# Patient Record
Sex: MALE | Race: WHITE | NOT HISPANIC OR LATINO | URBAN - METROPOLITAN AREA
[De-identification: names, ages, dates, MRNs, and addresses within clinical notes are randomized per-mention and may not be internally consistent; named-entity substitution may affect disease eponyms.]

---

## 2021-12-20 PROBLEM — I63.9 CVA (CEREBRAL VASCULAR ACCIDENT): Status: ACTIVE | Noted: 2021-12-20

## 2021-12-21 ENCOUNTER — HOSPITAL ENCOUNTER (INPATIENT)
Facility: HOSPITAL | Age: 56
LOS: 16 days | Discharge: NURSING, REHAB OR LONG-TERM CARE | DRG: 064 | End: 2022-01-06
Attending: INTERNAL MEDICINE | Admitting: INTERNAL MEDICINE
Payer: MEDICAID

## 2021-12-21 DIAGNOSIS — I73.9 PAD (PERIPHERAL ARTERY DISEASE): ICD-10-CM

## 2021-12-21 DIAGNOSIS — I63.412 STROKE DUE TO EMBOLISM OF LEFT MIDDLE CEREBRAL ARTERY: ICD-10-CM

## 2021-12-21 DIAGNOSIS — I51.3 MURAL THROMBUS OF CARDIAC APEX: ICD-10-CM

## 2021-12-21 DIAGNOSIS — I63.9 ISCHEMIC STROKE: ICD-10-CM

## 2021-12-21 DIAGNOSIS — R09.89: ICD-10-CM

## 2021-12-21 DIAGNOSIS — I63.9 STROKE: ICD-10-CM

## 2021-12-21 DIAGNOSIS — I50.40 COMBINED SYSTOLIC AND DIASTOLIC CONGESTIVE HEART FAILURE, UNSPECIFIED HF CHRONICITY: Primary | ICD-10-CM

## 2021-12-21 DIAGNOSIS — R94.31 QT PROLONGATION: ICD-10-CM

## 2021-12-21 DIAGNOSIS — I42.9 CARDIOMYOPATHY: ICD-10-CM

## 2021-12-21 PROBLEM — I51.89 GRADE III DIASTOLIC DYSFUNCTION: Status: ACTIVE | Noted: 2021-12-21

## 2021-12-21 PROBLEM — R53.81 DEBILITY: Status: ACTIVE | Noted: 2021-12-21

## 2021-12-21 PROBLEM — I10 ESSENTIAL HYPERTENSION: Status: ACTIVE | Noted: 2021-12-21

## 2021-12-21 PROBLEM — G93.6 CYTOTOXIC CEREBRAL EDEMA: Status: ACTIVE | Noted: 2021-12-21

## 2021-12-21 LAB
ABO + RH BLD: NORMAL
BLD GP AB SCN CELLS X3 SERPL QL: NORMAL
CHOLEST SERPL-MCNC: 162 MG/DL (ref 120–199)
CHOLEST/HDLC SERPL: 2.8 {RATIO} (ref 2–5)
HDLC SERPL-MCNC: 58 MG/DL (ref 40–75)
HDLC SERPL: 35.8 % (ref 20–50)
LDLC SERPL CALC-MCNC: 89.2 MG/DL (ref 63–159)
NONHDLC SERPL-MCNC: 104 MG/DL
POCT GLUCOSE: 83 MG/DL (ref 70–110)
TRIGL SERPL-MCNC: 74 MG/DL (ref 30–150)

## 2021-12-21 PROCEDURE — 25000003 PHARM REV CODE 250

## 2021-12-21 PROCEDURE — 81001 URINALYSIS AUTO W/SCOPE: CPT

## 2021-12-21 PROCEDURE — 99223 PR INITIAL HOSPITAL CARE,LEVL III: ICD-10-PCS | Mod: ,,, | Performed by: PSYCHIATRY & NEUROLOGY

## 2021-12-21 PROCEDURE — 99223 1ST HOSP IP/OBS HIGH 75: CPT | Mod: ,,, | Performed by: NEUROLOGICAL SURGERY

## 2021-12-21 PROCEDURE — 80061 LIPID PANEL: CPT

## 2021-12-21 PROCEDURE — 36415 COLL VENOUS BLD VENIPUNCTURE: CPT | Performed by: INTERNAL MEDICINE

## 2021-12-21 PROCEDURE — 99223 PR INITIAL HOSPITAL CARE,LEVL III: ICD-10-PCS | Mod: ,,, | Performed by: NEUROLOGICAL SURGERY

## 2021-12-21 PROCEDURE — 20000000 HC ICU ROOM

## 2021-12-21 PROCEDURE — 99291 CRITICAL CARE FIRST HOUR: CPT | Mod: ,,,

## 2021-12-21 PROCEDURE — 99223 1ST HOSP IP/OBS HIGH 75: CPT | Mod: ,,, | Performed by: PSYCHIATRY & NEUROLOGY

## 2021-12-21 PROCEDURE — 99291 PR CRITICAL CARE, E/M 30-74 MINUTES: ICD-10-PCS | Mod: ,,,

## 2021-12-21 PROCEDURE — 86850 RBC ANTIBODY SCREEN: CPT

## 2021-12-21 RX ORDER — ONDANSETRON 2 MG/ML
4 INJECTION INTRAMUSCULAR; INTRAVENOUS EVERY 8 HOURS PRN
Status: DISCONTINUED | OUTPATIENT
Start: 2021-12-21 | End: 2022-01-06 | Stop reason: HOSPADM

## 2021-12-21 RX ORDER — ACETAMINOPHEN 325 MG/1
650 TABLET ORAL EVERY 6 HOURS PRN
Status: DISCONTINUED | OUTPATIENT
Start: 2021-12-21 | End: 2021-12-28

## 2021-12-21 RX ORDER — SODIUM,POTASSIUM PHOSPHATES 280-250MG
2 POWDER IN PACKET (EA) ORAL
Status: DISCONTINUED | OUTPATIENT
Start: 2021-12-21 | End: 2021-12-28

## 2021-12-21 RX ORDER — LANOLIN ALCOHOL/MO/W.PET/CERES
800 CREAM (GRAM) TOPICAL
Status: DISCONTINUED | OUTPATIENT
Start: 2021-12-21 | End: 2021-12-28

## 2021-12-21 RX ORDER — ASPIRIN 81 MG/1
81 TABLET ORAL DAILY
Status: DISCONTINUED | OUTPATIENT
Start: 2021-12-22 | End: 2021-12-22

## 2021-12-21 RX ORDER — HYDRALAZINE HYDROCHLORIDE 20 MG/ML
10 INJECTION INTRAMUSCULAR; INTRAVENOUS EVERY 6 HOURS PRN
Status: DISCONTINUED | OUTPATIENT
Start: 2021-12-21 | End: 2021-12-22

## 2021-12-21 RX ORDER — SODIUM CHLORIDE 0.9 % (FLUSH) 0.9 %
10 SYRINGE (ML) INJECTION
Status: DISCONTINUED | OUTPATIENT
Start: 2021-12-21 | End: 2022-01-06 | Stop reason: HOSPADM

## 2021-12-21 RX ORDER — ATORVASTATIN CALCIUM 20 MG/1
40 TABLET, FILM COATED ORAL DAILY
Status: DISCONTINUED | OUTPATIENT
Start: 2021-12-22 | End: 2022-01-06 | Stop reason: HOSPADM

## 2021-12-21 RX ORDER — LABETALOL HCL 20 MG/4 ML
10 SYRINGE (ML) INTRAVENOUS EVERY 6 HOURS PRN
Status: DISCONTINUED | OUTPATIENT
Start: 2021-12-21 | End: 2021-12-22

## 2021-12-21 RX ORDER — AMOXICILLIN 250 MG
1 CAPSULE ORAL 2 TIMES DAILY
Status: DISCONTINUED | OUTPATIENT
Start: 2021-12-21 | End: 2021-12-28

## 2021-12-21 RX ADMIN — SENNOSIDES AND DOCUSATE SODIUM 1 TABLET: 50; 8.6 TABLET ORAL at 08:12

## 2021-12-21 NOTE — SUBJECTIVE & OBJECTIVE
Past Medical History:   Diagnosis Date    Coronary artery disease     Hypertension      No past surgical history on file.  No family history on file.  Social History     Tobacco Use    Smoking status: Light Tobacco Smoker   Substance Use Topics    Alcohol use: Not Currently     Review of patient's allergies indicates:  No Known Allergies    Medications: I have reviewed the current medication administration record.    No medications prior to admission.       Review of Systems   Unable to perform ROS: Patient nonverbal   Constitutional: Negative for fever.   HENT: Positive for trouble swallowing.    Eyes: Positive for discharge (left eye).   Gastrointestinal: Negative for vomiting.   Skin: Negative for rash.   Neurological: Positive for speech difficulty and weakness.     Objective:     Vital Signs (Most Recent):  Temp: 98.7 °F (37.1 °C) (12/21/21 1717)  Pulse: 88 (12/21/21 1717)  Resp: 18 (12/21/21 1717)  BP: (!) 149/96 (12/21/21 1717)  SpO2: 100 % (12/21/21 1717)    Vital Signs Range (Last 24H):  Temp:  [97.2 °F (36.2 °C)-98.7 °F (37.1 °C)]   Pulse:  []   Resp:  [18-29]   BP: (110-149)/(66-96)   SpO2:  [93 %-100 %]     Physical Exam  Vitals and nursing note reviewed.   Constitutional:       Appearance: He is normal weight. He is not diaphoretic.   HENT:      Head: Normocephalic and atraumatic.      Right Ear: External ear normal.      Left Ear: External ear normal.      Nose:      Comments: NGT in place  Eyes:      General:         Left eye: Discharge present.     Pupils: Pupils are equal, round, and reactive to light.      Comments: L gaze preference   Cardiovascular:      Rate and Rhythm: Normal rate.      Pulses: Normal pulses.   Pulmonary:      Effort: Pulmonary effort is normal. No respiratory distress.   Abdominal:      General: Abdomen is flat. There is no distension.   Musculoskeletal:         General: No deformity or signs of injury.      Cervical back: Normal range of motion. No rigidity.       Right lower leg: No edema.      Left lower leg: No edema.   Skin:     General: Skin is warm and dry.      Findings: No rash.   Neurological:      Mental Status: He is alert.      Cranial Nerves: Cranial nerve deficit, dysarthria and facial asymmetry present.      Motor: Weakness present.   Psychiatric:      Comments: Unable to assess 2/2 severe aphasia         Neurological Exam:   LOC: alert  Attention Span: Good   Language: Expressive aphasia  Articulation: Dysarthria  Orientation: Untestable due to severe aphasia   Visual Fields: Full  EOM (CN III, IV, VI): Gaze preference  left  Pupils (CN II, III): PERRL  Facial Movement (CN VII): Lower facial weakness on the Right  Motor: Arm left  Normal 5/5  Leg left  Normal 5/5  Arm right  Plegia 0/5  Leg right Paresis: 1/5  Sensation: withdraws to pain in bilateral LE      Laboratory:  CMP:   Recent Labs   Lab 12/21/21 0453   CALCIUM 8.6*   ALBUMIN 3.4*   PROT 6.5      K 3.9   CO2 19*      BUN 10   CREATININE 0.8   ALKPHOS 44*   ALT 22   AST 30   BILITOT 1.3*     CBC:   Recent Labs   Lab 12/21/21 0453   WBC 8.93   RBC 4.08*   HGB 13.6*   HCT 40.1   *   MCV 98   MCH 33.3*   MCHC 33.9     Lipid Panel: No results for input(s): CHOL, LDLCALC, HDL, TRIG in the last 168 hours.  Coagulation: No results for input(s): PT, INR, APTT in the last 168 hours.  Hgb A1C:   Recent Labs   Lab 12/21/21 0453   HGBA1C 4.9     TSH:   Recent Labs   Lab 12/21/21  0453   TSH 0.768       Diagnostic Results:      Brain imaging:  MRI Brain WO pending    CTH WO 12/21/21  Again demonstrated in better delineated as large left MCA distribution infarct with no associated acute hemorrhage and with localized mass effect resulting in effacement of overlying cortical sulci and partial effacement left sylvian fissure.  No associated acute hemorrhage.     Several small old areas of infarction again noted including cerebellum bilaterally, anterior left perisylvian region, lateral left  frontal cortex and posterior right parietal cortex.    CTH WO 12/20/21    Large area loss of gray-white differentiation consistent with acute infarct left MCA distribution involving the left temporal lobe, temporal occipital region and portions of the basal ganglia with localized mass effect including effacement of overlying cortical sulci although with no associated acute hemorrhage.     Several small old areas of infarction are evident including anterior left perisylvian and cerebellum bilaterally.  Mild underlying atrophy.    Vessel Imaging:  CTA H/N 12/20/21  Segmental occlusion measuring approximately 10 mm mid and distal M1 segment left MCA with diminished flow distal to the stenosis.     Right cervical carotid circulation--atherosclerotic calcification bifurcation without measurable stenosis.     Left cervical carotid circulation--atherosclerotic calcification distal left common carotid artery and bifurcation with stenosis proximal left ICA measuring 16% utilizing NASCET criteria.  Mild eccentric narrowing mid to distal portion left common carotid artery also noted.     Vertebral arteries--no focal arterial abnormality or measurable stenosis.    Cardiac Evaluation:   TTE with bubble 12/21/21  1. The left ventricle (LV) is dilated with severely depressed systolic function & global hypokinesis.  2. LV ejection fraction is 15-20%. Grade III LV diastolic dysfunction.  3. Small to moderate sized (1.6x1.1 cm) layered, protruding apical mass c/w thrombus.  4. The right ventricle (RV) is severely enlarged with normal systolic function.   5. Estimated RVSP is moderately elevated (50-70 mmHg). Pulmonary hypertension is present.  6. Severe atrial enlargement.  7. Moderate eccentric mitral regrugitation.  8. Mild tricuspid regurgitation.

## 2021-12-21 NOTE — HPI
Mr. Leger is a 57 yo male with unknown past medical history who presents to [facility] with a L MCA CVA without intervention. He originally presented to [facility] by EMS on 12/20 after being found down by a coworker. LKW uncertain. 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 The coworker checked on the patient the following day, and he was found down with only a shirt, which lead to EMS being called. On presentation to [facility], the patient was nonverbal to command, with right sided hemiparesis. Stroke protocol initiated with Tele stoke evaluation, and the patient was not deemed a candidate for any acute intervention. CT at [facility] with large left MCA distribution infarct with no associated acute hemorrhage  CTA at [facility] significant for segmental occlusion measuring approximately 10 mm mid and distal M1 segment left MCA with diminished flow distal to the stenosis. He has not yet had an MRI. Of note, ECHO at [facility] is significant for an EF of 15-20% with an apical mass consistent with a thrombus. He was started on a statin and DAPT at [facility] but never appeared to have enteral access so was only given ASA suppositories, per chart review. He was transferred to [facility] on 12/21.    He will be admitted to [facility] for hourly neuromonitoring and a higher level of care.

## 2021-12-21 NOTE — ASSESSMENT & PLAN NOTE
Seen on TTE at outside facility  -TTE 12/21/21: with EF of 15-20%, small to moderate sized (1.6x1.1 cm) layered, protruding apical mass c/w thrombus, global hypokinesis, severe atrial enlargement.  -Case discussed with stroke fellow. Recommend AC with heparin gtt MINIMAL intensity, NO bolus.

## 2021-12-21 NOTE — SUBJECTIVE & OBJECTIVE
Past Medical History:   Diagnosis Date    Coronary artery disease     Hypertension      No past surgical history on file.   Current Facility-Administered Medications on File Prior to Encounter   Medication Dose Route Frequency Provider Last Rate Last Admin    [] 0.9%  NaCl infusion   Intravenous Continuous Maryjane Verduzco  mL/hr at 21 0610 New Bag at 21 0610    [COMPLETED] aspirin suppository 300 mg  300 mg Rectal Once Fito Rodríguez MD   300 mg at 21 1103    [COMPLETED] iohexoL (OMNIPAQUE 350) injection 70 mL  70 mL Intravenous ONCE PRN Maryjane Verduzco MD   70 mL at 21 191    [COMPLETED] perflutren protein-A microsphr 0.22 mg/mL IV susp  0.5 mL Intravenous Once Artur Shah MD   0.11 mg at 21 1051    [DISCONTINUED] 0.9%  NaCl infusion   Intravenous Continuous Gilda Romero  mL/hr at 21 1454 New Bag at 21 1454    [DISCONTINUED] acetaminophen tablet 650 mg  650 mg Oral Q8H PRN Maryjane Verduzco MD        [DISCONTINUED] aspirin chewable tablet 81 mg  81 mg Oral Daily Maryjane Verduzco MD        [DISCONTINUED] aspirin tablet 325 mg  325 mg Oral Once Maryjane Verduzco MD        [DISCONTINUED] atorvastatin tablet 80 mg  80 mg Oral QHS Maryjane Verduzco MD        [DISCONTINUED] clopidogreL tablet 75 mg  75 mg Oral Daily Maryjane Verduzco MD        [DISCONTINUED] clopidogreL tablet 75 mg  75 mg Oral Daily Fito Rodríguez MD        [DISCONTINUED] dextrose 50% injection 12.5 g  12.5 g Intravenous PRN Maryjane Verduzco MD        [DISCONTINUED] dextrose 50% injection 25 g  25 g Intravenous PRN Maryajne Verduzco MD        [DISCONTINUED] enoxaparin injection 40 mg  40 mg Subcutaneous Daily Maryjane Verduzco MD   40 mg at 21    [DISCONTINUED] glucagon (human recombinant) injection 1 mg  1 mg Intramuscular PRN Maryjane Verduzco MD         [DISCONTINUED] glucose chewable tablet 16 g  16 g Oral PRN Maryjane Verduzco MD        [DISCONTINUED] glucose chewable tablet 24 g  24 g Oral PRN Maryjane Verduzco MD        [DISCONTINUED] naloxone 0.4 mg/mL injection 0.02 mg  0.02 mg Intravenous PRN Maryjane Verduzco MD        [DISCONTINUED] sodium chloride 0.9% flush 10 mL  10 mL Intravenous Q12H PRN Maryjane Verduzco MD         No current outpatient medications on file prior to encounter.      Allergies: Patient has no known allergies.    No family history on file.  Social History     Tobacco Use    Smoking status: Light Tobacco Smoker   Substance Use Topics    Alcohol use: Not Currently     Review of Systems   Unable to perform ROS: Acuity of condition     Objective:     Vitals:    Temp: 98.7 °F (37.1 °C)  Pulse: 88  BP: (!) 149/96  MAP (mmHg): 117  Resp: 18  SpO2: 100 %  O2 Device (Oxygen Therapy): room air    Temp  Min: 97.2 °F (36.2 °C)  Max: 98.7 °F (37.1 °C)  Pulse  Min: 64  Max: 103  BP  Min: 110/66  Max: 149/96  MAP (mmHg)  Min: 90  Max: 117  Resp  Min: 18  Max: 29  SpO2  Min: 93 %  Max: 100 %    No intake/output data recorded.           Physical Exam   Constitutional: Well-nourished, well-developed. No obvious distress.  Eyes: Clear conjunctiva. Anicteric. No discharge. Lids without lesions.  HEENT: MMM. Nose, external ears atraumatic.  Cardio: RRR. Pulses intact. Capillary refill time < 2 seconds.  Respiratory: Clear to auscultation. Regular effort.  GI: Bowel sounds present. Soft, non-distended, non-tender.    Neurologic:  E4V2M6  Alert, grunting  Severely dysarthric  Follows commands intermittently, difficult to identify if this is effort dependent or aphasia dependent  PERRL  Pupils brisk  RUE extends  RLE WD  Moves left side spontaneously and antigravity    NIH Scale  1a. 0  1b. 1  1c. 1  2.   1  3.   0  4.   2  5a. 0  5b. 3  6a. 0  6b. 3  7.   0  8.   1 (difficult to assess due to aphasia)  9.   2  10.  2  11. 0    [facility]: 16      Today I personally reviewed pertinent medications, lines/drains/airways, imaging, cardiology results, laboratory results, microbiology results, notably:

## 2021-12-21 NOTE — ASSESSMENT & PLAN NOTE
Small to moderate sized (1.6x1.1 cm) layered, protruding apical mass c/w thrombus.  -Noted on ECHO from 12/21  -Hold AC until 72 hours post CVA and after MRI to determine stroke burden

## 2021-12-21 NOTE — ASSESSMENT & PLAN NOTE
1. The left ventricle (LV) is dilated with severely depressed systolic function & global hypokinesis.  2. LV ejection fraction is 15-20%. Grade III LV diastolic dysfunction.  3. Small to moderate sized (1.6x1.1 cm) layered, protruding apical mass c/w thrombus.  4. The right ventricle (RV) is severely enlarged with normal systolic function.   5. Estimated RVSP is moderately elevated (50-70 mmHg). Pulmonary hypertension is present.  6. Severe atrial enlargement.  7. Moderate eccentric mitral regrugitation.  8. Mild tricuspid regurgitation.     ECHO 12/21

## 2021-12-21 NOTE — ASSESSMENT & PLAN NOTE
Area of cytotoxic cerebral edema identified when reviewing brain imaging in the territory of the L middle cerebral artery. There is mass effect associated with it. Continue to monitor the patients clinical exam for any worsening of symptoms which may indicate expansion of the stroke or the area of the edema resulting in the clinical change. The pattern is suggestive of embolic etiology

## 2021-12-21 NOTE — NURSING
Patient arrived to [facility] from [facility] via [id number]    Type of stroke/diagnosis:  L MCA stroke    TPA start and end time n/a    Thrombectomy start and end time n/a    Current symptoms: aphasic, squeeze L hand, withdraw R side, appears to have inattention of R side, making moans/grunts nose     Skin assessment done: Yes  Wounds noted: scabbing to right and left wrist     *If wounds noted, was Wound Care consulted?no    White Completed? Pending, NGT in place    Patient Belongings on Admit: none    NCC notified: IVON Georges

## 2021-12-22 LAB
ALBUMIN SERPL BCP-MCNC: 3.2 G/DL (ref 3.5–5.2)
ALP SERPL-CCNC: 42 U/L (ref 55–135)
ALT SERPL W/O P-5'-P-CCNC: 18 U/L (ref 10–44)
ANION GAP SERPL CALC-SCNC: 9 MMOL/L (ref 8–16)
AST SERPL-CCNC: 32 U/L (ref 10–40)
BASOPHILS # BLD AUTO: 0.03 K/UL (ref 0–0.2)
BASOPHILS NFR BLD: 0.3 % (ref 0–1.9)
BILIRUB SERPL-MCNC: 1.3 MG/DL (ref 0.1–1)
BILIRUB UR QL STRIP: NEGATIVE
BUN SERPL-MCNC: 11 MG/DL (ref 6–20)
CALCIUM SERPL-MCNC: 7.9 MG/DL (ref 8.7–10.5)
CHLORIDE SERPL-SCNC: 111 MMOL/L (ref 95–110)
CK SERPL-CCNC: 341 U/L (ref 20–200)
CLARITY UR REFRACT.AUTO: CLEAR
CO2 SERPL-SCNC: 19 MMOL/L (ref 23–29)
COLOR UR AUTO: YELLOW
CREAT SERPL-MCNC: 0.7 MG/DL (ref 0.5–1.4)
DIFFERENTIAL METHOD: ABNORMAL
EOSINOPHIL # BLD AUTO: 0 K/UL (ref 0–0.5)
EOSINOPHIL NFR BLD: 0.3 % (ref 0–8)
ERYTHROCYTE [DISTWIDTH] IN BLOOD BY AUTOMATED COUNT: 12.4 % (ref 11.5–14.5)
EST. GFR  (AFRICAN AMERICAN): >60 ML/MIN/1.73 M^2
EST. GFR  (NON AFRICAN AMERICAN): >60 ML/MIN/1.73 M^2
GLUCOSE SERPL-MCNC: 85 MG/DL (ref 70–110)
GLUCOSE UR QL STRIP: NEGATIVE
HCT VFR BLD AUTO: 36.5 % (ref 40–54)
HGB BLD-MCNC: 12.6 G/DL (ref 14–18)
HGB UR QL STRIP: ABNORMAL
HYALINE CASTS UR QL AUTO: 2 /LPF
IMM GRANULOCYTES # BLD AUTO: 0.05 K/UL (ref 0–0.04)
IMM GRANULOCYTES NFR BLD AUTO: 0.5 % (ref 0–0.5)
KETONES UR QL STRIP: ABNORMAL
LEUKOCYTE ESTERASE UR QL STRIP: ABNORMAL
LYMPHOCYTES # BLD AUTO: 1.5 K/UL (ref 1–4.8)
LYMPHOCYTES NFR BLD: 15.9 % (ref 18–48)
MAGNESIUM SERPL-MCNC: 1.5 MG/DL (ref 1.6–2.6)
MAGNESIUM SERPL-MCNC: 1.8 MG/DL (ref 1.6–2.6)
MCH RBC QN AUTO: 34.4 PG (ref 27–31)
MCHC RBC AUTO-ENTMCNC: 34.5 G/DL (ref 32–36)
MCV RBC AUTO: 100 FL (ref 82–98)
MICROSCOPIC COMMENT: ABNORMAL
MONOCYTES # BLD AUTO: 0.7 K/UL (ref 0.3–1)
MONOCYTES NFR BLD: 7.3 % (ref 4–15)
NEUTROPHILS # BLD AUTO: 6.9 K/UL (ref 1.8–7.7)
NEUTROPHILS NFR BLD: 75.7 % (ref 38–73)
NITRITE UR QL STRIP: NEGATIVE
NRBC BLD-RTO: 0 /100 WBC
PH UR STRIP: 5 [PH] (ref 5–8)
PHOSPHATE SERPL-MCNC: 2.2 MG/DL (ref 2.7–4.5)
PHOSPHATE SERPL-MCNC: 2.4 MG/DL (ref 2.7–4.5)
PLATELET # BLD AUTO: 83 K/UL (ref 150–450)
PLATELET BLD QL SMEAR: ABNORMAL
PMV BLD AUTO: 11 FL (ref 9.2–12.9)
POCT GLUCOSE: 105 MG/DL (ref 70–110)
POTASSIUM SERPL-SCNC: 4 MMOL/L (ref 3.5–5.1)
PROT SERPL-MCNC: 6.2 G/DL (ref 6–8.4)
PROT UR QL STRIP: NEGATIVE
RBC # BLD AUTO: 3.66 M/UL (ref 4.6–6.2)
RBC #/AREA URNS AUTO: 16 /HPF (ref 0–4)
SODIUM SERPL-SCNC: 139 MMOL/L (ref 136–145)
SP GR UR STRIP: 1.02 (ref 1–1.03)
URN SPEC COLLECT METH UR: ABNORMAL
WBC # BLD AUTO: 9.17 K/UL (ref 3.9–12.7)
WBC #/AREA URNS AUTO: 8 /HPF (ref 0–5)

## 2021-12-22 PROCEDURE — 63600175 PHARM REV CODE 636 W HCPCS: Performed by: PHYSICIAN ASSISTANT

## 2021-12-22 PROCEDURE — 99233 PR SUBSEQUENT HOSPITAL CARE,LEVL III: ICD-10-PCS | Mod: ,,, | Performed by: PSYCHIATRY & NEUROLOGY

## 2021-12-22 PROCEDURE — 99291 CRITICAL CARE FIRST HOUR: CPT | Mod: ,,, | Performed by: PHYSICIAN ASSISTANT

## 2021-12-22 PROCEDURE — 80053 COMPREHEN METABOLIC PANEL: CPT

## 2021-12-22 PROCEDURE — 94761 N-INVAS EAR/PLS OXIMETRY MLT: CPT

## 2021-12-22 PROCEDURE — 84100 ASSAY OF PHOSPHORUS: CPT | Mod: 91 | Performed by: INTERNAL MEDICINE

## 2021-12-22 PROCEDURE — 97530 THERAPEUTIC ACTIVITIES: CPT

## 2021-12-22 PROCEDURE — 83735 ASSAY OF MAGNESIUM: CPT | Mod: 91 | Performed by: INTERNAL MEDICINE

## 2021-12-22 PROCEDURE — 85025 COMPLETE CBC W/AUTO DIFF WBC: CPT

## 2021-12-22 PROCEDURE — 25000003 PHARM REV CODE 250

## 2021-12-22 PROCEDURE — 84100 ASSAY OF PHOSPHORUS: CPT

## 2021-12-22 PROCEDURE — 99233 SBSQ HOSP IP/OBS HIGH 50: CPT | Mod: ,,, | Performed by: PSYCHIATRY & NEUROLOGY

## 2021-12-22 PROCEDURE — 25000003 PHARM REV CODE 250: Performed by: PHYSICIAN ASSISTANT

## 2021-12-22 PROCEDURE — 20000000 HC ICU ROOM

## 2021-12-22 PROCEDURE — 97165 OT EVAL LOW COMPLEX 30 MIN: CPT

## 2021-12-22 PROCEDURE — 99233 SBSQ HOSP IP/OBS HIGH 50: CPT | Mod: ,,, | Performed by: NEUROLOGICAL SURGERY

## 2021-12-22 PROCEDURE — 82550 ASSAY OF CK (CPK): CPT

## 2021-12-22 PROCEDURE — 97112 NEUROMUSCULAR REEDUCATION: CPT

## 2021-12-22 PROCEDURE — 99233 PR SUBSEQUENT HOSPITAL CARE,LEVL III: ICD-10-PCS | Mod: ,,, | Performed by: NEUROLOGICAL SURGERY

## 2021-12-22 PROCEDURE — 83735 ASSAY OF MAGNESIUM: CPT

## 2021-12-22 PROCEDURE — 97162 PT EVAL MOD COMPLEX 30 MIN: CPT

## 2021-12-22 PROCEDURE — 99291 PR CRITICAL CARE, E/M 30-74 MINUTES: ICD-10-PCS | Mod: ,,, | Performed by: PHYSICIAN ASSISTANT

## 2021-12-22 RX ORDER — NAPROXEN SODIUM 220 MG/1
81 TABLET, FILM COATED ORAL DAILY
Status: DISCONTINUED | OUTPATIENT
Start: 2021-12-22 | End: 2021-12-24

## 2021-12-22 RX ORDER — METOPROLOL TARTRATE 25 MG/1
12.5 TABLET ORAL 2 TIMES DAILY
Status: DISCONTINUED | OUTPATIENT
Start: 2021-12-22 | End: 2021-12-23

## 2021-12-22 RX ORDER — HYDRALAZINE HYDROCHLORIDE 20 MG/ML
10 INJECTION INTRAMUSCULAR; INTRAVENOUS EVERY 6 HOURS PRN
Status: DISCONTINUED | OUTPATIENT
Start: 2021-12-22 | End: 2021-12-24

## 2021-12-22 RX ORDER — LABETALOL HCL 20 MG/4 ML
10 SYRINGE (ML) INTRAVENOUS EVERY 6 HOURS PRN
Status: DISCONTINUED | OUTPATIENT
Start: 2021-12-22 | End: 2021-12-24

## 2021-12-22 RX ORDER — HEPARIN SODIUM 5000 [USP'U]/ML
5000 INJECTION, SOLUTION INTRAVENOUS; SUBCUTANEOUS EVERY 8 HOURS
Status: DISCONTINUED | OUTPATIENT
Start: 2021-12-22 | End: 2021-12-23

## 2021-12-22 RX ADMIN — SODIUM CHLORIDE TAB 1 GM 2 G: 1 TAB at 09:12

## 2021-12-22 RX ADMIN — SENNOSIDES AND DOCUSATE SODIUM 1 TABLET: 50; 8.6 TABLET ORAL at 10:12

## 2021-12-22 RX ADMIN — SENNOSIDES AND DOCUSATE SODIUM 1 TABLET: 50; 8.6 TABLET ORAL at 09:12

## 2021-12-22 RX ADMIN — ASPIRIN 81 MG CHEWABLE TABLET 81 MG: 81 TABLET CHEWABLE at 09:12

## 2021-12-22 RX ADMIN — POTASSIUM & SODIUM PHOSPHATES POWDER PACK 280-160-250 MG 2 PACKET: 280-160-250 PACK at 06:12

## 2021-12-22 RX ADMIN — Medication 800 MG: at 09:12

## 2021-12-22 RX ADMIN — METOPROLOL TARTRATE 12.5 MG: 25 TABLET, FILM COATED ORAL at 09:12

## 2021-12-22 RX ADMIN — ATORVASTATIN CALCIUM 40 MG: 20 TABLET, FILM COATED ORAL at 09:12

## 2021-12-22 RX ADMIN — SODIUM CHLORIDE TAB 1 GM 2 G: 1 TAB at 02:12

## 2021-12-22 RX ADMIN — Medication 800 MG: at 06:12

## 2021-12-22 RX ADMIN — SODIUM CHLORIDE TAB 1 GM 2 G: 1 TAB at 10:12

## 2021-12-22 RX ADMIN — POTASSIUM & SODIUM PHOSPHATES POWDER PACK 280-160-250 MG 2 PACKET: 280-160-250 PACK at 09:12

## 2021-12-22 RX ADMIN — HEPARIN SODIUM 5000 UNITS: 5000 INJECTION INTRAVENOUS; SUBCUTANEOUS at 10:12

## 2021-12-22 RX ADMIN — METOPROLOL TARTRATE 12.5 MG: 25 TABLET, FILM COATED ORAL at 10:12

## 2021-12-22 RX ADMIN — HEPARIN SODIUM 5000 UNITS: 5000 INJECTION INTRAVENOUS; SUBCUTANEOUS at 09:12

## 2021-12-22 NOTE — SUBJECTIVE & OBJECTIVE
Interval History:  NAEO, stroke day 3. NSGY following, stroke team following. Continue to monitor neuro exam closely. Hemicrani watch, intubation watch. Strict BP and HR control since patient with LV thrombus, cannot safely AC yet 2/2 to acute large territory stroke. Start scheduled lopressor 12.5 BID     Review of Systems   Unable to perform ROS: Patient nonverbal     Objective:     Vitals:  Temp: 98.7 °F (37.1 °C)  Pulse: 80  Rhythm: normal sinus rhythm  BP: (!) 140/90  MAP (mmHg): 110  Resp: (!) 21  SpO2: 97 %  O2 Device (Oxygen Therapy): room air    Temp  Min: 97.9 °F (36.6 °C)  Max: 98.7 °F (37.1 °C)  Pulse  Min: 74  Max: 98  BP  Min: 126/87  Max: 155/87  MAP (mmHg)  Min: 1  Max: 117  Resp  Min: 11  Max: 23  SpO2  Min: 93 %  Max: 100 %    12/21 0701 - 12/22 0700  In: -   Out: 250 [Urine:250]   Unmeasured Output  Urine Occurrence: 1  Pad Count: 1       Physical Exam  Physical Exam   Constitutional: Well-nourished, well-developed. No obvious distress.  Eyes: Clear conjunctiva. Anicteric. No discharge. Lids without lesions.  HEENT: MMM. Nose, external ears atraumatic.  Cardio: RRR. Pulses intact. Capillary refill time < 2 seconds.  Respiratory: Clear to auscultation. Regular effort.  GI: Bowel sounds present. Soft, non-distended, non-tender.     Neurologic:  E4V2M6  Alert  Severely dysarthric  Mixed aphasia   PERRL  RUE extends  RLE WD  Moves left side spontaneously and antigravity     NIH Scale  1a. 0  1b. 1  1c. 1  2.   1  3.   0  4.   2  5a. 0  5b. 3  6a. 0  6b. 3  7.   0  8.   1 (difficult to assess due to aphasia)  9.   2  10. 2  11. 0     NIH: 16    Medications:  Continuous Scheduledaspirin, 81 mg, Daily  atorvastatin, 40 mg, Daily  heparin (porcine), 5,000 Units, Q8H  metoprolol tartrate, 12.5 mg, BID  senna-docusate 8.6-50 mg, 1 tablet, BID  sodium chloride, 2 g, TID    PRNacetaminophen, 650 mg, Q6H PRN  hydrALAZINE, 10 mg, Q6H PRN  labetalol, 10 mg, Q6H PRN  magnesium oxide, 800 mg, PRN  magnesium oxide,  800 mg, PRN  ondansetron, 4 mg, Q8H PRN  potassium bicarbonate, 35 mEq, PRN  potassium bicarbonate, 50 mEq, PRN  potassium bicarbonate, 60 mEq, PRN  potassium, sodium phosphates, 2 packet, PRN  potassium, sodium phosphates, 2 packet, PRN  potassium, sodium phosphates, 2 packet, PRN  sodium chloride 0.9%, 10 mL, PRN      Today I personally reviewed pertinent medications, lines/drains/airways, imaging, cardiology results, laboratory results, microbiology results,   Diet  Diet NPO  Diet NPO

## 2021-12-22 NOTE — HOSPITAL COURSE
Patient was admitted to [facility] for L MCA on som-craniotomy watch. He was started on heparin gtt for cardiac thrombus; however, this was temporarily discontinued due to petechial hemorrhagic conversion. Patient's stay also complicated by runs of V Tach (12/24-12/25) and a CTA finding of subsegmental PE. Heparin drip was restarted, and patient was later stepped down to NPU. Patient also was seen by cardiology as inpatient for HF. Entresto, metoprolol, and spironolactone were initiated, lisinopril d/c. He will follow up with cardiology as an outpatient. Patient was bridged from heparin gtt to coumadin. INR became therapeutic on 1/6/22 (2.2). He will be discharged to [facility] with OP follow up with KENNEDI.

## 2021-12-22 NOTE — PT/OT/SLP EVAL
Physical Therapy Evaluation and Treatment    Patient Name:  Jimmy Leger   MRN:  [id number]    Recommendations:     Discharge Recommendations:  rehabilitation facility   Discharge Equipment Recommendations:  (TBD)   Barriers to discharge: Increased level of assist    Assessment:     Jimmy Leger is a 56 y.o. male admitted with a medical diagnosis of Stroke due to embolism of left middle cerebral artery.  He presents with the following impairments/functional limitations:  weakness,impaired endurance,impaired self care skills,impaired functional mobilty,gait instability,impaired balance,impaired cognition,decreased upper extremity function,decreased lower extremity function,decreased safety awareness,abnormal tone,decreased ROM,impaired coordination,impaired fine motor. These deficits affect their roles and responsibilities in which they were able to complete prior to admit. Jimmy Leger would benefit from acute PT intervention to improve quality of life and focus on recovery of impairments. Once medically stable, recommending pt discharge to Inpatient Rehabilitation Facility. Patient attempting to verbalize during session but largely unintelligible. Requires maximal-total assistance for all mobility.    Rehab Prognosis: Good; patient would benefit from acute skilled PT services 4 x/week to address these deficits and reach maximum level of function. Patient is most appropriate to go to rehabilitation facility.  Recent Surgery: * No surgery found *      Plan:     During this hospitalization, patient to be seen 4 x/week to address the identified rehab impairments via gait training,therapeutic activities,therapeutic exercises,neuromuscular re-education and progress toward the following goals:    · Plan of Care Expires:  01/22/22    Subjective     Chief Complaint: Unable to assess, patient non-verbal   Patient/Family Comments/Goals: Unable to assess, patient non-verbal   Pain/Comfort:  · Pain Rating 1: 0/10  · Pain Rating  Post-Intervention 1: 0/10    Patients cultural, spiritual, Faith conflicts given the current situation: no    Living Environment:  Unable to obtain patient history due to aphasia. 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    Objective:     Communicated with nursing prior to session.  Patient found HOB elevated with telemetry,pulse ox (continuous),Condom Catheter,blood pressure cuff,NG tube,bed alarm upon PT entry to room.    General Precautions: Standard, aspiration,fall,NPO   Orthopedic Precautions:N/A   Braces: N/A    Exams:  · Cognitive Exam:  unable to assess, patient non-verbal, follows commands 50% of the time  · RLE ROM: WFL  · RLE Strength: 0/5  · LLE ROM: WFL  · LLE Strength: 4-/5 grossly  · Sensation: nods to being able to sense LLE, unable to determine on RLE    Functional Mobility:  · Bed Mobility:     · Rolling Right: maximal assistance  · Scooting: maximal assistance  · Supine to Sit: total assistance of 2 persons  · Sit to Supine: maximal assistance of 2 persons  · Transfers:  Deferred due to poor sitting balance   · Balance:   · Static Sitting: Poor, able to maintain for 10 minute(s) with maximal - total assistance, patient demonstrates significant R lean slightly improved after LUE WB, requires maximal verbal and tactile cues to orient to midline  · Dynamic Sitting: Poor: Patient unable to accept challenge or move without loss of balance, total assistance    Therapeutic Activities and Exercises:  Patient educated on role of acute care PT and PT POC and call light usage  Whiteboard updated    AM-PAC 6 CLICK MOBILITY  Total Score:7     Patient left HOB elevated with all lines intact, call button in reach, RN notified and bed alarm on.    GOALS:   Multidisciplinary Problems     Physical Therapy Goals        Problem: Physical Therapy Goal    Goal Priority Disciplines Outcome Goal Variances Interventions   Physical Therapy Goal     PT, PT/OT Ongoing, Progressing      Description: Goals to be met by: 2022     Patient will increase functional independence with mobility by performin. Supine to sit with moderate assistance  2. Sit to supine with minimum assistance  3. Rolling to Left with maximal assistance  4. Rolling to Right with minimum assistance  5. Sit to stand transfer with moderate assistance  6. Bed to chair transfer with moderate assistance using LRAD as needed  7. Gait  x 10 feet with maximal assistance using LRAD as needed  8. Lower extremity exercise program x10 reps per handout, with independence                    History:     Past Medical History:   Diagnosis Date    Coronary artery disease     Hypertension        No past surgical history on file.    Time Tracking:     PT Received On: 21  PT Start Time: 1002     PT Stop Time: 1020  PT Total Time (min): 18 min     Billable Minutes: Evaluation 8 min Neuromuscular Re-education 10 min    2021    Therapy tech utilized for session to maximize physical performance and safety

## 2021-12-22 NOTE — PROGRESS NOTES
[facility]  Neurosurgery  Progress Note    Subjective:     History of Present Illness: 56 M with unknown past medical history who presents as transfer with L MCA CVA without intervention. Patient with out family bedside for collateral history. Following history taken from chart review:  He [facility] presented [facility] by [facility] on 12/20 after being found down by a coworker. LKW uncertain. 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 The coworker checked on the patient the following day, and he was found down with only a shirt, which lead to EMS being called. On presentation to OSH, the patient was nonverbal to command, with right sided hemiparesis. Stroke protocol initiated with Tele stoke evaluation, and the patient was not deemed a candidate for any acute intervention. CTA at OSH significant for segmental occlusion in L MCA with diminished flow distal to the stenosis. If note, ECHO at [facility] is significant for an EF of 15-20% with an apical mass consistent with a thrombus. He was started on a statin and DAPT at OSH but never appeared to have enteral access so was only given ASA suppositories, per chart review. AC per NCC pending MRI.     NIHSS 17    CTH with multifocal L MCA infarct w/o MLS. Na 138.      Post-Op Info:  * No surgery found *         Interval History: PSD3. NAEON. MRI reviewed. Na 139    Medications:  Continuous Infusions:  Scheduled Meds:   aspirin  81 mg Oral Daily    atorvastatin  40 mg Oral Daily    senna-docusate 8.6-50 mg  1 tablet Per NG tube BID     PRN Meds:acetaminophen, hydrALAZINE, labetalol, magnesium oxide, magnesium oxide, ondansetron, potassium bicarbonate, potassium bicarbonate, potassium bicarbonate, potassium, sodium phosphates, potassium, sodium phosphates, potassium, sodium phosphates, sodium chloride 0.9%     Review of Systems  Objective:         There is no height or weight on file to calculate BMI.  Vital Signs (Most Recent):  Temp: 98.7 °F (37.1 °C) (12/22/21 0301)  Pulse: 76 (12/22/21 0836)  Resp: 16 (12/22/21 0836)  BP: (!) 134/94 (12/22/21 0836)  SpO2: 98 % (12/22/21 0836) Vital Signs (24h Range):  Temp:  [97.9 °F (36.6 °C)-98.7 °F (37.1 °C)] 98.7 °F (37.1 °C)  Pulse:  [74-98] 76  Resp:  [11-23] 16  SpO2:  [93 %-100 %] 98 %  BP: (126-155)/(81-97) 134/94                          NG/OG Tube 12/21/21 1410 Left nostril (Active)   Placement Check placement verified by x-ray;placement verified by distal tube length measurement 12/22/21 0301   Tolerance no signs/symptoms of discomfort 12/22/21 0301   Securement secured to nostril center w/ adhesive device 12/22/21 0301   Clamp Status/Tolerance clamped 12/22/21 0301   Suction Setting/Drainage Method suction at the bedside 12/22/21 0301   Insertion Site Appearance no redness, warmth, tenderness, skin breakdown, drainage 12/22/21 0301   Drainage None 12/22/21 0301   Flush/Irrigation flushed w/;water;no resistance met 12/22/21 0301       Male External Urinary Catheter 12/21/21 1830 (Active)       Neurosurgery Physical Exam  General: no distress  Head: Non-traumatic, normocephalic  Eyes: Pupils equal, L gaze preference  Neck: Supple, normal ROM, no tenderness to palpation  CVS: Normal rate and rhythm, distal pulses present  Pulm: Symmetric expansion, no respiratory distress  GI: Abdomen nondistended, nontender  MSK: Moves L side w/o restriction  Skin: Dry, intact  Psych: expressive/receptive aphasia  Neuro: GCS [id number]  CNII-XII: L gaze preference/R facial drooping, difficult to appreciate given patient participation but otherwise grossly intact   Extremities:  Motor:  Withdraws RUE/RLE to stim  Spontaneously moves LUE/LLE antigravity, will not follow commands     Sensory:      Sensorium intact throughout, responds to stim     Coordination:      Coordination intact throughout on L side    Significant  Labs:  Recent Labs   Lab 12/20/21  1357 12/21/21  0453 12/22/21  0301   * 91 85    138 139   K 4.3 3.9 4.0    108 111*   CO2 23 19* 19*   BUN 11 10 11   CREATININE 0.9 0.8 0.7   CALCIUM 9.3 8.6* 7.9*   MG  --   --  1.8     Recent Labs   Lab 12/20/21  1357 12/21/21  0453 12/22/21  0301   WBC 8.66 8.93 9.17   HGB 15.0 13.6* 12.6*   HCT 44.0 40.1 36.5*   * 118* 83*     No results for input(s): LABPT, INR, APTT in the last 48 hours.  Microbiology Results (last 7 days)     ** No results found for the last 168 hours. **        Recent Lab Results       12/22/21  0600   12/22/21  0301   12/21/21  2349   12/21/21  2309   12/21/21  1911        Albumin   3.2             Alkaline Phosphatase   42             ALT   18             Anion Gap   9             Ascending aorta               Ao peak daniel               Ao VTI               Appearance, UA     Clear           AST   32  Comment: *Result may be interfered by visible hemolysis             AV valve area               AV mean gradient               AV index (prosthetic)               AV peak gradient               AV Velocity Ratio               Baso #   0.03             Basophil %   0.3             Bilirubin (UA)     Negative           BILIRUBIN TOTAL   1.3  Comment: For infants and newborns, interpretation of results should be based  on gestational age, weight and in agreement with clinical  observations.    Premature Infant recommended reference ranges:  Up to 24 hours.............<8.0 mg/dL  Up to 48 hours............<12.0 mg/dL  3-5 days..................<15.0 mg/dL  6-29 days.................<15.0 mg/dL               BSA               BUN   11             QEF               Calcium   7.9             Chloride   111             Cholesterol         162  Comment: The National Cholesterol Education Program (NCEP) has set the  following guidelines (reference ranges) for Cholesterol:  Optimal.....................<200 mg/dL  Borderline  High.............200-239 mg/dL  High........................> or = 240 mg/dL         CO2   19             Color, UA     Yellow           CPK   341             Creatinine   0.7             Left Ventricle Relative Wall Thickness               Differential Method   Automated             E/A ratio               Echo EF Estimated               E/E' ratio               eGFR if    >60.0             eGFR if non    >60.0  Comment: Calculation used to obtain the estimated glomerular filtration  rate (eGFR) is the CKD-EPI equation.                EF               Eos #   0.0             Eosinophil %   0.3             E wave deceleration time               FS               Glucose   85             Glucose, UA     Negative           Gran # (ANC)   6.9             Gran %   75.7             Group & Rh               HDL         58  Comment: The National Cholesterol Education Program (NCEP) has set the  following guidelines (reference values) for HDL Cholesterol:  Low...............<40 mg/dL  Optimal...........>60 mg/dL         HDL/Cholesterol Ratio         35.8       Hematocrit   36.5             Hemoglobin   12.6             Hyaline Casts, UA     2           Immature Grans (Abs)   0.05  Comment: Mild elevation in immature granulocytes is non specific and   can be seen in a variety of conditions including stress response,   acute inflammation, trauma and pregnancy. Correlation with other   laboratory and clinical findings is essential.               Immature Granulocytes   0.5             INDIRECT MARISELA               IVC diameter               IVS               Ketones, UA     1+           LA WIDTH               Left Atrium Major Axis               Left Atrium Minor Axis               LA size               LA volume               LA Volume Index               LA Volume Index (Mod)               LVOT area               LDL Cholesterol External         89.2  Comment: The National Cholesterol Education  Program (NCEP) has set the  following guidelines (reference values) for LDL Cholesterol:  Optimal.......................<130 mg/dL  Borderline High...............130-159 mg/dL  High..........................160-189 mg/dL  Very High.....................>190 mg/dL         Leukocytes, UA     1+           LV LATERAL E/E' RATIO               LV SEPTAL E/E' RATIO               LV Diastolic Volume               LV Diastolic Volume Index               LVIDd               LVIDs               LV mass               LV Mass Index               Left Ventricular Outflow Tract Mean Gradient               Left Ventricular Outflow Tract Mean Velocity               LVOT diameter               LVOT peak navdeep               LVOT stroke volume               LVOT peak VTI               LV Systolic Volume               LV Systolic Volume Index               Lymph #   1.5             Lymph %   15.9             Magnesium   1.8             MCH   34.4             MCHC   34.5             MCV   100             Mean e'               Microscopic Comment     SEE COMMENT  Comment: Other formed elements not mentioned in the report are not   present in the microscopic examination.              Mono #   0.7             Mono %   7.3             MPV   11.0             MV valve area p 1/2 method               MV Peak A Navdeep               MV Peak E Navdeep               MV stenosis pressure 1/2 time               NITRITE UA     Negative           Non-HDL Cholesterol         104  Comment: Risk category and Non-HDL cholesterol goals:  Coronary heart disease (CHD)or equivalent (10-year risk of CHD >20%):  Non-HDL cholesterol goal     <130 mg/dL  Two or more CHD risk factors and 10-year risk of CHD <= 20%:  Non-HDL cholesterol goal     <160 mg/dL  0 to 1 CHD risk factor:  Non-HDL cholesterol goal     <190 mg/dL         nRBC   0             Occult Blood UA     2+           pH, UA     5.0           Phosphorus   2.4             Platelet Estimate   Decreased              Platelets   83             POCT Glucose 105       83         Potassium   4.0             PROTEIN TOTAL   6.2             Protein, UA     Negative  Comment: Recommend a 24 hour urine protein or a urine   protein/creatinine ratio if globulin induced proteinuria is  clinically suspected.             Pulmonary Valve Mean Velocity               PV Peak D Navdeep               PV Peak S Navdeep               PV PEAK VELOCITY               Pulm vein S/D ratio               Posterior Wall               Right Atrial Pressure (from IVC)               Right Atrium Volume Systolic               RA area               RA Major Axis               RA Width               RBC   3.66             RBC, UA     16           RDW   12.4             RIGHT VENTRICLE FREE WALL THICKNESS               RV mid diameter               RV S'               RVDD               Right ventricular length in diastole (apical 4-chamber view)               RVOT area               RVOT prox diameter               Sinus               Sodium   139             Specific [place], UA     1.025           Specimen UA     Urine, Catheterized           STJ               TAPSE               TDI SEPTAL               TDI LATERAL               Total Cholesterol/HDL Ratio         2.8       Triglycerides         74  Comment: The National Cholesterol Education Program (NCEP) has set the  following guidelines (reference values) for triglycerides:  Normal......................<150 mg/dL  Borderline High.............150-199 mg/dL  High........................200-499 mg/dL         Triscuspid Valve Regurgitation Peak Gradient               TR Max Navdeep               TV rest pulmonary artery pressure               WBC, UA     8           WBC   9.17                              12/21/21  1811   12/21/21  1039        Albumin         Alkaline Phosphatase         ALT         Anion Gap         Ascending aorta   3.59       Ao peak navdeep   1.03       Ao VTI   16.5       Appearance, UA         AST          AV valve area   2.41       AV mean gradient   2       AV index (prosthetic)   0.41       AV peak gradient   4       AV Velocity Ratio   0.43       Baso #         Basophil %         Bilirubin (UA)         BILIRUBIN TOTAL         BSA   1.98       BUN         QEF   27       Calcium         Chloride         Cholesterol         CO2         Color, UA         CPK         Creatinine         Left Ventricle Relative Wall Thickness   0.25       Differential Method         E/A ratio   2.52       Echo EF Estimated   18       E/E' ratio   12.60       eGFR if          eGFR if non          EF   15       Eos #         Eosinophil %         E wave deceleration time   [id number]       FS   8       Glucose         Glucose, UA         Gran # (ANC)         Gran %         Group & Rh A POS         HDL         HDL/Cholesterol Ratio         Hematocrit         Hemoglobin         Hyaline Casts, UA         Immature Grans (Abs)         Immature Granulocytes         INDIRECT MARISELA NEG         IVC diameter   2.22       IVS   0.88       Ketones, UA         LA WIDTH   5.00       Left Atrium Major Axis   6.26       Left Atrium Minor Axis   6.21       LA size   4.33       LA volume   114.74          96.87       LA Volume Index   57.9       LA Volume Index (Mod)   48.9       LVOT area   5.9       LDL Cholesterol External         Leukocytes, UA         LV LATERAL E/E' RATIO   9.00       LV SEPTAL E/E' RATIO   21.00       LV Diastolic Volume   282.54       LV Diastolic Volume Index   142.70       LVIDd   7.32       LVIDs   6.70       LV mass   301.24       LV Mass Index   152       Left Ventricular Outflow Tract Mean Gradient   0.48       Left Ventricular Outflow Tract Mean Velocity   0.3061400609782       LVOT diameter   2.75       LVOT peak daniel   0.44       LVOT stroke volume   39.77       LVOT peak VTI   6.70       LV Systolic Volume   230.98       LV Systolic Volume Index   116.7       Lymph #          Lymph %         Magnesium         MCH         MCHC         MCV         Mean e'   0.05       Microscopic Comment         Mono #         Mono %         MPV         MV valve area p 1/2 method   7.32       MV Peak A Navdeep   0.25       MV Peak E Navdeep   0.63       MV stenosis pressure 1/2 time   [id number]       NITRITE UA         Non-HDL Cholesterol         nRBC         Occult Blood UA         pH, UA         Phosphorus         Platelet Estimate         Platelets         POCT Glucose         Potassium         PROTEIN TOTAL         Protein, UA         Pulmonary Valve Mean Velocity   0.26       PV Peak D Navdeep   0.79       PV Peak S Navdeep   [id number]       PV PEAK VELOCITY   0.57       Pulm vein S/D ratio   0.33       Posterior Wall   0.90       Right Atrial Pressure (from IVC)   8       Right Atrium Volume Systolic   70.98       RA area   20.2       RA Major Axis   5.24       RA Width   4.94       RBC         RBC, UA         RDW         RIGHT VENTRICLE FREE WALL THICKNESS   0.60       RV mid diameter   3.34       RV S'   13       RVDD   3.27       Right ventricular length in diastole (apical 4-chamber view)   9.47       RVOT area   6.51       RVOT prox diameter   2.88       Sinus   3.25       Sodium         Specific Gravity, UA         Specimen UA         STJ   2.84       TAPSE   2.46       TDI SEPTAL   0.03       TDI LATERAL   0.07       Total Cholesterol/HDL Ratio         Triglycerides         Triscuspid Valve Regurgitation Peak Gradient   43       TR Max Navdeep   3.26       TV rest pulmonary artery pressure   51       WBC, UA         WBC               Significant Diagnostics:  I have reviewed and interpreted all pertinent imaging results/findings within the past 24 hours along with rad report     Assessment/Plan:     * Stroke due to embolism of left middle cerebral artery  56M w/ unknown PMHX who presents after being found down with R sided hemiparesis and aphasia found to have L MCA infarction 2/2 L MCA occlusion.      NIHSS17. PSD3    --Patient admitted to ICU on telemetry      -q1h neurochecks in ICU  --All labs and diagnostics reviewed. MRI reviewed with evolving stroke and some effacement on lateral vent without significant MLS      -Further imaging per primary team, NSGY only reccomends future scans if exam decompensates  --SBP recs and management per primary team  --Na 145-155 strict, reccomend hypertonic saline PRN per primary team to reach goal. Na 139 today       -Rec mannitol per primary team for serum osm goal <320 if Na goal reached and persistent swelling present  --Low threshold for Keppra; no concern for seizure at this time  --HOB >30  --Apical thrombus work up per NCC  --VN following, stroke work up per VN  --Continue to monitor clinically, notify NSGY immediately with any changes in neuro status    Dispo: DHC watch. Medical management for now            Michael Ramos MD  Neurosurgery  [facility]

## 2021-12-22 NOTE — HPI
56 M with unknown past medical history who presents as transfer with L MCA CVA without intervention. Patient with out family bedside for collateral history. Following history taken from chart review:  [facility] on [facility]/20 after being found down by a coworker. LKW uncertain. 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 The coworker checked on the patient the following day, and he was found down with only a shirt, which lead to EMS being called. On presentation to OSH, the patient was nonverbal to command, with right sided hemiparesis. Stroke protocol initiated with Tele stoke evaluation, and the patient was not deemed a candidate for any acute intervention. CTA at OSH significant for segmental occlusion in L MCA with diminished flow distal to the stenosis. If note, ECHO at [facility] is significant for an EF of 15-20% with an apical mass consistent with a thrombus. He was started on a statin and DAPT at OSH but never appeared to have enteral access so was only given ASA suppositories, per chart review. AC per NCC pending MRI.     NIHSS 17    CTH with multifocal L MCA infarct w/o MLS. Na 138.

## 2021-12-22 NOTE — ASSESSMENT & PLAN NOTE
56M w/ unknown PMHX who presents after being found down with R sided hemiparesis and aphasia found to have L MCA infarction 2/2 L MCA occlusion.     NIHSS17. PSD3    --Patient admitted to ICU on telemetry      -q1h neurochecks in ICU  --All labs and diagnostics reviewed. MRI reviewed with evolving stroke and some effacement on lateral vent without significant MLS      -Further imaging per primary team, NSGY only reccomends future scans if exam decompensates  --SBP recs and management per primary team  --Na 145-155 strict, reccomend hypertonic saline PRN per primary team to reach goal. Na 139 today       -Rec mannitol per primary team for serum osm goal <320 if Na goal reached and persistent swelling present  --Low threshold for Keppra; no concern for seizure at this time  --HOB >30  --Apical thrombus work up per NCC  --VN following, stroke work up per VN  --Continue to monitor clinically, notify NSGY immediately with any changes in neuro status    Dispo: DHC watch. Medical management for now

## 2021-12-22 NOTE — PLAN OF CARE
PT eval complete, plan of care established    2021    Problem: Physical Therapy Goal  Goal: Physical Therapy Goal  Description: Goals to be met by: 2022     Patient will increase functional independence with mobility by performin. Supine to sit with moderate assistance  2. Sit to supine with minimum assistance  3. Rolling to Left with maximal assistance  4. Rolling to Right with minimum assistance  5. Sit to stand transfer with moderate assistance  6. Bed to chair transfer with moderate assistance using LRAD as needed  7. Gait  x 10 feet with maximal assistance using LRAD as needed  8. Lower extremity exercise program x10 reps per handout, with independence   Outcome: Ongoing, Progressing

## 2021-12-22 NOTE — PLAN OF CARE
[facility]  Initial Discharge Assessment       Primary Care Provider: Primary Doctor No    Admission Diagnosis: Stroke [I63.9]    Admission Date: 12/21/2021  Expected Discharge Date: 12/28/2021    Discharge Barriers Identified: [employer]    Payor: MEDICAID / Plan: PENDING MEDICAID / Product Type: Government /     Extended Emergency Contact Information  Primary Emergency Contact: Brooke Leger  Mobile Phone: [phone]  Relation: Sister    Discharge Plan A: Rehab  Discharge Plan B: Home    Transferred from: [facility]    Past Medical History:   Diagnosis Date    Coronary artery disease     Hypertension          CM met with patient in room for Discharge Planning Assessment.  Patient is unable to answer questions. No family in room.  Phone call to Brooke Leger (sister) [phone].   Per sister, the pateint lives alone in a singles story house.   Per sister, the patient was independent with ADLS and used no dme for ambulation.  Patient will have assistance from his sister upon discharge.   Discharge Planning Booklet left in room for patient/family and discussed.  All questions addressed.  CM will follow for needs.      Initial Assessment (most recent)     Adult Discharge Assessment - 12/22/21 1606        Discharge Assessment    Assessment Type Discharge Planning Assessment     Confirmed/corrected address, phone number and insurance Yes     Confirmed Demographics Correct on Facesheet     Source of Information family     If unable to respond/provide information was family/caregiver contacted? Yes     Contact Name/Number Brooke Leger (sister) [phone]     Communicated ANGIE with patient/caregiver Date not available/Unable to determine     Reason For Admission Stroke     Lives With alone     Facility Arrived From: [facility]     Do you expect to return to your current living situation? Other (see comments)   any    Do you have help at home or someone to help you manage your care at home? Yes     Who are  your caregiver(s) and their phone number(s)? Brooke Leger (sister) [phone]     Prior to hospitilization cognitive status: Alert/Oriented     Current cognitive status: Unable to Assess     Walking or Climbing Stairs Difficulty none     Dressing/Bathing Difficulty none     Home Layout Able to live on 1st floor     Equipment Currently Used at Home none     Readmission within 30 days? No     Do you currently have service(s) that help you manage your care at home? No     Do you take prescription medications? --   any    Do you have prescription coverage? No     Do you have any problems affording any of your prescribed medications? TBD     Is the patient taking medications as prescribed? --   any    How do you get to doctors appointments? family or friend will provide     Are you on dialysis? No     Do you take coumadin? No     Discharge Plan A Rehab     Discharge Plan B Home     DME Needed Upon Discharge  none     Discharge Plan discussed with: Sibling     Name(s) and Number(s) Brooke Leger (sister) [phone]     Discharge Barriers Identified Unisured               Alma Denton RN, CCRN-K, [facility]  Neuro-Critical Care   [phone] [id number]

## 2021-12-22 NOTE — CONSULTS
[facility]  Adult Nutrition  Consult Note    SUMMARY     Recommendations    1. Modify TF to better meet needs: Isosource 1.5 @ goal rate 50 mL/hr    - This provides 1800 kcal, 82 g protein, and 917 mL free water    - Additional water per MD, hold for n/v or residuals >500 mL     2. If able to advance diet, rec'd Cardiac, fluids per MD and texture per SLP     3. RD to monitor    Goals: Pt will meet and tolerate >75% EEN/EPN by RD f/u  Nutrition Goal Status: new    Communication of RD Recs:  (POC)    Assessment and Plan    Nutrition Problem  Inadequate energy intake    Related to (etiology):   Inability to consume sufficient energy    Signs and Symptoms (as evidenced by):   NPO with no alternate means of nutrition  Confusion     Interventions (treatment strategy):  Collaboration with other providers  Enteral nutrition    Nutrition Diagnosis Status:   New     Malnutrition Assessment       Orbital Region (Subcutaneous Fat Loss): well nourished  Upper Arm Region (Subcutaneous Fat Loss): well nourished   [place] (Muscle Loss): well nourished         Reason for Assessment    Reason For Assessment: consult (TF)  Diagnosis:  (Stroke)  Relevant Medical History: HTN, CAD  Interdisciplinary Rounds: did not attend    General Information Comments: Pt presents s/p stroke. TF recs requested. NGT present. Noted that pt confused and nonverbal, no family at bedside. Nutren 1.5 ordered @ 55 mL/hr; no TF running during visit. Unsure of appetite PTA for chewing/swallowing difficulties. UBW ~165# per graph review. 13# wt gain noted x 4 months. No wt loss noted. Visual NFPE completed 12/22; pt apperas nourished with no physical s/s of malnutrition.    Nutrition Discharge Planning: Pending clinical course    Nutrition Risk Screen    Nutrition Risk Screen: tube feeding or parenteral nutrition    Nutrition/Diet History    Spiritual, Cultural Beliefs, Episcopal Practices, Values that Affect Care:  (SERA)  Food  "Allergies: NKFA  Factors Affecting Nutritional Intake: NPO,impaired cognitive status/motor control    Anthropometrics    Temp: 98 °F (36.7 °C)  Height: 5' 11" (180.3 cm)  Height (inches): 71 in  Weight: 78.5 kg (173 lb)  Weight (lb): 173 lb  Ideal Body Weight (IBW), Male: 172 lb  % Ideal Body Weight, Male (lb): 100.58 %  BMI (Calculated): 24.1  BMI Grade: 18.5-24.9 - normal  Usual Body Weight (UBW), k kg (per chart)  % Usual Body Weight: 104.85  % Weight Change From Usual Weight: 4.63 %       Lab/Procedures/Meds    Pertinent Labs Reviewed: reviewed  Pertinent Labs Comments: Phos 2.4, Alk phos 42, Alb 3.2, Bili total 1.3  Pertinent Medications Reviewed: reviewed  Pertinent Medications Comments: aspirin, lipitor, senna    Estimated/Assessed Needs    Weight Used For Calorie Calculations: 78.5 kg (173 lb)  Energy Calorie Requirements (kcal): 4023-1580 kcal/day  Energy Need Method: [place]-St Jeor (x 1.2)  Protein Requirements: 79-94 g/day (1.0-1.2 g/kg)  Weight Used For Protein Calculations: 78.5 kg (173 lb)  Fluid Requirements (mL): 1 mL/kcal or per MD  Estimated Fluid Requirement Method: RDA Method  RDA Method (mL): 1626       Nutrition Prescription Ordered    Current Diet Order: NPO   Comments: TF of Nutren 1.5 @ 55 mL/hr ordered; no TF running    Evaluation of Received Nutrient/Fluid Intake    I/O: -250 mL since admit  Energy Calories Required: not meeting needs  Protein Required: not meeting needs  Comments: LBM   Tolerance: tolerating  % Intake of Estimated Energy Needs: 0 - 25 %  % Meal Intake: NPO    Nutrition Risk    Level of Risk/Frequency of Follow-up: low     Monitor and Evaluation    Food and Nutrient Intake: energy intake,enteral nutrition intake  Food and Nutrient Adminstration: enteral and parenteral nutrition administration  Knowledge/Beliefs/Attitudes: food and nutrition knowledge/skill  Physical Activity and Function: nutrition-related ADLs and IADLs  Anthropometric Measurements: " weight,weight change  Biochemical Data, Medical Tests and Procedures: electrolyte and renal panel,gastrointestinal profile,glucose/endocrine profile,inflammatory profile,lipid profile  Nutrition-Focused Physical Findings: overall appearance     Nutrition Follow-Up    RD Follow-up?: Yes

## 2021-12-22 NOTE — CONSULTS
[facility]  Neurosurgery  Consult Note    Consults  Subjective:     Chief Complaint/Reason for Admission: MCA stroke    History of Present Illness: 56 M with unknown past medical history who presents as transfer with L MCA CVA without intervention. Patient with out family bedside for collateral history. Following history taken from chart review:  He originally presented to [facility] by [facility] on 12/20 after being found down by a coworker. LKW uncertain. 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 The coworker checked on the patient the following day, and he was found down with only a shirt, which lead to EMS being called. On presentation to [facility], the patient was nonverbal to command, with right sided hemiparesis. Stroke protocol initiated with Tele stoke evaluation, and the patient was not deemed a candidate for any acute intervention. CTA at OSH significant for segmental occlusion in L MCA with diminished flow distal to the stenosis. If note, ECHO at [facility] is significant for an EF of 15-20% with an apical mass consistent with a thrombus. He was started on a statin and DAPT at OSH but never appeared to have enteral access so was only given ASA suppositories, per chart review. AC per NCC pending MRI.     NIHSS 17    CTH with multifocal L MCA infarct w/o MLS. Na 138.      No medications prior to admission.       Review of patient's allergies indicates:  No Known Allergies    Past Medical History:   Diagnosis Date    Coronary artery disease     Hypertension      No past surgical history on file.  Family History    None       Tobacco Use    Smoking status: Light Tobacco Smoker    Smokeless tobacco: Not on file   Substance and Sexual Activity    Alcohol use: Not Currently    Drug use: Not on file    Sexual activity: Not on file     Review of Systems   Unable to perform ROS: Patient nonverbal  "    Objective:        There is no height or weight on file to calculate BMI.  Vital Signs (Most Recent):  Temp: 98.7 °F (37.1 °C) (12/21/21 1717)  Pulse: 84 (12/21/21 1815)  Resp: 19 (12/21/21 1815)  BP: (!) 149/96 (12/21/21 1717)  SpO2: 99 % (12/21/21 1815) Vital Signs (24h Range):  Temp:  [97.2 °F (36.2 °C)-98.7 °F (37.1 °C)] 98.7 °F (37.1 °C)  Pulse:  [] 84  Resp:  [18-19] 19  SpO2:  [93 %-100 %] 99 %  BP: (110-149)/(66-96) 149/96     Date 12/21/21 0700 - 12/22/21 0659   Shift 5558-2577 6521-9866 3541-8885 24 Hour Total   INTAKE   Shift Total       OUTPUT   Urine  0  0   Shift Total  0  0   Weight (kg)                            NG/OG Tube 12/21/21 1410 Left nostril (Active)   Placement Check placement verified by x-ray 12/21/21 1440   Tolerance no signs/symptoms of discomfort 12/21/21 1440   Securement secured to nostril center 12/21/21 1440   Clamp Status/Tolerance clamped 12/21/21 1440            Urethral Catheter 12/20/21 1420 Non-latex (Active)   Site Assessment Clean;Intact 12/21/21 0741   Collection Container Urimeter 12/21/21 0741   Securement Method secured to top of thigh w/ adhesive device 12/21/21 0741   Catheter Care Performed yes 12/21/21 0741   Reason for Continuing Urinary Catheterization Required immobilization 12/21/21 0741   CAUTI Prevention Bundle Intact seal between catheter & drainage tubing;Drainage bag/urimeter off the floor;Sheeting clip in use;StatLock in place w 1" slack;Drainage bag/urimeter below bladder;No dependent loops or kinks;Drainage bag/urimeter not overfilled (<2/3 full) 12/21/21 0741   Output (mL) 0 mL 12/21/21 1800       Neurosurgery Physical Exam   General: no distress  Head: Non-traumatic, normocephalic  Eyes: Pupils equal, EOMi; L gaze preference  Neck: Supple, normal ROM, no tenderness to palpation  CVS: Normal rate and rhythm, distal pulses present  Pulm: Symmetric expansion, no respiratory distress  GI: Abdomen nondistended, nontender  MSK: Moves L side w/o " restriction  Skin: Dry, intact  Psych: expressive/receptive aphasia  Neuro: GCS E4V2M5  CNII-XII: Intact on fine exam, PERRL, EOMi, possible L gaze preference/R facial drooping, difficult to appreciate given patient particpation  Extremities:  Motor:  Withdraws RUE/RLE to stim  Spontaneously moves LUE/LLE antigravity, will not follow commands    Sensory:      Sensorium intact throughout, responds to stim    Coordination:      Coordination intact throughout on L side            Significant Labs:  Recent Labs   Lab 12/20/21  1357 12/21/21  0453   * 91    138   K 4.3 3.9    108   CO2 23 19*   BUN 11 10   CREATININE 0.9 0.8   CALCIUM 9.3 8.6*     Recent Labs   Lab 12/20/21  1357 12/21/21  0453   WBC 8.66 8.93   HGB 15.0 13.6*   HCT 44.0 40.1   * 118*         Significant Diagnostics:  I have reviewed and interpreted all pertinent imaging results/findings within the past 24 hours.    Assessment/Plan:     * Stroke due to embolism of left middle cerebral artery  56M w/ unknown PMHX who presents after being found down with R sided hemiparesis and aphasia found to have multifocal L MCA infarction 2/2 L MCA occlusion.     NIHSS17     --Patient admitted to ICU on telemetry      -q1h neurochecks in ICU  --All labs and diagnostics reviewed. MRI pending.       -Further imaging per primary team, NSGY only reccomends future scans if exam decompensates  --Patient may continue anti-plt/coag medications at this time; will need full reversal prior to the OR (INR <1.4, Plt >100k)  --SBP recs and management per primary team  --Na 145-155 strict, reccomend hypertonic saline PRN per primary team to reach goal      -Rec mannitol per primary team for serum osm goal <320 if Na goal reached and persistent swelling present  --Low threshold for Keppra; no concern for seizure at this time  --HOB >30  --Follow-up full pre-op labs (CBC/CMP/PT-INR/PTT/T&S)  --NPO  --Apical thrombus work up per NCC  --VN following, stroke  work up per VN  --Continue to monitor clinically, notify NSGY immediately with any changes in neuro status    Dispo: ongoing    Please contact the on call neurosurgery provider with questions or concerns. Provider may be reached via  or during weekday call only via [employer] [phone].     D/w Dr Nowak          Thank you for your consult. I will follow-up with patient. Please contact us if you have any additional questions.    Margie Elliott MD  Neurosurgery  [facility]

## 2021-12-22 NOTE — PROGRESS NOTES
[facility]  Vascular Neurology  Comprehensive Stroke Center  Progress Note    Assessment/Plan:     * Stroke due to embolism of left middle cerebral artery  57 yo male with PMHx of HTN and CAD who presents as a transfer from [facility]. No intervention was performed(no tPA as OOW, no IR per telestroke provider). CTH at OSH with large L MCA infarct. CTA with distal L M1 occlusion. He was admitted to hospital medicine at OSH and now transferred to [facility] for higher level of care. Repeat NCCTH(12/21) with large L MCA infarct. Echo at OSH with LV global hypokinesis, EF 15-20%, severe atrial enlargement and small to moderate size apical thrombus.   Etiology: likely cardioembolic 2/2 low EF(15-20%) and cardiac thrombus    Antithrombotics for secondary stroke prevention: Anticoagulants: Heparin gtt MINIMAL intensity, NO bolus    Statins for secondary stroke prevention and hyperlipidemia, if present: Statins: Atorvastatin- 40 mg daily     Aggressive risk factor modification: HTN, CAD     Rehab efforts: The patient has been evaluated by a stroke team provider and the therapy needs have been fully considered based off the presenting complaints and exam findings. The following therapy evaluations are needed: PT evaluate and treat, OT evaluate and treat, SLP evaluate and treat, PM&R evaluate for appropriate placement    Diagnostics ordered/pending: None     VTE prophylaxis: Mechanical prophylaxis: Place SCDs  None: Reason for No Pharmacological VTE Prophylaxis: recommending heparin gtt minimal intensity    BP parameters: Infarct: No intervention, SBP <220       Cytotoxic cerebral edema  Area of cytotoxic cerebral edema identified when reviewing brain imaging in the territory of the L middle cerebral artery. There is mass effect associated with it. Continue to monitor the patients clinical exam for any worsening of symptoms which may indicate expansion of the stroke or the area of the edema resulting in the  clinical change. The pattern is suggestive of embolic etiology        Essential hypertension  Stroke RF  SBP<220    Mural thrombus of cardiac apex  Seen on TTE at outside facility  -TTE 12/21/21: with EF of 15-20%, small to moderate sized (1.6x1.1 cm) layered, protruding apical mass c/w thrombus, global hypokinesis, severe atrial enlargement.  -Case discussed with stroke fellow. Recommend AC with heparin gtt MINIMAL intensity, NO bolus.               12/22 patient in ICU for hemicrani watch, NPO per SLP      STROKE DOCUMENTATION        NIH Scale:  1a. Level of Consciousness: 0-->Alert, keenly responsive  1b. LOC Questions: 2-->Answers neither question correctly  1c. LOC Commands: 0-->Performs both tasks correctly  2. Best Gaze: 2-->Forced deviation, or total gaze paresis not overcome by the oculocephalic maneuver  3. Visual: 2-->Complete hemianopia  4. Facial Palsy: 2-->Partial paralysis (total or near-total paralysis of lower face)  5a. Motor Arm, Left: 0-->No drift, limb holds 90 (or 45) degrees for full 10 secs  5b. Motor Arm, Right: 4-->No movement  6a. Motor Leg, Left: 0-->No drift, leg holds 30 degree position for full 5 secs  6b. Motor Leg, Right: 4-->No movement  7. Limb Ataxia: 0-->Absent  8. Sensory: 0-->Normal, no sensory loss  9. Best Language: 2-->Severe aphasia, all communication is through fragmentary expression, great need for inference, questioning, and guessing by the listener. Range of information that can be exchanged is limited, listener carries burden of. . . (see row details)  10. Dysarthria: 2-->Severe dysarthria, patients speech is so slurred as to be unintelligible in the absence of or out of proportion to any dysphasia, or is mute/anarthric  11. Extinction and Inattention (formerly Neglect): 0-->No abnormality  Total (NIH Stroke Scale): 20       Modified Pickaway Score: 0  Brenden Coma Scale:    ABCD2 Score:    [id number] Score:   HAS -BLED Score:   ICH Score:   Hunt & Gillis Classification:       Hemorrhagic change of an Ischemic Stroke: Does this patient have an ischemic stroke with hemorrhagic changes? No     Neurologic Chief Complaint: found down, nonverbal, RSW    Subjective:     Interval History: Patient is seen for follow-up neurological assessment and treatment recommendations: patient in ICU for hemicrani watch, NPO per SLP    HPI, Past Medical, Family, and Social History remains the same as documented in the initial encounter.     Review of Systems   Constitutional: Negative for fever.   HENT: Positive for trouble swallowing.    Eyes: Positive for visual disturbance.   Respiratory: Negative for cough.    Cardiovascular: Negative for chest pain.   Gastrointestinal: Negative for nausea and vomiting.   Neurological: Positive for facial asymmetry, speech difficulty and weakness. Negative for numbness.     Scheduled Meds:   aspirin  81 mg Per NG tube Daily    atorvastatin  40 mg Oral Daily    heparin (porcine)  5,000 Units Subcutaneous Q8H    metoprolol tartrate  12.5 mg Per NG tube BID    senna-docusate 8.6-50 mg  1 tablet Per NG tube BID    sodium chloride  2 g Oral TID     Continuous Infusions:  PRN Meds:acetaminophen, hydrALAZINE, labetalol, magnesium oxide, magnesium oxide, ondansetron, potassium bicarbonate, potassium bicarbonate, potassium bicarbonate, potassium, sodium phosphates, potassium, sodium phosphates, potassium, sodium phosphates, sodium chloride 0.9%    Objective:     Vital Signs (Most Recent):  Temp: 98 °F (36.7 °C) (12/22/21 1200)  Pulse: 81 (12/22/21 1200)  Resp: 19 (12/22/21 1200)  BP: (!) 134/91 (12/22/21 1200)  SpO2: 98 % (12/22/21 1200)  BP Location: Right arm    Vital Signs Range (Last 24H):  Temp:  [97.9 °F (36.6 °C)-98.7 °F (37.1 °C)]   Pulse:  [74-98]   Resp:  [11-24]   BP: (131-155)/(81-97)   SpO2:  [95 %-100 %]   BP Location: Right arm    Physical Exam  Vitals reviewed.   Constitutional:       General: He is not in acute distress.     Appearance: He is  well-developed and well-nourished.   HENT:      Head: Normocephalic and atraumatic.   Cardiovascular:      Rate and Rhythm: Normal rate.   Pulmonary:      Effort: Pulmonary effort is normal. No respiratory distress.   Skin:     General: Skin is warm and dry.   Neurological:      Mental Status: He is alert.         Neurological Exam:   LOC: alert  Attention Span: Good   Language: Global aphasia  Articulation: Untestable due to severe aphasia   Orientation: Untestable due to severe aphasia   Visual Fields: Hemianopsia right  EOM (CN III, IV, VI): Gaze preference  left  Facial Movement (CN VII): Lower facial weakness on the Right  Motor: Arm left  Normal 5/5  Leg left  Normal 5/5  Arm right  Plegia 0/5  Leg right Plegia 0/5  Sensation: Intact to light touch, temperature and vibration  Tone: Flaccid  RUE    Laboratory:  CMP:   Recent Labs   Lab 12/22/21  0301   CALCIUM 7.9*   ALBUMIN 3.2*   PROT 6.2      K 4.0   CO2 19*   *   BUN 11   CREATININE 0.7   ALKPHOS 42*   ALT 18   AST 32   BILITOT 1.3*     CBC:   Recent Labs   Lab 12/22/21  0301   WBC 9.17   RBC 3.66*   HGB 12.6*   HCT 36.5*   PLT 83*   *   MCH 34.4*   MCHC 34.5     Lipid Panel:   Recent Labs   Lab 12/21/21  1911   CHOL 162   LDLCALC 89.2   HDL 58   TRIG 74     Coagulation: No results for input(s): PT, INR, APTT in the last 168 hours.  Platelet Aggregation Study: No results for input(s): PLTAGG, PLTAGINTERP, PLTAGREGLACO, ADPPLTAGGREG in the last 168 hours.  Hgb A1C:   Recent Labs   Lab 12/21/21  0453   HGBA1C 4.9     TSH:   Recent Labs   Lab 12/21/21  0453   TSH 0.768       Diagnostic Results     Brain imaging:  MRI Brain WO. Date: 12/22/21  Large acute  left MCA distribution infarct with modest mass effect.     Multiple small remote scattered infarcts elsewhere as above.  Embolic disease to be considered.     CTH WO 12/21/21  Again demonstrated in better delineated as large left MCA distribution infarct with no associated acute hemorrhage  and with localized mass effect resulting in effacement of overlying cortical sulci and partial effacement left sylvian fissure.  No associated acute hemorrhage.     Several small old areas of infarction again noted including cerebellum bilaterally, anterior left perisylvian region, lateral left frontal cortex and posterior right parietal cortex.     CTH WO 12/20/21     Large area loss of gray-white differentiation consistent with acute infarct left MCA distribution involving the left temporal lobe, temporal occipital region and portions of the basal ganglia with localized mass effect including effacement of overlying cortical sulci although with no associated acute hemorrhage.     Several small old areas of infarction are evident including anterior left perisylvian and cerebellum bilaterally.  Mild underlying atrophy.     Vessel Imaging:  CTA H/N 12/20/21  Segmental occlusion measuring approximately 10 mm mid and distal M1 segment left MCA with diminished flow distal to the stenosis.     Right cervical carotid circulation--atherosclerotic calcification bifurcation without measurable stenosis.     Left cervical carotid circulation--atherosclerotic calcification distal left common carotid artery and bifurcation with stenosis proximal left ICA measuring 16% utilizing NASCET criteria.  Mild eccentric narrowing mid to distal portion left common carotid artery also noted.     Vertebral arteries--no focal arterial abnormality or measurable stenosis.     Cardiac Evaluation:   TTE with bubble 12/21/21  1. The left ventricle (LV) is dilated with severely depressed systolic function & global hypokinesis.  2. LV ejection fraction is 15-20%. Grade III LV diastolic dysfunction.  3. Small to moderate sized (1.6x1.1 cm) layered, protruding apical mass c/w thrombus.  4. The right ventricle (RV) is severely enlarged with normal systolic function.   5. Estimated RVSP is moderately elevated (50-70 mmHg). Pulmonary hypertension is  present.  6. Severe atrial enlargement.  7. Moderate eccentric mitral regrugitation.  8. Mild tricuspid regurgitation.        Laura Toth PA-C  [facility] [[email or web address]] Stroke Center  Department of Vascular Neurology   [facility]

## 2021-12-22 NOTE — PT/OT/SLP PROGRESS
Speech Language Pathology      Jimmy Leger  MRN: [id number]    Patient not seen today secondary to Nursing hold (Comment) (pt intubation and hemicrani watch on this date). Will re-attempt 12/23.

## 2021-12-22 NOTE — ASSESSMENT & PLAN NOTE
55 yo male with PMHx of HTN and CAD who presents as a transfer from [facility]. No intervention was performed(no tPA as OOW, no IR per telestroke provider). CTH at OSH with large L MCA infarct. CTA with distal L M1 occlusion. He was admitted to hospital medicine at OSH and now transferred to [facility] for higher level of care. Repeat NCCTH(12/21) with large L MCA infarct. Echo at OSH with LV global hypokinesis, EF 15-20%, severe atrial enlargement and small to moderate size apical thrombus.   Etiology: likely cardioembolic 2/2 low EF(15-20%) and cardiac thrombus    Antithrombotics for secondary stroke prevention: Anticoagulants: Heparin gtt MINIMAL intensity, NO bolus    Statins for secondary stroke prevention and hyperlipidemia, if present: Statins: Atorvastatin- 40 mg daily     Aggressive risk factor modification: HTN, CAD     Rehab efforts: The patient has been evaluated by a stroke team provider and the therapy needs have been fully considered based off the presenting complaints and exam findings. The following therapy evaluations are needed: PT evaluate and treat, OT evaluate and treat, SLP evaluate and treat, PM&R evaluate for appropriate placement    Diagnostics ordered/pending: None     VTE prophylaxis: Mechanical prophylaxis: Place SCDs  None: Reason for No Pharmacological VTE Prophylaxis: recommending heparin gtt minimal intensity    BP parameters: Infarct: No intervention, SBP <220

## 2021-12-22 NOTE — PROGRESS NOTES
[facility] Neuro [facility]  Neurocritical Care  Progress Note    Admit Date: 12/21/2021  Service Date: 12/22/2021  Length of Stay: 1    Subjective:     Chief Complaint: Stroke due to embolism of left middle cerebral artery    History of Present Illness: Mr. Leger is a 57 yo male with unknown past medical history who presents to [facility] with a L MCA CVA without intervention. He originally presented to [facility] by EMS on 12/20 after being found down by a coworker. LKW uncertain. 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 The coworker checked on the patient the following day, and he was found down with only a shirt, which lead to EMS being called. On presentation to [facility], the patient was nonverbal to command, with right sided hemiparesis. Stroke protocol initiated with Tele stoke evaluation, and the patient was not deemed a candidate for any acute intervention. CT at [facility] with large left MCA distribution infarct with no associated acute hemorrhage  CTA at [facility] significant for segmental occlusion measuring approximately 10 mm mid and distal M1 segment left MCA with diminished flow distal to the stenosis. He has not yet had an MRI. Of note, ECHO at [facility] is significant for an EF of 15-20% with an apical mass consistent with a thrombus. He was started on a statin and DAPT at [facility] but never appeared to have enteral access so was only given ASA suppositories, per chart review. He was transferred to [facility] on 12/21.    He will be admitted to [facility] for hourly neuromonitoring and a higher level of care.          Hospital Course: 12/22/2021NAEO, stroke day 3. NSGY following, stroke team following. Continue to monitor neuro exam closely. Strict BP and HR control since patient with LV thrombus, cannot safely AC yet 2/2 to acute large territory stroke       Interval History:  NAEO, stroke day 3. NSGY following, stroke team  Date of Service: 08/23/2019    HISTORY OF PRESENT ILLNESS:  The patient is a 63-year-old female who is here today for her new Medicare wellness visit.  The patient is taking her medications on a daily basis, which helps out with her blood pressure.  She is on Hydrochlorothiazide and Lasix as needed for her edema.  She is on Pulmicort for her asthma as well as ProAir.  The patient states her breathing seems to be fine when she is taking the medication.  She does have a history of fatty liver disease and does have some elevated liver enzymes that has been consistent.  She is watching her diet.  She has been having some elevated blood sugars as well as elevated cholesterol numbers as well.  She does not want to take any statins because she tried it once before and it really did tolerate it that well.  The patient denies any shortness of breath or chest pains, any nausea or vomiting at all.  Denies any leg edema.  She denies any anxiety, depression or any memory loss.  She is able do the activities of daily living.    ALLERGIES AND MEDICATIONS:  Reviewed in Epic.      OBJECTIVE:    Please see my Medicare wellness SmartSet.    ASSESSMENT AND PLAN:  1.  Medicare wellness visit.  At this time, we did go over the patient's lab tests.  We did discuss about the cholesterol.  She wants to watch it with her diet.  We will get a mammogram as well as a DEXA scan on the patient.  We did discuss about the shingles vaccine.  2.  Postmenopausal status.  Again, will get a DEXA scan.  3.  Breast cancer screening.  Will get a mammogram.  4.  Hypertension.  Blood pressure stable today.  5.  Fatty liver disease, stable.  6.  Asthma that is stable as well.  7.  Hyperglycemia.  We did discuss about diet.  She does not want to see a diabetic educator.  8.  Hypercholesterolemia.  At this time, patient just wants to watch her diet.  I told her that she does have increased risk for cardiac event over the next 10 years.  9.  Abnormal liver  following. Continue to monitor neuro exam closely. Hemicrani watch, intubation watch. Strict BP and HR control since patient with LV thrombus, cannot safely AC yet 2/2 to acute large territory stroke. Start scheduled lopressor 12.5 BID     Review of Systems   Unable to perform ROS: Patient nonverbal     Objective:     Vitals:  Temp: 98.7 °F (37.1 °C)  Pulse: 80  Rhythm: normal sinus rhythm  BP: (!) 140/90  MAP (mmHg): 110  Resp: (!) 21  SpO2: 97 %  O2 Device (Oxygen Therapy): room air    Temp  Min: 97.9 °F (36.6 °C)  Max: 98.7 °F (37.1 °C)  Pulse  Min: 74  Max: 98  BP  Min: 126/87  Max: 155/87  MAP (mmHg)  Min: 1  Max: 117  Resp  Min: 11  Max: 23  SpO2  Min: 93 %  Max: 100 %    12/21 0701 - 12/22 0700  In: -   Out: 250 [Urine:250]   Unmeasured Output  Urine Occurrence: 1  Pad Count: 1       Physical Exam  Physical Exam   Constitutional: Well-nourished, well-developed. No obvious distress.  Eyes: Clear conjunctiva. Anicteric. No discharge. Lids without lesions.  HEENT: MMM. Nose, external ears atraumatic.  Cardio: RRR. Pulses intact. Capillary refill time < 2 seconds.  Respiratory: Clear to auscultation. Regular effort.  GI: Bowel sounds present. Soft, non-distended, non-tender.     Neurologic:  E4V2M6  Alert  Severely dysarthric  Mixed aphasia   PERRL  RUE extends  RLE WD  Moves left side spontaneously and antigravity     NIH Scale  1a. 0  1b. 1  1c. 1  2.   1  3.   0  4.   2  5a. 0  5b. 3  6a. 0  6b. 3  7.   0  8.   1 (difficult to assess due to aphasia)  9.   2  10. 2  11. 0     NIH: 16    Medications:  Continuous Scheduledaspirin, 81 mg, Daily  atorvastatin, 40 mg, Daily  heparin (porcine), 5,000 Units, Q8H  metoprolol tartrate, 12.5 mg, BID  senna-docusate 8.6-50 mg, 1 tablet, BID  sodium chloride, 2 g, TID    PRNacetaminophen, 650 mg, Q6H PRN  hydrALAZINE, 10 mg, Q6H PRN  labetalol, 10 mg, Q6H PRN  magnesium oxide, 800 mg, PRN  magnesium oxide, 800 mg, PRN  ondansetron, 4 mg, Q8H PRN  potassium bicarbonate, 35  function tests.  At this time, it is stable.  10.  Colon cancer screening.  We did do IFOB today.      Dictated By: Rafael Elaine DO  Signing Provider: Rafael Elaine DO    EQ/ray ([id number])  DD: 08/23/2019 11:13:11 TD: 08/26/2019 08:01:35    Copy Sent To:    mEq, PRN  potassium bicarbonate, 50 mEq, PRN  potassium bicarbonate, 60 mEq, PRN  potassium, sodium phosphates, 2 packet, PRN  potassium, sodium phosphates, 2 packet, PRN  potassium, sodium phosphates, 2 packet, PRN  sodium chloride 0.9%, 10 mL, PRN      Today I personally reviewed pertinent medications, lines/drains/airways, imaging, cardiology results, laboratory results, microbiology results,   Diet  Diet NPO  Diet NPO        Assessment/Plan:     Neuro  * Stroke due to embolism of left middle cerebral artery  Large Left MCA stroke without intervention  -Admit to NCC, VN following  -NSGY on board for hemicrani watch  -q1h neuro checks, vital checks  -EKG, ECHO, CXR  -A1c, TSH, lipid panel, coag panel  -Daily CBC, CMP, mag, phos  -SBP < 160, prn labetalol and hydralazine  -Daily Statin, ASA  -SCDs, SQH  -CT large left MCA distribution infarct with no associated acute hemorrhage  -CTA segmental occlusion measuring approximately 10 mm mid and distal M1 segment left MCA with diminished flow distal to the stenosis.  -PT/OT/SLP  -MRI brain -Large acute  left MCA distribution infarct with modest mass effect.   Multiple small remote scattered infarcts elsewhere as above.  Embolic disease to be considered      Cytotoxic cerebral edema  -see primary problem    Cardiac/Vascular  Essential hypertension  SBP < 160  Prn labetalol and hydralazine      Mural thrombus of cardiac apex  Small to moderate sized (1.6x1.1 cm) layered, protruding apical mass c/w thrombus.  -Noted on ECHO from 12/21  -Cannot safely start full AC at this time secondary to acute ischemic stroke with small hemorrhagic component  -Strict HR and BP control  -SBP goal <160  -lopressor started       Other  Debility  PT/OT    Grade III diastolic dysfunction  1. The left ventricle (LV) is dilated with severely depressed systolic function & global hypokinesis.  2. LV ejection fraction is 15-20%. Grade III LV diastolic dysfunction.  3. Small to moderate sized  (1.6x1.1 cm) layered, protruding apical mass c/w thrombus.  4. The right ventricle (RV) is severely enlarged with normal systolic function.   5. Estimated RVSP is moderately elevated (50-70 mmHg). Pulmonary hypertension is present.  6. Severe atrial enlargement.  7. Moderate eccentric mitral regrugitation.  8. Mild tricuspid regurgitation.     ECHO 12/21  -monitor I/Os   -careful sodium control             The patient is being Prophylaxed for:  Venous Thromboembolism with: Chemical  Stress Ulcer with: Not Applicable   Ventilator Pneumonia with: not applicable    Activity Orders          Turn patient starting at 12/21 1800    Elevate HOB starting at 12/21 1717    Diet NPO: NPO starting at 12/21 1717        Full Code     Critical condition in that Patient has a condition that poses threat to life and bodily function: hemispheric LMCA stroke at risk for neurologic decompensation including possible hemicrani     36 minutes of Critical care time was spent personally by me on the following activities: development of treatment plan with patient or surrogate and bedside caregivers, discussions with consultants, evaluation of patient's response to treatment, examination of patient, ordering and performing treatments and interventions, ordering and review of laboratory studies, ordering and review of radiographic studies, pulse oximetry, antibiotic titration if applicable, vasopressor titration if applicable, re-evaluation of patient's condition. This critical care time did not overlap with that of any other provider or involve time for any procedures. There is high probability for acute neurological change leading to clinical and possibly life-threatening deterioration requiring highest level of physician preparedness for urgent intervention.         Destini Weems PA-C  Neurocritical Care  [facility]

## 2021-12-22 NOTE — H&P
[facility] Neuro Critical Care  Neurocritical Care  History & Physical    Admit Date: 2021  Service Date: 2021  Length of Stay: 0    Subjective:     Chief Complaint: Stroke due to embolism of left middle cerebral artery    History of Present Illness: Mr. Leger is a 57 yo male with unknown past medical history who presents to [facility] with a L MCA CVA without intervention. He originally presented to [facility] by EMS on  after being found down by a coworker. LKW uncertain. Per ED staff/EMS information, he did not appear for work in the morning (21) or feed the horses, which, per his coworker, the patient does every morning. The coworker checked on the patient the following day, and he was found down with only a shirt, which lead to EMS being called. On presentation to [facility], the patient was nonverbal to command, with right sided hemiparesis. Stroke protocol initiated with Tele stoke evaluation, and the patient was not deemed a candidate for any acute intervention. CT at [facility] with large left MCA distribution infarct with no associated acute hemorrhage  CTA at [facility] significant for segmental occlusion measuring approximately 10 mm mid and distal M1 segment left MCA with diminished flow distal to the stenosis. He has not yet had an MRI. Of note, ECHO at [facility] is significant for an EF of 15-20% with an apical mass consistent with a thrombus. He was started on a statin and DAPT at [facility] but never appeared to have enteral access so was only given ASA suppositories, per chart review. He was transferred to [facility] on .    He will be admitted to [facility] for hourly neuromonitoring and a higher level of care.          Past Medical History:   Diagnosis Date    Coronary artery disease     Hypertension      No past surgical history on file.   Current Facility-Administered Medications on File Prior to Encounter   Medication Dose Route Frequency Provider Last Rate Last Admin    [] 0.9%  NaCl infusion   Intravenous  Continuous Maryjane Verduzco  mL/hr at 12/21/21 0610 New Bag at 12/21/21 0610    [COMPLETED] aspirin suppository 300 mg  300 mg Rectal Once Fito Rodríguez MD   300 mg at 12/21/21 1103    [COMPLETED] iohexoL (OMNIPAQUE 350) injection 70 mL  70 mL Intravenous ONCE PRN Maryjane Verduzco MD   70 mL at 12/20/21 1911    [COMPLETED] perflutren protein-A microsphr 0.22 mg/mL IV susp  0.5 mL Intravenous Once Artur Shah MD   0.11 mg at 12/21/21 1051    [DISCONTINUED] 0.9%  NaCl infusion   Intravenous Continuous Gilda Romero  mL/hr at 12/21/21 1454 New Bag at 12/21/21 1454    [DISCONTINUED] acetaminophen tablet 650 mg  650 mg Oral Q8H PRN Maryjane Verduzco MD        [DISCONTINUED] aspirin chewable tablet 81 mg  81 mg Oral Daily Maryjane Verduzco MD        [DISCONTINUED] aspirin tablet 325 mg  325 mg Oral Once Maryjane Verduzco MD        [DISCONTINUED] atorvastatin tablet 80 mg  80 mg Oral QHS Maryjane Verduzco MD        [DISCONTINUED] clopidogreL tablet 75 mg  75 mg Oral Daily Maryjane Verduzco MD        [DISCONTINUED] clopidogreL tablet 75 mg  75 mg Oral Daily Fito Rodríguez MD        [DISCONTINUED] dextrose 50% injection 12.5 g  12.5 g Intravenous PRN Maryjane Verduzco MD        [DISCONTINUED] dextrose 50% injection 25 g  25 g Intravenous PRN Maryjane Verduzco MD        [DISCONTINUED] enoxaparin injection 40 mg  40 mg Subcutaneous Daily Maryjane Verduzco MD   40 mg at 12/20/21 2151    [DISCONTINUED] glucagon (human recombinant) injection 1 mg  1 mg Intramuscular PRN Maryjane Verduzco MD        [DISCONTINUED] glucose chewable tablet 16 g  16 g Oral PRN Maryjane Verduzco MD        [DISCONTINUED] glucose chewable tablet 24 g  24 g Oral PRN Maryjane Verduzco MD        [DISCONTINUED] naloxone 0.4 mg/mL injection 0.02 mg  0.02 mg Intravenous PRN Maryjane Verduzco MD         [DISCONTINUED] sodium chloride 0.9% flush 10 mL  10 mL Intravenous Q12H PRN Maryjane Verduzco MD         No current outpatient medications on file prior to encounter.      Allergies: Patient has no known allergies.    No family history on file.  Social History     Tobacco Use    Smoking status: Light Tobacco Smoker   Substance Use Topics    Alcohol use: Not Currently     Review of Systems   Unable to perform ROS: Acuity of condition     Objective:     Vitals:    Temp: 98.7 °F (37.1 °C)  Pulse: 88  BP: (!) 149/96  MAP (mmHg): 117  Resp: 18  SpO2: 100 %  O2 Device (Oxygen Therapy): room air    Temp  Min: 97.2 °F (36.2 °C)  Max: 98.7 °F (37.1 °C)  Pulse  Min: 64  Max: 103  BP  Min: 110/66  Max: 149/96  MAP (mmHg)  Min: 90  Max: 117  Resp  Min: 18  Max: 29  SpO2  Min: 93 %  Max: 100 %    No intake/output data recorded.           Physical Exam   Constitutional: Well-nourished, well-developed. No obvious distress.  Eyes: Clear conjunctiva. Anicteric. No discharge. Lids without lesions.  HEENT: MMM. Nose, external ears atraumatic.  Cardio: RRR. Pulses intact. Capillary refill time < 2 seconds.  Respiratory: Clear to auscultation. Regular effort.  GI: Bowel sounds present. Soft, non-distended, non-tender.    Neurologic:  E4V2M6  Alert, grunting  Severely dysarthric  Follows commands intermittently, difficult to identify if this is effort dependent or aphasia dependent  PERRL  Pupils brisk  RUE extends  RLE WD  Moves left side spontaneously and antigravity    NIH Scale  1a. 0  1b. 1  1c. 1  2.   1  3.   0  4.   2  5a. 0  5b. 3  6a. 0  6b. 3  7.   0  8.   1 (difficult to assess due to aphasia)  9.   2  10. 2  11. 0    NIH: 16      Today I personally reviewed pertinent medications, lines/drains/airways, imaging, cardiology results, laboratory results, microbiology results, notably:        Assessment/Plan:     Neuro  * Stroke due to embolism of left middle cerebral artery  -Left MCA stroke without  intervention  -Admit to NCC, VN following  -NSGY on board for hemicrani watch  -q1h neuro checks, vital checks  -EKG, ECHO, CXR  -A1c, TSH, lipid panel, coag panel  -Daily CBC, CMP, mag, phos  -SBP < 180, prn labetalol and hydralazine  -Daily Statin, ASA  -SCDs, hold AC until after MRI to determine stroke burden  -CT large left MCA distribution infarct with no associated acute hemorrhage  -CTA segmental occlusion measuring approximately 10 mm mid and distal M1 segment left MCA with diminished flow distal to the stenosis.  -PT/OT/SLP  -MRI pending      Cytotoxic cerebral edema  -see primary problem    Cardiac/Vascular  Essential hypertension  SBP < 220  Prn labetalol and hydralazine  No home anti-hypertensives listed    Mural thrombus of cardiac apex  Small to moderate sized (1.6x1.1 cm) layered, protruding apical mass c/w thrombus.  -Noted on ECHO from 12/21  -Hold AC until 72 hours post CVA and after MRI to determine stroke burden      Other  Debility  PT/OT    Grade III diastolic dysfunction  1. The left ventricle (LV) is dilated with severely depressed systolic function & global hypokinesis.  2. LV ejection fraction is 15-20%. Grade III LV diastolic dysfunction.  3. Small to moderate sized (1.6x1.1 cm) layered, protruding apical mass c/w thrombus.  4. The right ventricle (RV) is severely enlarged with normal systolic function.   5. Estimated RVSP is moderately elevated (50-70 mmHg). Pulmonary hypertension is present.  6. Severe atrial enlargement.  7. Moderate eccentric mitral regrugitation.  8. Mild tricuspid regurgitation.     ECHO 12/21            The patient is being Prophylaxed for:  Venous Thromboembolism with: Mechanical  Stress Ulcer with: None  Ventilator Pneumonia with: none    Activity Orders          Turn patient starting at 12/21 1800    Elevate HOB starting at 12/21 1717    Diet NPO: NPO starting at 12/21 1717        Full Code     Critical condition in that Patient has a condition that poses threat  to life and bodily function: L MCA CVA with no intervention, Apical mural thrombus and determination of AC intervention in setting of new stroke, blood pressure management, airway monitoring, hypertension, hyperlipidemia.      35 minutes of Critical care time was spent personally by me on the following activities: development of treatment plan with patient or surrogate and bedside caregivers, discussions with consultants, evaluation of patient's response to treatment, examination of patient, ordering and performing treatments and interventions, ordering and review of laboratory studies, ordering and review of radiographic studies, pulse oximetry, antibiotic titration if applicable, vasopressor titration if applicable, re-evaluation of patient's condition. This critical care time did not overlap with that of any other provider or involve time for any procedures. There is high probability for acute neurological change leading to clinical and possibly life-threatening deterioration requiring highest level of physician preparedness for urgent intervention.    Felisa Quinones PA-C  Neurocritical Care  [facility]

## 2021-12-22 NOTE — HOSPITAL COURSE
12/22/2021NAEO, stroke day 3. NSGY following, stroke team following. Continue to monitor neuro exam closely. Strict BP and HR control since patient with LV thrombus, cannot safely AC yet 2/2 to acute large territory stroke   12/23/2021 NAEON. Heparin gtt started for LV thrombus, cont som-crani watch.   12/24/2021 exam improving; 24hrs CTH demonstrates petechial hmg will repeat and hold heparin drip if hmg increases otherwise will continue; DHC watch; Na goal 140-150; sbp 100-140  12/25/2021 Vtach runs o/n; tachypnea; CTA chest +subseq PE: continue heparin drip; CTH remains stable   12/26/2021 heparin drip: LV apical thrombus (known) +PE (new subsegmental); sbp 100-160; start SSRI; tachypnea    12/27/2021: Left dorsal pedal pulse not located w/ dopplar uncertain of onset. US of LE.   12/28/2021: Naeo. LE US w/ severe PAD and occluded stents. Vascular Surgery consulted, possible step down to Vascular Neurology.

## 2021-12-22 NOTE — PLAN OF CARE
[facility] Care Plan    POC reviewed with Jimmy Leger and family at 0300. Pt unable to verbalize understanding d/t aphasia. Family Questions and concerns addressed. No acute events overnight. Pt progressing toward goals. Will continue to monitor. See below and flowsheets for full assessment and VS info.     - mri obtained   - pt maintained NPO   - straight cathx1     Neuro:  Brenden Coma Scale  Best Eye Response: 3-->(E3) to speech  Best Motor Response: 5-->(M5) localizes pain  Best Verbal Response: 1-->(V1) none  Brenden Coma Scale Score: 9  Assessment Qualifiers: patient not sedated/intubated,no eye obstruction present        24hr Temp:  [97.2 °F (36.2 °C)-98.7 °F (37.1 °C)]     CV:   Rhythm: normal sinus rhythm  BP goals:   SBP < 220  MAP > 65    Resp:   O2 Device (Oxygen Therapy): room air       Plan: N/A    GI/:     Diet/Nutrition Received: NPO  Last Bowel Movement: 12/18/21  Voiding Characteristics: due to void,external catheter    Intake/Output Summary (Last 24 hours) at 12/22/2021 0651  Last data filed at 12/21/2021 2301  Gross per 24 hour   Intake --   Output 250 ml   Net -250 ml     Unmeasured Output  Urine Occurrence: 1  Pad Count: 1    Labs/Accuchecks:  Recent Labs   Lab 12/21/21  0453 12/21/21  0453 12/22/21  0301   WBC 8.93   < > 9.17   RBC 4.08*   < > 3.66*   HGB 13.6*   < > 12.6*   HCT 40.1   < > 36.5*   *  --   --     < > = values in this interval not displayed.      Recent Labs   Lab 12/22/21  0301      K 4.0   CO2 19*   *   BUN 11   CREATININE 0.7   ALKPHOS 42*   ALT 18   AST 32   BILITOT 1.3*    No results for input(s): PROTIME, INR, APTT, HEPANTIXA in the last 168 hours.   Recent Labs   Lab 12/22/21  0301   *       Electrolytes: Electrolytes replaced  Accuchecks: Q6H    Gtts:       LDA/Wounds:  Lines/Drains/Airways       Drain                   NG/OG Tube 12/21/21 1410 Left nostril <1 day    Male External Urinary Catheter 12/21/21 1830 <1 day              Peripheral  Intravenous Line                   Peripheral IV - Single Lumen 12/20/21 1330 16 G Left Antecubital 1 day         Peripheral IV - Single Lumen 12/20/21 1330 18 G Right Antecubital 1 day                  Wounds: No  Wound care consulted: No

## 2021-12-22 NOTE — PT/OT/SLP EVAL
Occupational Therapy   Evaluation & Treatment     Name: Jimmy Leger  MRN: [id number]  Admitting Diagnosis:  Stroke due to embolism of left middle cerebral artery  Recent Surgery: * No surgery found *      Recommendations:     Discharge Recommendations: rehabilitation facility  Discharge Equipment Recommendations:   (tbd)  Barriers to discharge:  None    Assessment:     Jimmy Leger is a 56 y.o. male with a medical diagnosis of Stroke due to embolism of left middle cerebral artery.  He presents with impairments listed below. Pt displayed limited overall tolerance and active participation in the session. Pt is functioning at a low level and requiring high overall levels of assist. Pt is noted w/ R hemiplegia causing deficits for balance; makign the pt an overall safety concern and fall risk. Pt noted w/ global aphasia and limited command following. Pt did well to tolerate and participate in the session Pt displayed global deconditioning requiring increased assist for ADLs and mobility at this time. Pt would benefit from skilled OT services to improve independence and overall occupational functioning.     Performance deficits affecting function: weakness,impaired endurance,impaired self care skills,impaired functional mobilty,gait instability,impaired balance,visual deficits,impaired cognition,decreased upper extremity function,decreased lower extremity function,decreased coordination,decreased safety awareness,abnormal tone,decreased ROM,impaired coordination,impaired fine motor.      Rehab Prognosis: Good; patient would benefit from acute skilled OT services to address these deficits and reach maximum level of function.       Plan:     Patient to be seen 5 x/week to address the above listed problems via self-care/home management,therapeutic activities,therapeutic exercises,neuromuscular re-education  · Plan of Care Expires:    · Plan of Care Reviewed with: patient    Subjective     Chief Complaint: No complaints    Patient/Family Comments/goals: Increase functioning    Occupational Profile: No family or friends around to provide info  Living Environment:   Previous level of function:   Roles and Routines:   Equipment Used at Home:  none  Assistance upon Discharge:     Pain/Comfort:  · Pain Rating 1: 0/10 (no s/s of pain noted)  · Pain Rating Post-Intervention 1: 0/10 (no noted s/s of pain)    Patients cultural, spiritual, Restorationism conflicts given the current situation:      Objective:     Communicated with: RN prior to session.  Patient found HOB elevated with telemetry,pulse ox (continuous),Condom Catheter upon OT entry to room.    General Precautions: Standard, aspiration,fall,NPO   Orthopedic Precautions:N/A   Braces: N/A  Respiratory Status: Room air    Occupational Performance:    Bed Mobility:    · Patient completed Scooting/Bridging with total assistance  · Patient completed Supine to Sit with total assistance  · Patient completed Sit to Supine with total assistance    Functional Mobility/Transfers:  · Pt w/ poor sitting balance and limited commands following.     Activities of Daily Living:  · Lower Body Dressing: total assistance donned socks    Cognitive/Visual Perceptual:  Cognitive/Psychosocial Skills:     -       Oriented to: global aphasia    -       Follows Commands/attention:Inattentive and Easily distracted  -       Communication: global aphasia  -       Memory: No Deficits noted  -       Safety awareness/insight to disability: impaired   -       Mood/Affect/Coping skills/emotional control: Flat affect  Visual/Perceptual:      -unbale to full assess       Physical Exam:  Balance:    -       total assist for sitting balance w/ R lateral lean and LUE   Postural examination/scapula alignment:    -       Rounded shoulders  Skin integrity: Visible skin intact  Upper Extremity Range of Motion:     -       Right Upper Extremity: Deficits: flaccid  -       Left Upper Extremity: WFL  Upper Extremity Strength:    -        Left Upper Extremity: WFL   Strength:    -       Right Upper Extremity: Deficits: flaccid  -       Left Upper Extremity: WFL  Fine Motor Coordination:    -       Impaired  Right hand thumb/finger opposition skills   and Right hand, manipulation of objects    Gross motor coordination:   Rside hemiplegia/paresis    AMPAC 6 Click ADL:  AMPAC Total Score: 6    Treatment & Education:  Pt sat EOB ~10 min at total assist w/ R lateral Lean and LUE pushing.  Pt completed postural activities at total assist seated EOB.  PROM to LUE to all joints in all planes.  Pt educated on POC and overall coordination of care.  Education:    Patient left HOB elevated with all lines intact, call button in reach and RN present    GOALS:   Multidisciplinary Problems     Occupational Therapy Goals     Not on file                History:     Past Medical History:   Diagnosis Date    Coronary artery disease     Hypertension        No past surgical history on file.    Time Tracking:     OT Date of Treatment: 12/22/21  OT Start Time: 0914  OT Stop Time: 0931  OT Total Time (min): 17 min    Billable Minutes:Evaluation 9 minutes  Therapeutic Activity 8 minutes    12/22/2021

## 2021-12-22 NOTE — ASSESSMENT & PLAN NOTE
1. The left ventricle (LV) is dilated with severely depressed systolic function & global hypokinesis.  2. LV ejection fraction is 15-20%. Grade III LV diastolic dysfunction.  3. Small to moderate sized (1.6x1.1 cm) layered, protruding apical mass c/w thrombus.  4. The right ventricle (RV) is severely enlarged with normal systolic function.   5. Estimated RVSP is moderately elevated (50-70 mmHg). Pulmonary hypertension is present.  6. Severe atrial enlargement.  7. Moderate eccentric mitral regrugitation.  8. Mild tricuspid regurgitation.     ECHO 12/21  -monitor I/Os   -careful sodium control

## 2021-12-22 NOTE — SUBJECTIVE & OBJECTIVE
Interval History: PSD3. NAEON. MRI reviewed. Na 139    Medications:  Continuous Infusions:  Scheduled Meds:   aspirin  81 mg Oral Daily    atorvastatin  40 mg Oral Daily    senna-docusate 8.6-50 mg  1 tablet Per NG tube BID     PRN Meds:acetaminophen, hydrALAZINE, labetalol, magnesium oxide, magnesium oxide, ondansetron, potassium bicarbonate, potassium bicarbonate, potassium bicarbonate, potassium, sodium phosphates, potassium, sodium phosphates, potassium, sodium phosphates, sodium chloride 0.9%     Review of Systems  Objective:        There is no height or weight on file to calculate BMI.  Vital Signs (Most Recent):  Temp: 98.7 °F (37.1 °C) (12/22/21 0301)  Pulse: 76 (12/22/21 0836)  Resp: 16 (12/22/21 0836)  BP: (!) 134/94 (12/22/21 0836)  SpO2: 98 % (12/22/21 0836) Vital Signs (24h Range):  Temp:  [97.9 °F (36.6 °C)-98.7 °F (37.1 °C)] 98.7 °F (37.1 °C)  Pulse:  [74-98] 76  Resp:  [11-23] 16  SpO2:  [93 %-100 %] 98 %  BP: (126-155)/(81-97) 134/94                          NG/OG Tube 12/21/21 1410 Left nostril (Active)   Placement Check placement verified by x-ray;placement verified by distal tube length measurement 12/22/21 0301   Tolerance no signs/symptoms of discomfort 12/22/21 0301   Securement secured to nostril center w/ adhesive device 12/22/21 0301   Clamp Status/Tolerance clamped 12/22/21 0301   Suction Setting/Drainage Method suction at the bedside 12/22/21 0301   Insertion Site Appearance no redness, warmth, tenderness, skin breakdown, drainage 12/22/21 0301   Drainage None 12/22/21 0301   Flush/Irrigation flushed w/;water;no resistance met 12/22/21 0301       Male External Urinary Catheter 12/21/21 1830 (Active)       Neurosurgery Physical Exam  General: no distress  Head: Non-traumatic, normocephalic  Eyes: Pupils equal, L gaze preference  Neck: Supple, normal ROM, no tenderness to palpation  CVS: Normal rate and rhythm, distal pulses present  Pulm: Symmetric expansion, no respiratory  distress  GI: Abdomen nondistended, nontender  MSK: Moves L side w/o restriction  Skin: Dry, intact  Psych: expressive/receptive aphasia  Neuro: GCS [id number]  CNII-XII: L gaze preference/R facial drooping, difficult to appreciate given patient participation but otherwise grossly intact   Extremities:  Motor:  Withdraws RUE/RLE to stim  Spontaneously moves LUE/LLE antigravity, will not follow commands     Sensory:      Sensorium intact throughout, responds to stim     Coordination:      Coordination intact throughout on L side    Significant Labs:  Recent Labs   Lab 12/20/21  1357 12/21/21  0453 12/22/21  0301   * 91 85    138 139   K 4.3 3.9 4.0    108 111*   CO2 23 19* 19*   BUN 11 10 11   CREATININE 0.9 0.8 0.7   CALCIUM 9.3 8.6* 7.9*   MG  --   --  1.8     Recent Labs   Lab 12/20/21  1357 12/21/21  0453 12/22/21  0301   WBC 8.66 8.93 9.17   HGB 15.0 13.6* 12.6*   HCT 44.0 40.1 36.5*   * 118* 83*     No results for input(s): LABPT, INR, APTT in the last 48 hours.  Microbiology Results (last 7 days)     ** No results found for the last 168 hours. **        Recent Lab Results       12/22/21  0600   12/22/21  0301   12/21/21  2349   12/21/21  2309   12/21/21  1911        Albumin   3.2             Alkaline Phosphatase   42             ALT   18             Anion Gap   9             Ascending aorta               Ao peak daniel               Ao VTI               Appearance, UA     Clear           AST   32  Comment: *Result may be interfered by visible hemolysis             AV valve area               AV mean gradient               AV index (prosthetic)               AV peak gradient               AV Velocity Ratio               Baso #   0.03             Basophil %   0.3             Bilirubin (UA)     Negative           BILIRUBIN TOTAL   1.3  Comment: For infants and newborns, interpretation of results should be based  on gestational age, weight and in agreement with  clinical  observations.    Premature Infant recommended reference ranges:  Up to 24 hours.............<8.0 mg/dL  Up to 48 hours............<12.0 mg/dL  3-5 days..................<15.0 mg/dL  6-29 days.................<15.0 mg/dL               BSA               BUN   11             QEF               Calcium   7.9             Chloride   111             Cholesterol         162  Comment: The National Cholesterol Education Program (NCEP) has set the  following guidelines (reference ranges) for Cholesterol:  Optimal.....................<200 mg/dL  Borderline High.............200-239 mg/dL  High........................> or = 240 mg/dL         CO2   19             Color, UA     Yellow           CPK   341             Creatinine   0.7             Left Ventricle Relative Wall Thickness               Differential Method   Automated             E/A ratio               Echo EF Estimated               E/E' ratio               eGFR if    >60.0             eGFR if non    >60.0  Comment: Calculation used to obtain the estimated glomerular filtration  rate (eGFR) is the CKD-EPI equation.                EF               Eos #   0.0             Eosinophil %   0.3             E wave deceleration time               FS               Glucose   85             Glucose, UA     Negative           Gran # (ANC)   6.9             Gran %   75.7             Group & Rh               HDL         58  Comment: The National Cholesterol Education Program (NCEP) has set the  following guidelines (reference values) for HDL Cholesterol:  Low...............<40 mg/dL  Optimal...........>60 mg/dL         HDL/Cholesterol Ratio         35.8       Hematocrit   36.5             Hemoglobin   12.6             Hyaline Casts, UA     2           Immature Grans (Abs)   0.05  Comment: Mild elevation in immature granulocytes is non specific and   can be seen in a variety of conditions including stress response,   acute inflammation, trauma  and pregnancy. Correlation with other   laboratory and clinical findings is essential.               Immature Granulocytes   0.5             INDIRECT MARISELA               IVC diameter               IVS               Ketones, UA     1+           LA WIDTH               Left Atrium Major Axis               Left Atrium Minor Axis               LA size               LA volume               LA Volume Index               LA Volume Index (Mod)               LVOT area               LDL Cholesterol External         89.2  Comment: The National Cholesterol Education Program (NCEP) has set the  following guidelines (reference values) for LDL Cholesterol:  Optimal.......................<130 mg/dL  Borderline High...............130-159 mg/dL  High..........................160-189 mg/dL  Very High.....................>190 mg/dL         Leukocytes, UA     1+           LV LATERAL E/E' RATIO               LV SEPTAL E/E' RATIO               LV Diastolic Volume               LV Diastolic Volume Index               LVIDd               LVIDs               LV mass               LV Mass Index               Left Ventricular Outflow Tract Mean Gradient               Left Ventricular Outflow Tract Mean Velocity               LVOT diameter               LVOT peak navdeep               LVOT stroke volume               LVOT peak VTI               LV Systolic Volume               LV Systolic Volume Index               Lymph #   1.5             Lymph %   15.9             Magnesium   1.8             MCH   34.4             MCHC   34.5             MCV   100             Mean e'               Microscopic Comment     SEE COMMENT  Comment: Other formed elements not mentioned in the report are not   present in the microscopic examination.              Mono #   0.7             Mono %   7.3             MPV   11.0             MV valve area p 1/2 method               MV Peak A Navdeep               MV Peak E Navdeep               MV stenosis pressure 1/2 time                NITRITE UA     Negative           Non-HDL Cholesterol         104  Comment: Risk category and Non-HDL cholesterol goals:  Coronary heart disease (CHD)or equivalent (10-year risk of CHD >20%):  Non-HDL cholesterol goal     <130 mg/dL  Two or more CHD risk factors and 10-year risk of CHD <= 20%:  Non-HDL cholesterol goal     <160 mg/dL  0 to 1 CHD risk factor:  Non-HDL cholesterol goal     <190 mg/dL         nRBC   0             Occult Blood UA     2+           pH, UA     5.0           Phosphorus   2.4             Platelet Estimate   Decreased             Platelets   83             POCT Glucose 105       83         Potassium   4.0             PROTEIN TOTAL   6.2             Protein, UA     Negative  Comment: Recommend a 24 hour urine protein or a urine   protein/creatinine ratio if globulin induced proteinuria is  clinically suspected.             Pulmonary Valve Mean Velocity               PV Peak D Navdeep               PV Peak S Navdeep               PV PEAK VELOCITY               Pulm vein S/D ratio               Posterior Wall               Right Atrial Pressure (from IVC)               Right Atrium Volume Systolic               RA area               RA Major Axis               RA Width               RBC   3.66             RBC, UA     16           RDW   12.4             RIGHT VENTRICLE FREE WALL THICKNESS               RV mid diameter               RV S'               RVDD               Right ventricular length in diastole (apical 4-chamber view)               RVOT area               RVOT prox diameter               Sinus               Sodium   139             Specific [place], UA     1.025           Specimen UA     Urine, Catheterized           STJ               TAPSE               TDI SEPTAL               TDI LATERAL               Total Cholesterol/HDL Ratio         2.8       Triglycerides         74  Comment: The National Cholesterol Education Program (NCEP) has set the  following guidelines (reference values) for  triglycerides:  Normal......................<150 mg/dL  Borderline High.............150-199 mg/dL  High........................200-499 mg/dL         Triscuspid Valve Regurgitation Peak Gradient               TR Max Navdeep               TV rest pulmonary artery pressure               WBC, UA     8           WBC   9.17                              12/21/21  1811   12/21/21  1039        Albumin         Alkaline Phosphatase         ALT         Anion Gap         Ascending aorta   3.59       Ao peak navdeep   1.03       Ao VTI   16.5       Appearance, UA         AST         AV valve area   2.41       AV mean gradient   2       AV index (prosthetic)   0.41       AV peak gradient   4       AV Velocity Ratio   0.43       Baso #         Basophil %         Bilirubin (UA)         BILIRUBIN TOTAL         BSA   1.98       BUN         QEF   27       Calcium         Chloride         Cholesterol         CO2         Color, UA         CPK         Creatinine         Left Ventricle Relative Wall Thickness   0.25       Differential Method         E/A ratio   2.52       Echo EF Estimated   18       E/E' ratio   12.60       eGFR if          eGFR if non          EF   15       Eos #         Eosinophil %         E wave deceleration time   [id number]       FS   8       Glucose         Glucose, UA         Gran # (ANC)         Gran %         Group & Rh A POS         HDL         HDL/Cholesterol Ratio         Hematocrit         Hemoglobin         Hyaline Casts, UA         Immature Grans (Abs)         Immature Granulocytes         INDIRECT MARISELA NEG         IVC diameter   2.22       IVS   0.88       Ketones, UA         LA WIDTH   5.00       Left Atrium Major Axis   6.26       Left Atrium Minor Axis   6.21       LA size   4.33       LA volume   114.74          96.87       LA Volume Index   57.9       LA Volume Index (Mod)   48.9       LVOT area   5.9       LDL Cholesterol External         Leukocytes, UA         LV  LATERAL E/E' RATIO   9.00       LV SEPTAL E/E' RATIO   21.00       LV Diastolic Volume   282.54       LV Diastolic Volume Index   142.70       LVIDd   7.32       LVIDs   6.70       LV mass   301.24       LV Mass Index   152       Left Ventricular Outflow Tract Mean Gradient   0.48       Left Ventricular Outflow Tract Mean Velocity   [id number]       LVOT diameter   2.75       LVOT peak navdeep   0.44       LVOT stroke volume   39.77       LVOT peak VTI   6.70       LV Systolic Volume   230.98       LV Systolic Volume Index   116.7       Lymph #         Lymph %         Magnesium         MCH         MCHC         MCV         Mean e'   0.05       Microscopic Comment         Mono #         Mono %         MPV         MV valve area p 1/2 method   7.32       MV Peak A Navdeep   0.25       MV Peak E Navdeep   0.63       MV stenosis pressure 1/2 time   [id number]       NITRITE UA         Non-HDL Cholesterol         nRBC         Occult Blood UA         pH, UA         Phosphorus         Platelet Estimate         Platelets         POCT Glucose         Potassium         PROTEIN TOTAL         Protein, UA         Pulmonary Valve Mean Velocity   0.26       PV Peak D Navdeep   0.79       PV Peak S Navdeep   [id number]       PV PEAK VELOCITY   0.57       Pulm vein S/D ratio   0.33       Posterior Wall   0.90       Right Atrial Pressure (from IVC)   8       Right Atrium Volume Systolic   70.98       RA area   20.2       RA Major Axis   5.24       RA Width   4.94       RBC         RBC, UA         RDW         RIGHT VENTRICLE FREE WALL THICKNESS   0.60       RV mid diameter   3.34       RV S'   13       RVDD   3.27       Right ventricular length in diastole (apical 4-chamber view)   9.47       RVOT area   6.51       RVOT prox diameter   2.88       Sinus   3.25       Sodium         Specific Gravity, UA         Specimen UA         STJ   2.84       TAPSE   2.46       TDI SEPTAL   0.03       TDI LATERAL   0.07       Total Cholesterol/HDL Ratio          Triglycerides         Triscuspid Valve Regurgitation Peak Gradient   43       TR Max Navdeep   3.26       TV rest pulmonary artery pressure   51       WBC, UA         WBC               Significant Diagnostics:  I have reviewed and interpreted all pertinent imaging results/findings within the past 24 hours along with rad report

## 2021-12-22 NOTE — ASSESSMENT & PLAN NOTE
Large Left MCA stroke without intervention  -Admit to NCC, VN following  -NSGY on board for hemicrani watch  -q1h neuro checks, vital checks  -EKG, ECHO, CXR  -A1c, TSH, lipid panel, coag panel  -Daily CBC, CMP, mag, phos  -SBP < 160, prn labetalol and hydralazine  -Daily Statin, ASA  -SCDs, SQH  -CT large left MCA distribution infarct with no associated acute hemorrhage  -CTA segmental occlusion measuring approximately 10 mm mid and distal M1 segment left MCA with diminished flow distal to the stenosis.  -PT/OT/SLP  -MRI brain -Large acute  left MCA distribution infarct with modest mass effect.   Multiple small remote scattered infarcts elsewhere as above.  Embolic disease to be considered

## 2021-12-22 NOTE — CONSULTS
Inpatient consult to Physical Medicine Rehab  Consult performed by: Laura Polk NP  Consult ordered by: Felisa Quinones PA-C  Reason for consult: Assess rehab needs      Reviewed patient history and current admission.  Rehab team following.  Full consult to follow.    JUHI Ramesh, FNP-C  Physical Medicine & Rehabilitation   12/22/2021

## 2021-12-22 NOTE — PLAN OF CARE
Recommendations    1. Modify TF to better meet needs: Isosource 1.5 @ goal rate 50 mL/hr    - This provides 1800 kcal, 82 g protein, and 917 mL free water    - Additional water per MD, hold for n/v or residuals >500 mL     2. If able to advance diet, rec'd Cardiac, fluids per MD and texture per SLP     3. RD to monitor    Goals: Pt will meet and tolerate >75% EEN/EPN by RD f/u  Nutrition Goal Status: new    Communication of RD Recs:  (POC)    Manasa Loja, MS, RD, LDN  Ext: [phone]

## 2021-12-22 NOTE — ASSESSMENT & PLAN NOTE
Small to moderate sized (1.6x1.1 cm) layered, protruding apical mass c/w thrombus.  -Noted on ECHO from 12/21  -Cannot safely start full AC at this time secondary to acute ischemic stroke with small hemorrhagic component  -Strict HR and BP control  -SBP goal <160  -lopressor started

## 2021-12-22 NOTE — CONSULTS
[facility] - Neuro Critical Care  Vascular Neurology  [facility]  Consult Note    Inpatient consult to Vascular (Stroke) Neurology  Consult performed by: Alis Kohli PA-C  Consult ordered by: Felisa Quinones PA-C        Assessment/Plan:     Patient is a 56 y.o. year old male with:    * Stroke due to embolism of left middle cerebral artery  55 yo male with PMHx of HTN and CAD who presents as a transfer from [facility]. No intervention was performed(no tPA as OOW, no IR per telestroke provider). CTH at OSH with large L MCA infarct. CTA with distal L M1 occlusion. He was admitted to hospital medicine at OSH and now transferred to [facility] for higher level of care. Repeat NCCTH(12/21) with large L MCA infarct. Echo at OSH with LV global hypokinesis, EF 15-20%, severe atrial enlargement and small to moderate size apical thrombus. He is NPO and was given ASA suppository 300 prior to arrival. Patient to be admitted to [facility] for close monitoring.   Etiology: likely cardioembolic 2/2 low EF(15-20%) and cardiac thrombus    Antithrombotics for secondary stroke prevention: Anticoagulants: Heparin gtt MINIMAL intensity, NO bolus    Statins for secondary stroke prevention and hyperlipidemia, if present:   Statins: Atorvastatin- 40 mg daily recommend checking lipid panel    Aggressive risk factor modification: HTN, CAD     Rehab efforts: The patient has been evaluated by a stroke team provider and the therapy needs have been fully considered based off the presenting complaints and exam findings. The following therapy evaluations are needed: PT evaluate and treat, OT evaluate and treat, SLP evaluate and treat, PM&R evaluate for appropriate placement    Diagnostics ordered/pending: Lipid Profile to assess cholesterol levels, MRI head without contrast to assess brain parenchyma    VTE prophylaxis: Mechanical prophylaxis: Place SCDs  None: Reason for No Pharmacological VTE Prophylaxis: recommending heparin gtt minimal  intensity    BP parameters: Infarct: No intervention, SBP <220       Cytotoxic cerebral edema  Area of cytotoxic cerebral edema identified when reviewing brain imaging in the territory of the L middle cerebral artery. There is mass effect associated with it. Continue to monitor the patients clinical exam for any worsening of symptoms which may indicate expansion of the stroke or the area of the edema resulting in the clinical change. The pattern is suggestive of embolic etiology        Essential hypertension  Stroke RF  SBP<220    Mural thrombus of cardiac apex  Seen on TTE at outside facility  -TTE 12/21/21: with EF of 15-20%, small to moderate sized (1.6x1.1 cm) layered, protruding apical mass c/w thrombus, global hypokinesis, severe atrial enlargement.  -Case discussed with stroke fellow. Recommend AC with heparin gtt MINIMAL intensity, NO bolus.            STROKE DOCUMENTATION          NIH Scale:  1a. Level of Consciousness: 0-->Alert, keenly responsive  1b. LOC Questions: 2-->Answers neither question correctly  1c. LOC Commands: 0-->Performs both tasks correctly  2. Best Gaze: 1-->Partial gaze palsy, gaze is abnormal in one or both eyes, but forced deviation or total gaze paresis is not present  3. Visual: 0-->No visual loss  4. Facial Palsy: 1-->Minor paralysis (flattened nasolabial fold, asymmetry on smiling)  5a. Motor Arm, Left: 0-->No drift, limb holds 90 (or 45) degrees for full 10 secs  5b. Motor Arm, Right: 3-->No effort against gravity, limb falls  6a. Motor Leg, Left: 0-->No drift, leg holds 30 degree position for full 5 secs  6b. Motor Leg, Right: 3-->No effort against gravity, leg falls to bed immediately  7. Limb Ataxia: 0-->Absent  8. Sensory: 0-->Normal, no sensory loss  9. Best Language: 2-->Severe aphasia, all communication is through fragmentary expression, great need for inference, questioning, and guessing by the listener. Range of information that can be exchanged is limited, listener  carries burden of. . . (see row details)  10. Dysarthria: 2-->Severe dysarthria, patients speech is so slurred as to be unintelligible in the absence of or out of proportion to any dysphasia, or is mute/anarthric  11. Extinction and Inattention (formerly Neglect): 0-->No abnormality  Total (NIH Stroke Scale): 14    Modified [place] Score: 0  [place] Coma Scale:    ABCD2 Score:    [id number] Score:   HAS -BLED Score:   ICH Score:   Hunt & Gillis Classification:       Thrombolysis Candidate? No, Out of window     Delays to Thrombolysis?  No    Interventional Revascularization Candidate?   Is the patient eligible for mechanical endovascular reperfusion (DOMINIC)?  No; Large core infarct on imaging     Delays to Thrombectomy? No    Hemorrhagic change of an Ischemic Stroke: Does this patient have an ischemic stroke with hemorrhagic changes? No     Subjective:     History of Present Illness:  57 yo male with PMHx of HTN and CAD who presents as a transfer from [facility]. Patient had been admitted to OSH after telestroke was cancelled due to no intervention recommended per telestroke provider. Patient initially brought to OSH via EMS after being found down by coworker yesterday afternoon. Last known normal unclear. Patient did not show up to feed the horses on the morning of 12/19/21 which he usually does. Coworker went to check on him yesterday and found patient down with only a shirt and called EMS. On initial evaluation at OSH ED, patient nonverbal to command with right sided hemiparesis. Stroke code activated. CTH at OSH with large L MCA infarct. CTA with distal L M1 occlusion. He was admitted to hospital medicine at OSH and now transferred to [facility] for higher level of care. Repeat NCCTH(12/21) with large L MCA infarct. Echo at OSH with LV global hypokinesis, EF 15-20%, severe atrial enlargement and small to moderate size apical thrombus. He is NPO, received  via suppository pta. Patient to be admitted to [facility] for close  monitoring.     *History obtained per chart review as patient is aphasic.           Past Medical History:   Diagnosis Date    Coronary artery disease     Hypertension      No past surgical history on file.  No family history on file.  Social History     Tobacco Use    Smoking status: Light Tobacco Smoker   Substance Use Topics    Alcohol use: Not Currently     Review of patient's allergies indicates:  No Known Allergies    Medications: I have reviewed the current medication administration record.    No medications prior to admission.       Review of Systems   Unable to perform ROS: Patient nonverbal   Constitutional: Negative for fever.   HENT: Positive for trouble swallowing.    Eyes: Positive for discharge (left eye).   Gastrointestinal: Negative for vomiting.   Skin: Negative for rash.   Neurological: Positive for speech difficulty and weakness.     Objective:     Vital Signs (Most Recent):  Temp: 98.7 °F (37.1 °C) (12/21/21 1717)  Pulse: 88 (12/21/21 1717)  Resp: 18 (12/21/21 1717)  BP: (!) 149/96 (12/21/21 1717)  SpO2: 100 % (12/21/21 1717)    Vital Signs Range (Last 24H):  Temp:  [97.2 °F (36.2 °C)-98.7 °F (37.1 °C)]   Pulse:  []   Resp:  [18-29]   BP: (110-149)/(66-96)   SpO2:  [93 %-100 %]     Physical Exam  Vitals and nursing note reviewed.   Constitutional:       Appearance: He is normal weight. He is not diaphoretic.   HENT:      Head: Normocephalic and atraumatic.      Right Ear: External ear normal.      Left Ear: External ear normal.      Nose:      Comments: NGT in place  Eyes:      General:         Left eye: Discharge present.     Pupils: Pupils are equal, round, and reactive to light.      Comments: L gaze preference   Cardiovascular:      Rate and Rhythm: Normal rate.      Pulses: Normal pulses.   Pulmonary:      Effort: Pulmonary effort is normal. No respiratory distress.   Abdominal:      General: Abdomen is flat. There is no distension.   Musculoskeletal:         General: No deformity  or signs of injury.      Cervical back: Normal range of motion. No rigidity.      Right lower leg: No edema.      Left lower leg: No edema.   Skin:     General: Skin is warm and dry.      Findings: No rash.   Neurological:      Mental Status: He is alert.      Cranial Nerves: Cranial nerve deficit, dysarthria and facial asymmetry present.      Motor: Weakness present.   Psychiatric:      Comments: Unable to assess 2/2 severe aphasia         Neurological Exam:   LOC: alert  Attention Span: Good   Language: Expressive aphasia  Articulation: Dysarthria  Orientation: Untestable due to severe aphasia   Visual Fields: Full  EOM (CN III, IV, VI): Gaze preference  left  Pupils (CN II, III): PERRL  Facial Movement (CN VII): Lower facial weakness on the Right  Motor: Arm left  Normal 5/5  Leg left  Normal 5/5  Arm right  Plegia 0/5  Leg right Paresis: 1/5  Sensation: withdraws to pain in bilateral LE      Laboratory:  CMP:   Recent Labs   Lab 12/21/21  0453   CALCIUM 8.6*   ALBUMIN 3.4*   PROT 6.5      K 3.9   CO2 19*      BUN 10   CREATININE 0.8   ALKPHOS 44*   ALT 22   AST 30   BILITOT 1.3*     CBC:   Recent Labs   Lab 12/21/21  0453   WBC 8.93   RBC 4.08*   HGB 13.6*   HCT 40.1   *   MCV 98   MCH 33.3*   MCHC 33.9     Lipid Panel: No results for input(s): CHOL, LDLCALC, HDL, TRIG in the last 168 hours.  Coagulation: No results for input(s): PT, INR, APTT in the last 168 hours.  Hgb A1C:   Recent Labs   Lab 12/21/21 0453   HGBA1C 4.9     TSH:   Recent Labs   Lab 12/21/21  0453   TSH 0.768       Diagnostic Results:      Brain imaging:  MRI Brain WO pending    CTH WO 12/21/21  Again demonstrated in better delineated as large left MCA distribution infarct with no associated acute hemorrhage and with localized mass effect resulting in effacement of overlying cortical sulci and partial effacement left sylvian fissure.  No associated acute hemorrhage.     Several small old areas of infarction again noted  including cerebellum bilaterally, anterior left perisylvian region, lateral left frontal cortex and posterior right parietal cortex.    CTH WO 12/20/21    Large area loss of gray-white differentiation consistent with acute infarct left MCA distribution involving the left temporal lobe, temporal occipital region and portions of the basal ganglia with localized mass effect including effacement of overlying cortical sulci although with no associated acute hemorrhage.     Several small old areas of infarction are evident including anterior left perisylvian and cerebellum bilaterally.  Mild underlying atrophy.    Vessel Imaging:  CTA H/N 12/20/21  Segmental occlusion measuring approximately 10 mm mid and distal M1 segment left MCA with diminished flow distal to the stenosis.     Right cervical carotid circulation--atherosclerotic calcification bifurcation without measurable stenosis.     Left cervical carotid circulation--atherosclerotic calcification distal left common carotid artery and bifurcation with stenosis proximal left ICA measuring 16% utilizing NASCET criteria.  Mild eccentric narrowing mid to distal portion left common carotid artery also noted.     Vertebral arteries--no focal arterial abnormality or measurable stenosis.    Cardiac Evaluation:   TTE with bubble 12/21/21  1. The left ventricle (LV) is dilated with severely depressed systolic function & global hypokinesis.  2. LV ejection fraction is 15-20%. Grade III LV diastolic dysfunction.  3. Small to moderate sized (1.6x1.1 cm) layered, protruding apical mass c/w thrombus.  4. The right ventricle (RV) is severely enlarged with normal systolic function.   5. Estimated RVSP is moderately elevated (50-70 mmHg). Pulmonary hypertension is present.  6. Severe atrial enlargement.  7. Moderate eccentric mitral regrugitation.  8. Mild tricuspid regurgitation.          Alis Kohli PA-C  Comprehensive Stroke Center  Department of Vascular Neurology   [facility] - Neuro Critical Care

## 2021-12-22 NOTE — SUBJECTIVE & OBJECTIVE
No medications prior to admission.       Review of patient's allergies indicates:  No Known Allergies    Past Medical History:   Diagnosis Date    Coronary artery disease     Hypertension      No past surgical history on file.  Family History    None       Tobacco Use    Smoking status: Light Tobacco Smoker    Smokeless tobacco: Not on file   Substance and Sexual Activity    Alcohol use: Not Currently    Drug use: Not on file    Sexual activity: Not on file     Review of Systems   Unable to perform ROS: Patient nonverbal     Objective:        There is no height or weight on file to calculate BMI.  Vital Signs (Most Recent):  Temp: 98.7 °F (37.1 °C) (12/21/21 1717)  Pulse: 84 (12/21/21 1815)  Resp: 19 (12/21/21 1815)  BP: (!) 149/96 (12/21/21 1717)  SpO2: 99 % (12/21/21 1815) Vital Signs (24h Range):  Temp:  [97.2 °F (36.2 °C)-98.7 °F (37.1 °C)] 98.7 °F (37.1 °C)  Pulse:  [] 84  Resp:  [18-19] 19  SpO2:  [93 %-100 %] 99 %  BP: (110-149)/(66-96) 149/96     Date 12/21/21 0700 - 12/22/21 0659   Shift [id number] 8132-5833 24 Hour Total   INTAKE   Shift Total       OUTPUT   Urine  0  0   Shift Total  0  0   Weight (kg)                            NG/OG Tube 12/21/21 1410 Left nostril (Active)   Placement Check placement verified by x-ray 12/21/21 1440   Tolerance no signs/symptoms of discomfort 12/21/21 1440   Securement secured to nostril center 12/21/21 1440   Clamp Status/Tolerance clamped 12/21/21 1440            Urethral Catheter 12/20/21 1420 Non-latex (Active)   Site Assessment Clean;Intact 12/21/21 0741   Collection Container Urimeter 12/21/21 0741   Securement Method secured to top of thigh w/ adhesive device 12/21/21 0741   Catheter Care Performed yes 12/21/21 0741   Reason for Continuing Urinary Catheterization Required immobilization 12/21/21 0741   CAUTI Prevention Bundle Intact seal between catheter & drainage tubing;Drainage bag/urimeter off the floor;Sheeting clip in use;StatLock in  "place w 1" slack;Drainage bag/urimeter below bladder;No dependent loops or kinks;Drainage bag/urimeter not overfilled (<2/3 full) 12/21/21 0741   Output (mL) 0 mL 12/21/21 1800       Neurosurgery Physical Exam   General: no distress  Head: Non-traumatic, normocephalic  Eyes: Pupils equal, EOMi; L gaze preference  Neck: Supple, normal ROM, no tenderness to palpation  CVS: Normal rate and rhythm, distal pulses present  Pulm: Symmetric expansion, no respiratory distress  GI: Abdomen nondistended, nontender  MSK: Moves L side w/o restriction  Skin: Dry, intact  Psych: expressive/receptive aphasia  Neuro: GCS [id number]  CNII-XII: Intact on fine exam, PERRL, EOMi, possible L gaze preference/R facial drooping, difficult to appreciate given patient particpation  Extremities:  Motor:  Withdraws RUE/RLE to stim  Spontaneously moves LUE/LLE antigravity, will not follow commands    Sensory:      Sensorium intact throughout, responds to stim    Coordination:      Coordination intact throughout on L side            Significant Labs:  Recent Labs   Lab 12/20/21  1357 12/21/21  0453   * 91    138   K 4.3 3.9    108   CO2 23 19*   BUN 11 10   CREATININE 0.9 0.8   CALCIUM 9.3 8.6*     Recent Labs   Lab 12/20/21  1357 12/21/21  0453   WBC 8.66 8.93   HGB 15.0 13.6*   HCT 44.0 40.1   * 118*         Significant Diagnostics:  I have reviewed and interpreted all pertinent imaging results/findings within the past 24 hours.  "

## 2021-12-22 NOTE — ASSESSMENT & PLAN NOTE
57 yo male with PMHx of HTN and CAD who presents as a transfer from [facility]. No intervention was performed(no tPA as OOW, no IR per telestroke provider). CTH at OSH with large L MCA infarct. CTA with distal L M1 occlusion. He was admitted to hospital medicine at OSH and now transferred to [facility] for higher level of care. Repeat NCCTH(12/21) with large L MCA infarct. Echo at OSH with LV global hypokinesis, EF 15-20%, severe atrial enlargement and small to moderate size apical thrombus. He is NPO and was given ASA suppository 300 prior to arrival. Patient to be admitted to [facility] for close monitoring.   Etiology: likely cardioembolic 2/2 low EF(15-20%) and cardiac thrombus    Antithrombotics for secondary stroke prevention: Anticoagulants: Heparin gtt MINIMAL intensity, NO bolus    Statins for secondary stroke prevention and hyperlipidemia, if present:   Statins: Atorvastatin- 40 mg daily recommend checking lipid panel    Aggressive risk factor modification: HTN, CAD     Rehab efforts: The patient has been evaluated by a stroke team provider and the therapy needs have been fully considered based off the presenting complaints and exam findings. The following therapy evaluations are needed: PT evaluate and treat, OT evaluate and treat, SLP evaluate and treat, PM&R evaluate for appropriate placement    Diagnostics ordered/pending: Lipid Profile to assess cholesterol levels, MRI head without contrast to assess brain parenchyma    VTE prophylaxis: Mechanical prophylaxis: Place SCDs  None: Reason for No Pharmacological VTE Prophylaxis: recommending heparin gtt minimal intensity    BP parameters: Infarct: No intervention, SBP <220

## 2021-12-22 NOTE — ASSESSMENT & PLAN NOTE
56M w/ unknown PMHX who presents after being found down with R sided hemiparesis and aphasia found to have multifocal L MCA infarction 2/2 L MCA occlusion.     [id number]     --Patient admitted to ICU on telemetry      -q1h neurochecks in ICU  --All labs and diagnostics reviewed. MRI pending.       -Further imaging per primary team, NSGY only reccomends future scans if exam decompensates  --Patient may continue anti-plt/coag medications at this time; will need full reversal prior to the OR (INR <1.4, Plt >100k)  --SBP recs and management per primary team  --Na 145-155 strict, reccomend hypertonic saline PRN per primary team to reach goal      -Rec mannitol per primary team for serum osm goal <320 if Na goal reached and persistent swelling present  --Low threshold for Keppra; no concern for seizure at this time  --HOB >30  --Follow-up full pre-op labs (CBC/CMP/PT-INR/PTT/T&S)  --NPO  --Apical thrombus work up per NCC  --VN following, stroke work up per VN  --Continue to monitor clinically, notify NSGY immediately with any changes in neuro status    Dispo: ongoing    Please contact the on call neurosurgery provider with questions or concerns. Provider may be reached via  or during weekday call only via [employer] [phone].     D/w Dr Nowak

## 2021-12-22 NOTE — SUBJECTIVE & OBJECTIVE
Neurologic Chief Complaint: found down, nonverbal, RSW    Subjective:     Interval History: Patient is seen for follow-up neurological assessment and treatment recommendations: patient in ICU for hemicrani watch, NPO per SLP    HPI, Past Medical, Family, and Social History remains the same as documented in the initial encounter.     Review of Systems   Constitutional: Negative for fever.   HENT: Positive for trouble swallowing.    Eyes: Positive for visual disturbance.   Respiratory: Negative for cough.    Cardiovascular: Negative for chest pain.   Gastrointestinal: Negative for nausea and vomiting.   Neurological: Positive for facial asymmetry, speech difficulty and weakness. Negative for numbness.     Scheduled Meds:   aspirin  81 mg Per NG tube Daily    atorvastatin  40 mg Oral Daily    heparin (porcine)  5,000 Units Subcutaneous Q8H    metoprolol tartrate  12.5 mg Per NG tube BID    senna-docusate 8.6-50 mg  1 tablet Per NG tube BID    sodium chloride  2 g Oral TID     Continuous Infusions:  PRN Meds:acetaminophen, hydrALAZINE, labetalol, magnesium oxide, magnesium oxide, ondansetron, potassium bicarbonate, potassium bicarbonate, potassium bicarbonate, potassium, sodium phosphates, potassium, sodium phosphates, potassium, sodium phosphates, sodium chloride 0.9%    Objective:     Vital Signs (Most Recent):  Temp: 98 °F (36.7 °C) (12/22/21 1200)  Pulse: 81 (12/22/21 1200)  Resp: 19 (12/22/21 1200)  BP: (!) 134/91 (12/22/21 1200)  SpO2: 98 % (12/22/21 1200)  BP Location: Right arm    Vital Signs Range (Last 24H):  Temp:  [97.9 °F (36.6 °C)-98.7 °F (37.1 °C)]   Pulse:  [74-98]   Resp:  [11-24]   BP: (131-155)/(81-97)   SpO2:  [95 %-100 %]   BP Location: Right arm    Physical Exam  Vitals reviewed.   Constitutional:       General: He is not in acute distress.     Appearance: He is well-developed and well-nourished.   HENT:      Head: Normocephalic and atraumatic.   Cardiovascular:      Rate and Rhythm: Normal  rate.   Pulmonary:      Effort: Pulmonary effort is normal. No respiratory distress.   Skin:     General: Skin is warm and dry.   Neurological:      Mental Status: He is alert.         Neurological Exam:   LOC: alert  Attention Span: Good   Language: Global aphasia  Articulation: Untestable due to severe aphasia   Orientation: Untestable due to severe aphasia   Visual Fields: Hemianopsia right  EOM (CN III, IV, VI): Gaze preference  left  Facial Movement (CN VII): Lower facial weakness on the Right  Motor: Arm left  Normal 5/5  Leg left  Normal 5/5  Arm right  Plegia 0/5  Leg right Plegia 0/5  Sensation: Intact to light touch, temperature and vibration  Tone: Flaccid  RUE    Laboratory:  CMP:   Recent Labs   Lab 12/22/21  0301   CALCIUM 7.9*   ALBUMIN 3.2*   PROT 6.2      K 4.0   CO2 19*   *   BUN 11   CREATININE 0.7   ALKPHOS 42*   ALT 18   AST 32   BILITOT 1.3*     CBC:   Recent Labs   Lab 12/22/21  0301   WBC 9.17   RBC 3.66*   HGB 12.6*   HCT 36.5*   PLT 83*   *   MCH 34.4*   MCHC 34.5     Lipid Panel:   Recent Labs   Lab 12/21/21  1911   CHOL 162   LDLCALC 89.2   HDL 58   TRIG 74     Coagulation: No results for input(s): PT, INR, APTT in the last 168 hours.  Platelet Aggregation Study: No results for input(s): PLTAGG, PLTAGINTERP, PLTAGREGLACO, ADPPLTAGGREG in the last 168 hours.  Hgb A1C:   Recent Labs   Lab 12/21/21  0453   HGBA1C 4.9     TSH:   Recent Labs   Lab 12/21/21  0453   TSH 0.768       Diagnostic Results     Brain imaging:  MRI Brain WO. Date: 12/22/21  Large acute  left MCA distribution infarct with modest mass effect.     Multiple small remote scattered infarcts elsewhere as above.  Embolic disease to be considered.     CTH WO 12/21/21  Again demonstrated in better delineated as large left MCA distribution infarct with no associated acute hemorrhage and with localized mass effect resulting in effacement of overlying cortical sulci and partial effacement left sylvian fissure.  No  associated acute hemorrhage.     Several small old areas of infarction again noted including cerebellum bilaterally, anterior left perisylvian region, lateral left frontal cortex and posterior right parietal cortex.     CTH WO 12/20/21     Large area loss of gray-white differentiation consistent with acute infarct left MCA distribution involving the left temporal lobe, temporal occipital region and portions of the basal ganglia with localized mass effect including effacement of overlying cortical sulci although with no associated acute hemorrhage.     Several small old areas of infarction are evident including anterior left perisylvian and cerebellum bilaterally.  Mild underlying atrophy.     Vessel Imaging:  CTA H/N 12/20/21  Segmental occlusion measuring approximately 10 mm mid and distal M1 segment left MCA with diminished flow distal to the stenosis.     Right cervical carotid circulation--atherosclerotic calcification bifurcation without measurable stenosis.     Left cervical carotid circulation--atherosclerotic calcification distal left common carotid artery and bifurcation with stenosis proximal left ICA measuring 16% utilizing NASCET criteria.  Mild eccentric narrowing mid to distal portion left common carotid artery also noted.     Vertebral arteries--no focal arterial abnormality or measurable stenosis.     Cardiac Evaluation:   TTE with bubble 12/21/21  1. The left ventricle (LV) is dilated with severely depressed systolic function & global hypokinesis.  2. LV ejection fraction is 15-20%. Grade III LV diastolic dysfunction.  3. Small to moderate sized (1.6x1.1 cm) layered, protruding apical mass c/w thrombus.  4. The right ventricle (RV) is severely enlarged with normal systolic function.   5. Estimated RVSP is moderately elevated (50-70 mmHg). Pulmonary hypertension is present.  6. Severe atrial enlargement.  7. Moderate eccentric mitral regrugitation.  8. Mild tricuspid regurgitation.

## 2021-12-23 LAB
ALBUMIN SERPL BCP-MCNC: 3.1 G/DL (ref 3.5–5.2)
ALP SERPL-CCNC: 47 U/L (ref 55–135)
ALT SERPL W/O P-5'-P-CCNC: 20 U/L (ref 10–44)
ANION GAP SERPL CALC-SCNC: 8 MMOL/L (ref 8–16)
APTT BLDCRRT: 24.9 SEC (ref 21–32)
APTT BLDCRRT: 29.6 SEC (ref 21–32)
AST SERPL-CCNC: 31 U/L (ref 10–40)
BASOPHILS # BLD AUTO: 0.04 K/UL (ref 0–0.2)
BASOPHILS NFR BLD: 0.4 % (ref 0–1.9)
BILIRUB SERPL-MCNC: 1.2 MG/DL (ref 0.1–1)
BUN SERPL-MCNC: 15 MG/DL (ref 6–20)
CALCIUM SERPL-MCNC: 8.2 MG/DL (ref 8.7–10.5)
CHLORIDE SERPL-SCNC: 109 MMOL/L (ref 95–110)
CO2 SERPL-SCNC: 22 MMOL/L (ref 23–29)
CREAT SERPL-MCNC: 0.7 MG/DL (ref 0.5–1.4)
DIFFERENTIAL METHOD: ABNORMAL
EOSINOPHIL # BLD AUTO: 0.1 K/UL (ref 0–0.5)
EOSINOPHIL NFR BLD: 1 % (ref 0–8)
ERYTHROCYTE [DISTWIDTH] IN BLOOD BY AUTOMATED COUNT: 12.2 % (ref 11.5–14.5)
EST. GFR  (AFRICAN AMERICAN): >60 ML/MIN/1.73 M^2
EST. GFR  (NON AFRICAN AMERICAN): >60 ML/MIN/1.73 M^2
GLUCOSE SERPL-MCNC: 143 MG/DL (ref 70–110)
HCT VFR BLD AUTO: 38.3 % (ref 40–54)
HGB BLD-MCNC: 13.2 G/DL (ref 14–18)
IMM GRANULOCYTES # BLD AUTO: 0.03 K/UL (ref 0–0.04)
IMM GRANULOCYTES NFR BLD AUTO: 0.3 % (ref 0–0.5)
INR PPP: 1 (ref 0.8–1.2)
LYMPHOCYTES # BLD AUTO: 1.9 K/UL (ref 1–4.8)
LYMPHOCYTES NFR BLD: 19.7 % (ref 18–48)
MAGNESIUM SERPL-MCNC: 1.8 MG/DL (ref 1.6–2.6)
MCH RBC QN AUTO: 34.2 PG (ref 27–31)
MCHC RBC AUTO-ENTMCNC: 34.5 G/DL (ref 32–36)
MCV RBC AUTO: 99 FL (ref 82–98)
MONOCYTES # BLD AUTO: 1 K/UL (ref 0.3–1)
MONOCYTES NFR BLD: 9.9 % (ref 4–15)
NEUTROPHILS # BLD AUTO: 6.6 K/UL (ref 1.8–7.7)
NEUTROPHILS NFR BLD: 68.7 % (ref 38–73)
NRBC BLD-RTO: 0 /100 WBC
PHOSPHATE SERPL-MCNC: 2 MG/DL (ref 2.7–4.5)
PLATELET # BLD AUTO: 141 K/UL (ref 150–450)
PMV BLD AUTO: 10.9 FL (ref 9.2–12.9)
POCT GLUCOSE: 149 MG/DL (ref 70–110)
POCT GLUCOSE: 150 MG/DL (ref 70–110)
POCT GLUCOSE: 157 MG/DL (ref 70–110)
POCT GLUCOSE: 159 MG/DL (ref 70–110)
POTASSIUM SERPL-SCNC: 3.8 MMOL/L (ref 3.5–5.1)
PROT SERPL-MCNC: 6.1 G/DL (ref 6–8.4)
PROTHROMBIN TIME: 10.9 SEC (ref 9–12.5)
RBC # BLD AUTO: 3.86 M/UL (ref 4.6–6.2)
SODIUM SERPL-SCNC: 139 MMOL/L (ref 136–145)
WBC # BLD AUTO: 9.57 K/UL (ref 3.9–12.7)

## 2021-12-23 PROCEDURE — 25000003 PHARM REV CODE 250: Performed by: INTERNAL MEDICINE

## 2021-12-23 PROCEDURE — 97112 NEUROMUSCULAR REEDUCATION: CPT

## 2021-12-23 PROCEDURE — 99291 PR CRITICAL CARE, E/M 30-74 MINUTES: ICD-10-PCS | Mod: ,,, | Performed by: NURSE PRACTITIONER

## 2021-12-23 PROCEDURE — 80053 COMPREHEN METABOLIC PANEL: CPT

## 2021-12-23 PROCEDURE — 85025 COMPLETE CBC W/AUTO DIFF WBC: CPT

## 2021-12-23 PROCEDURE — 94761 N-INVAS EAR/PLS OXIMETRY MLT: CPT

## 2021-12-23 PROCEDURE — 92523 SPEECH SOUND LANG COMPREHEN: CPT

## 2021-12-23 PROCEDURE — 99233 PR SUBSEQUENT HOSPITAL CARE,LEVL III: ICD-10-PCS | Mod: ,,, | Performed by: PSYCHIATRY & NEUROLOGY

## 2021-12-23 PROCEDURE — 25000003 PHARM REV CODE 250

## 2021-12-23 PROCEDURE — 63600175 PHARM REV CODE 636 W HCPCS: Performed by: PHYSICIAN ASSISTANT

## 2021-12-23 PROCEDURE — 84100 ASSAY OF PHOSPHORUS: CPT

## 2021-12-23 PROCEDURE — 99291 CRITICAL CARE FIRST HOUR: CPT | Mod: ,,, | Performed by: NURSE PRACTITIONER

## 2021-12-23 PROCEDURE — 92610 EVALUATE SWALLOWING FUNCTION: CPT

## 2021-12-23 PROCEDURE — 63600175 PHARM REV CODE 636 W HCPCS: Performed by: INTERNAL MEDICINE

## 2021-12-23 PROCEDURE — 25000003 PHARM REV CODE 250: Performed by: PHYSICIAN ASSISTANT

## 2021-12-23 PROCEDURE — 20000000 HC ICU ROOM

## 2021-12-23 PROCEDURE — 85730 THROMBOPLASTIN TIME PARTIAL: CPT | Performed by: INTERNAL MEDICINE

## 2021-12-23 PROCEDURE — 99233 SBSQ HOSP IP/OBS HIGH 50: CPT | Mod: ,,, | Performed by: PSYCHIATRY & NEUROLOGY

## 2021-12-23 PROCEDURE — 85610 PROTHROMBIN TIME: CPT | Performed by: INTERNAL MEDICINE

## 2021-12-23 PROCEDURE — 83735 ASSAY OF MAGNESIUM: CPT

## 2021-12-23 PROCEDURE — 99900035 HC TECH TIME PER 15 MIN (STAT)

## 2021-12-23 RX ORDER — METOPROLOL TARTRATE 25 MG/1
12.5 TABLET ORAL ONCE
Status: COMPLETED | OUTPATIENT
Start: 2021-12-23 | End: 2021-12-23

## 2021-12-23 RX ORDER — HEPARIN SODIUM,PORCINE/D5W 25000/250
0-40 INTRAVENOUS SOLUTION INTRAVENOUS CONTINUOUS
Status: DISCONTINUED | OUTPATIENT
Start: 2021-12-23 | End: 2022-01-06

## 2021-12-23 RX ORDER — METOPROLOL TARTRATE 25 MG/1
25 TABLET, FILM COATED ORAL 2 TIMES DAILY
Status: DISCONTINUED | OUTPATIENT
Start: 2021-12-23 | End: 2021-12-28

## 2021-12-23 RX ADMIN — POTASSIUM & SODIUM PHOSPHATES POWDER PACK 280-160-250 MG 2 PACKET: 280-160-250 PACK at 03:12

## 2021-12-23 RX ADMIN — HEPARIN SODIUM 12 UNITS/KG/HR: 1000 INJECTION INTRAVENOUS; SUBCUTANEOUS at 01:12

## 2021-12-23 RX ADMIN — POTASSIUM & SODIUM PHOSPHATES POWDER PACK 280-160-250 MG 2 PACKET: 280-160-250 PACK at 06:12

## 2021-12-23 RX ADMIN — METOPROLOL TARTRATE 12.5 MG: 25 TABLET, FILM COATED ORAL at 04:12

## 2021-12-23 RX ADMIN — POTASSIUM & SODIUM PHOSPHATES POWDER PACK 280-160-250 MG 2 PACKET: 280-160-250 PACK at 10:12

## 2021-12-23 RX ADMIN — METOPROLOL TARTRATE 25 MG: 25 TABLET, FILM COATED ORAL at 08:12

## 2021-12-23 RX ADMIN — Medication 800 MG: at 06:12

## 2021-12-23 RX ADMIN — SODIUM CHLORIDE TAB 1 GM 2 G: 1 TAB at 03:12

## 2021-12-23 RX ADMIN — ATORVASTATIN CALCIUM 40 MG: 20 TABLET, FILM COATED ORAL at 09:12

## 2021-12-23 RX ADMIN — SODIUM CHLORIDE TAB 1 GM 2 G: 1 TAB at 09:12

## 2021-12-23 RX ADMIN — SENNOSIDES AND DOCUSATE SODIUM 1 TABLET: 50; 8.6 TABLET ORAL at 08:12

## 2021-12-23 RX ADMIN — Medication 800 MG: at 10:12

## 2021-12-23 RX ADMIN — METOPROLOL TARTRATE 12.5 MG: 25 TABLET, FILM COATED ORAL at 09:12

## 2021-12-23 RX ADMIN — HEPARIN SODIUM 5000 UNITS: 5000 INJECTION INTRAVENOUS; SUBCUTANEOUS at 06:12

## 2021-12-23 RX ADMIN — SENNOSIDES AND DOCUSATE SODIUM 1 TABLET: 50; 8.6 TABLET ORAL at 09:12

## 2021-12-23 RX ADMIN — SODIUM CHLORIDE TAB 1 GM 2 G: 1 TAB at 08:12

## 2021-12-23 RX ADMIN — ASPIRIN 81 MG CHEWABLE TABLET 81 MG: 81 TABLET CHEWABLE at 09:12

## 2021-12-23 NOTE — SUBJECTIVE & OBJECTIVE
Interval History:  NAEON. Heparin gtt started for LV thrombus, cont som-crani watch.     Review of Systems   Unable to perform ROS: Other     Objective:     Vitals:  Temp: 97.8 °F (36.6 °C)  Pulse: 102  Rhythm: normal sinus rhythm  BP: (!) 130/92  MAP (mmHg): 106  Resp: (!) 28  SpO2: 95 %  O2 Device (Oxygen Therapy): room air    Temp  Min: 97.7 °F (36.5 °C)  Max: 99.7 °F (37.6 °C)  Pulse  Min: 79  Max: 112  BP  Min: 111/70  Max: 148/90  MAP (mmHg)  Min: 85  Max: 116  Resp  Min: 12  Max: 36  SpO2  Min: 92 %  Max: 97 %    12/22 0701 - 12/23 0700  In: 1165   Out: 870 [Urine:870]   Unmeasured Output  Urine Occurrence: 2  Pad Count: 2       Physical Exam  Vitals reviewed.   Constitutional:       General: He is not in acute distress.     Appearance: He is well-developed.   HENT:      Head: Normocephalic and atraumatic.      Right Ear: External ear normal.      Left Ear: External ear normal.      Nose: No rhinorrhea.   Eyes:      General: Visual field deficit present. No scleral icterus.        Right eye: No discharge.         Left eye: No discharge.   Cardiovascular:      Rate and Rhythm: Normal rate.   Pulmonary:      Effort: Pulmonary effort is normal. No respiratory distress.   Skin:     General: Skin is warm and dry.   Neurological:      Mental Status: He is alert.      Cranial Nerves: Dysarthria and facial asymmetry present.      Motor: Weakness present.      Comments:     -GCS: E4V2M6, no sedation   -Mental Status: Following commands.  -Cranial nerves: PERRL, 3mm, aphasic  -Motor: Spont on L, RUE:WD, RLE: ex post.   -Gait: Deferred            Medications:  Continuousheparin (porcine) in D5W, Last Rate: 12 Units/kg/hr (12/23/21 1501)    Scheduledaspirin, 81 mg, Daily  atorvastatin, 40 mg, Daily  metoprolol tartrate, 25 mg, BID  senna-docusate 8.6-50 mg, 1 tablet, BID  sodium chloride, 2 g, TID    PRNacetaminophen, 650 mg, Q6H PRN  hydrALAZINE, 10 mg, Q6H PRN  labetalol, 10 mg, Q6H PRN  magnesium oxide, 800 mg,  PRN  magnesium oxide, 800 mg, PRN  ondansetron, 4 mg, Q8H PRN  potassium bicarbonate, 35 mEq, PRN  potassium bicarbonate, 50 mEq, PRN  potassium bicarbonate, 60 mEq, PRN  potassium, sodium phosphates, 2 packet, PRN  potassium, sodium phosphates, 2 packet, PRN  potassium, sodium phosphates, 2 packet, PRN  sodium chloride 0.9%, 10 mL, PRN      Today I personally reviewed pertinent medications, lines/drains/airways, imaging, cardiology results, laboratory results, microbiology results, notably:    Diet  Diet NPO

## 2021-12-23 NOTE — PT/OT/SLP PROGRESS
Occupational Therapy   Treatment    Name: Jimmy Leger  MRN: [id number]  Admitting Diagnosis:  Stroke due to embolism of left middle cerebral artery       Recommendations:     Discharge Recommendations: rehabilitation facility  Discharge Equipment Recommendations:   (tbd)  Barriers to discharge:  None    Assessment:     Jimmy Leger is a 56 y.o. male with a medical diagnosis of Stroke due to embolism of left middle cerebral artery.  He presents with impairments listed below. Pt did well to tolerate the session, but has limited overall active participation due to aphasia and limited command following. Pt w/ noted R sided hemiplegia causing pt to requing increased assist w/ sitting balance. This has made the pt an overall safety concern and high fall risk. Pt is very strong on his non-hemiplegic side allowing pt to compensate during tasks. Pt did well to tolerate handling and positioning. Pt displayed global deconditioning requiring increased assist for ADLs and mobility at this time. Pt would benefit from skilled OT services to improve independence and overall occupational functioning.     Performance deficits affecting function are weakness,impaired endurance,impaired self care skills,impaired functional mobilty,gait instability,impaired balance,impaired cognition,decreased coordination,decreased upper extremity function,decreased lower extremity function,decreased safety awareness,decreased ROM,impaired coordination,impaired fine motor,abnormal tone.     Rehab Prognosis:  Good; patient would benefit from acute skilled OT services to address these deficits and reach maximum level of function.       Plan:     Patient to be seen 5 x/week to address the above listed problems via self-care/home management,therapeutic activities,therapeutic exercises,neuromuscular re-education  · Plan of Care Expires:    · Plan of Care Reviewed with: patient    Subjective     Pain/Comfort:  · Pain Rating 1:  (pt unable to rate)  · Pain  Rating Post-Intervention 1:  (pt unable to rate)    Objective:     Communicated with: RN prior to session.  Patient found HOB elevated with telemetry,pulse ox (continuous),NG tube,Condom Catheter upon OT entry to room.    General Precautions: Standard, fall,aspiration,aphasia   Orthopedic Precautions:N/A   Braces: N/A  Respiratory Status: Room air     Occupational Performance:     Bed Mobility:    · Patient completed Scooting/Bridging with total assistance  · Patient completed Supine to Sit with total assistance  · Patient completed Sit to Supine with total assistance     Functional Mobility/Transfers:  · Not performed due to poor sitting balance and limited commands following.     [facility] 6 Click ADL: 8    Treatment & Education:  Pt sat EOB ~15-20 min at total to cga. CGA when using LUE to hold onto a bed rail. Total assist when LUE isn't used to prop on.  Pt completed trunk weight shifting activities while seated EOB at total assist.  PROM to RUE to all joints in all planes.  Pt educated on POC and overall coordination of care.     Patient left HOB elevated with all lines intact, call button in reach and RN notifiedEducation:      GOALS:   Multidisciplinary Problems     Occupational Therapy Goals     Not on file                Time Tracking:     OT Date of Treatment: 12/23/21  OT Start Time: 0956  OT Stop Time: 1019  OT Total Time (min): 23 min    Billable Minutes:Neuromuscular Re-education 23 minutes    OT/SARAY: OT          12/23/2021

## 2021-12-23 NOTE — ASSESSMENT & PLAN NOTE
56M w/ unknown PMHX who presents after being found down with R sided hemiparesis and aphasia found to have L MCA infarction 2/2 L MCA occlusion.     NIHSS17. PSD4    --Patient admitted to ICU on telemetry      -q1h neurochecks in ICU  --All labs and diagnostics reviewed. MRI reviewed with evolving stroke and some effacement on lateral vent without significant MLS      -Further imaging per primary team, NSGY only reccomends future scans if exam decompensates  --SBP recs and management per primary team  --Na 145-155 strict, reccomend hypertonic saline PRN per primary team to reach goal. Na 139 today       -Rec mannitol per primary team for serum osm goal <320 if Na goal reached and persistent swelling present  --Low threshold for Keppra; no concern for seizure at this time  --HOB >30  --Apical thrombus work up per NCC  --VN following, stroke work up per VN  --Continue to monitor clinically, notify NSGY immediately with any changes in neuro status    Dispo: [facility] watch. Medical management for now

## 2021-12-23 NOTE — PLAN OF CARE
12/23/21 0938   Post-Acute Status   Post-Acute Authorization Placement;Other   Post-Acute Placement Status Pending medical clearance/testing   Other Status See Comments  (needs rehab- no insurance)   Discharge Delays (!) Payor Issues  (Pending Medicaid)     SW observed therapy recs for rehab. Pt does not have insurance but a Medicaid application was completed therefore Pt is pending Medicaid. Until the application is approved, Pt does not have post acute benefits.     Josefa Trotter LCSW  Neurocritical Care   [facility]  [id number]

## 2021-12-23 NOTE — PROGRESS NOTES
[facility] - Neuro Critical Care  Neurosurgery  Progress Note    Subjective:     History of Present Illness: 56 M with unknown past medical history who presents as transfer with L MCA CVA without intervention. Patient with out family bedside for collateral history. Following history taken from chart review:  He originally presented to [facility] by EMS on 12/20 after being found down by a coworker. LKW uncertain. 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 The coworker checked on the patient the following day, and he was found down with only a shirt, which lead to EMS being called. On presentation to [facility], the patient was nonverbal to command, with right sided hemiparesis. Stroke protocol initiated with Tele stoke evaluation, and the patient was not deemed a candidate for any acute intervention. CTA at OSH significant for segmental occlusion in L MCA with diminished flow distal to the stenosis. If note, ECHO at [facility] is significant for an EF of 15-20% with an apical mass consistent with a thrombus. He was started on a statin and DAPT at OSH but never appeared to have enteral access so was only given ASA suppositories, per chart review. AC per NCC pending MRI.     NIHSS 17    CTH with multifocal L MCA infarct w/o MLS. Na 138.      Post-Op Info:  * No surgery found *         Interval History: PSD4. NAEON. MRI reviewed. Na 139    Medications:  Continuous Infusions:  Scheduled Meds:   aspirin  81 mg Per NG tube Daily    atorvastatin  40 mg Oral Daily    heparin (porcine)  5,000 Units Subcutaneous Q8H    metoprolol tartrate  12.5 mg Per NG tube BID    senna-docusate 8.6-50 mg  1 tablet Per NG tube BID    sodium chloride  2 g Oral TID     PRN Meds:acetaminophen, hydrALAZINE, labetalol, magnesium oxide, magnesium oxide, ondansetron, potassium bicarbonate, potassium bicarbonate, potassium bicarbonate, potassium,  sodium phosphates, potassium, sodium phosphates, potassium, sodium phosphates, sodium chloride 0.9%     Review of Systems    Objective:     Weight: 78.5 kg (172 lb 15.9 oz)  Body mass index is 24.13 kg/m².  Vital Signs (Most Recent):  Temp: 98.1 °F (36.7 °C) (12/23/21 0701)  Pulse: 95 (12/23/21 1001)  Resp: (!) 29 (12/23/21 1001)  BP: 124/84 (12/23/21 1001)  SpO2: (!) 93 % (12/23/21 1001) Vital Signs (24h Range):  Temp:  [97.7 °F (36.5 °C)-99.7 °F (37.6 °C)] 98.1 °F (36.7 °C)  Pulse:  [] 95  Resp:  [12-36] 29  SpO2:  [92 %-100 %] 93 %  BP: (111-148)/(70-97) 124/84     Date 12/23/21 0700 - 12/24/21 0659   Shift 2080-6990 6405-7341 2034-8791 24 Hour Total   INTAKE   NG/   340   Shift Total(mL/kg) 340(4.3)   340(4.3)   OUTPUT   Urine(mL/kg/hr) 0   0   Shift Total(mL/kg) 0(0)   0(0)   Weight (kg) 78.5 78.5 78.5 78.5                        NG/OG Tube 12/21/21 1410 Left nostril (Active)   Placement Check placement verified by x-ray;placement verified by distal tube length measurement 12/22/21 0301   Tolerance no signs/symptoms of discomfort 12/22/21 0301   Securement secured to nostril center w/ adhesive device 12/22/21 0301   Clamp Status/Tolerance clamped 12/22/21 0301   Suction Setting/Drainage Method suction at the bedside 12/22/21 0301   Insertion Site Appearance no redness, warmth, tenderness, skin breakdown, drainage 12/22/21 0301   Drainage None 12/22/21 0301   Flush/Irrigation flushed w/;water;no resistance met 12/22/21 0301       Male External Urinary Catheter 12/21/21 1830 (Active)       Neurosurgery Physical Exam    General: no distress  Head: Non-traumatic, normocephalic  Eyes: Pupils equal, L gaze preference  Neck: Supple, normal ROM, no tenderness to palpation  CVS: Normal rate and rhythm, distal pulses present  Pulm: Symmetric expansion, no respiratory distress  GI: Abdomen nondistended, nontender  MSK: Moves L side w/o restriction  Skin: Dry, intact  Psych: expressive/receptive aphasia  Neuro:  GCS E4V2M5  CNII-XII: L gaze preference/R facial drooping, difficult to appreciate given patient participation but otherwise grossly intact   Extremities:  Motor:  Withdraws RUE/RLE to stim  Spontaneously moves LUE/LLE antigravity, will not follow commands     Sensory:      Sensorium intact throughout, responds to stim     Coordination:      Coordination intact throughout on L side    Significant Labs:  Recent Labs   Lab 12/22/21  0301 12/22/21 1816 12/23/21 0222   GLU 85  --  143*     --  139   K 4.0  --  3.8   *  --  109   CO2 19*  --  22*   BUN 11  --  15   CREATININE 0.7  --  0.7   CALCIUM 7.9*  --  8.2*   MG 1.8 1.5* 1.8     Recent Labs   Lab 12/22/21 0301 12/23/21 0222   WBC 9.17 9.57   HGB 12.6* 13.2*   HCT 36.5* 38.3*   PLT 83* 141*     No results for input(s): LABPT, INR, APTT in the last 48 hours.  Microbiology Results (last 7 days)     ** No results found for the last 168 hours. **        Recent Lab Results       12/23/21  0646   12/23/21 0222 12/23/21  0038   12/22/21 1816        Albumin   3.1           Alkaline Phosphatase   47           ALT   20           Anion Gap   8           AST   31           Baso #   0.04           Basophil %   0.4           BILIRUBIN TOTAL   1.2  Comment: For infants and newborns, interpretation of results should be based  on gestational age, weight and in agreement with clinical  observations.    Premature Infant recommended reference ranges:  Up to 24 hours.............<8.0 mg/dL  Up to 48 hours............<12.0 mg/dL  3-5 days..................<15.0 mg/dL  6-29 days.................<15.0 mg/dL             BUN   15           Calcium   8.2           Chloride   109           CO2   22           Creatinine   0.7           Differential Method   Automated           eGFR if    >60.0           eGFR if non    >60.0  Comment: Calculation used to obtain the estimated glomerular filtration  rate (eGFR) is the CKD-EPI equation.               Eos #   0.1           Eosinophil %   1.0           Glucose   143           Gran # (ANC)   6.6           Gran %   68.7           Hematocrit   38.3           Hemoglobin   13.2           Immature Grans (Abs)   0.03  Comment: Mild elevation in immature granulocytes is non specific and   can be seen in a variety of conditions including stress response,   acute inflammation, trauma and pregnancy. Correlation with other   laboratory and clinical findings is essential.             Immature Granulocytes   0.3           Lymph #   1.9           Lymph %   19.7           Magnesium   1.8     1.5       MCH   34.2           MCHC   34.5           MCV   99           Mono #   1.0           Mono %   9.9           MPV   10.9           nRBC   0           Phosphorus   2.0     2.2       Platelets   141           POCT Glucose 150     159         Potassium   3.8           PROTEIN TOTAL   6.1           RBC   3.86           RDW   12.2           Sodium   139           WBC   9.57                 Significant Diagnostics:  I have reviewed and interpreted all pertinent imaging results/findings within the past 24 hours along with rad report     Assessment/Plan:     * Stroke due to embolism of left middle cerebral artery  56M w/ unknown PMHX who presents after being found down with R sided hemiparesis and aphasia found to have L MCA infarction 2/2 L MCA occlusion.     NIHSS17. PSD4    --Patient admitted to ICU on telemetry      -q1h neurochecks in ICU  --All labs and diagnostics reviewed. MRI reviewed with evolving stroke and some effacement on lateral vent without significant MLS      -Further imaging per primary team, NSGY only reccomends future scans if exam decompensates  --SBP recs and management per primary team  --Na 145-155 strict, reccomend hypertonic saline PRN per primary team to reach goal. Na 139 today       -Rec mannitol per primary team for serum osm goal <320 if Na goal reached and persistent swelling present  --Low threshold for  Keppra; no concern for seizure at this time  --HOB >30  --Apical thrombus work up per NCC  --VN following, stroke work up per VN  --Continue to monitor clinically, notify NSGY immediately with any changes in neuro status    Dispo: DHC watch. Medical management for now            Michael Morales MD  Neurosurgery  [facility] - Neuro Critical Care

## 2021-12-23 NOTE — ASSESSMENT & PLAN NOTE
55 yo male with PMHx of HTN and CAD who presents as a transfer from [facility]. No intervention was performed(no tPA as OOW, no IR per telestroke provider). CTH at OSH with large L MCA infarct. CTA with distal L M1 occlusion. He was admitted to hospital medicine at OSH and now transferred to [facility] for higher level of care. Repeat [facility](12/21) with large L MCA infarct. Echo at OSH with LV global hypokinesis, EF 15-20%, severe atrial enlargement and small to moderate size apical thrombus.       Etiology: likely cardioembolic 2/2 low EF(15-20%) and cardiac thrombus    Antithrombotics for secondary stroke prevention:  Patient currently on Heparin gtt MINIMAL intensity, NO bolus    Statins for secondary stroke prevention and hyperlipidemia, if present: Statins: Atorvastatin- 40 mg daily     Aggressive risk factor modification: HTN, CAD     Rehab efforts: The patient has been evaluated by a stroke team provider and the therapy needs have been fully considered based off the presenting complaints and exam findings. The following therapy evaluations are needed: PT evaluate and treat, OT evaluate and treat, SLP evaluate and treat, PM&R evaluate for appropriate placement    Diagnostics ordered/pending: None     VTE prophylaxis: Mechanical prophylaxis: Place SCDs  None: Reason for No Pharmacological VTE Prophylaxis: currently on heparin gtt minimal intensity    BP parameters: Infarct: No intervention, SBP <220

## 2021-12-23 NOTE — SUBJECTIVE & OBJECTIVE
Interval History: PSD4. NAEON. MRI reviewed. Na 139    Medications:  Continuous Infusions:  Scheduled Meds:   aspirin  81 mg Per NG tube Daily    atorvastatin  40 mg Oral Daily    heparin (porcine)  5,000 Units Subcutaneous Q8H    metoprolol tartrate  12.5 mg Per NG tube BID    senna-docusate 8.6-50 mg  1 tablet Per NG tube BID    sodium chloride  2 g Oral TID     PRN Meds:acetaminophen, hydrALAZINE, labetalol, magnesium oxide, magnesium oxide, ondansetron, potassium bicarbonate, potassium bicarbonate, potassium bicarbonate, potassium, sodium phosphates, potassium, sodium phosphates, potassium, sodium phosphates, sodium chloride 0.9%     Review of Systems    Objective:     Weight: 78.5 kg (172 lb 15.9 oz)  Body mass index is 24.13 kg/m².  Vital Signs (Most Recent):  Temp: 98.1 °F (36.7 °C) (12/23/21 0701)  Pulse: 95 (12/23/21 1001)  Resp: (!) 29 (12/23/21 1001)  BP: 124/84 (12/23/21 1001)  SpO2: (!) 93 % (12/23/21 1001) Vital Signs (24h Range):  Temp:  [97.7 °F (36.5 °C)-99.7 °F (37.6 °C)] 98.1 °F (36.7 °C)  Pulse:  [] 95  Resp:  [12-36] 29  SpO2:  [92 %-100 %] 93 %  BP: (111-148)/(70-97) 124/84     Date 12/23/21 0700 - 12/24/21 0659   Shift 0768-2241 2790-3202 8704-7126 24 Hour Total   INTAKE   NG/   340   Shift Total(mL/kg) 340(4.3)   340(4.3)   OUTPUT   Urine(mL/kg/hr) 0   0   Shift Total(mL/kg) 0(0)   0(0)   Weight (kg) 78.5 78.5 78.5 78.5                        NG/OG Tube 12/21/21 1410 Left nostril (Active)   Placement Check placement verified by x-ray;placement verified by distal tube length measurement 12/22/21 0301   Tolerance no signs/symptoms of discomfort 12/22/21 0301   Securement secured to nostril center w/ adhesive device 12/22/21 0301   Clamp Status/Tolerance clamped 12/22/21 0301   Suction Setting/Drainage Method suction at the bedside 12/22/21 0301   Insertion Site Appearance no redness, warmth, tenderness, skin breakdown, drainage 12/22/21 0301   Drainage None 12/22/21 0301    Flush/Irrigation flushed w/;water;no resistance met 12/22/21 0301       Male External Urinary Catheter 12/21/21 1830 (Active)       Neurosurgery Physical Exam    General: no distress  Head: Non-traumatic, normocephalic  Eyes: Pupils equal, L gaze preference  Neck: Supple, normal ROM, no tenderness to palpation  CVS: Normal rate and rhythm, distal pulses present  Pulm: Symmetric expansion, no respiratory distress  GI: Abdomen nondistended, nontender  MSK: Moves L side w/o restriction  Skin: Dry, intact  Psych: expressive/receptive aphasia  Neuro: GCS E4V2M5  CNII-XII: L gaze preference/R facial drooping, difficult to appreciate given patient participation but otherwise grossly intact   Extremities:  Motor:  Withdraws RUE/RLE to stim  Spontaneously moves LUE/LLE antigravity, will not follow commands     Sensory:      Sensorium intact throughout, responds to stim     Coordination:      Coordination intact throughout on L side    Significant Labs:  Recent Labs   Lab 12/22/21  0301 12/22/21 1816 12/23/21 0222   GLU 85  --  143*     --  139   K 4.0  --  3.8   *  --  109   CO2 19*  --  22*   BUN 11  --  15   CREATININE 0.7  --  0.7   CALCIUM 7.9*  --  8.2*   MG 1.8 1.5* 1.8     Recent Labs   Lab 12/22/21  0301 12/23/21 0222   WBC 9.17 9.57   HGB 12.6* 13.2*   HCT 36.5* 38.3*   PLT 83* 141*     No results for input(s): LABPT, INR, APTT in the last 48 hours.  Microbiology Results (last 7 days)     ** No results found for the last 168 hours. **        Recent Lab Results       12/23/21  0646   12/23/21 0222 12/23/21  0038   12/22/21 1816        Albumin   3.1           Alkaline Phosphatase   47           ALT   20           Anion Gap   8           AST   31           Baso #   0.04           Basophil %   0.4           BILIRUBIN TOTAL   1.2  Comment: For infants and newborns, interpretation of results should be based  on gestational age, weight and in agreement with clinical  observations.    Premature Infant  recommended reference ranges:  Up to 24 hours.............<8.0 mg/dL  Up to 48 hours............<12.0 mg/dL  3-5 days..................<15.0 mg/dL  6-29 days.................<15.0 mg/dL             BUN   15           Calcium   8.2           Chloride   109           CO2   22           Creatinine   0.7           Differential Method   Automated           eGFR if    >60.0           eGFR if non    >60.0  Comment: Calculation used to obtain the estimated glomerular filtration  rate (eGFR) is the CKD-EPI equation.              Eos #   0.1           Eosinophil %   1.0           Glucose   143           Gran # (ANC)   6.6           Gran %   68.7           Hematocrit   38.3           Hemoglobin   13.2           Immature Grans (Abs)   0.03  Comment: Mild elevation in immature granulocytes is non specific and   can be seen in a variety of conditions including stress response,   acute inflammation, trauma and pregnancy. Correlation with other   laboratory and clinical findings is essential.             Immature Granulocytes   0.3           Lymph #   1.9           Lymph %   19.7           Magnesium   1.8     1.5       MCH   34.2           MCHC   34.5           MCV   99           Mono #   1.0           Mono %   9.9           MPV   10.9           nRBC   0           Phosphorus   2.0     2.2       Platelets   141           POCT Glucose 150     159         Potassium   3.8           PROTEIN TOTAL   6.1           RBC   3.86           RDW   12.2           Sodium   139           WBC   9.57                 Significant Diagnostics:  I have reviewed and interpreted all pertinent imaging results/findings within the past 24 hours along with rad report

## 2021-12-23 NOTE — PLAN OF CARE
Problem: SLP Goal  Goal: SLP Goal  Description: Speech Language Pathology Goals  Goals expected to be met by 12/30:  1. Pt will participate in ongoing swallowing assessment to determine if safe for PO intake.   2. Pt will respond to simple yes/no q's with 60% accuracy given max cues.   3. Pt will model 1-step commands on 1/5 trials given max cues.   4. Pt will repeat single words with 50% intelligibility given max cues.   5. Pt will participate in ongoing evaluation of speech/language abilities.           Outcome: Ongoing, Progressing  Bedside swallow study and speech/language evaluations initiated.  Pt not safe for PO intake at this time. Pt also exhibiting aphasia and dysarthria.  Likely right neglect as well. Ongoing assessment.

## 2021-12-23 NOTE — PT/OT/SLP EVAL
Speech Language Pathology Evaluation  Cognitive/Bedside Swallow    Patient Name:  Jimmy Leger   MRN:  [id number]  Admitting Diagnosis: Stroke due to embolism of left middle cerebral artery    Recommendations:                  General Recommendations:  Speech/language therapy and ongoing swallowing assessment  Diet recommendations:  NPO, NPO   Aspiration Precautions: Continue alternate means of nutrition, Frequent oral care and Strict aspiration precautions   General Precautions: Standard, aphasia,aspiration,fall,NPO  Communication strategies:  go to room if call light pushed; communication limited by aphasia and dysarthria    History:     Past Medical History:   Diagnosis Date    Coronary artery disease     Hypertension      Chief Complaint: Stroke due to embolism of left middle cerebral artery     History of Present Illness: Mr. Leger is a 57 yo male with unknown past medical history who presents to [facility] with a L MCA CVA without intervention. He originally presented to [facility] by EMS on 12/20 after being found down by a coworker. LKW uncertain. 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 The coworker checked on the patient the following day, and he was found down with only a shirt, which lead to EMS being called. On presentation to [facility], the patient was nonverbal to command, with right sided hemiparesis. Stroke protocol initiated with Tele stoke evaluation, and the patient was not deemed a candidate for any acute intervention. CT at [facility] with large left MCA distribution infarct with no associated acute hemorrhage  CTA at [facility] significant for segmental occlusion measuring approximately 10 mm mid and distal M1 segment left MCA with diminished flow distal to the stenosis. He has not yet had an MRI. Of note, ECHO at [facility] is significant for an EF of 15-20% with an apical mass consistent with a thrombus. He was  "started on a statin and DAPT at [facility] but never appeared to have enteral access so was only given ASA suppositories, per chart review. He was transferred to [facility] on 12/21.     He will be admitted to [facility] for hourly neuromonitoring and a higher level of care.        No past surgical history on file.    Prior Intubation HX: none during this admission      Modified Barium Swallow: none on file    Chest X-Rays: 12/21/21: FINDINGS:  Nasogastric tube courses below the diaphragm, beyond the field of view.  There is no pneumothorax or significant interval detrimental change in the cardiopulmonary status since the previous exam noting somewhat improved aeration bilaterally.    Prior diet: currently NPO; NG TFs    Subjective     "Alright" Pt able to verbalize intelligibly at end of session.     Pain/Comfort:  · Pain Rating 1:  (pt unable to rate pain 2/2 aphasia and dysarthria)    Respiratory Status: Room air    Objective:     Cognitive Status:    Unable to fully assess due to aphasia and dysarthria; pt demonstrated decreased attention to the right     Receptive Language:   Comprehension:      Pt answered simple yes/no question on 1 out of 3 trials.  Pt did not follow or model simple commands.    Expressive Language:  Verbal:  Pt did not attempt to state name or count to assess expressive language.      Motor Speech: Pt initially only vocalizing, but unable to produce words.  Pt later appearing to attempt to form words, but severe dysarthria evident and speech was mostly unintelligible.    Voice:   Quality impaired resonance    Visual-Spatial:  Right Neglect evident, but not yet directly assessed    Reading:   not yet assessed     Written Expression:   not yet assessed    Oral Musculature Evaluation  · Oral Musculature: unable to assess due to poor participation/comprehension,facial asymmetry present,right weakness  · Dentition: present and adequate  · Secretion Management: right corner drooling  · Mucosal Quality: " sticky  · Mandibular Strength and Mobility: impaired  · Oral Labial Strength and Mobility: impaired retraction,impaired pursing,impaired seal  · Buccal Strength and Mobility: impaired,flaccid  · Volitional Cough: did not follow commands to produce  · Volitional Swallow: did not follow commands to elicit  · Voice Prior to PO Intake: dry, clear, impaired resonance noted    Bedside Swallow Eval:   Consistencies Assessed:  · Thin liquids ice chips x 2, <1/3 tsp thin x 1, 1/3 tsp x 1, 1/2 tsp x 1, full tsp x 1     Oral Phase:   · Anterior loss entire ice chip x 1 and right side leakage of <1/3 tsp thin water    Pharyngeal Phase:   · Delayed coughing/choking after full tsp thin water    Compensatory Strategies  · None    Treatment: Education provided to pt regarding role of SLP, purpose of swallowing assessment, purpose of speech/language assessment, and plan for ongoing assessment.  Pt was unable to demonstrate full understanding.  Pt is not safe for PO intake at this time.  Continue strict NPO with frequent oral care and nutrition/hydration/medication via NG tube.  Ongoing swallowing and speech/language evaluation.     Assessment:     Jimmy Leger is a 56 y.o. male with an SLP diagnosis of Aphasia, Dysphagia, Dysarthria, Cognitive-Linguistic Impairment and Visio-Spatial Impairment.      Goals:   Multidisciplinary Problems     SLP Goals        Problem: SLP Goal    Goal Priority Disciplines Outcome   SLP Goal     SLP Ongoing, Progressing   Description: Speech Language Pathology Goals  Goals expected to be met by 12/30:  1. Pt will participate in ongoing swallowing assessment to determine if safe for PO intake.   2. Pt will respond to simple yes/no q's with 60% accuracy given max cues.   3. Pt will model 1-step commands on 1/5 trials given max cues.   4. Pt will repeat single words with 50% intelligibility given max cues.   5. Pt will participate in ongoing evaluation of speech/language abilities.                             Plan:     · Patient to be seen:  4 x/week   · Plan of Care expires:  01/22/22  · Plan of Care reviewed with:  patient   · SLP Follow-Up:  Yes       Discharge recommendations:  Discharge Facility/Level of Care Needs: rehabilitation facility     Time Tracking:     SLP Treatment Date:   12/23/21  Speech Start Time:  0833  Speech Stop Time:  0851     Speech Total Time (min):  18 min    Billable Minutes: Eval 8  and Eval Swallow and Oral Function 10    12/23/2021

## 2021-12-23 NOTE — SUBJECTIVE & OBJECTIVE
Neurologic Chief Complaint: found down, nonverbal, RSW    Subjective:     Interval History: Patient is seen for follow-up neurological assessment and treatment recommendations:  Patient remains in NCC. Min intensity heparin gtt was initiated. Patient aphasic with RS plegia.    HPI, Past Medical, Family, and Social History remains the same as documented in the initial encounter.     Review of Systems   Unable to perform ROS: Mental status change   Constitutional: Negative for fever.   HENT: Positive for trouble swallowing.    Eyes: Positive for visual disturbance.   Respiratory: Negative for cough.    Cardiovascular: Negative for chest pain.   Gastrointestinal: Negative for nausea and vomiting.   Neurological: Positive for facial asymmetry, speech difficulty and weakness. Negative for numbness.     Scheduled Meds:   aspirin  81 mg Per NG tube Daily    atorvastatin  40 mg Oral Daily    metoprolol tartrate  12.5 mg Per NG tube BID    senna-docusate 8.6-50 mg  1 tablet Per NG tube BID    sodium chloride  2 g Oral TID     Continuous Infusions:   heparin (porcine) in D5W 12 Units/kg/hr (12/23/21 1303)     PRN Meds:acetaminophen, hydrALAZINE, labetalol, magnesium oxide, magnesium oxide, ondansetron, potassium bicarbonate, potassium bicarbonate, potassium bicarbonate, potassium, sodium phosphates, potassium, sodium phosphates, potassium, sodium phosphates, sodium chloride 0.9%    Objective:     Vital Signs (Most Recent):  Temp: 98.3 °F (36.8 °C) (12/23/21 1101)  Pulse: 92 (12/23/21 1301)  Resp: (!) 26 (12/23/21 1301)  BP: 128/81 (12/23/21 1301)  SpO2: (!) 94 % (12/23/21 1301)  BP Location: Left arm    Vital Signs Range (Last 24H):  Temp:  [97.7 °F (36.5 °C)-99.7 °F (37.6 °C)]   Pulse:  []   Resp:  [12-36]   BP: (111-148)/(70-97)   SpO2:  [92 %-99 %]   BP Location: Left arm    Physical Exam  Vitals reviewed.   Constitutional:       General: He is not in acute distress.     Appearance: He is well-developed.    HENT:      Head: Normocephalic and atraumatic.      Right Ear: External ear normal.      Left Ear: External ear normal.      Nose: No rhinorrhea.   Eyes:      General: Visual field deficit present. No scleral icterus.        Right eye: No discharge.         Left eye: No discharge.   Cardiovascular:      Rate and Rhythm: Normal rate.   Pulmonary:      Effort: Pulmonary effort is normal. No respiratory distress.   Skin:     General: Skin is warm and dry.   Neurological:      Mental Status: He is alert.      Cranial Nerves: Dysarthria and facial asymmetry present.      Motor: Weakness present.      Comments: Aphasic         Neurological Exam:   LOC: alert  Attention Span: Good   Language: Global aphasia  Articulation: Untestable due to severe aphasia   Orientation: Untestable due to severe aphasia   Visual Fields: Hemianopsia right  EOM (CN III, IV, VI): Gaze preference  left  Facial Movement (CN VII): Lower facial weakness on the Right  Motor: Arm left  Normal 5/5  Leg left  Normal 5/5  Arm right  Plegia 0/5  Leg right Plegia 0/5  Sensation: Intact to light touch, temperature and vibration  Tone: Flaccid  RUE    Laboratory:  CMP:   Recent Labs   Lab 12/23/21 0222   CALCIUM 8.2*   ALBUMIN 3.1*   PROT 6.1      K 3.8   CO2 22*      BUN 15   CREATININE 0.7   ALKPHOS 47*   ALT 20   AST 31   BILITOT 1.2*     CBC:   Recent Labs   Lab 12/23/21 0222   WBC 9.57   RBC 3.86*   HGB 13.2*   HCT 38.3*   *   MCV 99*   MCH 34.2*   MCHC 34.5     Lipid Panel:   Recent Labs   Lab 12/21/21  1911   CHOL 162   LDLCALC 89.2   HDL 58   TRIG 74     Coagulation:   Recent Labs   Lab 12/23/21  1206   INR 1.0   APTT 24.9     Platelet Aggregation Study: No results for input(s): PLTAGG, PLTAGINTERP, PLTAGREGLACO, ADPPLTAGGREG in the last 168 hours.  Hgb A1C:   Recent Labs   Lab 12/21/21  0453   HGBA1C 4.9     TSH:   Recent Labs   Lab 12/21/21 0453   TSH 0.768       Diagnostic Results     Brain imaging:  MRI Brain WO. Date:  12/22/21    Large acute  left MCA distribution infarct with modest mass effect. Multiple small remote scattered infarcts elsewhere as above.  Embolic disease to be considered.     CTH WO 12/21/21  Again demonstrated in better delineated as large left MCA distribution infarct with no associated acute hemorrhage and with localized mass effect resulting in effacement of overlying cortical sulci and partial effacement left sylvian fissure.  No associated acute hemorrhage.     Several small old areas of infarction again noted including cerebellum bilaterally, anterior left perisylvian region, lateral left frontal cortex and posterior right parietal cortex.     CTH WO 12/20/21   Large area loss of gray-white differentiation consistent with acute infarct left MCA distribution involving the left temporal lobe, temporal occipital region and portions of the basal ganglia with localized mass effect including effacement of overlying cortical sulci although with no associated acute hemorrhage.     Several small old areas of infarction are evident including anterior left perisylvian and cerebellum bilaterally. Mild underlying atrophy.     Vessel Imaging:  CTA H/N 12/20/21  Segmental occlusion measuring approximately 10 mm mid and distal M1 segment left MCA with diminished flow distal to the stenosis.     Right cervical carotid circulation--atherosclerotic calcification bifurcation without measurable stenosis.     Left cervical carotid circulation--atherosclerotic calcification distal left common carotid artery and bifurcation with stenosis proximal left ICA measuring 16% utilizing NASCET criteria.  Mild eccentric narrowing mid to distal portion left common carotid artery also noted.     Vertebral arteries--no focal arterial abnormality or measurable stenosis.     Cardiac Evaluation:   TTE with bubble 12/21/21  1. The left ventricle (LV) is dilated with severely depressed systolic function & global hypokinesis.  2. LV ejection  fraction is 15-20%. Grade III LV diastolic dysfunction.  3. Small to moderate sized (1.6x1.1 cm) layered, protruding apical mass c/w thrombus.  4. The right ventricle (RV) is severely enlarged with normal systolic function.   5. Estimated RVSP is moderately elevated (50-70 mmHg). Pulmonary hypertension is present.  6. Severe atrial enlargement.  7. Moderate eccentric mitral regrugitation.  8. Mild tricuspid regurgitation.

## 2021-12-23 NOTE — PROGRESS NOTES
[facility] - Neuro Critical Care  Vascular Neurology  Comprehensive Stroke Center  Progress Note    Assessment/Plan:     * Stroke due to embolism of left middle cerebral artery  55 yo male with PMHx of HTN and CAD who presents as a transfer from [facility]. No intervention was performed(no tPA as OOW, no IR per telestroke provider). CTH at OSH with large L MCA infarct. CTA with distal L M1 occlusion. He was admitted to hospital medicine at OSH and now transferred to [facility] for higher level of care. Repeat [facility]CTH(12/21) with large L MCA infarct. Echo at OSH with LV global hypokinesis, EF 15-20%, severe atrial enlargement and small to moderate size apical thrombus.       Etiology: likely cardioembolic 2/2 low EF(15-20%) and cardiac thrombus    Antithrombotics for secondary stroke prevention:  Patient currently on Heparin gtt MINIMAL intensity, NO bolus    Statins for secondary stroke prevention and hyperlipidemia, if present: Statins: Atorvastatin- 40 mg daily     Aggressive risk factor modification: HTN, CAD     Rehab efforts: The patient has been evaluated by a stroke team provider and the therapy needs have been fully considered based off the presenting complaints and exam findings. The following therapy evaluations are needed: PT evaluate and treat, OT evaluate and treat, SLP evaluate and treat, PM&R evaluate for appropriate placement    Diagnostics ordered/pending: None     VTE prophylaxis: Mechanical prophylaxis: Place SCDs  None: Reason for No Pharmacological VTE Prophylaxis: currently on heparin gtt minimal intensity    BP parameters: Infarct: No intervention, SBP <220       Cytotoxic cerebral edema  Area of cytotoxic cerebral edema identified when reviewing brain imaging in the territory of the L middle cerebral artery. There is mass effect associated with it. Continue to monitor the patients clinical exam for any worsening of symptoms which may indicate expansion of the stroke or the area of the edema resulting in  the clinical change. The pattern is suggestive of embolic etiology        Essential hypertension  Stroke RF  SBP<220      Mural thrombus of cardiac apex  Seen on TTE at outside facility  -TTE 12/21/21: with EF of 15-20%, small to moderate sized (1.6x1.1 cm) layered, protruding apical mass c/w thrombus, global hypokinesis, severe atrial enlargement.  -Case discussed with stroke fellow. Recommend AC with heparin gtt MINIMAL intensity, NO bolus.               12/22 patient in ICU for hemicrani watch, NPO per SLP  12/23: Patient remains in NCC. Min intensity heparin gtt was initiated. Patient aphasic with RS plegia.       STROKE DOCUMENTATION        NIH Scale:  1a. Level of Consciousness: 0-->Alert, keenly responsive  1b. LOC Questions: 2-->Answers neither question correctly  1c. LOC Commands: 0-->Performs both tasks correctly  2. Best Gaze: 2-->Forced deviation, or total gaze paresis not overcome by the oculocephalic maneuver  3. Visual: 2-->Complete hemianopia  4. Facial Palsy: 2-->Partial paralysis (total or near-total paralysis of lower face)  5a. Motor Arm, Left: 0-->No drift, limb holds 90 (or 45) degrees for full 10 secs  5b. Motor Arm, Right: 4-->No movement  6a. Motor Leg, Left: 0-->No drift, leg holds 30 degree position for full 5 secs  6b. Motor Leg, Right: 4-->No movement  7. Limb Ataxia: 0-->Absent  8. Sensory: 0-->Normal, no sensory loss  9. Best Language: 2-->Severe aphasia, all communication is through fragmentary expression, great need for inference, questioning, and guessing by the listener. Range of information that can be exchanged is limited, listener carries burden of. . . (see row details)  10. Dysarthria: 2-->Severe dysarthria, patients speech is so slurred as to be unintelligible in the absence of or out of proportion to any dysphasia, or is mute/anarthric  11. Extinction and Inattention (formerly Neglect): 0-->No abnormality  Total (NIH Stroke Scale): 20       Modified [place] Score: 0  Brenden  Coma Scale:    ABCD2 Score:    [id number] Score:   HAS -BLED Score:   ICH Score:   Hunt & Gillis Classification:      Hemorrhagic change of an Ischemic Stroke: Does this patient have an ischemic stroke with hemorrhagic changes? No     Neurologic Chief Complaint: found down, nonverbal, RSW    Subjective:     Interval History: Patient is seen for follow-up neurological assessment and treatment recommendations:  Patient remains in NCC. Min intensity heparin gtt was initiated. Patient aphasic with RS plegia.    HPI, Past Medical, Family, and Social History remains the same as documented in the initial encounter.     Review of Systems   Unable to perform ROS: Mental status change   Constitutional: Negative for fever.   HENT: Positive for trouble swallowing.    Eyes: Positive for visual disturbance.   Respiratory: Negative for cough.    Cardiovascular: Negative for chest pain.   Gastrointestinal: Negative for nausea and vomiting.   Neurological: Positive for facial asymmetry, speech difficulty and weakness. Negative for numbness.     Scheduled Meds:   aspirin  81 mg Per NG tube Daily    atorvastatin  40 mg Oral Daily    metoprolol tartrate  12.5 mg Per NG tube BID    senna-docusate 8.6-50 mg  1 tablet Per NG tube BID    sodium chloride  2 g Oral TID     Continuous Infusions:   heparin (porcine) in D5W 12 Units/kg/hr (12/23/21 1303)     PRN Meds:acetaminophen, hydrALAZINE, labetalol, magnesium oxide, magnesium oxide, ondansetron, potassium bicarbonate, potassium bicarbonate, potassium bicarbonate, potassium, sodium phosphates, potassium, sodium phosphates, potassium, sodium phosphates, sodium chloride 0.9%    Objective:     Vital Signs (Most Recent):  Temp: 98.3 °F (36.8 °C) (12/23/21 1101)  Pulse: 92 (12/23/21 1301)  Resp: (!) 26 (12/23/21 1301)  BP: 128/81 (12/23/21 1301)  SpO2: (!) 94 % (12/23/21 1301)  BP Location: Left arm    Vital Signs Range (Last 24H):  Temp:  [97.7 °F (36.5 °C)-99.7 °F (37.6 °C)]   Pulse:   []   Resp:  [12-36]   BP: (111-148)/(70-97)   SpO2:  [92 %-99 %]   BP Location: Left arm    Physical Exam  Vitals reviewed.   Constitutional:       General: He is not in acute distress.     Appearance: He is well-developed.   HENT:      Head: Normocephalic and atraumatic.      Right Ear: External ear normal.      Left Ear: External ear normal.      Nose: No rhinorrhea.   Eyes:      General: Visual field deficit present. No scleral icterus.        Right eye: No discharge.         Left eye: No discharge.   Cardiovascular:      Rate and Rhythm: Normal rate.   Pulmonary:      Effort: Pulmonary effort is normal. No respiratory distress.   Skin:     General: Skin is warm and dry.   Neurological:      Mental Status: He is alert.      Cranial Nerves: Dysarthria and facial asymmetry present.      Motor: Weakness present.      Comments: Aphasic         Neurological Exam:   LOC: alert  Attention Span: Good   Language: Global aphasia  Articulation: Untestable due to severe aphasia   Orientation: Untestable due to severe aphasia   Visual Fields: Hemianopsia right  EOM (CN III, IV, VI): Gaze preference  left  Facial Movement (CN VII): Lower facial weakness on the Right  Motor: Arm left  Normal 5/5  Leg left  Normal 5/5  Arm right  Plegia 0/5  Leg right Plegia 0/5  Sensation: Intact to light touch, temperature and vibration  Tone: Flaccid  RUE    Laboratory:  CMP:   Recent Labs   Lab 12/23/21 0222   CALCIUM 8.2*   ALBUMIN 3.1*   PROT 6.1      K 3.8   CO2 22*      BUN 15   CREATININE 0.7   ALKPHOS 47*   ALT 20   AST 31   BILITOT 1.2*     CBC:   Recent Labs   Lab 12/23/21 0222   WBC 9.57   RBC 3.86*   HGB 13.2*   HCT 38.3*   *   MCV 99*   MCH 34.2*   MCHC 34.5     Lipid Panel:   Recent Labs   Lab 12/21/21  1911   CHOL 162   LDLCALC 89.2   HDL 58   TRIG 74     Coagulation:   Recent Labs   Lab 12/23/21  1206   INR 1.0   APTT 24.9     Platelet Aggregation Study: No results for input(s): PLTAGG, PLTAGINTERP,  PLTAGREGLACO, ADPPLTAGGREG in the last 168 hours.  Hgb A1C:   Recent Labs   Lab 12/21/21  0453   HGBA1C 4.9     TSH:   Recent Labs   Lab 12/21/21  0453   TSH 0.768       Diagnostic Results     Brain imaging:  MRI Brain WO. Date: 12/22/21    Large acute  left MCA distribution infarct with modest mass effect. Multiple small remote scattered infarcts elsewhere as above.  Embolic disease to be considered.     CTH WO 12/21/21  Again demonstrated in better delineated as large left MCA distribution infarct with no associated acute hemorrhage and with localized mass effect resulting in effacement of overlying cortical sulci and partial effacement left sylvian fissure.  No associated acute hemorrhage.     Several small old areas of infarction again noted including cerebellum bilaterally, anterior left perisylvian region, lateral left frontal cortex and posterior right parietal cortex.     CTH WO 12/20/21   Large area loss of gray-white differentiation consistent with acute infarct left MCA distribution involving the left temporal lobe, temporal occipital region and portions of the basal ganglia with localized mass effect including effacement of overlying cortical sulci although with no associated acute hemorrhage.     Several small old areas of infarction are evident including anterior left perisylvian and cerebellum bilaterally. Mild underlying atrophy.     Vessel Imaging:  CTA H/N 12/20/21  Segmental occlusion measuring approximately 10 mm mid and distal M1 segment left MCA with diminished flow distal to the stenosis.     Right cervical carotid circulation--atherosclerotic calcification bifurcation without measurable stenosis.     Left cervical carotid circulation--atherosclerotic calcification distal left common carotid artery and bifurcation with stenosis proximal left ICA measuring 16% utilizing NASCET criteria.  Mild eccentric narrowing mid to distal portion left common carotid artery also noted.     Vertebral  arteries--no focal arterial abnormality or measurable stenosis.     Cardiac Evaluation:   TTE with bubble 12/21/21  1. The left ventricle (LV) is dilated with severely depressed systolic function & global hypokinesis.  2. LV ejection fraction is 15-20%. Grade III LV diastolic dysfunction.  3. Small to moderate sized (1.6x1.1 cm) layered, protruding apical mass c/w thrombus.  4. The right ventricle (RV) is severely enlarged with normal systolic function.   5. Estimated RVSP is moderately elevated (50-70 mmHg). Pulmonary hypertension is present.  6. Severe atrial enlargement.  7. Moderate eccentric mitral regrugitation.  8. Mild tricuspid regurgitation.        Clovis Kate PA-C  [facility]  Department of Vascular Neurology   [facility] - Neuro Critical Care

## 2021-12-23 NOTE — PLAN OF CARE
[facility] Care Plan    POC reviewed with Jimmy Africa and family at 1400. Pt aphasic, unable to verbalize understanding. Sisters updated over the phone. Pt frustrated with aphasia. Speech eval and OT came to bedside. Started Heparin QTT. Questions and concerns addressed. No acute events today. Pt progressing toward goals. Will continue to monitor. See below and flowsheets for full assessment and VS info.       Neuro:  Brenden Coma Scale  Best Eye Response: 4-->(E4) spontaneous  Best Motor Response: 6-->(M6) obeys commands  Best Verbal Response: 2-->(V2) incomprehensible speech  Brenden Coma Scale Score: 12  Assessment Qualifiers: patient not sedated/intubated  Pupil PERRLA: yes     24 hr Temp:  [97.7 °F (36.5 °C)-99.7 °F (37.6 °C)]     CV:   Rhythm: normal sinus rhythm  BP goals:   SBP < 160  MAP > 65    Resp:   O2 Device (Oxygen Therapy): room air       Plan: N/A    GI/:  FARZANA Total Score: 0  Diet/Nutrition Received: tube feeding  Last Bowel Movement: 12/18/21  Voiding Characteristics: external catheter    Intake/Output Summary (Last 24 hours) at 12/23/2021 1747  Last data filed at 12/23/2021 1701  Gross per 24 hour   Intake 1752.96 ml   Output 395 ml   Net 1357.96 ml     Unmeasured Output  Urine Occurrence: 2  Pad Count: 2    Labs/Accuchecks:  Recent Labs   Lab 12/23/21 0222   WBC 9.57   RBC 3.86*   HGB 13.2*   HCT 38.3*   *      Recent Labs   Lab 12/23/21  0222      K 3.8   CO2 22*      BUN 15   CREATININE 0.7   ALKPHOS 47*   ALT 20   AST 31   BILITOT 1.2*      Recent Labs   Lab 12/23/21  1206   INR 1.0   APTT 24.9      Recent Labs   Lab 12/22/21  0301   *       Electrolytes: Electrolytes replaced  Accuchecks: Q6H    Gtts:   heparin (porcine) in D5W 12 Units/kg/hr (12/23/21 1501)       LDA/Wounds:  Lines/Drains/Airways       Drain                   NG/OG Tube 12/21/21 1410 Left nostril 2 days    Male External Urinary Catheter 12/22/21 2000 <1 day              Peripheral Intravenous Line                    Peripheral IV - Single Lumen 12/20/21 1330 18 G Right Antecubital 3 days         Peripheral IV - Single Lumen 12/22/21 1600 20 G Distal;Posterior;Right Wrist 1 day                  Wounds: No  Wound care consulted: No

## 2021-12-23 NOTE — PT/OT/SLP PROGRESS
Physical Therapy Treatment    Patient Name:  Jimmy Leger   MRN:  [id number]    Recommendations:     Discharge Recommendations:  rehabilitation facility   Discharge Equipment Recommendations:  (TBD)   Barriers to discharge: Increased level of assist    Assessment:     Jimmy Leger is a 56 y.o. male admitted with a medical diagnosis of Stroke due to embolism of left middle cerebral artery.  He presents with the following impairments/functional limitations: weakness,impaired endurance,impaired self care skills,impaired functional mobilty,gait instability,impaired balance,decreased upper extremity function,decreased lower extremity function,decreased safety awareness,abnormal tone,decreased ROM,impaired coordination,impaired fine motor. These deficits affect their roles and responsibilities in which they were able to complete prior to admit. Jimmy Leger would continue to benefit from acute PT intervention to improve quality of life and focus on recovery of impairments. Once medically stable, recommending pt discharge to Inpatient Rehabilitation Facility. Patient verbalizing more this session, but largely unintelligible. Patient appears increasingly frustrated due to therapist being unable to understand speech while sitting edge of bed, seems to be swearing. Returned patient to supine to de-escalate and RN to bedside to assist.    Rehab Prognosis: Good; patient continues to benefit from acute skilled PT services to address these deficits and reach maximum level of function.  Patient remains most appropriate to discharge to rehabilitation facility.  Recent Surgery: * No surgery found *      Plan:     During this hospitalization, patient to be seen 4 x/week to address the identified rehab impairments via gait training,therapeutic activities,therapeutic exercises,neuromuscular re-education and progress toward the following goals:    · Plan of Care Expires:  01/22/22    Subjective     Chief Complaint: None verbalized  "  Patient/Family Comments/Goals: "I am very irritated" to RN at bedside  Pain/Comfort:  · Pain Rating 1:  (unable to assess due to aphasia, no indicators of pain)    Objective:     Communicated with RN prior to session. Patient found HOB elevated with telemetry,pulse ox (continuous),NG tube,Condom Catheter upon PT entry to room.     General Precautions: Standard, aphasia,aspiration,fall   Orthopedic Precautions:N/A   Braces: N/A    Functional Mobility:  · Bed Mobility:     · Rolling Right: maximal assistance  · Scooting: maximal assistance  · Supine to Sit: total assistance of 2 persons  · Sit to Supine: maximal assistance of 2 persons  · Transfers:  Deferred due to poor sitting balance  · Balance:   · Static Sitting: Poor, able to maintain for 15 minute(s) with moderate- maximal assistance, demonstrates R lean slightly improved after LUE WB, provided verbal and tactile cues to orient to midline and for upright posture  · Dynamic Sitting: Poor: Patient unable to accept challenge or move without loss of balance, maximal assistance    AM-PAC 6 CLICK MOBILITY  Turning over in bed (including adjusting bedclothes, sheets and blankets)?: 2  Sitting down on and standing up from a chair with arms (e.g., wheelchair, bedside commode, etc.): 1  Moving from lying on back to sitting on the side of the bed?: 1  Moving to and from a bed to a chair (including a wheelchair)?: 1  Need to walk in hospital room?: 1  Climbing 3-5 steps with a railing?: 1  Basic Mobility Total Score: 7     Therapeutic Activities and Exercises:  Patient educated on role of acute care PT and PT POC  Whiteboard updated   Performed lateral weight shift onto L forearm to promote weightbearing and proprioceptive input, 30 seconds    Patient left left sidelying with HOB elevated with all lines intact, call button in reach, RN notified and bed alarm on.    GOALS:   Multidisciplinary Problems     Physical Therapy Goals        Problem: Physical Therapy Goal    " Goal Priority Disciplines Outcome Goal Variances Interventions   Physical Therapy Goal     PT, PT/OT Ongoing, Progressing     Description: Goals to be met by: 2022     Patient will increase functional independence with mobility by performin. Supine to sit with moderate assistance  2. Sit to supine with minimum assistance  3. Rolling to Left with maximal assistance  4. Rolling to Right with minimum assistance  5. Sit to stand transfer with moderate assistance  6. Bed to chair transfer with moderate assistance using LRAD as needed  7. Gait  x 10 feet with maximal assistance using LRAD as needed  8. Lower extremity exercise program x10 reps per handout, with independence                    Time Tracking:     PT Received On: 21  PT Start Time: 1328     PT Stop Time: 1358  PT Total Time (min): 30 min     Billable Minutes: Neuromuscular Re-education 15 min    Treatment Type: Treatment  PT/PTA: PT     PTA Visit Number: 0     2021    Therapy tech utilized for session to maximize physical performance and safety

## 2021-12-23 NOTE — PLAN OF CARE
[facility] Care Plan    POC reviewed with Jimmy Leger and family at 0300. Pt unable to verbalize understanding d/t aphasia.  No acute events overnight. Pt progressing toward goals. Will continue to monitor. See below and flowsheets for full assessment and VS info.   - salt tabs/ subQ heparin administered   - TF @ goal       Neuro:  Brenden Coma Scale  Best Eye Response: 4-->(E4) spontaneous  Best Motor Response: 5-->(M5) localizes pain  Best Verbal Response: 1-->(V1) none  [place] Coma Scale Score: 10  Assessment Qualifiers: patient not sedated/intubated,no eye obstruction present  Pupil PERRLA: yes     24hr Temp:  [97.7 °F (36.5 °C)-99.7 °F (37.6 °C)]     CV:   Rhythm: normal sinus rhythm  BP goals:   SBP < 160  MAP > 65    Resp:   O2 Device (Oxygen Therapy): room air       Plan: N/A    GI/:     Diet/Nutrition Received: tube feeding  Last Bowel Movement: 12/18/21  Voiding Characteristics: external catheter    Intake/Output Summary (Last 24 hours) at 12/23/2021 0725  Last data filed at 12/23/2021 0601  Gross per 24 hour   Intake 1165 ml   Output 870 ml   Net 295 ml     Unmeasured Output  Urine Occurrence: 1  Pad Count: 2    Labs/Accuchecks:  Recent Labs   Lab 12/23/21 0222   WBC 9.57   RBC 3.86*   HGB 13.2*   HCT 38.3*   *      Recent Labs   Lab 12/23/21 0222      K 3.8   CO2 22*      BUN 15   CREATININE 0.7   ALKPHOS 47*   ALT 20   AST 31   BILITOT 1.2*    No results for input(s): PROTIME, INR, APTT, HEPANTIXA in the last 168 hours.   Recent Labs   Lab 12/22/21  0301   *       Electrolytes: Electrolytes replaced  Accuchecks: Q6H    Gtts:       LDA/Wounds:  Lines/Drains/Airways       Drain                   NG/OG Tube 12/21/21 1410 Left nostril 1 day    Male External Urinary Catheter 12/22/21 2000 <1 day              Peripheral Intravenous Line                   Peripheral IV - Single Lumen 12/20/21 1330 18 G Right Antecubital 2 days         Peripheral IV - Single Lumen 12/22/21 1600 20 G  Distal;Posterior;Right Wrist <1 day                  Wounds: No  Wound care consulted: No

## 2021-12-24 LAB
ALBUMIN SERPL BCP-MCNC: 3.1 G/DL (ref 3.5–5.2)
ALP SERPL-CCNC: 46 U/L (ref 55–135)
ALT SERPL W/O P-5'-P-CCNC: 24 U/L (ref 10–44)
ANION GAP SERPL CALC-SCNC: 10 MMOL/L (ref 8–16)
APTT BLDCRRT: 27.3 SEC (ref 21–32)
APTT BLDCRRT: 37.4 SEC (ref 21–32)
APTT BLDCRRT: 38.1 SEC (ref 21–32)
AST SERPL-CCNC: 33 U/L (ref 10–40)
BASOPHILS # BLD AUTO: 0.04 K/UL (ref 0–0.2)
BASOPHILS NFR BLD: 0.4 % (ref 0–1.9)
BILIRUB SERPL-MCNC: 1.4 MG/DL (ref 0.1–1)
BUN SERPL-MCNC: 16 MG/DL (ref 6–20)
CALCIUM SERPL-MCNC: 8.7 MG/DL (ref 8.7–10.5)
CHLORIDE SERPL-SCNC: 111 MMOL/L (ref 95–110)
CO2 SERPL-SCNC: 22 MMOL/L (ref 23–29)
CREAT SERPL-MCNC: 0.7 MG/DL (ref 0.5–1.4)
DIFFERENTIAL METHOD: ABNORMAL
EOSINOPHIL # BLD AUTO: 0 K/UL (ref 0–0.5)
EOSINOPHIL NFR BLD: 0.2 % (ref 0–8)
ERYTHROCYTE [DISTWIDTH] IN BLOOD BY AUTOMATED COUNT: 12.4 % (ref 11.5–14.5)
EST. GFR  (AFRICAN AMERICAN): >60 ML/MIN/1.73 M^2
EST. GFR  (NON AFRICAN AMERICAN): >60 ML/MIN/1.73 M^2
GLUCOSE SERPL-MCNC: 155 MG/DL (ref 70–110)
HCT VFR BLD AUTO: 40.2 % (ref 40–54)
HGB BLD-MCNC: 13.7 G/DL (ref 14–18)
IMM GRANULOCYTES # BLD AUTO: 0.03 K/UL (ref 0–0.04)
IMM GRANULOCYTES NFR BLD AUTO: 0.3 % (ref 0–0.5)
LYMPHOCYTES # BLD AUTO: 1.4 K/UL (ref 1–4.8)
LYMPHOCYTES NFR BLD: 14.5 % (ref 18–48)
MAGNESIUM SERPL-MCNC: 1.9 MG/DL (ref 1.6–2.6)
MCH RBC QN AUTO: 33.8 PG (ref 27–31)
MCHC RBC AUTO-ENTMCNC: 34.1 G/DL (ref 32–36)
MCV RBC AUTO: 99 FL (ref 82–98)
MONOCYTES # BLD AUTO: 0.8 K/UL (ref 0.3–1)
MONOCYTES NFR BLD: 8.5 % (ref 4–15)
NEUTROPHILS # BLD AUTO: 7.4 K/UL (ref 1.8–7.7)
NEUTROPHILS NFR BLD: 76.1 % (ref 38–73)
NRBC BLD-RTO: 0 /100 WBC
PHOSPHATE SERPL-MCNC: 3 MG/DL (ref 2.7–4.5)
PLATELET # BLD AUTO: 155 K/UL (ref 150–450)
PMV BLD AUTO: 10.7 FL (ref 9.2–12.9)
POCT GLUCOSE: 100 MG/DL (ref 70–110)
POCT GLUCOSE: 124 MG/DL (ref 70–110)
POCT GLUCOSE: 126 MG/DL (ref 70–110)
POCT GLUCOSE: 150 MG/DL (ref 70–110)
POTASSIUM SERPL-SCNC: 3.9 MMOL/L (ref 3.5–5.1)
PROT SERPL-MCNC: 6.5 G/DL (ref 6–8.4)
RBC # BLD AUTO: 4.05 M/UL (ref 4.6–6.2)
SODIUM SERPL-SCNC: 143 MMOL/L (ref 136–145)
WBC # BLD AUTO: 9.71 K/UL (ref 3.9–12.7)

## 2021-12-24 PROCEDURE — 80053 COMPREHEN METABOLIC PANEL: CPT

## 2021-12-24 PROCEDURE — 83735 ASSAY OF MAGNESIUM: CPT

## 2021-12-24 PROCEDURE — 25000003 PHARM REV CODE 250: Performed by: PHYSICIAN ASSISTANT

## 2021-12-24 PROCEDURE — 93010 ELECTROCARDIOGRAM REPORT: CPT | Mod: 59,,, | Performed by: INTERNAL MEDICINE

## 2021-12-24 PROCEDURE — 25000003 PHARM REV CODE 250: Performed by: NURSE PRACTITIONER

## 2021-12-24 PROCEDURE — 20000000 HC ICU ROOM

## 2021-12-24 PROCEDURE — 99291 PR CRITICAL CARE, E/M 30-74 MINUTES: ICD-10-PCS | Mod: ,,, | Performed by: INTERNAL MEDICINE

## 2021-12-24 PROCEDURE — 99233 PR SUBSEQUENT HOSPITAL CARE,LEVL III: ICD-10-PCS | Mod: ,,, | Performed by: PSYCHIATRY & NEUROLOGY

## 2021-12-24 PROCEDURE — 93005 ELECTROCARDIOGRAM TRACING: CPT

## 2021-12-24 PROCEDURE — 99900035 HC TECH TIME PER 15 MIN (STAT)

## 2021-12-24 PROCEDURE — 99291 CRITICAL CARE FIRST HOUR: CPT | Mod: ,,, | Performed by: INTERNAL MEDICINE

## 2021-12-24 PROCEDURE — 93010 ELECTROCARDIOGRAM REPORT: CPT | Mod: 76,,, | Performed by: INTERNAL MEDICINE

## 2021-12-24 PROCEDURE — 25000003 PHARM REV CODE 250

## 2021-12-24 PROCEDURE — 25000003 PHARM REV CODE 250: Performed by: INTERNAL MEDICINE

## 2021-12-24 PROCEDURE — 93010 EKG 12-LEAD: ICD-10-PCS | Mod: 59,,, | Performed by: INTERNAL MEDICINE

## 2021-12-24 PROCEDURE — 85025 COMPLETE CBC W/AUTO DIFF WBC: CPT

## 2021-12-24 PROCEDURE — 85730 THROMBOPLASTIN TIME PARTIAL: CPT | Mod: 91 | Performed by: NURSE PRACTITIONER

## 2021-12-24 PROCEDURE — 85730 THROMBOPLASTIN TIME PARTIAL: CPT | Mod: 91 | Performed by: INTERNAL MEDICINE

## 2021-12-24 PROCEDURE — 84100 ASSAY OF PHOSPHORUS: CPT

## 2021-12-24 PROCEDURE — 63600175 PHARM REV CODE 636 W HCPCS: Performed by: INTERNAL MEDICINE

## 2021-12-24 PROCEDURE — 99233 SBSQ HOSP IP/OBS HIGH 50: CPT | Mod: ,,, | Performed by: PSYCHIATRY & NEUROLOGY

## 2021-12-24 RX ORDER — LABETALOL HCL 20 MG/4 ML
10 SYRINGE (ML) INTRAVENOUS EVERY 6 HOURS PRN
Status: DISCONTINUED | OUTPATIENT
Start: 2021-12-24 | End: 2021-12-28

## 2021-12-24 RX ORDER — HYDRALAZINE HYDROCHLORIDE 20 MG/ML
10 INJECTION INTRAMUSCULAR; INTRAVENOUS EVERY 6 HOURS PRN
Status: DISCONTINUED | OUTPATIENT
Start: 2021-12-24 | End: 2021-12-28

## 2021-12-24 RX ADMIN — SODIUM CHLORIDE TAB 1 GM 2 G: 1 TAB at 09:12

## 2021-12-24 RX ADMIN — METOPROLOL TARTRATE 25 MG: 25 TABLET, FILM COATED ORAL at 09:12

## 2021-12-24 RX ADMIN — SODIUM CHLORIDE TAB 1 GM 2 G: 1 TAB at 03:12

## 2021-12-24 RX ADMIN — QUETIAPINE FUMARATE 12.5 MG: 25 TABLET, FILM COATED ORAL at 10:12

## 2021-12-24 RX ADMIN — SENNOSIDES AND DOCUSATE SODIUM 1 TABLET: 50; 8.6 TABLET ORAL at 09:12

## 2021-12-24 RX ADMIN — ATORVASTATIN CALCIUM 40 MG: 20 TABLET, FILM COATED ORAL at 09:12

## 2021-12-24 RX ADMIN — HEPARIN SODIUM 16 UNITS/KG/HR: 1000 INJECTION INTRAVENOUS; SUBCUTANEOUS at 11:12

## 2021-12-24 RX ADMIN — ASPIRIN 81 MG CHEWABLE TABLET 81 MG: 81 TABLET CHEWABLE at 09:12

## 2021-12-24 NOTE — ASSESSMENT & PLAN NOTE
Small to moderate sized (1.6x1.1 cm) layered, protruding apical mass c/w thrombus.  -Noted on ECHO from 12/21  -Strict HR and BP control  -SBP goal <160  -lopressor started       -Heparin gtt started, min intensity, no bolus

## 2021-12-24 NOTE — CARE UPDATE
Rpt CT scan stable.   Low concern for continued shift and need for hemicrani at this time.     NSGY will sign off. Please do not hesitate to call with any questions/concerns    Mac ZEPEDAGY PGY2

## 2021-12-24 NOTE — ASSESSMENT & PLAN NOTE
57 yo male with PMHx of HTN and CAD who presents as a transfer from [facility]. No intervention was performed(no tPA as OOW, no IR per telestroke provider). CTH at OSH with large L MCA infarct. CTA with distal L M1 occlusion. He was admitted to hospital medicine at OSH and now transferred to [facility] for higher level of care. Repeat NCCTH(12/21) with large L MCA infarct. Echo at OSH with LV global hypokinesis, EF 15-20%, severe atrial enlargement and small to moderate size apical thrombus. Etiology: likely cardioembolic 2/2 low EF(15-20%) and cardiac thrombus.    Repeat CTH with petechial hemorrhage recommend stopping aspirin and heparin. Repeat CTH with any worse exam.     Antithrombotics for secondary stroke prevention:   None ICH    Statins for secondary stroke prevention and hyperlipidemia, if present: Statins: Atorvastatin- 40 mg daily     Aggressive risk factor modification: HTN, CAD     Rehab efforts: The patient has been evaluated by a stroke team provider and the therapy needs have been fully considered based off the presenting complaints and exam findings. The following therapy evaluations are needed: PT evaluate and treat, OT evaluate and treat, SLP evaluate and treat, PM&R evaluate for appropriate placement    Diagnostics ordered/pending: None     VTE prophylaxis: Mechanical prophylaxis: Place SCDs  None: Reason for No Pharmacological VTE Prophylaxis: History of systemic or intracranial bleeding    BP parameters: Infarct: No intervention, SBP <220

## 2021-12-24 NOTE — SUBJECTIVE & OBJECTIVE
Neurologic Chief Complaint: found down, nonverbal, RSW    Subjective:     Interval History: Patient is seen for follow-up neurological assessment and treatment recommendations: Patient with changes in CT with petechial hemorraghic conversion recommend stopping aspirin and holding heparin. Repeat imaging with worsening exam. On hemicrani watch.     HPI, Past Medical, Family, and Social History remains the same as documented in the initial encounter.     Review of Systems   Unable to perform ROS: Patient nonverbal   HENT: Positive for trouble swallowing.    Neurological: Positive for facial asymmetry, speech difficulty and weakness.     Scheduled Meds:   atorvastatin  40 mg Oral Daily    metoprolol tartrate  25 mg Per NG tube BID    senna-docusate 8.6-50 mg  1 tablet Per NG tube BID    sodium chloride  2 g Oral TID     Continuous Infusions:   heparin (porcine) in D5W 17 Units/kg/hr (12/24/21 1327)     PRN Meds:acetaminophen, hydrALAZINE, labetalol, magnesium oxide, magnesium oxide, ondansetron, potassium bicarbonate, potassium bicarbonate, potassium bicarbonate, potassium, sodium phosphates, potassium, sodium phosphates, potassium, sodium phosphates, sodium chloride 0.9%    Objective:     Vital Signs (Most Recent):  Temp: 97.9 °F (36.6 °C) (12/24/21 0701)  Pulse: 95 (12/24/21 1001)  Resp: (!) 21 (12/24/21 1001)  BP: (!) 140/110 (12/24/21 1001)  SpO2: (!) 94 % (12/24/21 1001)  BP Location: Left arm    Vital Signs Range (Last 24H):  Temp:  [97.8 °F (36.6 °C)-99.1 °F (37.3 °C)]   Pulse:  []   Resp:  [21-37]   BP: (125-167)/()   SpO2:  [91 %-96 %]   BP Location: Left arm    Physical Exam  Vitals reviewed.   Constitutional:       General: He is not in acute distress.     Appearance: He is well-developed.   HENT:      Head: Normocephalic and atraumatic.      Right Ear: External ear normal.      Left Ear: External ear normal.      Nose: No rhinorrhea.   Eyes:      General: No scleral icterus.        Right  eye: No discharge.         Left eye: No discharge.   Cardiovascular:      Rate and Rhythm: Normal rate.   Pulmonary:      Effort: Pulmonary effort is normal. No respiratory distress.   Skin:     General: Skin is warm and dry.   Neurological:      Mental Status: He is alert.      Cranial Nerves: Dysarthria and facial asymmetry present.      Motor: Weakness present.      Comments: Aphasic         Neurological Exam:   LOC: alert  Attention Span: Good   Language: Global aphasia  Articulation: Untestable due to severe aphasia   Orientation: Untestable due to severe aphasia   Facial Movement (CN VII): Lower facial weakness on the Right  Motor: Arm left  Normal 5/5  Leg left  Normal 5/5  Arm right  Plegia 0/5  Leg right Plegia 0/5  Sensation: Intact to light touch, temperature and vibration  Tone: Flaccid  RUE       Laboratory:  CMP:   Recent Labs   Lab 12/24/21  0018   CALCIUM 8.7   ALBUMIN 3.1*   PROT 6.5      K 3.9   CO2 22*   *   BUN 16   CREATININE 0.7   ALKPHOS 46*   ALT 24   AST 33   BILITOT 1.4*     CBC:   Recent Labs   Lab 12/24/21  0018   WBC 9.71   RBC 4.05*   HGB 13.7*   HCT 40.2      MCV 99*   MCH 33.8*   MCHC 34.1     Lipid Panel:   Recent Labs   Lab 12/21/21  1911   CHOL 162   LDLCALC 89.2   HDL 58   TRIG 74     Coagulation:   Recent Labs   Lab 12/23/21  1206 12/23/21  1822 12/24/21  1236   INR 1.0  --   --    APTT 24.9   < > 37.4*    < > = values in this interval not displayed.     Platelet Aggregation Study: No results for input(s): PLTAGG, PLTAGINTERP, PLTAGREGLACO, ADPPLTAGGREG in the last 168 hours.  Hgb A1C:   Recent Labs   Lab 12/21/21  0453   HGBA1C 4.9     TSH:   Recent Labs   Lab 12/21/21  0453   TSH 0.768       Diagnostic Results     Brain imaging:    [facility] 12/24/2021  Evolving large left MCA territory infarction with petechial hemorrhagic conversion.  There is intermediate density components in the left temporal lobe area of infarction suggestive for interval additional petechial  hemorrhage.  No evidence for extra-axial hemorrhage or hydrocephalus.    MRI Brain WO. Date: 12/22/21    Large acute  left MCA distribution infarct with modest mass effect. Multiple small remote scattered infarcts elsewhere as above.  Embolic disease to be considered.     CTH WO 12/21/21  Again demonstrated in better delineated as large left MCA distribution infarct with no associated acute hemorrhage and with localized mass effect resulting in effacement of overlying cortical sulci and partial effacement left sylvian fissure.  No associated acute hemorrhage.     Several small old areas of infarction again noted including cerebellum bilaterally, anterior left perisylvian region, lateral left frontal cortex and posterior right parietal cortex.     CTH WO 12/20/21   Large area loss of gray-white differentiation consistent with acute infarct left MCA distribution involving the left temporal lobe, temporal occipital region and portions of the basal ganglia with localized mass effect including effacement of overlying cortical sulci although with no associated acute hemorrhage.     Several small old areas of infarction are evident including anterior left perisylvian and cerebellum bilaterally. Mild underlying atrophy.       Vessel Imaging:  CTA H/N 12/20/21  Segmental occlusion measuring approximately 10 mm mid and distal M1 segment left MCA with diminished flow distal to the stenosis.     Right cervical carotid circulation--atherosclerotic calcification bifurcation without measurable stenosis.     Left cervical carotid circulation--atherosclerotic calcification distal left common carotid artery and bifurcation with stenosis proximal left ICA measuring 16% utilizing NASCET criteria.  Mild eccentric narrowing mid to distal portion left common carotid artery also noted.     Vertebral arteries--no focal arterial abnormality or measurable stenosis.     Cardiac Evaluation:   TTE with bubble 12/21/21  1. The left ventricle (LV)  is dilated with severely depressed systolic function & global hypokinesis.  2. LV ejection fraction is 15-20%. Grade III LV diastolic dysfunction.  3. Small to moderate sized (1.6x1.1 cm) layered, protruding apical mass c/w thrombus.  4. The right ventricle (RV) is severely enlarged with normal systolic function.   5. Estimated RVSP is moderately elevated (50-70 mmHg). Pulmonary hypertension is present.  6. Severe atrial enlargement.  7. Moderate eccentric mitral regrugitation.  8. Mild tricuspid regurgitation.

## 2021-12-24 NOTE — ASSESSMENT & PLAN NOTE
Large Left MCA stroke without intervention  -Admit to NCC, VN following  -NSGY on board for hemicrani watch  -q1h neuro checks, vital checks  -EKG, ECHO, CXR  -A1c, TSH, lipid panel, coag panel  -Daily CBC, CMP, mag, phos  -SBP < 160, prn labetalol and hydralazine  -Daily Statin, ASA  -SCDs, SQH  -CT large left MCA distribution infarct with no associated acute hemorrhage  -CTA segmental occlusion measuring approximately 10 mm mid and distal M1 segment left MCA with diminished flow distal to the stenosis.  -PT/OT/SLP  -MRI brain -Large acute  left MCA distribution infarct with modest mass effect.   Multiple small remote scattered infarcts elsewhere as above.  Embolic disease to be considered  -- Cont som crani watch

## 2021-12-24 NOTE — ASSESSMENT & PLAN NOTE
Seen on TTE at outside facility  -TTE 12/21/21: with EF of 15-20%, small to moderate sized (1.6x1.1 cm) layered, protruding apical mass c/w thrombus, global hypokinesis, severe atrial enlargement.  -on heparin gtt but now with hemo conversion

## 2021-12-24 NOTE — ASSESSMENT & PLAN NOTE
Large Left MCA stroke without intervention  -Admit to NCC, VN following  -NSGY on board for hemicrani watch  -q1h neuro checks, vital checks  -EKG, ECHO, CXR  -A1c, TSH, lipid panel, coag panel  -Daily CBC, CMP, mag, phos  -SBP < 160, prn labetalol and hydralazine  -Daily Statin, ASA  -SCDs, SQH  -CT large left MCA distribution infarct with no associated acute hemorrhage  -CTA segmental occlusion measuring approximately 10 mm mid and distal M1 segment left MCA with diminished flow distal to the stenosis.  -PT/OT/SLP  -MRI brain -Large acute  left MCA distribution infarct with modest mass effect.   Multiple small remote scattered infarcts elsewhere as above.  Embolic disease: LV apical thrombus (known hx)  -- Cont som crani watch

## 2021-12-24 NOTE — ASSESSMENT & PLAN NOTE
56M w/ unknown PMHX who presents after being found down with R sided hemiparesis and aphasia found to have L MCA infarction 2/2 L MCA occlusion.     NIHSS17. PSD5    Patient admitted to ICU on telemetry      -q1h neurochecks in ICU  MRI reviewed with evolving stroke and some effacement on lateral vent without significant MLS  Follow head CT today to assess interval stability   Na 145-155 strict, reccomend hypertonic saline PRN per primary team to reach goal. Na 139 today   Low threshold for Keppra; no concern for seizure at this time  HOB >30  SBP recs and management per primary team  Apical thrombus work up per NCC  VN following, stroke work up per VN  Continue to monitor clinically, notify NSGY immediately with any changes in neuro status    Dispo: PSD#5. Stable on exam. Will assess interval stability with repeat head CT

## 2021-12-24 NOTE — PROGRESS NOTES
[facility]  Neurocritical Care  Progress Note    Admit Date: 12/21/2021  Service Date: 12/23/2021  Length of Stay: 2    Subjective:     Chief Complaint: Stroke due to embolism of left middle cerebral artery    History of Present Illness: Mr. Leger is a 55 yo male with unknown past medical history who presents to [facility] with a L MCA CVA without intervention. He originally presented to [facility] by EMS on 12/20 after being found down by a coworker. LKW uncertain. 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 The coworker checked on the patient the following day, and he was found down with only a shirt, which lead to EMS being called. On presentation to [facility], the patient was nonverbal to command, with right sided hemiparesis. Stroke protocol initiated with Tele stoke evaluation, and the patient was not deemed a candidate for any acute intervention. CT at [facility] with large left MCA distribution infarct with no associated acute hemorrhage  CTA at [facility] significant for segmental occlusion measuring approximately 10 mm mid and distal M1 segment left MCA with diminished flow distal to the stenosis. He has not yet had an MRI. Of note, ECHO at [facility] is significant for an EF of 15-20% with an apical mass consistent with a thrombus. He was started on a statin and DAPT at [facility] but never appeared to have enteral access so was only given ASA suppositories, per chart review. He was transferred to [facility] on 12/21.    He will be admitted to [facility] for hourly neuromonitoring and a higher level of care.          Hospital Course: 12/22/2021NAEO, stroke day 3. NSGY following, stroke team following. Continue to monitor neuro exam closely. Strict BP and HR control since patient with LV thrombus, cannot safely AC yet 2/2 to acute large territory stroke   12/23/2021 NAEON. Heparin gtt started for LV thrombus, cont som-crani  watch.       Interval History:  NAEON. Heparin gtt started for LV thrombus, cont som-crani watch.     Review of Systems   Unable to perform ROS: Other     Objective:     Vitals:  Temp: 97.8 °F (36.6 °C)  Pulse: 102  Rhythm: normal sinus rhythm  BP: (!) 130/92  MAP (mmHg): 106  Resp: (!) 28  SpO2: 95 %  O2 Device (Oxygen Therapy): room air    Temp  Min: 97.7 °F (36.5 °C)  Max: 99.7 °F (37.6 °C)  Pulse  Min: 79  Max: 112  BP  Min: 111/70  Max: 148/90  MAP (mmHg)  Min: 85  Max: 116  Resp  Min: 12  Max: 36  SpO2  Min: 92 %  Max: 97 %    12/22 0701 - 12/23 0700  In: 1165   Out: 870 [Urine:870]   Unmeasured Output  Urine Occurrence: 2  Pad Count: 2       Physical Exam  Vitals reviewed.   Constitutional:       General: He is not in acute distress.     Appearance: He is well-developed.   HENT:      Head: Normocephalic and atraumatic.      Right Ear: External ear normal.      Left Ear: External ear normal.      Nose: No rhinorrhea.   Eyes:      General: Visual field deficit present. No scleral icterus.        Right eye: No discharge.         Left eye: No discharge.   Cardiovascular:      Rate and Rhythm: Normal rate.   Pulmonary:      Effort: Pulmonary effort is normal. No respiratory distress.   Skin:     General: Skin is warm and dry.   Neurological:      Mental Status: He is alert.      Cranial Nerves: Dysarthria and facial asymmetry present.      Motor: Weakness present.      Comments:     -GCS: [id number], no sedation   -Mental Status: Following commands.  -Cranial nerves: PERRL, 3mm, aphasic  -Motor: Spont on L, RUE:WD, RLE: ex post.   -Gait: Deferred            Medications:  Continuousheparin (porcine) in D5W, Last Rate: 12 Units/kg/hr (12/23/21 1501)    Scheduledaspirin, 81 mg, Daily  atorvastatin, 40 mg, Daily  metoprolol tartrate, 25 mg, BID  senna-docusate 8.6-50 mg, 1 tablet, BID  sodium chloride, 2 g, TID    PRNacetaminophen, 650 mg, Q6H PRN  hydrALAZINE, 10 mg, Q6H PRN  labetalol, 10 mg, Q6H PRN  magnesium oxide,  800 mg, PRN  magnesium oxide, 800 mg, PRN  ondansetron, 4 mg, Q8H PRN  potassium bicarbonate, 35 mEq, PRN  potassium bicarbonate, 50 mEq, PRN  potassium bicarbonate, 60 mEq, PRN  potassium, sodium phosphates, 2 packet, PRN  potassium, sodium phosphates, 2 packet, PRN  potassium, sodium phosphates, 2 packet, PRN  sodium chloride 0.9%, 10 mL, PRN      Today I personally reviewed pertinent medications, lines/drains/airways, imaging, cardiology results, laboratory results, microbiology results, notably:    Diet  Diet NPO        Assessment/Plan:     Neuro  * Stroke due to embolism of left middle cerebral artery  Large Left MCA stroke without intervention  -Admit to NCC, VN following  -NSGY on board for hemicrani watch  -q1h neuro checks, vital checks  -EKG, ECHO, CXR  -A1c, TSH, lipid panel, coag panel  -Daily CBC, CMP, mag, phos  -SBP < 160, prn labetalol and hydralazine  -Daily Statin, ASA  -SCDs, SQH  -CT large left MCA distribution infarct with no associated acute hemorrhage  -CTA segmental occlusion measuring approximately 10 mm mid and distal M1 segment left MCA with diminished flow distal to the stenosis.  -PT/OT/SLP  -MRI brain -Large acute  left MCA distribution infarct with modest mass effect.   Multiple small remote scattered infarcts elsewhere as above.  Embolic disease to be considered  -- Cont som crani watch       Cytotoxic cerebral edema  -see primary problem    Cardiac/Vascular  Essential hypertension  SBP < 160  Prn labetalol and hydralazine      Mural thrombus of cardiac apex  Small to moderate sized (1.6x1.1 cm) layered, protruding apical mass c/w thrombus.  -Noted on ECHO from 12/21  -Strict HR and BP control  -SBP goal <160  -lopressor started       -Heparin gtt started, min intensity, no bolus      Other  Debility  PT/OT    Grade III diastolic dysfunction  1. The left ventricle (LV) is dilated with severely depressed systolic function & global hypokinesis.  2. LV ejection fraction is 15-20%. Grade  III LV diastolic dysfunction.  3. Small to moderate sized (1.6x1.1 cm) layered, protruding apical mass c/w thrombus.  4. The right ventricle (RV) is severely enlarged with normal systolic function.   5. Estimated RVSP is moderately elevated (50-70 mmHg). Pulmonary hypertension is present.  6. Severe atrial enlargement.  7. Moderate eccentric mitral regrugitation.  8. Mild tricuspid regurgitation.     ECHO 12/21  -monitor I/Os   -careful sodium control             The patient is being Prophylaxed for:  Venous Thromboembolism with: Mechanical or Chemical  Stress Ulcer with: None  Ventilator Pneumonia with: not applicable    Activity Orders          Straight Cath starting at 12/22 1206    Turn patient starting at 12/21 1800    Elevate HOB starting at 12/21 1717    Diet NPO: NPO starting at 12/21 1717        Full Code    Alejandra Salcido DNP  Neurocritical Care  [facility]    Critical condition in that Patient has a condition that poses threat to life and bodily function: hemispheric LMCA stroke at risk for neurologic decompensation including possible hemicrani     36 minutes of Critical care time was spent personally by me on the following activities: development of treatment plan with patient or surrogate and bedside caregivers, discussions with consultants, evaluation of patient's response to treatment, examination of patient, ordering and performing treatments and interventions, ordering and review of laboratory studies, ordering and review of radiographic studies, pulse oximetry, antibiotic titration if applicable, vasopressor titration if applicable, re-evaluation of patient's condition. This critical care time did not overlap with that of any other provider or involve time for any procedures. There is high probability for acute neurological change leading to clinical and possibly life-threatening deterioration requiring highest level of physician preparedness for urgent intervention.

## 2021-12-24 NOTE — PROGRESS NOTES
[facility]  Neurosurgery  Progress Note    Subjective:     History of Present Illness: 56 M with unknown past medical history who presents as transfer with L MCA CVA without intervention. Patient with out family bedside for collateral history. Following history taken from chart review:  He originally presented to [facility] by EMS on 12/20 after being found down by a coworker. LKW uncertain. 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 The coworker checked on the patient the following day, and he was found down with only a shirt, which lead to EMS being called. On presentation to [facility], the patient was nonverbal to command, with right sided hemiparesis. Stroke protocol initiated with Tele stoke evaluation, and the patient was not deemed a candidate for any acute intervention. CTA at OSH significant for segmental occlusion in L MCA with diminished flow distal to the stenosis. If note, ECHO at [facility] is significant for an EF of 15-20% with an apical mass consistent with a thrombus. He was started on a statin and DAPT at OSH but never appeared to have enteral access so was only given ASA suppositories, per chart review. AC per NCC pending MRI.     NIHSS 17    CTH with multifocal L MCA infarct w/o MLS. Na 138.      Post-Op Info:  * No surgery found *         Interval History: PSD5. NAEON. Exam stable    Medications:  Continuous Infusions:   heparin (porcine) in D5W 16 Units/kg/hr (12/24/21 0901)     Scheduled Meds:   aspirin  81 mg Per NG tube Daily    atorvastatin  40 mg Oral Daily    metoprolol tartrate  25 mg Per NG tube BID    senna-docusate 8.6-50 mg  1 tablet Per NG tube BID    sodium chloride  2 g Oral TID     PRN Meds:acetaminophen, hydrALAZINE, labetalol, magnesium oxide, magnesium oxide, ondansetron, potassium bicarbonate, potassium bicarbonate, potassium bicarbonate, potassium,  sodium phosphates, potassium, sodium phosphates, potassium, sodium phosphates, sodium chloride 0.9%       Objective:     Weight: 78.5 kg (172 lb 15.9 oz)  Body mass index is 24.13 kg/m².  Vital Signs (Most Recent):  Temp: 97.9 °F (36.6 °C) (12/24/21 0701)  Pulse: 96 (12/24/21 0911)  Resp: (!) 27 (12/24/21 0901)  BP: (!) 147/102 (12/24/21 0911)  SpO2: 96 % (12/24/21 0901) Vital Signs (24h Range):  Temp:  [97.8 °F (36.6 °C)-99.1 °F (37.3 °C)] 97.9 °F (36.6 °C)  Pulse:  [] 96  Resp:  [23-37] 27  SpO2:  [91 %-96 %] 96 %  BP: (120-167)/() 147/102     Date 12/24/21 0700 - 12/25/21 0659   Shift 7419-3177 1698-4195 5787-9915 24 Hour Total   INTAKE   I.V.(mL/kg) 60.1(0.8)   60.1(0.8)   NG/   265   Shift Total(mL/kg) 325.1(4.1)   325.1(4.1)   OUTPUT   Shift Total(mL/kg)       Weight (kg) 78.5 78.5 78.5 78.5                        NG/OG Tube 12/21/21 1410 Left nostril (Active)   Placement Check placement verified by x-ray;placement verified by distal tube length measurement 12/22/21 0301   Tolerance no signs/symptoms of discomfort 12/22/21 0301   Securement secured to nostril center w/ adhesive device 12/22/21 0301   Clamp Status/Tolerance clamped 12/22/21 0301   Suction Setting/Drainage Method suction at the bedside 12/22/21 0301   Insertion Site Appearance no redness, warmth, tenderness, skin breakdown, drainage 12/22/21 0301   Drainage None 12/22/21 0301   Flush/Irrigation flushed w/;water;no resistance met 12/22/21 0301       Male External Urinary Catheter 12/21/21 1830 (Active)       Neurosurgery Physical Exam  Psych: expressive/receptive aphasia  Neuro: GCS [id number]  CNII-XII: L gaze preference/R facial drooping  Extremities:  Motor:  Withdraws RUE/RLE to stim  Spontaneously moves LUE/LLE antigravity, will not follow commands but mimic        Significant Labs:  Recent Labs   Lab 12/22/21  1816 12/23/21  0222 12/24/21  0018   GLU  --  143* 155*   NA  --  139 143   K  --  3.8 3.9   CL  --  109 111*   CO2   --  22* 22*   BUN  --  15 16   CREATININE  --  0.7 0.7   CALCIUM  --  8.2* 8.7   MG 1.5* 1.8 1.9     Recent Labs   Lab 12/23/21  0222 12/24/21  0018   WBC 9.57 9.71   HGB 13.2* 13.7*   HCT 38.3* 40.2   * 155     Recent Labs   Lab 12/23/21  1206 12/23/21  1822 12/24/21  0018   INR 1.0  --   --    APTT 24.9 29.6 27.3     Microbiology Results (last 7 days)     ** No results found for the last 168 hours. **        Recent Lab Results       12/24/21  0018   12/24/21  0016   12/23/21  1823   12/23/21 1822 12/23/21  1307        Albumin 3.1               Alkaline Phosphatase 46               ALT 24               Anion Gap 10               aPTT 27.3  Comment: aPTT therapeutic range = 39-69 seconds       29.6  Comment: aPTT therapeutic range = 39-69 seconds         AST 33               Baso # 0.04               Basophil % 0.4               BILIRUBIN TOTAL 1.4  Comment: For infants and newborns, interpretation of results should be based  on gestational age, weight and in agreement with clinical  observations.    Premature Infant recommended reference ranges:  Up to 24 hours.............<8.0 mg/dL  Up to 48 hours............<12.0 mg/dL  3-5 days..................<15.0 mg/dL  6-29 days.................<15.0 mg/dL                 BUN 16               Calcium 8.7               Chloride 111               CO2 22               Creatinine 0.7               Differential Method Automated               eGFR if  >60.0               eGFR if non  >60.0  Comment: Calculation used to obtain the estimated glomerular filtration  rate (eGFR) is the CKD-EPI equation.                  Eos # 0.0               Eosinophil % 0.2               Glucose 155               Gran # (ANC) 7.4               Gran % 76.1               Hematocrit 40.2               Hemoglobin 13.7               Immature Grans (Abs) 0.03  Comment: Mild elevation in immature granulocytes is non specific and   can be seen in a variety of  conditions including stress response,   acute inflammation, trauma and pregnancy. Correlation with other   laboratory and clinical findings is essential.                 Immature Granulocytes 0.3               INR               Lymph # 1.4               Lymph % 14.5               Magnesium 1.9               MCH 33.8               MCHC 34.1               MCV 99               Mono # 0.8               Mono % 8.5               MPV 10.7               nRBC 0               Phosphorus 3.0               Platelets 155               POCT Glucose   150   157     149       Potassium 3.9               PROTEIN TOTAL 6.5               Protime               RBC 4.05               RDW 12.4               Sodium 143               WBC 9.71                                12/23/21  1206        Albumin       Alkaline Phosphatase       ALT       Anion Gap       aPTT 24.9  Comment: aPTT therapeutic range = 39-69 seconds       AST       Baso #       Basophil %       BILIRUBIN TOTAL       BUN       Calcium       Chloride       CO2       Creatinine       Differential Method       eGFR if        eGFR if non        Eos #       Eosinophil %       Glucose       Gran # (ANC)       Gran %       Hematocrit       Hemoglobin       Immature Grans (Abs)       Immature Granulocytes       INR 1.0  Comment: Coumadin Therapy:  2.0 - 3.0 for INR for all indicators except mechanical heart valves  and antiphospholipid syndromes which should use 2.5 - 3.5.         Lymph #       Lymph %       Magnesium       MCH       MCHC       MCV       Mono #       Mono %       MPV       nRBC       Phosphorus       Platelets       POCT Glucose       Potassium       PROTEIN TOTAL       Protime 10.9       RBC       RDW       Sodium       WBC             Significant Diagnostics:  I have reviewed and interpreted all pertinent imaging results/findings within the past 24 hours along with rad report     Assessment/Plan:     * Stroke due to embolism of  left middle cerebral artery  56M w/ unknown PMHX who presents after being found down with R sided hemiparesis and aphasia found to have L MCA infarction 2/2 L MCA occlusion.     NIHSS17. PSD5    Patient admitted to ICU on telemetry      -q1h neurochecks in ICU  MRI reviewed with evolving stroke and some effacement on lateral vent without significant MLS  Follow head CT today to assess interval stability   Na 145-155 strict, reccomend hypertonic saline PRN per primary team to reach goal. Na 139 today   Low threshold for Keppra; no concern for seizure at this time  HOB >30  SBP recs and management per primary team  Apical thrombus per NCC. On Hep gtt  VN following, stroke work up per VN  Continue to monitor clinically, notify NSGY immediately with any changes in neuro status    Dispo: PSD#5. Stable on exam. Will assess interval stability with repeat head CT             Michael Ramos MD  Neurosurgery  [facility]

## 2021-12-24 NOTE — PROGRESS NOTES
[facility]  Vascular Neurology  Comprehensive Stroke Center  Progress Note    Assessment/Plan:     * Stroke due to embolism of left middle cerebral artery  57 yo male with PMHx of HTN and CAD who presents as a transfer from [facility]. No intervention was performed(no tPA as OOW, no IR per telestroke provider). CTH at OSH with large L MCA infarct. CTA with distal L M1 occlusion. He was admitted to hospital medicine at OSH and [place] [facility] [place] [facility]. Repeat [facility](12/21) with large L MCA infarct. Echo at OSH with LV global hypokinesis, EF 15-20%, severe atrial enlargement and small to moderate size apical thrombus. Etiology: likely cardioembolic 2/2 low EF(15-20%) and cardiac thrombus.    Repeat CTH with petechial hemorrhage recommend stopping aspirin and heparin. Repeat CTH with any worse exam.     Antithrombotics for secondary stroke prevention:   None ICH    Statins for secondary stroke prevention and hyperlipidemia, if present: Statins: Atorvastatin- 40 mg daily     Aggressive risk factor modification: HTN, CAD     Rehab efforts: The patient has been evaluated by a stroke team provider and the therapy needs have been fully considered based off the presenting complaints and exam findings. The following therapy evaluations are needed: PT evaluate and treat, OT evaluate and treat, SLP evaluate and treat, PM&R evaluate for appropriate placement    Diagnostics ordered/pending: None     VTE prophylaxis: Mechanical prophylaxis: Place SCDs  None: Reason for No Pharmacological VTE Prophylaxis: History of systemic or intracranial bleeding    BP parameters: Infarct: No intervention, SBP <220       Cytotoxic cerebral edema  Area of cytotoxic cerebral edema identified when reviewing brain imaging in the territory of the L middle cerebral artery. There is mass effect associated with it. Continue to monitor the patients clinical exam for any worsening of symptoms which may indicate  expansion of the stroke or the area of the edema resulting in the clinical change. The pattern is suggestive of embolic etiology.        Essential hypertension  Stroke RF  SBP<220      Mural thrombus of cardiac apex  Seen on TTE at outside facility  -TTE 12/21/21: with EF of 15-20%, small to moderate sized (1.6x1.1 cm) layered, protruding apical mass c/w thrombus, global hypokinesis, severe atrial enlargement.  -on heparin gtt but now with hemo conversion               12/22 patient in ICU for hemicrani watch, NPO per SLP  12/23: Patient remains in NCC. Min intensity heparin gtt was initiated. Patient aphasic with RS plegia.   12/24-Patient with changes in CT with petechial hemorraghic conversion recommend stopping aspirin and holding heparin. Repeat imaging with worsening exam. On hemicrani watch.       STROKE DOCUMENTATION        NIH Scale:  1a. Level of Consciousness: 0-->Alert, keenly responsive  1b. LOC Questions: 2-->Answers neither question correctly  1c. LOC Commands: 2-->Performs neither task correctly  2. Best Gaze: 0-->Normal  3. Visual: 0-->No visual loss  4. Facial Palsy: 2-->Partial paralysis (total or near-total paralysis of lower face)  5a. Motor Arm, Left: 0-->No drift, limb holds 90 (or 45) degrees for full 10 secs  5b. Motor Arm, Right: 4-->No movement  6a. Motor Leg, Left: 0-->No drift, leg holds 30 degree position for full 5 secs  6b. Motor Leg, Right: 4-->No movement  7. Limb Ataxia: 0-->Absent  8. Sensory: 2-->Severe to total sensory loss, patient is not aware of being touched in the face, arm, and leg  9. Best Language: 3-->Mute, global aphasia, no usable speech or auditory comprehension  10. Dysarthria: 2-->Severe dysarthria, patients speech is so slurred as to be unintelligible in the absence of or out of proportion to any dysphasia, or is mute/anarthric  11. Extinction and Inattention (formerly Neglect): 2-->Profound som-inattention/extinction more than 1 modality  Total (NIH Stroke  Scale): 23       Modified Claridge Score: 0  Brenden Coma Scale:    ABCD2 Score:    [id number] Score:   HAS -BLED Score:   ICH Score:   Hunt & Gillis Classification:      Hemorrhagic change of an Ischemic Stroke: Does this patient have an ischemic stroke with hemorrhagic changes? Yes, Grading Scale: HI Type 1 (HI-1) = small petechiae along the margins of the infarct. Is this a symptomatic change?  Yes - NIHSS increase of 4 or more points directly related to imaging changes.    Neurologic Chief Complaint: found down, nonverbal, RSW    Subjective:     Interval History: Patient is seen for follow-up neurological assessment and treatment recommendations: Patient with changes in CT with petechial hemorraghic conversion recommend stopping aspirin and holding heparin. Repeat imaging with worsening exam. On hemicrani watch.     HPI, Past Medical, Family, and Social History remains the same as documented in the initial encounter.     Review of Systems   Unable to perform ROS: Patient nonverbal   HENT: Positive for trouble swallowing.    Neurological: Positive for facial asymmetry, speech difficulty and weakness.     Scheduled Meds:   atorvastatin  40 mg Oral Daily    metoprolol tartrate  25 mg Per NG tube BID    senna-docusate 8.6-50 mg  1 tablet Per NG tube BID    sodium chloride  2 g Oral TID     Continuous Infusions:   heparin (porcine) in D5W 17 Units/kg/hr (12/24/21 1327)     PRN Meds:acetaminophen, hydrALAZINE, labetalol, magnesium oxide, magnesium oxide, ondansetron, potassium bicarbonate, potassium bicarbonate, potassium bicarbonate, potassium, sodium phosphates, potassium, sodium phosphates, potassium, sodium phosphates, sodium chloride 0.9%    Objective:     Vital Signs (Most Recent):  Temp: 97.9 °F (36.6 °C) (12/24/21 0701)  Pulse: 95 (12/24/21 1001)  Resp: (!) 21 (12/24/21 1001)  BP: (!) 140/110 (12/24/21 1001)  SpO2: (!) 94 % (12/24/21 1001)  BP Location: Left arm    Vital Signs Range (Last 24H):  Temp:   [97.8 °F (36.6 °C)-99.1 °F (37.3 °C)]   Pulse:  []   Resp:  [21-37]   BP: (125-167)/()   SpO2:  [91 %-96 %]   BP Location: Left arm    Physical Exam  Vitals reviewed.   Constitutional:       General: He is not in acute distress.     Appearance: He is well-developed.   HENT:      Head: Normocephalic and atraumatic.      Right Ear: External ear normal.      Left Ear: External ear normal.      Nose: No rhinorrhea.   Eyes:      General: No scleral icterus.        Right eye: No discharge.         Left eye: No discharge.   Cardiovascular:      Rate and Rhythm: Normal rate.   Pulmonary:      Effort: Pulmonary effort is normal. No respiratory distress.   Skin:     General: Skin is warm and dry.   Neurological:      Mental Status: He is alert.      Cranial Nerves: Dysarthria and facial asymmetry present.      Motor: Weakness present.      Comments: Aphasic         Neurological Exam:   LOC: alert  Attention Span: Good   Language: Global aphasia  Articulation: Untestable due to severe aphasia   Orientation: Untestable due to severe aphasia   Facial Movement (CN VII): Lower facial weakness on the Right  Motor: Arm left  Normal 5/5  Leg left  Normal 5/5  Arm right  Plegia 0/5  Leg right Plegia 0/5  Sensation: Intact to light touch, temperature and vibration  Tone: Flaccid  RUE       Laboratory:  CMP:   Recent Labs   Lab 12/24/21  0018   CALCIUM 8.7   ALBUMIN 3.1*   PROT 6.5      K 3.9   CO2 22*   *   BUN 16   CREATININE 0.7   ALKPHOS 46*   ALT 24   AST 33   BILITOT 1.4*     CBC:   Recent Labs   Lab 12/24/21  0018   WBC 9.71   RBC 4.05*   HGB 13.7*   HCT 40.2      MCV 99*   MCH 33.8*   MCHC 34.1     Lipid Panel:   Recent Labs   Lab 12/21/21  1911   CHOL 162   LDLCALC 89.2   HDL 58   TRIG 74     Coagulation:   Recent Labs   Lab 12/23/21  1206 12/23/21  1822 12/24/21  1236   INR 1.0  --   --    APTT 24.9   < > 37.4*    < > = values in this interval not displayed.     Platelet Aggregation Study: No  results for input(s): PLTAGG, PLTAGINTERP, PLTAGREGLACO, ADPPLTAGGREG in the last 168 hours.  Hgb A1C:   Recent Labs   Lab 12/21/21  0453   HGBA1C 4.9     TSH:   Recent Labs   Lab 12/21/21  0453   TSH 0.768       Diagnostic Results     Brain imaging:    CTH 12/24/2021  Evolving large left MCA territory infarction with petechial hemorrhagic conversion.  There is intermediate density components in the left temporal lobe area of infarction suggestive for interval additional petechial hemorrhage.  No evidence for extra-axial hemorrhage or hydrocephalus.    MRI Brain WO. Date: 12/22/21    Large acute  left MCA distribution infarct with modest mass effect. Multiple small remote scattered infarcts elsewhere as above.  Embolic disease to be considered.     CTH WO 12/21/21  Again demonstrated in better delineated as large left MCA distribution infarct with no associated acute hemorrhage and with localized mass effect resulting in effacement of overlying cortical sulci and partial effacement left sylvian fissure.  No associated acute hemorrhage.     Several small old areas of infarction again noted including cerebellum bilaterally, anterior left perisylvian region, lateral left frontal cortex and posterior right parietal cortex.     CTH WO 12/20/21   Large area loss of gray-white differentiation consistent with acute infarct left MCA distribution involving the left temporal lobe, temporal occipital region and portions of the basal ganglia with localized mass effect including effacement of overlying cortical sulci although with no associated acute hemorrhage.     Several small old areas of infarction are evident including anterior left perisylvian and cerebellum bilaterally. Mild underlying atrophy.       Vessel Imaging:  CTA H/N 12/20/21  Segmental occlusion measuring approximately 10 mm mid and distal M1 segment left MCA with diminished flow distal to the stenosis.     Right cervical carotid circulation--atherosclerotic  calcification bifurcation without measurable stenosis.     Left cervical carotid circulation--atherosclerotic calcification distal left common carotid artery and bifurcation with stenosis proximal left ICA measuring 16% utilizing NASCET criteria.  Mild eccentric narrowing mid to distal portion left common carotid artery also noted.     Vertebral arteries--no focal arterial abnormality or measurable stenosis.     Cardiac Evaluation:   TTE with bubble 12/21/21  1. The left ventricle (LV) is dilated with severely depressed systolic function & global hypokinesis.  2. LV ejection fraction is 15-20%. Grade III LV diastolic dysfunction.  3. Small to moderate sized (1.6x1.1 cm) layered, protruding apical mass c/w thrombus.  4. The right ventricle (RV) is severely enlarged with normal systolic function.   5. Estimated RVSP is moderately elevated (50-70 mmHg). Pulmonary hypertension is present.  6. Severe atrial enlargement.  7. Moderate eccentric mitral regrugitation.  8. Mild tricuspid regurgitation.        Blanca Ragsdale NP  [facility]  Department of Vascular Neurology   [facility]

## 2021-12-24 NOTE — SUBJECTIVE & OBJECTIVE
Interval History: PSD5. NAEON. Exam stable    Medications:  Continuous Infusions:   heparin (porcine) in D5W 16 Units/kg/hr (12/24/21 0901)     Scheduled Meds:   aspirin  81 mg Per NG tube Daily    atorvastatin  40 mg Oral Daily    metoprolol tartrate  25 mg Per NG tube BID    senna-docusate 8.6-50 mg  1 tablet Per NG tube BID    sodium chloride  2 g Oral TID     PRN Meds:acetaminophen, hydrALAZINE, labetalol, magnesium oxide, magnesium oxide, ondansetron, potassium bicarbonate, potassium bicarbonate, potassium bicarbonate, potassium, sodium phosphates, potassium, sodium phosphates, potassium, sodium phosphates, sodium chloride 0.9%       Objective:     Weight: 78.5 kg (172 lb 15.9 oz)  Body mass index is 24.13 kg/m².  Vital Signs (Most Recent):  Temp: 97.9 °F (36.6 °C) (12/24/21 0701)  Pulse: 96 (12/24/21 0911)  Resp: (!) 27 (12/24/21 0901)  BP: (!) 147/102 (12/24/21 0911)  SpO2: 96 % (12/24/21 0901) Vital Signs (24h Range):  Temp:  [97.8 °F (36.6 °C)-99.1 °F (37.3 °C)] 97.9 °F (36.6 °C)  Pulse:  [] 96  Resp:  [23-37] 27  SpO2:  [91 %-96 %] 96 %  BP: (120-167)/() 147/102     Date 12/24/21 0700 - 12/25/21 0659   Shift 6565-8589 0979-0736 3671-7357 24 Hour Total   INTAKE   I.V.(mL/kg) 60.1(0.8)   60.1(0.8)   NG/   265   Shift Total(mL/kg) 325.1(4.1)   325.1(4.1)   OUTPUT   Shift Total(mL/kg)       Weight (kg) 78.5 78.5 78.5 78.5                        NG/OG Tube 12/21/21 1410 Left nostril (Active)   Placement Check placement verified by x-ray;placement verified by distal tube length measurement 12/22/21 0301   Tolerance no signs/symptoms of discomfort 12/22/21 0301   Securement secured to nostril center w/ adhesive device 12/22/21 0301   Clamp Status/Tolerance clamped 12/22/21 0301   Suction Setting/Drainage Method suction at the bedside 12/22/21 0301   Insertion Site Appearance no redness, warmth, tenderness, skin breakdown, drainage 12/22/21 0301   Drainage None 12/22/21 0301    Flush/Irrigation flushed w/;water;no resistance met 12/22/21 0301       Male External Urinary Catheter 12/21/21 1830 (Active)       Neurosurgery Physical Exam  Psych: expressive/receptive aphasia  Neuro: GCS E4V2M5  CNII-XII: L gaze preference/R facial drooping  Extremities:  Motor:  Withdraws RUE/RLE to stim  Spontaneously moves LUE/LLE antigravity, will not follow commands but mimic        Significant Labs:  Recent Labs   Lab 12/22/21  1816 12/23/21  0222 12/24/21  0018   GLU  --  143* 155*   NA  --  139 143   K  --  3.8 3.9   CL  --  109 111*   CO2  --  22* 22*   BUN  --  15 16   CREATININE  --  0.7 0.7   CALCIUM  --  8.2* 8.7   MG 1.5* 1.8 1.9     Recent Labs   Lab 12/23/21  0222 12/24/21  0018   WBC 9.57 9.71   HGB 13.2* 13.7*   HCT 38.3* 40.2   * 155     Recent Labs   Lab 12/23/21  1206 12/23/21  1822 12/24/21  0018   INR 1.0  --   --    APTT 24.9 29.6 27.3     Microbiology Results (last 7 days)     ** No results found for the last 168 hours. **        Recent Lab Results       12/24/21  0018   12/24/21  0016   12/23/21  1823   12/23/21  1822 12/23/21  1307        Albumin 3.1               Alkaline Phosphatase 46               ALT 24               Anion Gap 10               aPTT 27.3  Comment: aPTT therapeutic range = 39-69 seconds       29.6  Comment: aPTT therapeutic range = 39-69 seconds         AST 33               Baso # 0.04               Basophil % 0.4               BILIRUBIN TOTAL 1.4  Comment: For infants and newborns, interpretation of results should be based  on gestational age, weight and in agreement with clinical  observations.    Premature Infant recommended reference ranges:  Up to 24 hours.............<8.0 mg/dL  Up to 48 hours............<12.0 mg/dL  3-5 days..................<15.0 mg/dL  6-29 days.................<15.0 mg/dL                 BUN 16               Calcium 8.7               Chloride 111               CO2 22               Creatinine 0.7               Differential Method  Automated               eGFR if  >60.0               eGFR if non  >60.0  Comment: Calculation used to obtain the estimated glomerular filtration  rate (eGFR) is the CKD-EPI equation.                  Eos # 0.0               Eosinophil % 0.2               Glucose 155               Gran # (ANC) 7.4               Gran % 76.1               Hematocrit 40.2               Hemoglobin 13.7               Immature Grans (Abs) 0.03  Comment: Mild elevation in immature granulocytes is non specific and   can be seen in a variety of conditions including stress response,   acute inflammation, trauma and pregnancy. Correlation with other   laboratory and clinical findings is essential.                 Immature Granulocytes 0.3               INR               Lymph # 1.4               Lymph % 14.5               Magnesium 1.9               MCH 33.8               MCHC 34.1               MCV 99               Mono # 0.8               Mono % 8.5               MPV 10.7               nRBC 0               Phosphorus 3.0               Platelets 155               POCT Glucose   150   157     149       Potassium 3.9               PROTEIN TOTAL 6.5               Protime               RBC 4.05               RDW 12.4               Sodium 143               WBC 9.71                                12/23/21  1206        Albumin       Alkaline Phosphatase       ALT       Anion Gap       aPTT 24.9  Comment: aPTT therapeutic range = 39-69 seconds       AST       Baso #       Basophil %       BILIRUBIN TOTAL       BUN       Calcium       Chloride       CO2       Creatinine       Differential Method       eGFR if        eGFR if non        Eos #       Eosinophil %       Glucose       Gran # (ANC)       Gran %       Hematocrit       Hemoglobin       Immature Grans (Abs)       Immature Granulocytes       INR 1.0  Comment: Coumadin Therapy:  2.0 - 3.0 for INR for all indicators except mechanical  heart valves  and antiphospholipid syndromes which should use 2.5 - 3.5.         Lymph #       Lymph %       Magnesium       MCH       MCHC       MCV       Mono #       Mono %       MPV       nRBC       Phosphorus       Platelets       POCT Glucose       Potassium       PROTEIN TOTAL       Protime 10.9       RBC       RDW       Sodium       WBC             Significant Diagnostics:  I have reviewed and interpreted all pertinent imaging results/findings within the past 24 hours along with rad report

## 2021-12-24 NOTE — PLAN OF CARE
[facility] Care Plan    POC reviewed with Jimmy Leger and family at 0300. Pt unable to verbalize understanding due to aphasia. Questions and concerns addressed. No acute events overnight. Pt progressing toward goals. Will continue to monitor. See below and flowsheets for full assessment and VS info.     - Heparin GTT  - Tube feedings at goal of 55      Neuro:  [place] Coma Scale  Best Eye Response: 4-->(E4) spontaneous  Best Motor Response: 6-->(M6) obeys commands  Best Verbal Response: 2-->(V2) incomprehensible speech  [place] Coma Scale Score: 12  Assessment Qualifiers: patient not sedated/intubated  Pupil PERRLA: yes     24hr Temp:  [97.8 °F (36.6 °C)-99.1 °F (37.3 °C)]     CV:   Rhythm: normal sinus rhythm  BP goals:   SBP < 160  MAP > 65    Resp:   O2 Device (Oxygen Therapy): room air       Plan: N/A    GI/:  FARZANA Total Score: 0  Diet/Nutrition Received: tube feeding  Last Bowel Movement: 12/18/21  Voiding Characteristics: external catheter    Intake/Output Summary (Last 24 hours) at 12/24/2021 0454  Last data filed at 12/24/2021 0343  Gross per 24 hour   Intake 1769.69 ml   Output 635 ml   Net 1134.69 ml     Unmeasured Output  Urine Occurrence: 2  Stool Occurrence: 0  Pad Count: 2    Labs/Accuchecks:  Recent Labs   Lab 12/24/21  0018   WBC 9.71   RBC 4.05*   HGB 13.7*   HCT 40.2         Recent Labs   Lab 12/24/21  0018      K 3.9   CO2 22*   *   BUN 16   CREATININE 0.7   ALKPHOS 46*   ALT 24   AST 33   BILITOT 1.4*      Recent Labs   Lab 12/23/21  1206 12/23/21  1822 12/24/21  0018   INR 1.0  --   --    APTT 24.9   < > 27.3    < > = values in this interval not displayed.      Recent Labs   Lab 12/22/21  0301   *       Electrolytes: N/A - electrolytes WDL  Accuchecks: Q6H    Gtts:   heparin (porcine) in D5W 14 Units/kg/hr (12/24/21 0343)       LDA/Wounds:  Lines/Drains/Airways       Drain                   NG/OG Tube 12/21/21 1410 Left nostril 2 days    Male External Urinary Catheter  12/22/21 2000 1 day              Peripheral Intravenous Line                   Peripheral IV - Single Lumen 12/20/21 1330 18 G Right Antecubital 3 days         Peripheral IV - Single Lumen 12/22/21 1600 20 G Distal;Posterior;Right Wrist 1 day                  Wounds: No  Wound care consulted: No

## 2021-12-24 NOTE — SUBJECTIVE & OBJECTIVE
Interval History:  As above    Review of Systems   Unable to perform ROS: Other     Objective:     Vitals:  Temp: 97.9 °F (36.6 °C)  Pulse: 95  Rhythm: atrial rhythm  BP: (!) 140/110  MAP (mmHg): 123  Resp: (!) 21  SpO2: (!) 94 %  O2 Device (Oxygen Therapy): room air    Temp  Min: 97.8 °F (36.6 °C)  Max: 99.1 °F (37.3 °C)  Pulse  Min: 95  Max: 110  BP  Min: 125/92  Max: 167/101  MAP (mmHg)  Min: 102  Max: 126  Resp  Min: 21  Max: 37  SpO2  Min: 91 %  Max: 96 %    12/23 0701 - 12/24 0700  In: 1839.7 [I.V.:144.7]  Out: 690 [Urine:690]   Unmeasured Output  Urine Occurrence: 2  Stool Occurrence: 0  Pad Count: 2       Physical Exam  Vitals reviewed.   Constitutional:       General: He is not in acute distress.     Appearance: He is well-developed.   HENT:      Head: Normocephalic and atraumatic.      Right Ear: External ear normal.      Left Ear: External ear normal.      Nose: No rhinorrhea.   Eyes:      General: Visual field deficit present. No scleral icterus.        Right eye: No discharge.         Left eye: No discharge.   Cardiovascular:      Rate and Rhythm: Normal rate.   Pulmonary:      Effort: Pulmonary effort is normal. No respiratory distress.   Skin:     General: Skin is warm and dry.   Neurological:      Mental Status: He is alert.      Cranial Nerves: Dysarthria and facial asymmetry present.      Motor: Weakness present.      Comments:     -GCS: E4V2M6, no sedation   -Mental Status: Following commands.  -Cranial nerves: PERRL, 3mm, aphasic  -Motor: Spont on L, RUE:WD, RLE: ex post.   -Gait: Deferred            Medications:  Continuousheparin (porcine) in D5W, Last Rate: 17 Units/kg/hr (12/24/21 1327)    Scheduledatorvastatin, 40 mg, Daily  metoprolol tartrate, 25 mg, BID  senna-docusate 8.6-50 mg, 1 tablet, BID  sodium chloride, 2 g, TID    PRNacetaminophen, 650 mg, Q6H PRN  hydrALAZINE, 10 mg, Q6H PRN  labetalol, 10 mg, Q6H PRN  magnesium oxide, 800 mg, PRN  magnesium oxide, 800 mg, PRN  ondansetron, 4  mg, Q8H PRN  potassium bicarbonate, 35 mEq, PRN  potassium bicarbonate, 50 mEq, PRN  potassium bicarbonate, 60 mEq, PRN  potassium, sodium phosphates, 2 packet, PRN  potassium, sodium phosphates, 2 packet, PRN  potassium, sodium phosphates, 2 packet, PRN  sodium chloride 0.9%, 10 mL, PRN      Today I personally reviewed pertinent medications, lines/drains/airways, imaging, cardiology results, laboratory results, microbiology results, notably:    Diet  Diet NPO

## 2021-12-24 NOTE — PROGRESS NOTES
[facility] - Neuro Critical Care  Neurocritical Care  Progress Note    Admit Date: 12/21/2021  Service Date: 12/24/2021  Length of Stay: 3    Subjective:     Chief Complaint: Stroke due to embolism of left middle cerebral artery    History of Present Illness: Mr. Leger is a 55 yo male with unknown past medical history who presents to [facility] with a L MCA CVA without intervention. He originally presented to [facility] by EMS on 12/20 after being found down by a coworker. LKW uncertain. 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 The coworker checked on the patient the following day, and he was found down with only a shirt, which lead to EMS being called. On presentation to [facility], the patient was nonverbal to command, with right sided hemiparesis. Stroke protocol initiated with Tele stoke evaluation, and the patient was not deemed a candidate for any acute intervention. CT at [facility] with large left MCA distribution infarct with no associated acute hemorrhage  CTA at [facility] significant for segmental occlusion measuring approximately 10 mm mid and distal M1 segment left MCA with diminished flow distal to the stenosis. He has not yet had an MRI. Of note, ECHO at [facility] is significant for an EF of 15-20% with an apical mass consistent with a thrombus. He was started on a statin and DAPT at [facility] but never appeared to have enteral access so was only given ASA suppositories, per chart review. He was transferred to [facility] on 12/21.    He will be admitted to [facility] for hourly neuromonitoring and a higher level of care.          Hospital Course: 12/22/2021NAEO, stroke day 3. NSGY following, stroke team following. Continue to monitor neuro exam closely. Strict BP and HR control since patient with LV thrombus, cannot safely AC yet 2/2 to acute large territory stroke   12/23/2021 NAEON. Heparin gtt started for LV thrombus, cont som-crani  watch.   12/24/2021 exam improving; 24hrs CTH demonstrates petechial hmg will repeat and hold heparin drip if hmg increases otherwise will continue; DHC watch; Na goal 140-150; sbp 100-140        Interval History:  As above    Review of Systems   Unable to perform ROS: Other     Objective:     Vitals:  Temp: 97.9 °F (36.6 °C)  Pulse: 95  Rhythm: atrial rhythm  BP: (!) 140/110  MAP (mmHg): 123  Resp: (!) 21  SpO2: (!) 94 %  O2 Device (Oxygen Therapy): room air    Temp  Min: 97.8 °F (36.6 °C)  Max: 99.1 °F (37.3 °C)  Pulse  Min: 95  Max: 110  BP  Min: 125/92  Max: 167/101  MAP (mmHg)  Min: 102  Max: 126  Resp  Min: 21  Max: 37  SpO2  Min: 91 %  Max: 96 %    12/23 0701 - 12/24 0700  In: 1839.7 [I.V.:144.7]  Out: 690 [Urine:690]   Unmeasured Output  Urine Occurrence: 2  Stool Occurrence: 0  Pad Count: 2       Physical Exam  Vitals reviewed.   Constitutional:       General: He is not in acute distress.     Appearance: He is well-developed.   HENT:      Head: Normocephalic and atraumatic.      Right Ear: External ear normal.      Left Ear: External ear normal.      Nose: No rhinorrhea.   Eyes:      General: Visual field deficit present. No scleral icterus.        Right eye: No discharge.         Left eye: No discharge.   Cardiovascular:      Rate and Rhythm: Normal rate.   Pulmonary:      Effort: Pulmonary effort is normal. No respiratory distress.   Skin:     General: Skin is warm and dry.   Neurological:      Mental Status: He is alert.      Cranial Nerves: Dysarthria and facial asymmetry present.      Motor: Weakness present.      Comments:     -GCS: E4V2M6, no sedation   -Mental Status: Following commands.  -Cranial nerves: PERRL, 3mm, aphasic  -Motor: Spont on L, RUE:WD, RLE: ex post.   -Gait: Deferred            Medications:  Continuousheparin (porcine) in D5W, Last Rate: 17 Units/kg/hr (12/24/21 1327)    Scheduledatorvastatin, 40 mg, Daily  metoprolol tartrate, 25 mg, BID  senna-docusate 8.6-50 mg, 1 tablet,  BID  sodium chloride, 2 g, TID    PRNacetaminophen, 650 mg, Q6H PRN  hydrALAZINE, 10 mg, Q6H PRN  labetalol, 10 mg, Q6H PRN  magnesium oxide, 800 mg, PRN  magnesium oxide, 800 mg, PRN  ondansetron, 4 mg, Q8H PRN  potassium bicarbonate, 35 mEq, PRN  potassium bicarbonate, 50 mEq, PRN  potassium bicarbonate, 60 mEq, PRN  potassium, sodium phosphates, 2 packet, PRN  potassium, sodium phosphates, 2 packet, PRN  potassium, sodium phosphates, 2 packet, PRN  sodium chloride 0.9%, 10 mL, PRN      Today I personally reviewed pertinent medications, lines/drains/airways, imaging, cardiology results, laboratory results, microbiology results, notably:    Diet  Diet NPO        Assessment/Plan:     Neuro  * Stroke due to embolism of left middle cerebral artery  Large Left MCA stroke without intervention  -Admit to NCC, VN following  -NSGY on board for hemicrani watch  -q1h neuro checks, vital checks  -EKG, ECHO, CXR  -A1c, TSH, lipid panel, coag panel  -Daily CBC, CMP, mag, phos  -SBP < 160, prn labetalol and hydralazine  -Daily Statin, ASA  -SCDs, SQH  -CT large left MCA distribution infarct with no associated acute hemorrhage  -CTA segmental occlusion measuring approximately 10 mm mid and distal M1 segment left MCA with diminished flow distal to the stenosis.  -PT/OT/SLP  -MRI brain -Large acute  left MCA distribution infarct with modest mass effect.   Multiple small remote scattered infarcts elsewhere as above.  Embolic disease: LV apical thrombus (known hx)  -- Cont som crani watch       Cytotoxic cerebral edema  -see primary problem    Cardiac/Vascular  Essential hypertension  SBP < 160  Prn labetalol and hydralazine      Mural thrombus of cardiac apex  Small to moderate sized (1.6x1.1 cm) layered, protruding apical mass c/w thrombus.  -Noted on ECHO from 12/21  -Strict HR and BP control  -SBP goal <160  -lopressor started     12/23 Heparin gtt started, min intensity, no bolus  12/24 petechial hmg noted; subtherapeutic  ptt    Other  Debility  PT/OT    Grade III diastolic dysfunction  1. The left ventricle (LV) is dilated with severely depressed systolic function & global hypokinesis.  2. LV ejection fraction is 15-20%. Grade III LV diastolic dysfunction.  3. Small to moderate sized (1.6x1.1 cm) layered, protruding apical mass c/w thrombus.  4. The right ventricle (RV) is severely enlarged with normal systolic function.   5. Estimated RVSP is moderately elevated (50-70 mmHg). Pulmonary hypertension is present.  6. Severe atrial enlargement.  7. Moderate eccentric mitral regrugitation.  8. Mild tricuspid regurgitation.     ECHO 12/21  -monitor I/Os   -careful sodium control             The patient is being Prophylaxed for:  Venous Thromboembolism with: Mechanical or Chemical  Stress Ulcer with: H2B  Ventilator Pneumonia with: not applicable    Activity Orders          Straight Cath starting at 12/22 1206    Turn patient starting at 12/21 1800    Elevate HOB starting at 12/21 1717    Diet NPO: NPO starting at 12/21 1717        Full Code    Justen Luo MD  Neurocritical Care  [facility] - Neuro Critical Care    Time spent with this critically ill patient and care team at the bedside: 35min

## 2021-12-24 NOTE — ASSESSMENT & PLAN NOTE
Area of cytotoxic cerebral edema identified when reviewing brain imaging in the territory of the L middle cerebral artery. There is mass effect associated with it. Continue to monitor the patients clinical exam for any worsening of symptoms which may indicate expansion of the stroke or the area of the edema resulting in the clinical change. The pattern is suggestive of embolic etiology.

## 2021-12-24 NOTE — ASSESSMENT & PLAN NOTE
Small to moderate sized (1.6x1.1 cm) layered, protruding apical mass c/w thrombus.  -Noted on ECHO from 12/21  -Strict HR and BP control  -SBP goal <160  -lopressor started     12/23 Heparin gtt started, min intensity, no bolus  12/24 petechial hmg noted; subtherapeutic ptt

## 2021-12-25 PROBLEM — I26.93 SINGLE SUBSEGMENTAL PULMONARY EMBOLISM WITHOUT ACUTE COR PULMONALE: Status: ACTIVE | Noted: 2021-12-25

## 2021-12-25 LAB
ALBUMIN SERPL BCP-MCNC: 3 G/DL (ref 3.5–5.2)
ALP SERPL-CCNC: 49 U/L (ref 55–135)
ALT SERPL W/O P-5'-P-CCNC: 22 U/L (ref 10–44)
ANION GAP SERPL CALC-SCNC: 8 MMOL/L (ref 8–16)
APTT BLDCRRT: 41.4 SEC (ref 21–32)
AST SERPL-CCNC: 24 U/L (ref 10–40)
BASOPHILS # BLD AUTO: 0.07 K/UL (ref 0–0.2)
BASOPHILS NFR BLD: 0.7 % (ref 0–1.9)
BILIRUB SERPL-MCNC: 1.6 MG/DL (ref 0.1–1)
BNP SERPL-MCNC: 1947 PG/ML (ref 0–99)
BUN SERPL-MCNC: 20 MG/DL (ref 6–20)
CALCIUM SERPL-MCNC: 9 MG/DL (ref 8.7–10.5)
CHLORIDE SERPL-SCNC: 109 MMOL/L (ref 95–110)
CO2 SERPL-SCNC: 24 MMOL/L (ref 23–29)
CREAT SERPL-MCNC: 0.8 MG/DL (ref 0.5–1.4)
DIFFERENTIAL METHOD: ABNORMAL
EOSINOPHIL # BLD AUTO: 0 K/UL (ref 0–0.5)
EOSINOPHIL NFR BLD: 0.4 % (ref 0–8)
ERYTHROCYTE [DISTWIDTH] IN BLOOD BY AUTOMATED COUNT: 12.4 % (ref 11.5–14.5)
EST. GFR  (AFRICAN AMERICAN): >60 ML/MIN/1.73 M^2
EST. GFR  (NON AFRICAN AMERICAN): >60 ML/MIN/1.73 M^2
GLUCOSE SERPL-MCNC: 111 MG/DL (ref 70–110)
HCT VFR BLD AUTO: 39.2 % (ref 40–54)
HGB BLD-MCNC: 13.3 G/DL (ref 14–18)
IMM GRANULOCYTES # BLD AUTO: 0.06 K/UL (ref 0–0.04)
IMM GRANULOCYTES NFR BLD AUTO: 0.6 % (ref 0–0.5)
LACTATE SERPL-SCNC: 1.1 MMOL/L (ref 0.5–2.2)
LYMPHOCYTES # BLD AUTO: 2.4 K/UL (ref 1–4.8)
LYMPHOCYTES NFR BLD: 22.7 % (ref 18–48)
MAGNESIUM SERPL-MCNC: 2 MG/DL (ref 1.6–2.6)
MCH RBC QN AUTO: 34 PG (ref 27–31)
MCHC RBC AUTO-ENTMCNC: 33.9 G/DL (ref 32–36)
MCV RBC AUTO: 100 FL (ref 82–98)
MONOCYTES # BLD AUTO: 1 K/UL (ref 0.3–1)
MONOCYTES NFR BLD: 9.4 % (ref 4–15)
NEUTROPHILS # BLD AUTO: 7.1 K/UL (ref 1.8–7.7)
NEUTROPHILS NFR BLD: 66.2 % (ref 38–73)
NRBC BLD-RTO: 0 /100 WBC
PHOSPHATE SERPL-MCNC: 3.4 MG/DL (ref 2.7–4.5)
PLATELET # BLD AUTO: 156 K/UL (ref 150–450)
PMV BLD AUTO: 12.1 FL (ref 9.2–12.9)
POCT GLUCOSE: 106 MG/DL (ref 70–110)
POCT GLUCOSE: 132 MG/DL (ref 70–110)
POCT GLUCOSE: 146 MG/DL (ref 70–110)
POCT GLUCOSE: 99 MG/DL (ref 70–110)
POTASSIUM SERPL-SCNC: 3.7 MMOL/L (ref 3.5–5.1)
PROT SERPL-MCNC: 6.4 G/DL (ref 6–8.4)
RBC # BLD AUTO: 3.91 M/UL (ref 4.6–6.2)
SODIUM SERPL-SCNC: 141 MMOL/L (ref 136–145)
TROPONIN I SERPL DL<=0.01 NG/ML-MCNC: 0.07 NG/ML (ref 0–0.03)
TROPONIN I SERPL DL<=0.01 NG/ML-MCNC: 0.08 NG/ML (ref 0–0.03)
WBC # BLD AUTO: 10.64 K/UL (ref 3.9–12.7)

## 2021-12-25 PROCEDURE — 25000003 PHARM REV CODE 250: Performed by: PHYSICIAN ASSISTANT

## 2021-12-25 PROCEDURE — 20000000 HC ICU ROOM

## 2021-12-25 PROCEDURE — 80053 COMPREHEN METABOLIC PANEL: CPT

## 2021-12-25 PROCEDURE — 84484 ASSAY OF TROPONIN QUANT: CPT | Performed by: NURSE PRACTITIONER

## 2021-12-25 PROCEDURE — 25000003 PHARM REV CODE 250

## 2021-12-25 PROCEDURE — 85025 COMPLETE CBC W/AUTO DIFF WBC: CPT

## 2021-12-25 PROCEDURE — 94761 N-INVAS EAR/PLS OXIMETRY MLT: CPT

## 2021-12-25 PROCEDURE — 25000003 PHARM REV CODE 250: Performed by: INTERNAL MEDICINE

## 2021-12-25 PROCEDURE — 99291 PR CRITICAL CARE, E/M 30-74 MINUTES: ICD-10-PCS | Mod: ,,, | Performed by: INTERNAL MEDICINE

## 2021-12-25 PROCEDURE — 85730 THROMBOPLASTIN TIME PARTIAL: CPT | Performed by: INTERNAL MEDICINE

## 2021-12-25 PROCEDURE — 84100 ASSAY OF PHOSPHORUS: CPT

## 2021-12-25 PROCEDURE — 83880 ASSAY OF NATRIURETIC PEPTIDE: CPT | Performed by: NURSE PRACTITIONER

## 2021-12-25 PROCEDURE — 63600175 PHARM REV CODE 636 W HCPCS: Performed by: INTERNAL MEDICINE

## 2021-12-25 PROCEDURE — 99291 CRITICAL CARE FIRST HOUR: CPT | Mod: ,,, | Performed by: INTERNAL MEDICINE

## 2021-12-25 PROCEDURE — 83735 ASSAY OF MAGNESIUM: CPT

## 2021-12-25 PROCEDURE — 84484 ASSAY OF TROPONIN QUANT: CPT | Mod: 91 | Performed by: INTERNAL MEDICINE

## 2021-12-25 PROCEDURE — 36415 COLL VENOUS BLD VENIPUNCTURE: CPT | Performed by: NURSE PRACTITIONER

## 2021-12-25 PROCEDURE — 83605 ASSAY OF LACTIC ACID: CPT | Performed by: NURSE PRACTITIONER

## 2021-12-25 PROCEDURE — 25500020 PHARM REV CODE 255: Performed by: INTERNAL MEDICINE

## 2021-12-25 RX ADMIN — SODIUM CHLORIDE TAB 1 GM 2 G: 1 TAB at 03:12

## 2021-12-25 RX ADMIN — SODIUM CHLORIDE TAB 1 GM 2 G: 1 TAB at 08:12

## 2021-12-25 RX ADMIN — SENNOSIDES AND DOCUSATE SODIUM 1 TABLET: 50; 8.6 TABLET ORAL at 09:12

## 2021-12-25 RX ADMIN — METOPROLOL TARTRATE 25 MG: 25 TABLET, FILM COATED ORAL at 08:12

## 2021-12-25 RX ADMIN — ATORVASTATIN CALCIUM 40 MG: 20 TABLET, FILM COATED ORAL at 08:12

## 2021-12-25 RX ADMIN — POTASSIUM BICARBONATE 50 MEQ: 978 TABLET, EFFERVESCENT ORAL at 05:12

## 2021-12-25 RX ADMIN — SENNOSIDES AND DOCUSATE SODIUM 1 TABLET: 50; 8.6 TABLET ORAL at 08:12

## 2021-12-25 RX ADMIN — HEPARIN SODIUM 17 UNITS/KG/HR: 1000 INJECTION INTRAVENOUS; SUBCUTANEOUS at 06:12

## 2021-12-25 RX ADMIN — SODIUM CHLORIDE TAB 1 GM 2 G: 1 TAB at 09:12

## 2021-12-25 RX ADMIN — IOHEXOL 75 ML: 350 INJECTION, SOLUTION INTRAVENOUS at 10:12

## 2021-12-25 RX ADMIN — METOPROLOL TARTRATE 25 MG: 25 TABLET, FILM COATED ORAL at 09:12

## 2021-12-25 NOTE — PLAN OF CARE
[facility] Care Plan    POC reviewed with Jimmy Leger and family at 1400. Pt verbalized understanding. Questions and concerns addressed. No acute events today. Pt progressing toward goals. Will continue to monitor. See below and flowsheets for full assessment and VS info.       Neuro:  [place] Coma Scale  Best Eye Response: 3-->(E3) to speech  Best Motor Response: 6-->(M6) obeys commands  Best Verbal Response: 2-->(V2) incomprehensible speech  [place] Coma Scale Score: 11  Assessment Qualifiers: patient not sedated/intubated  Pupil PERRLA: yes     24 hr Temp:  [98.8 °F (37.1 °C)-100.1 °F (37.8 °C)]     CV:   Rhythm: normal sinus rhythm  BP goals:   SBP < 160  MAP > 65    Resp:   O2 Device (Oxygen Therapy): nasal cannula w/ humidification       Plan: N/A    GI/:  FARZANA Total Score: 0  Diet/Nutrition Received: tube feeding  Last Bowel Movement: 12/18/21  Voiding Characteristics: external catheter,incontinence    Intake/Output Summary (Last 24 hours) at 12/25/2021 1718  Last data filed at 12/25/2021 1701  Gross per 24 hour   Intake 1641.68 ml   Output 1245 ml   Net 396.68 ml     Unmeasured Output  Urine Occurrence: 2  Stool Occurrence: 0  Pad Count: 3    Labs/Accuchecks:  Recent Labs   Lab 12/25/21 0223   WBC 10.64   RBC 3.91*   HGB 13.3*   HCT 39.2*         Recent Labs   Lab 12/25/21 0223      K 3.7   CO2 24      BUN 20   CREATININE 0.8   ALKPHOS 49*   ALT 22   AST 24   BILITOT 1.6*      Recent Labs   Lab 12/23/21  1206 12/23/21  1822 12/25/21  0223   INR 1.0  --   --    APTT 24.9   < > 41.4*    < > = values in this interval not displayed.      Recent Labs   Lab 12/22/21  0301 12/25/21  1039   *  --    TROPONINI  --  0.075*       Electrolytes: N/A - electrolytes WDL  Accuchecks: Q6H    Gtts:   heparin (porcine) in D5W 17 Units/kg/hr (12/25/21 1701)       LDA/Wounds:  Lines/Drains/Airways       Drain                   NG/OG Tube 12/21/21 1410 Left nostril 4 days    Male External Urinary Catheter  12/22/21 2000 2 days              Peripheral Intravenous Line                   Peripheral IV - Single Lumen 12/20/21 1330 18 G Right Antecubital 5 days         Peripheral IV - Single Lumen 12/22/21 1600 20 G Distal;Posterior;Right Wrist 3 days                  Wounds: No  Wound care consulted: No     Problem: Bleeding (Thrombolytic Therapy)  Goal: Absence of Bleeding  Outcome: Ongoing, Progressing

## 2021-12-25 NOTE — NURSING
KATIE Moya notified of 25 beats of NSVT. 12 lead completed. Patient back at baseline. No new orders at this time. Will continue to monitor.

## 2021-12-25 NOTE — PROGRESS NOTES
[facility] - Neuro Critical Care  Neurocritical Care  Progress Note    Admit Date: 12/21/2021  Service Date: 12/25/2021  Length of Stay: 4    Subjective:     Chief Complaint: Stroke due to embolism of left middle cerebral artery    History of Present Illness: Mr. Leger is a 55 yo male with unknown past medical history who presents to [facility] with a L MCA CVA without intervention. He originally presented to [facility] by EMS on 12/20 after being found down by a coworker. LKW uncertain. 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 The coworker checked on the patient the following day, and he was found down with only a shirt, which lead to EMS being called. On presentation to [facility], the patient was nonverbal to command, with right sided hemiparesis. Stroke protocol initiated with Tele stoke evaluation, and the patient was not deemed a candidate for any acute intervention. CT at [facility] with large left MCA distribution infarct with no associated acute hemorrhage  CTA at [facility] significant for segmental occlusion measuring approximately 10 mm mid and distal M1 segment left MCA with diminished flow distal to the stenosis. He has not yet had an MRI. Of note, ECHO at [facility] is significant for an EF of 15-20% with an apical mass consistent with a thrombus. He was started on a statin and DAPT at [facility] but never appeared to have enteral access so was only given ASA suppositories, per chart review. He was transferred to [facility] on 12/21.    He will be admitted to [facility] for hourly neuromonitoring and a higher level of care.          Hospital Course: 12/22/2021NAEO, stroke day 3. NSGY following, stroke team following. Continue to monitor neuro exam closely. Strict BP and HR control since patient with LV thrombus, cannot safely AC yet 2/2 to acute large territory stroke   12/23/2021 NAEON. Heparin gtt started for LV thrombus, cont som-crani  watch.   12/24/2021 exam improving; 24hrs CTH demonstrates petechial hmg will repeat and hold heparin drip if hmg increases otherwise will continue; DHC watch; Na goal 140-150; sbp 100-140        Interval History:  As above    Review of Systems   Unable to perform ROS: Other     Objective:     Vitals:  Temp: 99.5 °F (37.5 °C)  Pulse: 91  Rhythm: normal sinus rhythm  BP: 121/88  MAP (mmHg): 100  Resp: (!) 30  SpO2: 96 %  O2 Device (Oxygen Therapy): nasal cannula w/ humidification    Temp  Min: 98.8 °F (37.1 °C)  Max: 100.1 °F (37.8 °C)  Pulse  Min: 81  Max: 111  BP  Min: 103/69  Max: 149/107  MAP (mmHg)  Min: 81  Max: 124  Resp  Min: 18  Max: 119  SpO2  Min: 91 %  Max: 100 %    12/24 0701 - 12/25 0700  In: 1754.5 [I.V.:329.5]  Out: 545 [Urine:545]   Unmeasured Output  Urine Occurrence: 2  Stool Occurrence: 0  Pad Count: 3       Physical Exam  Vitals reviewed.   Constitutional:       General: He is not in acute distress.     Appearance: He is well-developed.   HENT:      Head: Normocephalic and atraumatic.      Right Ear: External ear normal.      Left Ear: External ear normal.      Nose: No rhinorrhea.   Eyes:      General: Visual field deficit present. No scleral icterus.        Right eye: No discharge.         Left eye: No discharge.   Cardiovascular:      Rate and Rhythm: Normal rate.   Pulmonary:      Effort: Respiratory distress present.   Skin:     General: Skin is warm and dry.   Neurological:      Mental Status: He is alert.      Cranial Nerves: Dysarthria and facial asymmetry present.      Motor: Weakness present.      Comments:     -GCS: E4V2M6, no sedation   -Mental Status: Following commands.  -Cranial nerves: PERRL, 3mm, aphasic  -Motor: Spont on L, RUE:WD, RLE: ex post.   -Gait: Deferred            Medications:  Continuousheparin (porcine) in D5W, Last Rate: 17 Units/kg/hr (12/25/21 1301)    Scheduledatorvastatin, 40 mg, Daily  metoprolol tartrate, 25 mg, BID  senna-docusate 8.6-50 mg, 1 tablet,  BID  sodium chloride, 2 g, TID    PRNacetaminophen, 650 mg, Q6H PRN  hydrALAZINE, 10 mg, Q6H PRN  labetalol, 10 mg, Q6H PRN  magnesium oxide, 800 mg, PRN  magnesium oxide, 800 mg, PRN  ondansetron, 4 mg, Q8H PRN  potassium bicarbonate, 35 mEq, PRN  potassium bicarbonate, 50 mEq, PRN  potassium bicarbonate, 60 mEq, PRN  potassium, sodium phosphates, 2 packet, PRN  potassium, sodium phosphates, 2 packet, PRN  potassium, sodium phosphates, 2 packet, PRN  sodium chloride 0.9%, 10 mL, PRN      Today I personally reviewed pertinent medications, lines/drains/airways, imaging, cardiology results, laboratory results, microbiology results, notably: cta chest    Diet  Diet NPO        Assessment/Plan:     Neuro  * Stroke due to embolism of left middle cerebral artery  Large Left MCA stroke without intervention  -Admit to NCC, VN following  -NSGY on board for hemicrani watch  -q1h neuro checks, vital checks  -EKG, ECHO, CXR  -A1c, TSH, lipid panel, coag panel  -Daily CBC, CMP, mag, phos  -SBP < 160, prn labetalol and hydralazine  -Daily Statin, ASA  -SCDs, SQH  -CT large left MCA distribution infarct with no associated acute hemorrhage  -CTA segmental occlusion measuring approximately 10 mm mid and distal M1 segment left MCA with diminished flow distal to the stenosis.  -PT/OT/SLP  -MRI brain -Large acute  left MCA distribution infarct with modest mass effect.   Multiple small remote scattered infarcts elsewhere as above.  Embolic disease: LV apical thrombus (known hx)  -- Cont som crani watch       Cytotoxic cerebral edema  -see primary problem    Cardiac/Vascular  Essential hypertension  SBP < 160  Prn labetalol and hydralazine      Mural thrombus of cardiac apex  Small to moderate sized (1.6x1.1 cm) layered, protruding apical mass c/w thrombus.  -Noted on ECHO from 12/21  -Strict HR and BP control  -SBP goal <160  -lopressor started     12/23 Heparin gtt started, min intensity, no bolus  12/24 petechial hmg noted;  subtherapeutic ptt  12/25: cth remains stable     Other  Single subsegmental pulmonary embolism without acute cor pulmonale  12/25 CTA chest -subseg PE; lactate 1.1; trop+; bnp ~2k; Sx tachypnea (on hepain drip for >36hrs)  -f/u u/s ble   -patient off AC due to loss of insurance coverage     Debility  PT/OT    Grade III diastolic dysfunction  1. The left ventricle (LV) is dilated with severely depressed systolic function & global hypokinesis.  2. LV ejection fraction is 15-20%. Grade III LV diastolic dysfunction.  3. Small to moderate sized (1.6x1.1 cm) layered, protruding apical mass c/w thrombus.  4. The right ventricle (RV) is severely enlarged with normal systolic function.   5. Estimated RVSP is moderately elevated (50-70 mmHg). Pulmonary hypertension is present.  6. Severe atrial enlargement.  7. Moderate eccentric mitral regrugitation.  8. Mild tricuspid regurgitation.     ECHO 12/21  -monitor I/Os   -careful sodium control             The patient is being Prophylaxed for:  Venous Thromboembolism with: Mechanical or Chemical  Stress Ulcer with: H2B  Ventilator Pneumonia with: not applicable    Activity Orders          Straight Cath starting at 12/22 1206    Turn patient starting at 12/21 1800    Elevate HOB starting at 12/21 1717    Diet NPO: NPO starting at 12/21 1717        Full Code    Justen Luo MD  Neurocritical Care  [facility] - Neuro Critical Care      Time spent with this critically ill patient and care team at the bedside: 50min

## 2021-12-25 NOTE — SUBJECTIVE & OBJECTIVE
Interval History:  As above    Review of Systems   Unable to perform ROS: Other     Objective:     Vitals:  Temp: 99.5 °F (37.5 °C)  Pulse: 91  Rhythm: normal sinus rhythm  BP: 121/88  MAP (mmHg): 100  Resp: (!) 30  SpO2: 96 %  O2 Device (Oxygen Therapy): nasal cannula w/ humidification    Temp  Min: 98.8 °F (37.1 °C)  Max: 100.1 °F (37.8 °C)  Pulse  Min: 81  Max: 111  BP  Min: 103/69  Max: 149/107  MAP (mmHg)  Min: 81  Max: 124  Resp  Min: 18  Max: 119  SpO2  Min: 91 %  Max: 100 %    12/24 0701 - 12/25 0700  In: 1754.5 [I.V.:329.5]  Out: 545 [Urine:545]   Unmeasured Output  Urine Occurrence: 2  Stool Occurrence: 0  Pad Count: 3       Physical Exam  Vitals reviewed.   Constitutional:       General: He is not in acute distress.     Appearance: He is well-developed.   HENT:      Head: Normocephalic and atraumatic.      Right Ear: External ear normal.      Left Ear: External ear normal.      Nose: No rhinorrhea.   Eyes:      General: Visual field deficit present. No scleral icterus.        Right eye: No discharge.         Left eye: No discharge.   Cardiovascular:      Rate and Rhythm: Normal rate.   Pulmonary:      Effort: Respiratory distress present.   Skin:     General: Skin is warm and dry.   Neurological:      Mental Status: He is alert.      Cranial Nerves: Dysarthria and facial asymmetry present.      Motor: Weakness present.      Comments:     -GCS: E4V2M6, no sedation   -Mental Status: Following commands.  -Cranial nerves: PERRL, 3mm, aphasic  -Motor: Spont on L, RUE:WD, RLE: ex post.   -Gait: Deferred            Medications:  Continuousheparin (porcine) in D5W, Last Rate: 17 Units/kg/hr (12/25/21 1301)    Scheduledatorvastatin, 40 mg, Daily  metoprolol tartrate, 25 mg, BID  senna-docusate 8.6-50 mg, 1 tablet, BID  sodium chloride, 2 g, TID    PRNacetaminophen, 650 mg, Q6H PRN  hydrALAZINE, 10 mg, Q6H PRN  labetalol, 10 mg, Q6H PRN  magnesium oxide, 800 mg, PRN  magnesium oxide, 800 mg, PRN  ondansetron, 4 mg,  Q8H PRN  potassium bicarbonate, 35 mEq, PRN  potassium bicarbonate, 50 mEq, PRN  potassium bicarbonate, 60 mEq, PRN  potassium, sodium phosphates, 2 packet, PRN  potassium, sodium phosphates, 2 packet, PRN  potassium, sodium phosphates, 2 packet, PRN  sodium chloride 0.9%, 10 mL, PRN      Today I personally reviewed pertinent medications, lines/drains/airways, imaging, cardiology results, laboratory results, microbiology results, notably: cta chest    Diet  Diet NPO

## 2021-12-25 NOTE — ASSESSMENT & PLAN NOTE
Small to moderate sized (1.6x1.1 cm) layered, protruding apical mass c/w thrombus.  -Noted on ECHO from 12/21  -Strict HR and BP control  -SBP goal <160  -lopressor started     12/23 Heparin gtt started, min intensity, no bolus  12/24 petechial hmg noted; subtherapeutic ptt  12/25: cth remains stable

## 2021-12-25 NOTE — PLAN OF CARE
[facility] Care Plan    POC reviewed with Jimmy Leger and family at 0300. Pt unable to verbalized understanding. K replaced. Seroquel given x1 for agitation. 25 beats of NSVT overnight. Left wrist restraint initiated. Sister at bedside and updated. Questions and concerns addressed. Pt progressing toward goals. Will continue to monitor. See below and flowsheets for full assessment and VS info.       Neuro:  [place] Coma Scale  Best Eye Response: 4-->(E4) spontaneous  Best Motor Response: 6-->(M6) obeys commands  Best Verbal Response: 2-->(V2) incomprehensible speech  [place] Coma Scale Score: 12  Assessment Qualifiers: patient not sedated/intubated  Pupil PERRLA: yes     24hr Temp:  [97.9 °F (36.6 °C)-100.1 °F (37.8 °C)]     CV:   Rhythm: normal sinus rhythm  BP goals:   SBP < 140  MAP > 65    Resp:   O2 Device (Oxygen Therapy): room air       Plan: N/A    GI/:  FARZANA Total Score: 0  Diet/Nutrition Received: tube feeding  Last Bowel Movement: 12/18/21  Voiding Characteristics: external catheter    Intake/Output Summary (Last 24 hours) at 12/25/2021 0537  Last data filed at 12/25/2021 0505  Gross per 24 hour   Intake 1795.72 ml   Output 545 ml   Net 1250.72 ml     Unmeasured Output  Urine Occurrence: 2  Stool Occurrence: 0  Pad Count: 3    Labs/Accuchecks:  Recent Labs   Lab 12/25/21 0223   WBC 10.64   RBC 3.91*   HGB 13.3*   HCT 39.2*         Recent Labs   Lab 12/25/21 0223      K 3.7   CO2 24      BUN 20   CREATININE 0.8   ALKPHOS 49*   ALT 22   AST 24   BILITOT 1.6*      Recent Labs   Lab 12/23/21  1206 12/23/21  1822 12/25/21 0223   INR 1.0  --   --    APTT 24.9   < > 41.4*    < > = values in this interval not displayed.      Recent Labs   Lab 12/22/21 0301   *       Electrolytes: Electrolytes replaced  Accuchecks: Q6H    Gtts:   heparin (porcine) in D5W 18 Units/kg/hr (12/25/21 0505)       LDA/Wounds:  Lines/Drains/Airways       Drain                   NG/OG Tube 12/21/21 1410 Left  nostril 3 days    Male External Urinary Catheter 12/22/21 2000 2 days              Peripheral Intravenous Line                   Peripheral IV - Single Lumen 12/20/21 1330 18 G Right Antecubital 4 days         Peripheral IV - Single Lumen 12/22/21 1600 20 G Distal;Posterior;Right Wrist 2 days                  Wounds: No  Wound care consulted: No

## 2021-12-25 NOTE — PLAN OF CARE
[facility] Care Plan    POC reviewed with Jimmy Leger and family at 1400. Pt family verbalized understanding, pt aphasic. Questions and concerns addressed. CT head x2 today after starting heparin qtt yesterday.  Pt progressing toward goals. Will continue to monitor. See below and flowsheets for full assessment and VS info.       Neuro:  [place] Coma Scale  Best Eye Response: 4-->(E4) spontaneous  Best Motor Response: 6-->(M6) obeys commands  Best Verbal Response: 2-->(V2) incomprehensible speech  [place] Coma Scale Score: 12  Assessment Qualifiers: patient not sedated/intubated  Pupil PERRLA: yes     24 hr Temp:  [97.9 °F (36.6 °C)-99.1 °F (37.3 °C)]     CV:   Rhythm: atrial rhythm  BP goals:   SBP < 140  MAP > 65    Resp:   O2 Device (Oxygen Therapy): room air       Plan: N/A    GI/:  FARZANA Total Score: 0  Diet/Nutrition Received: tube feeding  Last Bowel Movement: 12/18/21  Voiding Characteristics: external catheter    Intake/Output Summary (Last 24 hours) at 12/24/2021 1815  Last data filed at 12/24/2021 1801  Gross per 24 hour   Intake 1772.03 ml   Output 590 ml   Net 1182.03 ml     Unmeasured Output  Urine Occurrence: 2  Stool Occurrence: 0  Pad Count: 3    Labs/Accuchecks:  Recent Labs   Lab 12/24/21  0018   WBC 9.71   RBC 4.05*   HGB 13.7*   HCT 40.2         Recent Labs   Lab 12/24/21  0018      K 3.9   CO2 22*   *   BUN 16   CREATININE 0.7   ALKPHOS 46*   ALT 24   AST 33   BILITOT 1.4*      Recent Labs   Lab 12/23/21  1206 12/23/21  1822 12/24/21  1236   INR 1.0  --   --    APTT 24.9   < > 37.4*    < > = values in this interval not displayed.      Recent Labs   Lab 12/22/21  0301   *       Electrolytes: N/A - electrolytes WDL  Accuchecks: Q6H    Gtts:   heparin (porcine) in D5W 17 Units/kg/hr (12/24/21 1801)       LDA/Wounds:  Lines/Drains/Airways       Drain                   NG/OG Tube 12/21/21 1410 Left nostril 3 days    Male External Urinary Catheter 12/22/21 2000 1 day               Peripheral Intravenous Line                   Peripheral IV - Single Lumen 12/20/21 1330 18 G Right Antecubital 4 days         Peripheral IV - Single Lumen 12/22/21 1600 20 G Distal;Posterior;Right Wrist 2 days                  Wounds: No  Wound care consulted: No

## 2021-12-25 NOTE — ASSESSMENT & PLAN NOTE
12/25 CTA chest -subseg PE; lactate 1.1; trop+; bnp ~2k; Sx tachypnea (on hepain drip for >36hrs)  -f/u u/s ble   -patient off AC due to loss of insurance coverage

## 2021-12-26 LAB
ALBUMIN SERPL BCP-MCNC: 2.8 G/DL (ref 3.5–5.2)
ALP SERPL-CCNC: 45 U/L (ref 55–135)
ALT SERPL W/O P-5'-P-CCNC: 23 U/L (ref 10–44)
ANION GAP SERPL CALC-SCNC: 9 MMOL/L (ref 8–16)
APTT BLDCRRT: 33.8 SEC (ref 21–32)
APTT BLDCRRT: 36.5 SEC (ref 21–32)
APTT BLDCRRT: 39 SEC (ref 21–32)
APTT BLDCRRT: 43.9 SEC (ref 21–32)
AST SERPL-CCNC: 19 U/L (ref 10–40)
BASOPHILS # BLD AUTO: 0.05 K/UL (ref 0–0.2)
BASOPHILS NFR BLD: 0.5 % (ref 0–1.9)
BILIRUB SERPL-MCNC: 1.4 MG/DL (ref 0.1–1)
BUN SERPL-MCNC: 20 MG/DL (ref 6–20)
CALCIUM SERPL-MCNC: 8.5 MG/DL (ref 8.7–10.5)
CHLORIDE SERPL-SCNC: 113 MMOL/L (ref 95–110)
CO2 SERPL-SCNC: 22 MMOL/L (ref 23–29)
CREAT SERPL-MCNC: 0.8 MG/DL (ref 0.5–1.4)
DIFFERENTIAL METHOD: ABNORMAL
EOSINOPHIL # BLD AUTO: 0.1 K/UL (ref 0–0.5)
EOSINOPHIL NFR BLD: 1.2 % (ref 0–8)
ERYTHROCYTE [DISTWIDTH] IN BLOOD BY AUTOMATED COUNT: 12.5 % (ref 11.5–14.5)
EST. GFR  (AFRICAN AMERICAN): >60 ML/MIN/1.73 M^2
EST. GFR  (NON AFRICAN AMERICAN): >60 ML/MIN/1.73 M^2
GLUCOSE SERPL-MCNC: 116 MG/DL (ref 70–110)
HCT VFR BLD AUTO: 36.6 % (ref 40–54)
HGB BLD-MCNC: 12.5 G/DL (ref 14–18)
IMM GRANULOCYTES # BLD AUTO: 0.06 K/UL (ref 0–0.04)
IMM GRANULOCYTES NFR BLD AUTO: 0.6 % (ref 0–0.5)
LYMPHOCYTES # BLD AUTO: 2 K/UL (ref 1–4.8)
LYMPHOCYTES NFR BLD: 21.6 % (ref 18–48)
MAGNESIUM SERPL-MCNC: 2 MG/DL (ref 1.6–2.6)
MCH RBC QN AUTO: 34.2 PG (ref 27–31)
MCHC RBC AUTO-ENTMCNC: 34.2 G/DL (ref 32–36)
MCV RBC AUTO: 100 FL (ref 82–98)
MONOCYTES # BLD AUTO: 0.8 K/UL (ref 0.3–1)
MONOCYTES NFR BLD: 8.6 % (ref 4–15)
NEUTROPHILS # BLD AUTO: 6.4 K/UL (ref 1.8–7.7)
NEUTROPHILS NFR BLD: 67.5 % (ref 38–73)
NRBC BLD-RTO: 0 /100 WBC
PHOSPHATE SERPL-MCNC: 4 MG/DL (ref 2.7–4.5)
PLATELET # BLD AUTO: 180 K/UL (ref 150–450)
PMV BLD AUTO: 11.7 FL (ref 9.2–12.9)
POCT GLUCOSE: 106 MG/DL (ref 70–110)
POCT GLUCOSE: 109 MG/DL (ref 70–110)
POCT GLUCOSE: 113 MG/DL (ref 70–110)
POCT GLUCOSE: 121 MG/DL (ref 70–110)
POTASSIUM SERPL-SCNC: 4.1 MMOL/L (ref 3.5–5.1)
PROT SERPL-MCNC: 5.9 G/DL (ref 6–8.4)
RBC # BLD AUTO: 3.66 M/UL (ref 4.6–6.2)
SODIUM SERPL-SCNC: 144 MMOL/L (ref 136–145)
TROPONIN I SERPL DL<=0.01 NG/ML-MCNC: 0.06 NG/ML (ref 0–0.03)
TROPONIN I SERPL DL<=0.01 NG/ML-MCNC: 0.06 NG/ML (ref 0–0.03)
WBC # BLD AUTO: 9.43 K/UL (ref 3.9–12.7)

## 2021-12-26 PROCEDURE — 85730 THROMBOPLASTIN TIME PARTIAL: CPT | Mod: 91 | Performed by: INTERNAL MEDICINE

## 2021-12-26 PROCEDURE — 84484 ASSAY OF TROPONIN QUANT: CPT | Performed by: NURSE PRACTITIONER

## 2021-12-26 PROCEDURE — 25000003 PHARM REV CODE 250: Performed by: PHYSICIAN ASSISTANT

## 2021-12-26 PROCEDURE — 25000003 PHARM REV CODE 250: Performed by: NURSE PRACTITIONER

## 2021-12-26 PROCEDURE — 99291 PR CRITICAL CARE, E/M 30-74 MINUTES: ICD-10-PCS | Mod: ,,, | Performed by: INTERNAL MEDICINE

## 2021-12-26 PROCEDURE — 63600175 PHARM REV CODE 636 W HCPCS: Performed by: INTERNAL MEDICINE

## 2021-12-26 PROCEDURE — 99291 CRITICAL CARE FIRST HOUR: CPT | Mod: ,,, | Performed by: INTERNAL MEDICINE

## 2021-12-26 PROCEDURE — 20000000 HC ICU ROOM

## 2021-12-26 PROCEDURE — 83735 ASSAY OF MAGNESIUM: CPT

## 2021-12-26 PROCEDURE — 85730 THROMBOPLASTIN TIME PARTIAL: CPT | Mod: 91 | Performed by: PHYSICIAN ASSISTANT

## 2021-12-26 PROCEDURE — 84100 ASSAY OF PHOSPHORUS: CPT

## 2021-12-26 PROCEDURE — 25000003 PHARM REV CODE 250: Performed by: INTERNAL MEDICINE

## 2021-12-26 PROCEDURE — 85025 COMPLETE CBC W/AUTO DIFF WBC: CPT

## 2021-12-26 PROCEDURE — 85730 THROMBOPLASTIN TIME PARTIAL: CPT | Performed by: INTERNAL MEDICINE

## 2021-12-26 PROCEDURE — 94761 N-INVAS EAR/PLS OXIMETRY MLT: CPT

## 2021-12-26 PROCEDURE — 25000003 PHARM REV CODE 250

## 2021-12-26 PROCEDURE — 84484 ASSAY OF TROPONIN QUANT: CPT | Mod: 91 | Performed by: NURSE PRACTITIONER

## 2021-12-26 PROCEDURE — 80053 COMPREHEN METABOLIC PANEL: CPT

## 2021-12-26 RX ORDER — BISACODYL 10 MG
10 SUPPOSITORY, RECTAL RECTAL ONCE
Status: COMPLETED | OUTPATIENT
Start: 2021-12-26 | End: 2021-12-26

## 2021-12-26 RX ORDER — ESCITALOPRAM OXALATE 5 MG/1
5 TABLET ORAL DAILY
Status: DISCONTINUED | OUTPATIENT
Start: 2021-12-26 | End: 2021-12-28

## 2021-12-26 RX ORDER — POLYETHYLENE GLYCOL 3350 17 G/17G
17 POWDER, FOR SOLUTION ORAL DAILY
Status: DISCONTINUED | OUTPATIENT
Start: 2021-12-26 | End: 2021-12-28

## 2021-12-26 RX ORDER — LISINOPRIL 5 MG/1
5 TABLET ORAL DAILY
Status: DISCONTINUED | OUTPATIENT
Start: 2021-12-26 | End: 2021-12-28

## 2021-12-26 RX ADMIN — SODIUM CHLORIDE TAB 1 GM 2 G: 1 TAB at 03:12

## 2021-12-26 RX ADMIN — SENNOSIDES AND DOCUSATE SODIUM 1 TABLET: 50; 8.6 TABLET ORAL at 08:12

## 2021-12-26 RX ADMIN — SODIUM CHLORIDE TAB 1 GM 2 G: 1 TAB at 08:12

## 2021-12-26 RX ADMIN — ESCITALOPRAM OXALATE 5 MG: 5 TABLET, FILM COATED ORAL at 11:12

## 2021-12-26 RX ADMIN — LISINOPRIL 5 MG: 5 TABLET ORAL at 11:12

## 2021-12-26 RX ADMIN — METOPROLOL TARTRATE 25 MG: 25 TABLET, FILM COATED ORAL at 08:12

## 2021-12-26 RX ADMIN — POLYETHYLENE GLYCOL 3350 17 G: 17 POWDER, FOR SOLUTION ORAL at 11:12

## 2021-12-26 RX ADMIN — BISACODYL 10 MG: 10 SUPPOSITORY RECTAL at 11:12

## 2021-12-26 RX ADMIN — ATORVASTATIN CALCIUM 40 MG: 20 TABLET, FILM COATED ORAL at 08:12

## 2021-12-26 RX ADMIN — ACETAMINOPHEN 650 MG: 325 TABLET ORAL at 08:12

## 2021-12-26 RX ADMIN — METOPROLOL TARTRATE 25 MG: 25 TABLET, FILM COATED ORAL at 09:12

## 2021-12-26 RX ADMIN — HEPARIN SODIUM 18 UNITS/KG/HR: 1000 INJECTION INTRAVENOUS; SUBCUTANEOUS at 01:12

## 2021-12-26 RX ADMIN — ACETAMINOPHEN 650 MG: 325 TABLET ORAL at 03:12

## 2021-12-26 NOTE — ASSESSMENT & PLAN NOTE
12/25 CTA chest -subseg PE; lactate 1.1; trop+; bnp ~2k; Sx tachypnea (on hepain drip for >36hrs)  -u/s ble -neg DVT  -patient off AC due to loss of insurance coverage

## 2021-12-26 NOTE — PLAN OF CARE
[facility] Care Plan    POC reviewed with Jimmy Africa and family at 1400. Pt verbalized understanding. Questions and concerns addressed. No acute events today. Pt progressing toward goals. Will continue to monitor. See below and flowsheets for full assessment and VS info.       Neuro:  [place] Coma Scale  Best Eye Response: 3-->(E3) to speech  Best Motor Response: 6-->(M6) obeys commands  Best Verbal Response: 2-->(V2) incomprehensible speech  [place] Coma Scale Score: 11  Assessment Qualifiers: patient not sedated/intubated  Pupil PERRLA: yes     24 hr Temp:  [97.6 °F (36.4 °C)-99.5 °F (37.5 °C)]     CV:   Rhythm: normal sinus rhythm  BP goals:   SBP < 160  MAP > 65    Resp:   O2 Device (Oxygen Therapy): nasal cannula w/ humidification       Plan: N/A    GI/:  FARZANA Total Score: 0  Diet/Nutrition Received: tube feeding  Last Bowel Movement: 12/18/21  Voiding Characteristics: external catheter,incontinence    Intake/Output Summary (Last 24 hours) at 12/26/2021 1705  Last data filed at 12/26/2021 1701  Gross per 24 hour   Intake 1809.76 ml   Output 900 ml   Net 909.76 ml     Unmeasured Output  Urine Occurrence: 1  Stool Occurrence: 1  Pad Count: 3    Labs/Accuchecks:  Recent Labs   Lab 12/26/21  0029   WBC 9.43   RBC 3.66*   HGB 12.5*   HCT 36.6*         Recent Labs   Lab 12/26/21  0029      K 4.1   CO2 22*   *   BUN 20   CREATININE 0.8   ALKPHOS 45*   ALT 23   AST 19   BILITOT 1.4*      Recent Labs   Lab 12/23/21  1206 12/23/21  1822 12/26/21  1208   INR 1.0  --   --    APTT 24.9   < > 39.0*    < > = values in this interval not displayed.      Recent Labs   Lab 12/22/21  0301 12/25/21  1039 12/26/21  0610   *  --   --    TROPONINI  --    < > 0.060*    < > = values in this interval not displayed.       Electrolytes: N/A - electrolytes WDL  Accuchecks: Q6H    Gtts:   heparin (porcine) in D5W 19 Units/kg/hr (12/26/21 1701)       LDA/Wounds:  Lines/Drains/Airways       Drain                   NG/OG  Tube 12/21/21 1410 Left nostril 5 days    Male External Urinary Catheter 12/22/21 2000 3 days              Peripheral Intravenous Line                   Peripheral IV - Single Lumen 12/22/21 1600 20 G Distal;Posterior;Right Wrist 4 days         Peripheral IV - Single Lumen 12/25/21 2204 20 G Anterior;Right Forearm <1 day                  Wounds: No  Wound care consulted: No

## 2021-12-26 NOTE — PROGRESS NOTES
[facility] - Neuro Critical Care  Neurocritical Care  Progress Note    Admit Date: 12/21/2021  Service Date: 12/26/2021  Length of Stay: 5    Subjective:     Chief Complaint: Stroke due to embolism of left middle cerebral artery    History of Present Illness: Mr. Leger is a 55 yo male with unknown past medical history who presents to [facility] with a L MCA CVA without intervention. He originally presented to [facility] by EMS on 12/20 after being found down by a coworker. LKW uncertain. 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 The coworker checked on the patient the following day, and he was found down with only a shirt, which lead to EMS being called. On presentation to [facility], the patient was nonverbal to command, with right sided hemiparesis. Stroke protocol initiated with Tele stoke evaluation, and the patient was not deemed a candidate for any acute intervention. CT at [facility] with large left MCA distribution infarct with no associated acute hemorrhage  CTA at [facility] significant for segmental occlusion measuring approximately 10 mm mid and distal M1 segment left MCA with diminished flow distal to the stenosis. He has not yet had an MRI. Of note, ECHO at [facility] is significant for an EF of 15-20% with an apical mass consistent with a thrombus. He was started on a statin and DAPT at [facility] but never appeared to have enteral access so was only given ASA suppositories, per chart review. He was transferred to [facility] on 12/21.    He will be admitted to [facility] for hourly neuromonitoring and a higher level of care.          Hospital Course: 12/22/2021NAEO, stroke day 3. NSGY following, stroke team following. Continue to monitor neuro exam closely. Strict BP and HR control since patient with LV thrombus, cannot safely AC yet 2/2 to acute large territory stroke   12/23/2021 NAEON. Heparin gtt started for LV thrombus, cont som-crani  watch.   12/24/2021 exam improving; 24hrs CTH demonstrates petechial hmg will repeat and hold heparin drip if hmg increases otherwise will continue; DHC watch; Na goal 140-150; sbp 100-140  12/25/2021 Vtach runs o/n; tachypnea; CTA chest +subseq PE: continue heparin drip; CTH remains stable   12/26/2021 heparin drip: LV apical thrombus (known) +PE (new subsegmental); sbp 100-160; start SSRI; tachypnea          Interval History:  As above    Review of Systems   Constitutional: Positive for activity change and fatigue.   HENT: Positive for trouble swallowing.    Neurological: Positive for speech difficulty and weakness.   Psychiatric/Behavioral: Positive for dysphoric mood.   All other systems reviewed and are negative.    Objective:     Vitals:  Temp: 98.5 °F (36.9 °C)  Pulse: 74  Rhythm: normal sinus rhythm  BP: 125/82  MAP (mmHg): 97  Resp: (!) 24  SpO2: 97 %  O2 Device (Oxygen Therapy): nasal cannula w/ humidification    Temp  Min: 98.5 °F (36.9 °C)  Max: 99.5 °F (37.5 °C)  Pulse  Min: 69  Max: 95  BP  Min: 103/62  Max: 136/87  MAP (mmHg)  Min: 76  Max: 109  Resp  Min: 18  Max: 38  SpO2  Min: 94 %  Max: 98 %    12/25 0701 - 12/26 0700  In: 1671.5 [I.V.:306.5]  Out: 1050 [Urine:1050]   Unmeasured Output  Urine Occurrence: 2  Stool Occurrence: 0  Pad Count: 3       Physical Exam  Vitals reviewed.   Constitutional:       General: He is not in acute distress.     Appearance: He is well-developed.   HENT:      Head: Normocephalic and atraumatic.      Right Ear: External ear normal.      Left Ear: External ear normal.      Nose: No rhinorrhea.   Eyes:      General: Visual field deficit present. No scleral icterus.        Right eye: No discharge.         Left eye: No discharge.   Cardiovascular:      Rate and Rhythm: Normal rate.   Pulmonary:      Effort: Respiratory distress present.   Skin:     General: Skin is warm and dry.   Neurological:      Mental Status: He is alert.      Cranial Nerves: Dysarthria and facial  asymmetry present.      Motor: Weakness present.      Comments:     -GCS: E4V2M6, no sedation   -Mental Status: Following commands.  -Cranial nerves: PERRL, 3mm, aphasic attempts to speak  -Motor: Spont on L, RUE:WD, RLE: ex post.   -Gait: Deferred            Medications:  Continuousheparin (porcine) in D5W, Last Rate: 19 Units/kg/hr (12/26/21 1101)    Scheduledatorvastatin, 40 mg, Daily  bisacodyL, 10 mg, Once  EScitalopram oxalate, 5 mg, Daily  lisinopriL, 5 mg, Daily  metoprolol tartrate, 25 mg, BID  polyethylene glycol, 17 g, Daily  senna-docusate 8.6-50 mg, 1 tablet, BID  sodium chloride, 2 g, TID    PRNacetaminophen, 650 mg, Q6H PRN  hydrALAZINE, 10 mg, Q6H PRN  labetalol, 10 mg, Q6H PRN  magnesium oxide, 800 mg, PRN  magnesium oxide, 800 mg, PRN  ondansetron, 4 mg, Q8H PRN  potassium bicarbonate, 35 mEq, PRN  potassium bicarbonate, 50 mEq, PRN  potassium bicarbonate, 60 mEq, PRN  potassium, sodium phosphates, 2 packet, PRN  potassium, sodium phosphates, 2 packet, PRN  potassium, sodium phosphates, 2 packet, PRN  sodium chloride 0.9%, 10 mL, PRN      Today I personally reviewed pertinent medications, lines/drains/airways, imaging, cardiology results, laboratory results, microbiology results, notably: cta chest    Diet  Diet NPO        Assessment/Plan:     Neuro  * Stroke due to embolism of left middle cerebral artery  Large Left MCA stroke without intervention  -Admit to NCC, VN following  -NSGY on board for hemicrani watch  -q1h neuro checks, vital checks  -EKG, ECHO, CXR  -A1c, TSH, lipid panel, coag panel  -Daily CBC, CMP, mag, phos  -SBP < 160, prn labetalol and hydralazine  -Daily Statin, ASA  -SCDs, SQH  -CT large left MCA distribution infarct with no associated acute hemorrhage  -CTA segmental occlusion measuring approximately 10 mm mid and distal M1 segment left MCA with diminished flow distal to the stenosis.  -PT/OT/SLP  -MRI brain -Large acute  left MCA distribution infarct with modest mass  effect.   Multiple small remote scattered infarcts elsewhere as above.  Embolic disease: LV apical thrombus (known hx)        Cytotoxic cerebral edema  -see primary problem    Cardiac/Vascular  Essential hypertension  SBP < 160  Prn labetalol and hydralazine      Mural thrombus of cardiac apex  Small to moderate sized (1.6x1.1 cm) layered, protruding apical mass c/w thrombus.  -Noted on ECHO from 12/21  -Strict HR and BP control  -SBP goal <160  -lopressor started     12/23 Heparin gtt started, min intensity, no bolus  12/24 petechial hmg noted; subtherapeutic ptt  12/25: cth remains stable     Other  Single subsegmental pulmonary embolism without acute cor pulmonale  12/25 CTA chest -subseg PE; lactate 1.1; trop+; bnp ~2k; Sx tachypnea (on hepain drip for >36hrs)  -u/s ble -neg DVT  -patient off AC due to loss of insurance coverage     Grade III diastolic dysfunction  1. The left ventricle (LV) is dilated with severely depressed systolic function & global hypokinesis.  2. LV ejection fraction is 15-20%. Grade III LV diastolic dysfunction.  3. Small to moderate sized (1.6x1.1 cm) layered, protruding apical mass c/w thrombus.  4. The right ventricle (RV) is severely enlarged with normal systolic function.   5. Estimated RVSP is moderately elevated (50-70 mmHg). Pulmonary hypertension is present.  6. Severe atrial enlargement.  7. Moderate eccentric mitral regrugitation.  8. Mild tricuspid regurgitation.     ECHO 12/21  -monitor I/Os    -resume ACEi            The patient is being Prophylaxed for:  Venous Thromboembolism with: Mechanical or Chemical  Stress Ulcer with: H2B  Ventilator Pneumonia with: not applicable    Activity Orders          Straight Cath starting at 12/22 1206    Turn patient starting at 12/21 1800    Elevate HOB starting at 12/21 1717    Diet NPO: NPO starting at 12/21 1717        Full Code    Justen Luo MD  Neurocritical Care  [facility] - Neuro Critical Care    Time spent with this critically  ill patient and care team at the bedside: 35min

## 2021-12-26 NOTE — ASSESSMENT & PLAN NOTE
Large Left MCA stroke without intervention  -Admit to NCC, VN following  -NSGY on board for hemicrani watch  -q1h neuro checks, vital checks  -EKG, ECHO, CXR  -A1c, TSH, lipid panel, coag panel  -Daily CBC, CMP, mag, phos  -SBP < 160, prn labetalol and hydralazine  -Daily Statin, ASA  -SCDs, SQH  -CT large left MCA distribution infarct with no associated acute hemorrhage  -CTA segmental occlusion measuring approximately 10 mm mid and distal M1 segment left MCA with diminished flow distal to the stenosis.  -PT/OT/SLP  -MRI brain -Large acute  left MCA distribution infarct with modest mass effect.   Multiple small remote scattered infarcts elsewhere as above.  Embolic disease: LV apical thrombus (known hx)

## 2021-12-26 NOTE — PLAN OF CARE
[facility] Care Plan    POC reviewed with Jimmy Leger and family at 0300. Pt unable to verbalize understanding. Sister at bedside and updated. Questions and concerns addressed. No acute events overnight. Pt progressing toward goals. Will continue to monitor. See below and flowsheets for full assessment and VS info.       Neuro:  Brenden Coma Scale  Best Eye Response: 3-->(E3) to speech  Best Motor Response: 6-->(M6) obeys commands  Best Verbal Response: 2-->(V2) incomprehensible speech  Brenden Coma Scale Score: 11  Assessment Qualifiers: patient not sedated/intubated  Pupil PERRLA: yes     24hr Temp:  [98.5 °F (36.9 °C)-99.5 °F (37.5 °C)]     CV:   Rhythm: normal sinus rhythm  BP goals:   SBP < 140  MAP > 65    Resp:   O2 Device (Oxygen Therapy): nasal cannula w/ humidification       Plan: N/A    GI/:  FARZANA Total Score: 0  Diet/Nutrition Received: tube feeding  Last Bowel Movement: 12/18/21  Voiding Characteristics: external catheter    Intake/Output Summary (Last 24 hours) at 12/26/2021 0531  Last data filed at 12/26/2021 0505  Gross per 24 hour   Intake 1616.49 ml   Output 1050 ml   Net 566.49 ml     Unmeasured Output  Urine Occurrence: 2  Stool Occurrence: 0  Pad Count: 3    Labs/Accuchecks:  Recent Labs   Lab 12/26/21  0029   WBC 9.43   RBC 3.66*   HGB 12.5*   HCT 36.6*         Recent Labs   Lab 12/26/21  0029      K 4.1   CO2 22*   *   BUN 20   CREATININE 0.8   ALKPHOS 45*   ALT 23   AST 19   BILITOT 1.4*      Recent Labs   Lab 12/23/21  1206 12/23/21  1822 12/26/21  0029   INR 1.0  --   --    APTT 24.9   < > 36.5*    < > = values in this interval not displayed.      Recent Labs   Lab 12/22/21  0301 12/25/21  1039 12/26/21  0029   *  --   --    TROPONINI  --    < > 0.062*    < > = values in this interval not displayed.       Electrolytes: N/A - electrolytes WDL  Accuchecks: Q6H    Gtts:   heparin (porcine) in D5W 18 Units/kg/hr (12/26/21 2661)       LDA/Wounds:  Lines/Drains/Airways        Drain                   NG/OG Tube 12/21/21 1410 Left nostril 4 days    Male External Urinary Catheter 12/22/21 2000 3 days              Peripheral Intravenous Line                   Peripheral IV - Single Lumen 12/22/21 1600 20 G Distal;Posterior;Right Wrist 3 days         Peripheral IV - Single Lumen 12/25/21 2204 20 G Anterior;Right Forearm <1 day                  Wounds: No  Wound care consulted: No

## 2021-12-26 NOTE — ASSESSMENT & PLAN NOTE
1. The left ventricle (LV) is dilated with severely depressed systolic function & global hypokinesis.  2. LV ejection fraction is 15-20%. Grade III LV diastolic dysfunction.  3. Small to moderate sized (1.6x1.1 cm) layered, protruding apical mass c/w thrombus.  4. The right ventricle (RV) is severely enlarged with normal systolic function.   5. Estimated RVSP is moderately elevated (50-70 mmHg). Pulmonary hypertension is present.  6. Severe atrial enlargement.  7. Moderate eccentric mitral regrugitation.  8. Mild tricuspid regurgitation.     ECHO 12/21  -monitor I/Os    -resume ACEi

## 2021-12-26 NOTE — SUBJECTIVE & OBJECTIVE
Interval History:  As above    Review of Systems   Constitutional: Positive for activity change and fatigue.   HENT: Positive for trouble swallowing.    Neurological: Positive for speech difficulty and weakness.   Psychiatric/Behavioral: Positive for dysphoric mood.   All other systems reviewed and are negative.    Objective:     Vitals:  Temp: 98.5 °F (36.9 °C)  Pulse: 74  Rhythm: normal sinus rhythm  BP: 125/82  MAP (mmHg): 97  Resp: (!) 24  SpO2: 97 %  O2 Device (Oxygen Therapy): nasal cannula w/ humidification    Temp  Min: 98.5 °F (36.9 °C)  Max: 99.5 °F (37.5 °C)  Pulse  Min: 69  Max: 95  BP  Min: 103/62  Max: 136/87  MAP (mmHg)  Min: 76  Max: 109  Resp  Min: 18  Max: 38  SpO2  Min: 94 %  Max: 98 %    12/25 0701 - 12/26 0700  In: 1671.5 [I.V.:306.5]  Out: 1050 [Urine:1050]   Unmeasured Output  Urine Occurrence: 2  Stool Occurrence: 0  Pad Count: 3       Physical Exam  Vitals reviewed.   Constitutional:       General: He is not in acute distress.     Appearance: He is well-developed.   HENT:      Head: Normocephalic and atraumatic.      Right Ear: External ear normal.      Left Ear: External ear normal.      Nose: No rhinorrhea.   Eyes:      General: Visual field deficit present. No scleral icterus.        Right eye: No discharge.         Left eye: No discharge.   Cardiovascular:      Rate and Rhythm: Normal rate.   Pulmonary:      Effort: Respiratory distress present.   Skin:     General: Skin is warm and dry.   Neurological:      Mental Status: He is alert.      Cranial Nerves: Dysarthria and facial asymmetry present.      Motor: Weakness present.      Comments:     -GCS: E4V2M6, no sedation   -Mental Status: Following commands.  -Cranial nerves: PERRL, 3mm, aphasic attempts to speak  -Motor: Spont on L, RUE:WD, RLE: ex post.   -Gait: Deferred            Medications:  Continuousheparin (porcine) in D5W, Last Rate: 19 Units/kg/hr (12/26/21 1101)    Scheduledatorvastatin, 40 mg, Daily  bisacodyL, 10 mg,  Once  EScitalopram oxalate, 5 mg, Daily  lisinopriL, 5 mg, Daily  metoprolol tartrate, 25 mg, BID  polyethylene glycol, 17 g, Daily  senna-docusate 8.6-50 mg, 1 tablet, BID  sodium chloride, 2 g, TID    PRNacetaminophen, 650 mg, Q6H PRN  hydrALAZINE, 10 mg, Q6H PRN  labetalol, 10 mg, Q6H PRN  magnesium oxide, 800 mg, PRN  magnesium oxide, 800 mg, PRN  ondansetron, 4 mg, Q8H PRN  potassium bicarbonate, 35 mEq, PRN  potassium bicarbonate, 50 mEq, PRN  potassium bicarbonate, 60 mEq, PRN  potassium, sodium phosphates, 2 packet, PRN  potassium, sodium phosphates, 2 packet, PRN  potassium, sodium phosphates, 2 packet, PRN  sodium chloride 0.9%, 10 mL, PRN      Today I personally reviewed pertinent medications, lines/drains/airways, imaging, cardiology results, laboratory results, microbiology results, notably: cta chest    Diet  Diet NPO

## 2021-12-27 PROBLEM — R09.89: Status: ACTIVE | Noted: 2021-12-27

## 2021-12-27 LAB
ALBUMIN SERPL BCP-MCNC: 2.6 G/DL (ref 3.5–5.2)
ALP SERPL-CCNC: 48 U/L (ref 55–135)
ALT SERPL W/O P-5'-P-CCNC: 22 U/L (ref 10–44)
ANION GAP SERPL CALC-SCNC: 8 MMOL/L (ref 8–16)
APTT BLDCRRT: 38.4 SEC (ref 21–32)
APTT BLDCRRT: 46.1 SEC (ref 21–32)
APTT BLDCRRT: 48.7 SEC (ref 21–32)
AST SERPL-CCNC: 21 U/L (ref 10–40)
BASOPHILS # BLD AUTO: 0.06 K/UL (ref 0–0.2)
BASOPHILS NFR BLD: 0.6 % (ref 0–1.9)
BILIRUB SERPL-MCNC: 0.8 MG/DL (ref 0.1–1)
BUN SERPL-MCNC: 20 MG/DL (ref 6–20)
CALCIUM SERPL-MCNC: 8.4 MG/DL (ref 8.7–10.5)
CHLORIDE SERPL-SCNC: 116 MMOL/L (ref 95–110)
CO2 SERPL-SCNC: 21 MMOL/L (ref 23–29)
CREAT SERPL-MCNC: 0.7 MG/DL (ref 0.5–1.4)
DIFFERENTIAL METHOD: ABNORMAL
EOSINOPHIL # BLD AUTO: 0.2 K/UL (ref 0–0.5)
EOSINOPHIL NFR BLD: 2 % (ref 0–8)
ERYTHROCYTE [DISTWIDTH] IN BLOOD BY AUTOMATED COUNT: 12.4 % (ref 11.5–14.5)
EST. GFR  (AFRICAN AMERICAN): >60 ML/MIN/1.73 M^2
EST. GFR  (NON AFRICAN AMERICAN): >60 ML/MIN/1.73 M^2
GLUCOSE SERPL-MCNC: 99 MG/DL (ref 70–110)
HCT VFR BLD AUTO: 36 % (ref 40–54)
HGB BLD-MCNC: 12.2 G/DL (ref 14–18)
IMM GRANULOCYTES # BLD AUTO: 0.05 K/UL (ref 0–0.04)
IMM GRANULOCYTES NFR BLD AUTO: 0.5 % (ref 0–0.5)
LEFT ANT TIBIAL SYS PSV: 28 CM/S
LEFT CFA PSV: 83 CM/S
LEFT DORSALIS PEDIS PSV: 17 CM/S
LEFT EXTERNAL ILIAC PSV: 132 CM/S
LEFT PERONEAL SYS PSV: 11 CM/S
LEFT POST TIBIAL SYS PSV: 23 CM/S
LEFT PROFUNDA SYS PSV: 126 CM/S
LEFT SUPER FEMORAL MID SYS PSV: 0 CM/S
LEFT SUPER FEMORAL OSTIAL SYS PSV: 39 CM/S
LEFT SUPER FEMORAL PROX SYS PSV: 0 CM/S
LEFT TIB/PER TRUNK SYS PSV: 21 CM/S
LYMPHOCYTES # BLD AUTO: 1.5 K/UL (ref 1–4.8)
LYMPHOCYTES NFR BLD: 15.1 % (ref 18–48)
MAGNESIUM SERPL-MCNC: 2.1 MG/DL (ref 1.6–2.6)
MCH RBC QN AUTO: 34.6 PG (ref 27–31)
MCHC RBC AUTO-ENTMCNC: 33.9 G/DL (ref 32–36)
MCV RBC AUTO: 102 FL (ref 82–98)
MONOCYTES # BLD AUTO: 0.8 K/UL (ref 0.3–1)
MONOCYTES NFR BLD: 8.3 % (ref 4–15)
NEUTROPHILS # BLD AUTO: 7.4 K/UL (ref 1.8–7.7)
NEUTROPHILS NFR BLD: 73.5 % (ref 38–73)
NRBC BLD-RTO: 0 /100 WBC
OHS CV LEFT LOWER EXTREMITY ABI (NO CALC): 0.57
OHS CV LOWER EXTREMITY STENT MEASUREMENTS - LEFT - DSFA S1 - OST: 0
OHS CV LOWER EXTREMITY STENT MEASUREMENTS - LEFT - POP S1 - DIST: 0
OHS CV LOWER EXTREMITY STENT MEASUREMENTS - LEFT - POP S1 - MID: 49
OHS CV LOWER EXTREMITY STENT MEASUREMENTS - LEFT - POP S1 - PROX: 0
PHOSPHATE SERPL-MCNC: 4 MG/DL (ref 2.7–4.5)
PLATELET # BLD AUTO: 196 K/UL (ref 150–450)
PMV BLD AUTO: 11.1 FL (ref 9.2–12.9)
POCT GLUCOSE: 112 MG/DL (ref 70–110)
POCT GLUCOSE: 113 MG/DL (ref 70–110)
POCT GLUCOSE: 116 MG/DL (ref 70–110)
POTASSIUM SERPL-SCNC: 4 MMOL/L (ref 3.5–5.1)
PROT SERPL-MCNC: 5.8 G/DL (ref 6–8.4)
RBC # BLD AUTO: 3.53 M/UL (ref 4.6–6.2)
SODIUM SERPL-SCNC: 145 MMOL/L (ref 136–145)
WBC # BLD AUTO: 10.1 K/UL (ref 3.9–12.7)

## 2021-12-27 PROCEDURE — 85730 THROMBOPLASTIN TIME PARTIAL: CPT | Performed by: INTERNAL MEDICINE

## 2021-12-27 PROCEDURE — 97535 SELF CARE MNGMENT TRAINING: CPT

## 2021-12-27 PROCEDURE — 99233 PR SUBSEQUENT HOSPITAL CARE,LEVL III: ICD-10-PCS | Mod: ,,, | Performed by: PSYCHIATRY & NEUROLOGY

## 2021-12-27 PROCEDURE — 84100 ASSAY OF PHOSPHORUS: CPT

## 2021-12-27 PROCEDURE — 97530 THERAPEUTIC ACTIVITIES: CPT

## 2021-12-27 PROCEDURE — 97110 THERAPEUTIC EXERCISES: CPT

## 2021-12-27 PROCEDURE — 80053 COMPREHEN METABOLIC PANEL: CPT

## 2021-12-27 PROCEDURE — 83735 ASSAY OF MAGNESIUM: CPT

## 2021-12-27 PROCEDURE — 99233 SBSQ HOSP IP/OBS HIGH 50: CPT | Mod: ,,, | Performed by: PSYCHIATRY & NEUROLOGY

## 2021-12-27 PROCEDURE — 99233 PR SUBSEQUENT HOSPITAL CARE,LEVL III: ICD-10-PCS | Mod: ,,,

## 2021-12-27 PROCEDURE — 85025 COMPLETE CBC W/AUTO DIFF WBC: CPT

## 2021-12-27 PROCEDURE — 99233 SBSQ HOSP IP/OBS HIGH 50: CPT | Mod: ,,,

## 2021-12-27 PROCEDURE — 25000003 PHARM REV CODE 250: Performed by: INTERNAL MEDICINE

## 2021-12-27 PROCEDURE — 85730 THROMBOPLASTIN TIME PARTIAL: CPT | Mod: 91 | Performed by: PSYCHIATRY & NEUROLOGY

## 2021-12-27 PROCEDURE — 20000000 HC ICU ROOM

## 2021-12-27 PROCEDURE — 63600175 PHARM REV CODE 636 W HCPCS: Performed by: INTERNAL MEDICINE

## 2021-12-27 PROCEDURE — 92526 ORAL FUNCTION THERAPY: CPT

## 2021-12-27 PROCEDURE — 25000003 PHARM REV CODE 250

## 2021-12-27 PROCEDURE — 25000003 PHARM REV CODE 250: Performed by: NURSE PRACTITIONER

## 2021-12-27 PROCEDURE — 97112 NEUROMUSCULAR REEDUCATION: CPT

## 2021-12-27 PROCEDURE — 85730 THROMBOPLASTIN TIME PARTIAL: CPT | Mod: 91 | Performed by: PHYSICIAN ASSISTANT

## 2021-12-27 PROCEDURE — 92507 TX SP LANG VOICE COMM INDIV: CPT

## 2021-12-27 PROCEDURE — 25000003 PHARM REV CODE 250: Performed by: PHYSICIAN ASSISTANT

## 2021-12-27 RX ADMIN — SODIUM CHLORIDE TAB 1 GM 2 G: 1 TAB at 08:12

## 2021-12-27 RX ADMIN — HEPARIN SODIUM 20 UNITS/KG/HR: 1000 INJECTION INTRAVENOUS; SUBCUTANEOUS at 11:12

## 2021-12-27 RX ADMIN — SENNOSIDES AND DOCUSATE SODIUM 1 TABLET: 50; 8.6 TABLET ORAL at 08:12

## 2021-12-27 RX ADMIN — METOPROLOL TARTRATE 25 MG: 25 TABLET, FILM COATED ORAL at 09:12

## 2021-12-27 RX ADMIN — LISINOPRIL 5 MG: 5 TABLET ORAL at 09:12

## 2021-12-27 RX ADMIN — ESCITALOPRAM OXALATE 5 MG: 5 TABLET, FILM COATED ORAL at 09:12

## 2021-12-27 RX ADMIN — METOPROLOL TARTRATE 25 MG: 25 TABLET, FILM COATED ORAL at 08:12

## 2021-12-27 RX ADMIN — POLYETHYLENE GLYCOL 3350 17 G: 17 POWDER, FOR SOLUTION ORAL at 08:12

## 2021-12-27 RX ADMIN — SODIUM CHLORIDE TAB 1 GM 2 G: 1 TAB at 03:12

## 2021-12-27 RX ADMIN — ATORVASTATIN CALCIUM 40 MG: 20 TABLET, FILM COATED ORAL at 08:12

## 2021-12-27 NOTE — PLAN OF CARE
[facility] Care Plan    POC reviewed with Jimmy Leger and family at 0300. Pt unable to verbalize understanding r/t being aphasic. Questions and concerns addressed. No acute events overnight. Pt progressing toward goals. Will continue to monitor. See below and flowsheets for full assessment and VS info.     -Neuro exam unchanged   -0000 aptt 38.4  -0000 Na 145   -Heparin now @ 20units, re-check @1000  -Tube feeds infusing @ goal (55ml/hr)      Neuro:  Brenden Coma Scale  Best Eye Response: 3-->(E3) to speech  Best Motor Response: 6-->(M6) obeys commands  Best Verbal Response: 2-->(V2) incomprehensible speech  Young Coma Scale Score: 11  Assessment Qualifiers: patient not sedated/intubated,no eye obstruction present  Pupil PERRLA: yes     24hr Temp:  [97.1 °F (36.2 °C)-99.2 °F (37.3 °C)]     CV:   Rhythm: normal sinus rhythm  BP goals:   SBP < 140  MAP > 65    Resp:   O2 Device (Oxygen Therapy): nasal cannula       Plan: N/A    GI/:  FARZANA Total Score: 0  Diet/Nutrition Received: tube feeding  Last Bowel Movement: 12/26/21  Voiding Characteristics: external catheter    Intake/Output Summary (Last 24 hours) at 12/27/2021 0331  Last data filed at 12/27/2021 0300  Gross per 24 hour   Intake 1290.05 ml   Output 900 ml   Net 390.05 ml     Unmeasured Output  Urine Occurrence: 1  Stool Occurrence: 1  Pad Count: 3    Labs/Accuchecks:  Recent Labs   Lab 12/27/21  0012   WBC 10.10   RBC 3.53*   HGB 12.2*   HCT 36.0*         Recent Labs   Lab 12/27/21  0012      K 4.0   CO2 21*   *   BUN 20   CREATININE 0.7   ALKPHOS 48*   ALT 22   AST 21   BILITOT 0.8      Recent Labs   Lab 12/23/21  1206 12/23/21  1822 12/27/21  0012   INR 1.0  --   --    APTT 24.9   < > 38.4*    < > = values in this interval not displayed.      Recent Labs   Lab 12/22/21  0301 12/25/21  1039 12/26/21  0610   *  --   --    TROPONINI  --    < > 0.060*    < > = values in this interval not displayed.       Electrolytes: N/A - electrolytes  WDL  Accuchecks: Q6H    Gtts:   heparin (porcine) in D5W 19 Units/kg/hr (12/27/21 0300)       LDA/Wounds:  Lines/Drains/Airways       Drain                   NG/OG Tube 12/21/21 1410 Left nostril 5 days    Male External Urinary Catheter 12/22/21 2000 4 days              Peripheral Intravenous Line                   Peripheral IV - Single Lumen 12/25/21 2204 20 G Anterior;Right Forearm 1 day         Peripheral IV - Single Lumen 12/26/21 2200 20 G Anterior;Left Forearm <1 day                  Wounds: No  Wound care consulted: No       Problem: Adult Inpatient Plan of Care  Goal: Plan of Care Review  Flowsheets (Taken 12/27/2021 0330)  Plan of Care Reviewed With:   patient   family     Problem: Communication Impairment (Stroke, Ischemic/Transient Ischemic Attack)  Goal: Improved Communication Skills  Intervention: Optimize Communication Skills  Flowsheets (Taken 12/27/2021 0330)  Communication Enhancement Strategies:   call light answered in person   nonverbal strategies used     Problem: Attention and Thought Clarity Impairment (Delirium)  Goal: Improved Attention and Thought Clarity  Intervention: Maximize Cognitive Function  Flowsheets (Taken 12/27/2021 0330)  Reorientation Measures:   calendar in view   clock in view  Sensory Stimulation Regulation:   care clustered   lighting decreased

## 2021-12-27 NOTE — PT/OT/SLP PROGRESS
Occupational Therapy   Co-Treatment    Name: Jimmy Leger  MRN: [id number]  Admitting Diagnosis:  Stroke due to embolism of left middle cerebral artery       Recommendations:     Discharge Recommendations: rehabilitation facility  Discharge Equipment Recommendations:   (TBD at next level of care)  Barriers to discharge:  None    Assessment:     Jimmy Leger is a 56 y.o. male with a medical diagnosis of Stroke due to embolism of left middle cerebral artery.  He presents with progress towards goals as evidenced by ability to sit unsupported and self correct R lean. Pt follow most commands. Pt assisted with R sided rolling and supine to sit. Pt unable to identify body parts and remains aphasic, but no evidence of agitation. Pt did get emotional during session, and was reassured of his progress. Pt unable to wash face related to severe edema in R unaffected UE (pt unable to reach face 2* soft tissue approximation.) Performance deficits affecting function are weakness,impaired functional mobilty,gait instability,impaired endurance,decreased upper extremity function,impaired balance,decreased lower extremity function,impaired self care skills,impaired cardiopulmonary response to activity. Pt appropriate for cotreat due to acuity and complexity of pt's medical status with 2 skilled disciplines needed to optimize pts functional performance in ICU setting.    Rehab Prognosis:  Good; patient would benefit from acute skilled OT services to address these deficits and reach maximum level of function.       Plan:     Patient to be seen 5 x/week to address the above listed problems via self-care/home management,neuromuscular re-education,cognitive retraining,therapeutic activities,therapeutic exercises,sensory integration  · Plan of Care Expires:    · Plan of Care Reviewed with: patient,family    Subjective     Pain/Comfort:  · Pain Rating 1:  (No indication of pain during session)    Objective:     Communicated with: RN prior to  session.  Patient found supine with blood pressure cuff,pulse ox (continuous),telemetry,Condom Catheter,NG tube upon OT entry to room.    General Precautions: Standard, aspiration,aphasia,fall   Orthopedic Precautions:N/A   Braces:    Respiratory Status: Room air     Occupational Performance:     Bed Mobility:    · Patient completed Rolling/Turning to Right with maximal assistance and 2 persons  · Patient completed Scooting/Bridging with maximal assistance and 2 persons  · Patient completed Supine to Sit with maximal assistance and 2 persons  · Patient completed Sit to Supine with maximal assistance and 2 persons     Functional Mobility/Transfers:  · Patient completed Sit <> Stand Transfer with maximal assistance and of 2 persons  with  hand-held assist   · Functional Mobility: NT    Activities of Daily Living:  · Grooming: total assistance    · Upper Body Dressing: total assistance    · Lower Body Dressing: total assistance        AMPA 6 Click ADL:      Treatment & Education:  Pt ed on OT POC  Daily orientation reiterated  Pt ed on neural plasticity and importance of visualizing hemiparetic side while attempting active movement    Patient left HOB elevated with all lines intact, call button in reach and RN notifiedEducation:      GOALS:   Multidisciplinary Problems     Occupational Therapy Goals        Problem: Occupational Therapy Goal    Goal Priority Disciplines Outcome Interventions   Occupational Therapy Goal     OT, PT/OT Ongoing, Progressing    Description: Goals to be met by: 1/10/22     Patient will increase functional independence with ADLs by performing:      UE Dressing with Minimal Assistance.  LE Dressing with Moderate Assistance.  Grooming while seated with Moderate Assistance.  Toileting from bedside commode with Maximum Assistance for hygiene and clothing management.   Sitting at edge of bed x5 minutes with Minimal Assistance.  Rolling to Bilateral with Minimal Assistance.   Supine to sit with  Minimal Assistance.  Toilet transfer to bedside commode with Moderate Assistance.  Upper extremity exercise program AAROM x10 reps, with assistance as needed.                      Time Tracking:     OT Date of Treatment: 12/27/21  OT Start Time: 0900  OT Stop Time: 0926  OT Total Time (min): 26 min    Billable Minutes:Self Care/Home Management 10  Neuromuscular Re-education 16               12/27/2021

## 2021-12-27 NOTE — PT/OT/SLP PROGRESS
Physical Therapy Co-Treatment with OT    Patient Name:  Jimmy Leger   MRN:  [id number]    *Co-treatment with OT due to patient complexity and need for two skilled therapists to ensure safe mobilization.    Recommendations:     Discharge Recommendations:  rehabilitation facility    Discharge Equipment Recommendations:  (tbd @ next level of care)   Barriers to discharge: increased (A)    Highest Level of Mobility: STS  Assistance Required: Max(A)X2 persons    Assessment:     Jimmy Leger is a 56 y.o. male admitted with a medical diagnosis of Stroke due to embolism of left middle cerebral artery.  He presents with the following impairments/functional limitations:  weakness,impaired balance,decreased safety awareness,impaired self care skills,impaired functional mobilty,gait instability,decreased lower extremity function,decreased upper extremity function,impaired endurance. Jimmy Leger would benefit from acute PT intervention to address listed functional deficits, provide patient/caregiver education, reduce fall risk, and maximize (I) and safety with functional mobility.    Pt met with HOB elevated and agreeable to PT treatment. Today's acute PT treatment focus was on sitting balance EOB and transfer training to improve function. Pt continues to demo right-sided weakness, expressive aphasia, and R-sided inattention. He follows most 1-step commands appropriately and became emotional sitting EOB. Pt made multiple attempts to communicate, however his speech is mostly unintelligible.     Pt is progressing towards acute PT goals appropriately and continues to benefit from acute PT sessions. After hospital discharge, pt would benefit from inpatient rehab to maximize rehab potential. Pt is a high fall risk and is unsafe to return home at this time.    Rehab Prognosis: Good; patient would benefit from acute skilled PT services to address these deficits and reach maximum level of function.      Plan:     During this  hospitalization, patient to be seen 4 x/week to address the identified rehab impairments via gait training,therapeutic activities,therapeutic exercises,neuromuscular re-education and progress toward the following goals:    · Plan of Care Expires:  01/22/22    This plan of care has been discussed with the patient/caregiver, who was included in its development and is in agreement with the identified goals and treatment plan.     Subjective     Communicated with RN prior to session.  Patient agreeable to participate.     Pain/Comfort:  · Pain Rating 1:  (no indication of pain)  · Pain Rating Post-Intervention 1:  (no indications of pain during session)    Chief Complaint: L MCA CVA  Patient/Family Comments/goals: None stated      Objective:     Patient found HOB elevated with blood pressure cuff,pulse ox (continuous),telemetry,NG tube,Condom Catheter  upon PT entry to room.    General Precautions: Standard, aspiration,aphasia,pureed diet,nectar thick   Orthopedic Precautions:N/A   Braces: N/A         Exams:     Cognition:  o Pt is alert and oriented x 4  o Command following: intact    Functional Mobility:    Bed Mobility:  · Supine to Sit: Maximum Assistance and 2 persons on R side of bed  · Sit to Supine: Maximum Assistance and 2 persons  · Rolling L: Maximum Assistance and 2 persons  · Rolling R: Maximum Assistance and 2 persons  · Scooting anteriorly to EOB to plant feet on floor: Maximum Assistance and 2 persons  · Scooting/Bridging in supine to HOB: Maximum Assistance and 2 persons via drawsheet    Transfers:   · Sit to Stand Transfer: Maximum Assistance and 2 persons  from EOB with HHAx2  · x2 trials from EOB  · Blocking R knee to prevent buckling  · Pt with NBOS with increased hip adduction on R, requires physical A to widen TALIA  · Pt tolerated each trial ~20 seconds  · Bed to Chair: Activity did not occur , pt not appropriate for chair at this time             Gait:  · Patient unable    Balance:  · Static Sit:    · initially mod(A), then SBA at EOB x ~15 minutes  · Fair: Patient able to maintain balance with handhold support; may require occasional minimal assistance.  · Static Stand:   · Total Assist with Hand-held assist x 2  · Poor: Patient requires handhold support and moderate to maximal assistance to maintain position   · R-sided lean      Therapeutic Activities/Exercises      Patient assisted with functional mobility as noted above   FW weight shifting sitting EOB w/ B LEs planted on floor. PT with HHAx1 (L UE) and pt instructed to pull himself FW while maintaining awareness to midline. X5 reps   Cross-body reaching EOB   STSx2 from EOB- pt demos fatigue after 2nd trial   Patient educated on the importance of early mobility to prevent functional decline during hospital stay   Patient educated on PT POC and role of PT in acute care   White board updated regarding patient's safest level of mobility with staff assistance, RN also updated.     AM-PAC 6 CLICK MOBILITY  Turning over in bed (including adjusting bedclothes, sheets and blankets)?: 2  Sitting down on and standing up from a chair with arms (e.g., wheelchair, bedside commode, etc.): 2  Moving from lying on back to sitting on the side of the bed?: 2  Moving to and from a bed to a chair (including a wheelchair)?: 1  Need to walk in hospital room?: 1  Climbing 3-5 steps with a railing?: 1  Basic Mobility Total Score: 9     Patient left HOB elevated with all lines intact, call button in reach, bed alarm on and RN notified.        History/Goals:     PAST MEDICAL HISTORY:  Past Medical History:   Diagnosis Date    Coronary artery disease     Hypertension        No past surgical history on file.    GOALS:   Multidisciplinary Problems     Physical Therapy Goals        Problem: Physical Therapy Goal    Goal Priority Disciplines Outcome Goal Variances Interventions   Physical Therapy Goal     PT, PT/OT Ongoing, Progressing     Description: Goals to be met by:  2022     Patient will increase functional independence with mobility by performin. Supine to sit with moderate assistance  2. Sit to supine with minimum assistance  3. Rolling to Left with maximal assistance  4. Rolling to Right with minimum assistance  5. Sit to stand transfer with moderate assistance  6. Bed to chair transfer with moderate assistance using LRAD as needed  7. Gait  x 10 feet with maximal assistance using LRAD as needed  8. Lower extremity exercise program x10 reps per handout, with independence                    Time Tracking:     PT Received On: 21  PT Start Time: 0900     PT Stop Time: 926  PT Total Time (min): 26 min     Billable Minutes: Therapeutic Activity 13 and Therapeutic Exercise 13      Debra Mistry, PT  2021  Pager# [phone]

## 2021-12-27 NOTE — ASSESSMENT & PLAN NOTE
57 yo male with PMHx of HTN and CAD who presents as a transfer from [facility]. No intervention was performed(no tPA as OOW, no IR per telestroke provider). CTH at OSH with large L MCA infarct. CTA with distal L M1 occlusion. He was admitted to hospital medicine at OSH and now transferred to [facility] for higher level of care. Repeat [facility]CTH(12/21) with large L MCA infarct. Echo at OSH with LV global hypokinesis, EF 15-20%, severe atrial enlargement and small to moderate size apical thrombus. Etiology: likely cardioembolic 2/2 low EF(15-20%) and cardiac thrombus.    Repeat CTH with petechial hemorrhage, antithrombotics temporarily held. Repeat CT with stable petechial hemorrhage, patient no longer on hemicrani watch, CTA chest with subsegmental PE. Heparin gtt now resumed    Antithrombotics for secondary stroke prevention:   Heparin gtt    Statins for secondary stroke prevention and hyperlipidemia, if present: Statins: Atorvastatin- 40 mg daily     Aggressive risk factor modification: HTN, CAD     Rehab efforts: The patient has been evaluated by a stroke team provider and the therapy needs have been fully considered based off the presenting complaints and exam findings. The following therapy evaluations are needed: PT evaluate and treat, OT evaluate and treat, SLP evaluate and treat, PM&R evaluate for appropriate placement    Diagnostics ordered/pending: None     VTE prophylaxis: Mechanical prophylaxis: Place SCDs  None: Reason for No Pharmacological VTE Prophylaxis: Currently on anticoagulation    BP parameters: Infarct: SBP <180

## 2021-12-27 NOTE — ASSESSMENT & PLAN NOTE
-L dorsal pedal pulse not able to be detected on doppler overnight, unsure of onset  -bilateral lower extremities mildly cool and equal  -US lower extremities pending

## 2021-12-27 NOTE — ASSESSMENT & PLAN NOTE
Area of cytotoxic cerebral edema identified when reviewing brain imaging in the territory of the L middle cerebral artery. There is mass effect associated with it. Continue to monitor the patients clinical exam for any worsening of symptoms which may indicate expansion of the stroke or the area of the edema resulting in the clinical change. The pattern is suggestive of embolic etiology.    Clinical exam has remained stable, this suggests that cerebral edema has stabilized. Low probability of clinical deterioration. Neurosurgery signed off, patient no longer on hemicrani watch

## 2021-12-27 NOTE — PLAN OF CARE
Problem: Occupational Therapy Goal  Goal: Occupational Therapy Goal  Description: Goals to be met by: 1/10/22     Patient will increase functional independence with ADLs by performing:      UE Dressing with Minimal Assistance.  LE Dressing with Moderate Assistance.  Grooming while seated with Moderate Assistance.  Toileting from bedside commode with Maximum Assistance for hygiene and clothing management.   Sitting at edge of bed x5 minutes with Minimal Assistance.  Rolling to Bilateral with Minimal Assistance.   Supine to sit with Minimal Assistance.  Toilet transfer to bedside commode with Moderate Assistance.  Upper extremity exercise program AAROM x10 reps, with assistance as needed.     Outcome: Ongoing, Progressing

## 2021-12-27 NOTE — ASSESSMENT & PLAN NOTE
Seen on TTE at outside facility  -TTE 12/21/21: with EF of 15-20%, small to moderate sized (1.6x1.1 cm) layered, protruding apical mass c/w thrombus, global hypokinesis, severe atrial enlargement.  -on heparin gtt and temporarily held due to hemorrhagic conversion, now restarted  -continue heparin gtt

## 2021-12-27 NOTE — ASSESSMENT & PLAN NOTE
12/25 CTA chest -subseg PE; lactate 1.1; trop+; bnp ~2k; Sx tachypnea (on hepain drip for >36hrs)  -u/s ble -neg DVT  -patient off AC due to loss of insurance coverage   -On heparin gtt since 12/23 for cardiac thrombus, will continue  -Decreasing oxygen requirement (on RA 12/27); O2 sat goals >88%

## 2021-12-27 NOTE — PROGRESS NOTES
[facility] - Neuro Critical Care  Vascular Neurology  Comprehensive Stroke Center  Progress Note    Assessment/Plan:     * Stroke due to embolism of left middle cerebral artery  55 yo male with PMHx of HTN and CAD who presents as a transfer from [facility]. No intervention was performed(no tPA as OOW, no IR per telestroke provider). CTH at OSH with large L MCA infarct. CTA with distal L M1 occlusion. He was admitted to hospital medicine at OSH and [place] [facility] [place]. Repeat [facility]CTH(12/21) with large L MCA infarct. Echo at OSH with LV global hypokinesis, EF 15-20%, severe atrial enlargement and small to moderate size apical thrombus. Etiology: likely cardioembolic 2/2 low EF(15-20%) and cardiac thrombus.    Repeat CTH with petechial hemorrhage, antithrombotics temporarily held. Repeat CT with stable petechial hemorrhage, patient no longer on hemicrani watch, CTA chest with subsegmental PE. Heparin gtt now resumed    Antithrombotics for secondary stroke prevention:   Heparin gtt    Statins for secondary stroke prevention and hyperlipidemia, if present: Statins: Atorvastatin- 40 mg daily     Aggressive risk factor modification: HTN, CAD     Rehab efforts: The patient has been evaluated by a stroke team provider and the therapy needs have been fully considered based off the presenting complaints and exam findings. The following therapy evaluations are needed: PT evaluate and treat, OT evaluate and treat, SLP evaluate and treat, PM&R evaluate for appropriate placement    Diagnostics ordered/pending: None     VTE prophylaxis: Mechanical prophylaxis: Place SCDs  None: Reason for No Pharmacological VTE Prophylaxis: Currently on anticoagulation    BP parameters: Infarct: SBP <180       Single subsegmental pulmonary embolism without acute cor pulmonale  Visualized on CTA chest 12/25  L apical segmental pulmonary artery embolism  US LE negative for DVT  Continue heparin gtt    Debility  PT/OT to evaluate  and treat  Therapy recommending rehab    Cytotoxic cerebral edema  Area of cytotoxic cerebral edema identified when reviewing brain imaging in the territory of the L middle cerebral artery. There is mass effect associated with it. Continue to monitor the patients clinical exam for any worsening of symptoms which may indicate expansion of the stroke or the area of the edema resulting in the clinical change. The pattern is suggestive of embolic etiology.    Clinical exam has remained stable, this suggests that cerebral edema has stabilized. Low probability of clinical deterioration. Neurosurgery signed off, patient no longer on hemicrani watch          Essential hypertension  Stroke RF  SBP<180      Grade III diastolic dysfunction  Newly diagnosed  EF 15-20%  Consider cardiology consult    Mural thrombus of cardiac apex  Seen on TTE at outside facility  -TTE 12/21/21: with EF of 15-20%, small to moderate sized (1.6x1.1 cm) layered, protruding apical mass c/w thrombus, global hypokinesis, severe atrial enlargement.  -on heparin gtt and temporarily held due to hemorrhagic conversion, now restarted  -continue heparin gtt             12/22 patient in ICU for hemicrani watch, NPO per SLP  12/23: Patient remains in NCC. Min intensity heparin gtt was initiated. Patient aphasic with RS plegia.   12/24-Patient with changes in CT with petechial hemorraghic conversion recommend stopping aspirin and holding heparin. Repeat imaging with worsening exam. On hemicrani watch.   12/27 RLE weakness improved on today's exam, patient with runs of vtach overnight on 12/24-12/25, CTA chest with subsegmental PE, patient restarted on heparin gtt, neurosurgery signed off and patient no longer on hemicrani watch, remains NPO per SLP, therapy recommending inpatient rehab      STROKE DOCUMENTATION        NIH Scale:  1a. Level of Consciousness: 0-->Alert, keenly responsive  1b. LOC Questions: 0-->Answers both questions correctly  1c. LOC  Commands: 1-->Performs one task correctly  2. Best Gaze: 1-->Partial gaze palsy, gaze is abnormal in one or both eyes, but forced deviation or total gaze paresis is not present  3. Visual: 2-->Complete hemianopia  4. Facial Palsy: 2-->Partial paralysis (total or near-total paralysis of lower face)  5a. Motor Arm, Left: 0-->No drift, limb holds 90 (or 45) degrees for full 10 secs  5b. Motor Arm, Right: 4-->No movement  6a. Motor Leg, Left: 0-->No drift, leg holds 30 degree position for full 5 secs  6b. Motor Leg, Right: 2-->Some effort against gravity, leg falls to bed by 5 secs, but has some effort against gravity  7. Limb Ataxia: 0-->Absent  8. Sensory: 0-->Normal, no sensory loss  9. Best Language: 2-->Severe aphasia, all communication is through fragmentary expression, great need for inference, questioning, and guessing by the listener. Range of information that can be exchanged is limited, listener carries burden of. . . (see row details)  10. Dysarthria: 2-->Severe dysarthria, patients speech is so slurred as to be unintelligible in the absence of or out of proportion to any dysphasia, or is mute/anarthric  11. Extinction and Inattention (formerly Neglect): 0-->No abnormality  Total (NIH Stroke Scale): 16       Modified [place] Score: 0  [place] Coma Scale:    ABCD2 Score:    [id number] Score:   HAS -BLED Score:   ICH Score:   Hunt & Gillis Classification:      Hemorrhagic change of an Ischemic Stroke: Does this patient have an ischemic stroke with hemorrhagic changes? Yes, Grading Scale: HI Type 1 (HI-1) = small petechiae along the margins of the infarct. Is this a symptomatic change?  No - Hemorrhage is not clinically significant     Neurologic Chief Complaint: found down, nonverbal, RSW    Subjective:     Interval History: Patient is seen for follow-up neurological assessment and treatment recommendations: RLE weakness improved on today's exam, patient with runs of vtach overnight on 12/24-12/25, CTA chest  with subsegmental PE, patient restarted on heparin gtt, neurosurgery signed off and patient no longer on hemicrani watch, remains NPO per SLP, therapy recommending inpatient rehab    HPI, Past Medical, Family, and Social History remains the same as documented in the initial encounter.     Review of Systems   Unable to perform ROS: Patient nonverbal   Constitutional: Negative for fever.   HENT: Positive for trouble swallowing.    Respiratory: Negative for cough.    Cardiovascular: Negative for leg swelling.   Gastrointestinal: Negative for vomiting.   Neurological: Positive for facial asymmetry, speech difficulty and weakness. Negative for numbness.     Scheduled Meds:   atorvastatin  40 mg Oral Daily    EScitalopram oxalate  5 mg Per NG tube Daily    lisinopriL  5 mg Per NG tube Daily    metoprolol tartrate  25 mg Per NG tube BID    polyethylene glycol  17 g Per NG tube Daily    senna-docusate 8.6-50 mg  1 tablet Per NG tube BID    sodium chloride  2 g Oral TID     Continuous Infusions:   heparin (porcine) in D5W 20 Units/kg/hr (12/27/21 1005)     PRN Meds:acetaminophen, hydrALAZINE, labetalol, magnesium oxide, magnesium oxide, ondansetron, potassium bicarbonate, potassium bicarbonate, potassium bicarbonate, potassium, sodium phosphates, potassium, sodium phosphates, potassium, sodium phosphates, sodium chloride 0.9%    Objective:     Vital Signs (Most Recent):  Temp: 99.6 °F (37.6 °C) (12/27/21 0705)  Pulse: 80 (12/27/21 1005)  Resp: (!) 25 (12/27/21 1005)  BP: (!) 133/93 (12/27/21 1005)  SpO2: (!) 93 % (12/27/21 1005)  BP Location: Left arm    Vital Signs Range (Last 24H):  Temp:  [97.1 °F (36.2 °C)-99.6 °F (37.6 °C)]   Pulse:  [71-92]   Resp:  [20-31]   BP: (117-133)/(71-93)   SpO2:  [93 %-100 %]   BP Location: Left arm    Physical Exam  Vitals reviewed.   Constitutional:       General: He is not in acute distress.     Appearance: He is well-developed.   HENT:      Head: Normocephalic and atraumatic.    Cardiovascular:      Rate and Rhythm: Normal rate.   Pulmonary:      Effort: Pulmonary effort is normal. No respiratory distress.   Skin:     General: Skin is warm and dry.   Neurological:      Mental Status: He is alert.         Neurological Exam:   LOC: alert  Attention Span: Good   Language: Global aphasia, follows simple midline commands  Articulation: Untestable due to severe aphasia   Orientation: Untestable due to severe aphasia   Facial Movement (CN VII): Lower facial weakness on the Right  Motor: Arm left  Normal 5/5  Leg left  Normal 5/5  Arm right  Plegia 0/5  Leg right Plegia 3/5  Sensation: Intact to light touch, temperature and vibration  Tone: Flaccid  RUE       Laboratory:  CMP:   Recent Labs   Lab 12/27/21  0012   CALCIUM 8.4*   ALBUMIN 2.6*   PROT 5.8*      K 4.0   CO2 21*   *   BUN 20   CREATININE 0.7   ALKPHOS 48*   ALT 22   AST 21   BILITOT 0.8     CBC:   Recent Labs   Lab 12/27/21  0012   WBC 10.10   RBC 3.53*   HGB 12.2*   HCT 36.0*      *   MCH 34.6*   MCHC 33.9     Lipid Panel:   Recent Labs   Lab 12/21/21  1911   CHOL 162   LDLCALC 89.2   HDL 58   TRIG 74     Coagulation:   Recent Labs   Lab 12/23/21  1206 12/23/21  1822 12/27/21  1031   INR 1.0  --   --    APTT 24.9   < > 46.1*    < > = values in this interval not displayed.     Platelet Aggregation Study: No results for input(s): PLTAGG, PLTAGINTERP, PLTAGREGLACO, ADPPLTAGGREG in the last 168 hours.  Hgb A1C:   Recent Labs   Lab 12/21/21  0453   HGBA1C 4.9     TSH:   Recent Labs   Lab 12/21/21  0453   TSH 0.768       Diagnostic Results     Brain imaging:    CTH 12/25/21  No significant change from prior.  Evolving large recent left MCA territory infarction with petechial hemorrhagic conversion.  No evidence for significant new hemorrhage or increased mass effect.      CTH 12/24/2021  Evolving large left MCA territory infarction with petechial hemorrhagic conversion.  There is intermediate density components in  the left temporal lobe area of infarction suggestive for interval additional petechial hemorrhage.  No evidence for extra-axial hemorrhage or hydrocephalus.    MRI Brain WO. Date: 12/22/21    Large acute  left MCA distribution infarct with modest mass effect. Multiple small remote scattered infarcts elsewhere as above.  Embolic disease to be considered.     CTH WO 12/21/21  Again demonstrated in better delineated as large left MCA distribution infarct with no associated acute hemorrhage and with localized mass effect resulting in effacement of overlying cortical sulci and partial effacement left sylvian fissure.  No associated acute hemorrhage.     Several small old areas of infarction again noted including cerebellum bilaterally, anterior left perisylvian region, lateral left frontal cortex and posterior right parietal cortex.     CTH WO 12/20/21   Large area loss of gray-white differentiation consistent with acute infarct left MCA distribution involving the left temporal lobe, temporal occipital region and portions of the basal ganglia with localized mass effect including effacement of overlying cortical sulci although with no associated acute hemorrhage.     Several small old areas of infarction are evident including anterior left perisylvian and cerebellum bilaterally. Mild underlying atrophy.       Vessel Imaging:  CTA H/N 12/20/21  Segmental occlusion measuring approximately 10 mm mid and distal M1 segment left MCA with diminished flow distal to the stenosis.     Right cervical carotid circulation--atherosclerotic calcification bifurcation without measurable stenosis.     Left cervical carotid circulation--atherosclerotic calcification distal left common carotid artery and bifurcation with stenosis proximal left ICA measuring 16% utilizing NASCET criteria.  Mild eccentric narrowing mid to distal portion left common carotid artery also noted.     Vertebral arteries--no focal arterial abnormality or measurable  stenosis.     Cardiac Evaluation:   TTE with bubble 12/21/21  1. The left ventricle (LV) is dilated with severely depressed systolic function & global hypokinesis.  2. LV ejection fraction is 15-20%. Grade III LV diastolic dysfunction.  3. Small to moderate sized (1.6x1.1 cm) layered, protruding apical mass c/w thrombus.  4. The right ventricle (RV) is severely enlarged with normal systolic function.   5. Estimated RVSP is moderately elevated (50-70 mmHg). Pulmonary hypertension is present.  6. Severe atrial enlargement.  7. Moderate eccentric mitral regrugitation.  8. Mild tricuspid regurgitation.        Laura Toth PA-C  [facility] [[email or web address]] Stroke Center  Department of Vascular Neurology   [facility] - Neuro Critical [facility]

## 2021-12-27 NOTE — PLAN OF CARE
[facility] Care Plan    POC reviewed with Jimmy Leger and family at 1600. Pt unable to verbalize understanding due to aphasia. Nods appropriately. Family at bedside verbalized understanding. Questions and concerns addressed. Heparin gtt titrated per nomogram. Bath done. TF off for now. To advance diet to puree/nectar thick.  Will continue to monitor. See below and flowsheets for full assessment and VS info.       Neuro:  Brenden Coma Scale  Best Eye Response: 4-->(E4) spontaneous  Best Motor Response: 6-->(M6) obeys commands  Best Verbal Response: 2-->(V2) incomprehensible speech  Brenden Coma Scale Score: 12  Assessment Qualifiers: patient not sedated/intubated,no eye obstruction present  Pupil PERRLA: yes     24 hr Temp:  [97.1 °F (36.2 °C)-99.6 °F (37.6 °C)]     CV:   Rhythm: normal sinus rhythm  BP goals:   SBP < 160  MAP > 65    Resp:   O2 Device (Oxygen Therapy): nasal cannula       Plan: N/A    GI/:  FARZANA Total Score: 0  Diet/Nutrition Received: mechanical/dental soft  Last Bowel Movement: 12/26/21  Voiding Characteristics: external catheter    Intake/Output Summary (Last 24 hours) at 12/27/2021 1725  Last data filed at 12/27/2021 1705  Gross per 24 hour   Intake 1346.38 ml   Output 1500 ml   Net -153.62 ml     Unmeasured Output  Urine Occurrence: 1  Stool Occurrence: 0  Pad Count: 3    Labs/Accuchecks:  Recent Labs   Lab 12/27/21  0012   WBC 10.10   RBC 3.53*   HGB 12.2*   HCT 36.0*         Recent Labs   Lab 12/27/21  0012      K 4.0   CO2 21*   *   BUN 20   CREATININE 0.7   ALKPHOS 48*   ALT 22   AST 21   BILITOT 0.8      Recent Labs   Lab 12/23/21  1206 12/23/21  1822 12/27/21  1031   INR 1.0  --   --    APTT 24.9   < > 46.1*    < > = values in this interval not displayed.      Recent Labs   Lab 12/22/21  0301 12/25/21  1039 12/26/21  0610   *  --   --    TROPONINI  --    < > 0.060*    < > = values in this interval not displayed.       Electrolytes: N/A - electrolytes  WDL  Accuchecks: Q6H    Gtts:   heparin (porcine) in D5W 20 Units/kg/hr (12/27/21 1705)       LDA/Wounds:  Lines/Drains/Airways       Drain                   NG/OG Tube 12/21/21 1410 Left nostril 6 days    Male External Urinary Catheter 12/22/21 2000 4 days              Peripheral Intravenous Line                   Peripheral IV - Single Lumen 12/25/21 2204 20 G Anterior;Right Forearm 1 day         Peripheral IV - Single Lumen 12/26/21 2200 20 G Anterior;Left Forearm <1 day                  Wounds: No  Wound care consulted: No

## 2021-12-27 NOTE — ASSESSMENT & PLAN NOTE
Large Left MCA stroke without intervention    -Admit to NCC, VN following  -NSGY on board for hemicrani watch  -q1h neuro checks, vital checks  -EKG, ECHO, CXR done  -A1c, TSH, lipid panel, coag panel done   -Daily CBC, CMP, mag, phos  -SBP < 160, prn labetalol and hydralazine  -Daily Statin, ASA  -SCDs, SQH  -CT large left MCA distribution infarct with no associated acute hemorrhage  -CTA segmental occlusion measuring approximately 10 mm mid and distal M1 segment left MCA with diminished flow distal to the stenosis.  -PT/OT/SLP  -MRI brain -Large acute  left MCA distribution infarct with modest mass effect.   Multiple small remote scattered infarcts elsewhere as above.  Embolic disease: LV apical thrombus (known hx)

## 2021-12-27 NOTE — PROGRESS NOTES
[facility]  Neurocritical Care  Progress Note    Admit Date: 12/21/2021  Service Date: 12/27/2021  Length of Stay: 6    Subjective:     Chief Complaint: Stroke due to embolism of left middle cerebral artery    History of Present Illness: Mr. Leger is a 55 yo male with unknown past medical history who presents to [facility] with a L MCA CVA without intervention. He originally presented to [facility] by EMS on 12/20 after being found down by a coworker. LKW uncertain. 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 The coworker checked on the patient the following day, and he was found down with only a shirt, which lead to EMS being called. On presentation to [facility], the patient was nonverbal to command, with right sided hemiparesis. Stroke protocol initiated with Tele stoke evaluation, and the patient was not deemed a candidate for any acute intervention. CT at [facility] with large left MCA distribution infarct with no associated acute hemorrhage  CTA at [facility] significant for segmental occlusion measuring approximately 10 mm mid and distal M1 segment left MCA with diminished flow distal to the stenosis. He has not yet had an MRI. Of note, ECHO at [facility] is significant for an EF of 15-20% with an apical mass consistent with a thrombus. He was started on a statin and DAPT at [facility] but never appeared to have enteral access so was only given ASA suppositories, per chart review. He was transferred to [facility] on 12/21.    He will be admitted to [facility] for hourly neuromonitoring and a higher level of care.          Hospital Course: 12/22/2021NAEO, stroke day 3. NSGY following, stroke team following. Continue to monitor neuro exam closely. Strict BP and HR control since patient with LV thrombus, cannot safely AC yet 2/2 to acute large territory stroke   12/23/2021 NAEON. Heparin gtt started for LV thrombus, cont som-crani  watch.   12/24/2021 exam improving; 24hrs CTH demonstrates petechial hmg will repeat and hold heparin drip if hmg increases otherwise will continue; DHC watch; Na goal 140-150; sbp 100-140  12/25/2021 Vtach runs o/n; tachypnea; CTA chest +subseq PE: continue heparin drip; CTH remains stable   12/26/2021 heparin drip: LV apical thrombus (known) +PE (new subsegmental); sbp 100-160; start SSRI; tachypnea    12/27/2021: Left dorsal pedal pulse not located w/ dopplar uncertain of onset. US of LE.       Interval History:  L dorsal pedal pulse unable to be located w/ dopplar overnight, unsure of onset. Bilat LE same temp, but mildly cool. Lower extremity US.     Review of Systems   Constitutional: Positive for activity change and fatigue.   HENT: Positive for trouble swallowing. Negative for facial swelling.    Respiratory: Negative for cough and shortness of breath.    Neurological: Positive for speech difficulty and weakness.     Objective:     Vitals:  Temp: 98.6 °F (37 °C)  Pulse: 82  Rhythm: normal sinus rhythm  BP: (!) 132/90  MAP (mmHg): 106  Resp: (!) 28  SpO2: 95 %  O2 Device (Oxygen Therapy): room air    Temp  Min: 97.1 °F (36.2 °C)  Max: 99.6 °F (37.6 °C)  Pulse  Min: 71  Max: 92  BP  Min: 117/71  Max: 133/93  MAP (mmHg)  Min: 89  Max: 109  Resp  Min: 20  Max: 31  SpO2  Min: 93 %  Max: 100 %    12/26 0701 - 12/27 0700  In: 1732.3 [I.V.:342.3]  Out: 1300 [Urine:1300]   Unmeasured Output  Urine Occurrence: 0  Stool Occurrence: 0  Pad Count: 3       Physical Exam  Vitals and nursing note reviewed.   Constitutional:       General: He is not in acute distress.     Appearance: Normal appearance. He is normal weight. He is not ill-appearing, toxic-appearing or diaphoretic.   HENT:      Right Ear: External ear normal.      Left Ear: External ear normal.      Nose: Nose normal.   Eyes:      Conjunctiva/sclera: Conjunctivae normal.   Cardiovascular:      Rate and Rhythm: Normal rate.      Pulses:           Dorsalis pedis  pulses are 2+ on the right side and 0 on the left side.      Comments: L DP pulse undetected w/ doppler   Pulmonary:      Effort: Pulmonary effort is normal. No respiratory distress.      Comments: Sat >90% on RA.   Neurological:      Comments: E3 V2 M6     Patient briskly opens eyes to voice. Following commands intermittently. Dysarthric and aphasic.     PERRL 3mm. Left gaze preference, but able to cross midline.     Motor:   Moves L side spontaneously  LUE: 5/5  LLE: 5/5  RUE: 0/5  RLE: 2/5     Sensory:  LUE, LLE intact  RUE: no response to pain  RLE: minimal response to stimulation          Medications:  Continuousheparin (porcine) in D5W, Last Rate: 20 Units/kg/hr (12/27/21 1128)    Scheduledatorvastatin, 40 mg, Daily  EScitalopram oxalate, 5 mg, Daily  lisinopriL, 5 mg, Daily  metoprolol tartrate, 25 mg, BID  polyethylene glycol, 17 g, Daily  senna-docusate 8.6-50 mg, 1 tablet, BID  sodium chloride, 2 g, TID    PRNacetaminophen, 650 mg, Q6H PRN  hydrALAZINE, 10 mg, Q6H PRN  labetalol, 10 mg, Q6H PRN  magnesium oxide, 800 mg, PRN  magnesium oxide, 800 mg, PRN  ondansetron, 4 mg, Q8H PRN  potassium bicarbonate, 35 mEq, PRN  potassium bicarbonate, 50 mEq, PRN  potassium bicarbonate, 60 mEq, PRN  potassium, sodium phosphates, 2 packet, PRN  potassium, sodium phosphates, 2 packet, PRN  potassium, sodium phosphates, 2 packet, PRN  sodium chloride 0.9%, 10 mL, PRN      Today I personally reviewed pertinent medications, lines/drains/airways, imaging, cardiology results, laboratory results, microbiology results, notably: US     Diet  Diet Dysphagia Pureed (IDDSI Level 4) [facility]; [place] Thick  Diet Dysphagia Pureed (IDDSI Level 4) [facility]; [place] Thick    Assessment/Plan:     Neuro  * Stroke due to embolism of left middle cerebral artery  Large Left MCA stroke without intervention    -Admit to NCC, VN following  -NSGY on board for hemicrani watch  -q1h neuro checks, vital checks  -EKG, ECHO, CXR  done  -A1c, TSH, lipid panel, coag panel done   -Daily CBC, CMP, mag, phos  -SBP < 160, prn labetalol and hydralazine  -Daily Statin, ASA  -SCDs, SQH  -CT large left MCA distribution infarct with no associated acute hemorrhage  -CTA segmental occlusion measuring approximately 10 mm mid and distal M1 segment left MCA with diminished flow distal to the stenosis.  -PT/OT/SLP  -MRI brain -Large acute  left MCA distribution infarct with modest mass effect.   Multiple small remote scattered infarcts elsewhere as above.  Embolic disease: LV apical thrombus (known hx)        Cytotoxic cerebral edema  -see primary problem    Cardiac/Vascular  Essential hypertension  -SBP < 160  -Prn labetalol and hydralazine      Mural thrombus of cardiac apex  Small to moderate sized (1.6x1.1 cm) layered, protruding apical mass c/w thrombus.    -Noted on ECHO from 12/21  -Strict HR and BP control  -SBP goal <160  -lopressor started     12/23 Heparin gtt started, min intensity, no bolus  12/24 petechial hmg noted; subtherapeutic ptt  12/25: cth remains stable   12/26: NAEO, remains therapeutic     Other  Absent pulse in one limb  -L dorsal pedal pulse not able to be detected on doppler overnight, unsure of onset  -bilateral lower extremities mildly cool and equal  -US lower extremities pending     Single subsegmental pulmonary embolism without acute cor pulmonale  12/25 CTA chest -subseg PE; lactate 1.1; trop+; bnp ~2k; Sx tachypnea (on hepain drip for >36hrs)  -u/s ble -neg DVT  -patient off AC due to loss of insurance coverage   -On heparin gtt since 12/23 for cardiac thrombus, will continue  -Decreasing oxygen requirement (on RA 12/27); O2 sat goals >88%     Grade III diastolic dysfunction  1. The left ventricle (LV) is dilated with severely depressed systolic function & global hypokinesis.  2. LV ejection fraction is 15-20%. Grade III LV diastolic dysfunction.  3. Small to moderate sized (1.6x1.1 cm) layered, protruding apical mass c/w  thrombus.  4. The right ventricle (RV) is severely enlarged with normal systolic function.   5. Estimated RVSP is moderately elevated (50-70 mmHg). Pulmonary hypertension is present.  6. Severe atrial enlargement.  7. Moderate eccentric mitral regrugitation.  8. Mild tricuspid regurgitation.     ECHO 12/21  -monitor I/Os    -resume ACEi            The patient is being Prophylaxed for:  Venous Thromboembolism with: Mechanical or Chemical  Stress Ulcer with: H2B  Ventilator Pneumonia with: not applicable    Activity Orders          Diet Dysphagia Pureed (IDDSI Level 4) [facility]; [place] Th[place]: Dysphagia 1 (Pureed) starting at 12/27 0955    Straight Cath starting at 12/22 1206    Turn patient starting at 12/21 1800    Elevate HOB starting at 12/21 1717        Full Code    Level III      Margie Burch PA-C  Neurocritical Care  [facility] Critical [facility]

## 2021-12-27 NOTE — SUBJECTIVE & OBJECTIVE
Interval History:  L dorsal pedal pulse unable to be located w/ dopplar overnight, unsure of onset. Bilat LE same temp, but mildly cool. Lower extremity US.     Review of Systems   Constitutional: Positive for activity change and fatigue.   HENT: Positive for trouble swallowing. Negative for facial swelling.    Respiratory: Negative for cough and shortness of breath.    Neurological: Positive for speech difficulty and weakness.     Objective:     Vitals:  Temp: 98.6 °F (37 °C)  Pulse: 82  Rhythm: normal sinus rhythm  BP: (!) 132/90  MAP (mmHg): 106  Resp: (!) 28  SpO2: 95 %  O2 Device (Oxygen Therapy): room air    Temp  Min: 97.1 °F (36.2 °C)  Max: 99.6 °F (37.6 °C)  Pulse  Min: 71  Max: 92  BP  Min: 117/71  Max: 133/93  MAP (mmHg)  Min: 89  Max: 109  Resp  Min: 20  Max: 31  SpO2  Min: 93 %  Max: 100 %    12/26 0701 - 12/27 0700  In: 1732.3 [I.V.:342.3]  Out: 1300 [Urine:1300]   Unmeasured Output  Urine Occurrence: 0  Stool Occurrence: 0  Pad Count: 3       Physical Exam  Vitals and nursing note reviewed.   Constitutional:       General: He is not in acute distress.     Appearance: Normal appearance. He is normal weight. He is not ill-appearing, toxic-appearing or diaphoretic.   HENT:      Right Ear: External ear normal.      Left Ear: External ear normal.      Nose: Nose normal.   Eyes:      Conjunctiva/sclera: Conjunctivae normal.   Cardiovascular:      Rate and Rhythm: Normal rate.      Pulses:           Dorsalis pedis pulses are 2+ on the right side and 0 on the left side.      Comments: L DP pulse undetected w/ doppler   Pulmonary:      Effort: Pulmonary effort is normal. No respiratory distress.      Comments: Sat >90% on RA.   Neurological:      Comments: E3 V2 M6     Patient briskly opens eyes to voice. Following commands intermittently. Dysarthric and aphasic.     PERRL 3mm. Left gaze preference, but able to cross midline.     Motor:   Moves L side spontaneously  LUE: 5/5  LLE: 5/5  RUE: 0/5  RLE: 2/5      Sensory:  LUE, LLE intact  RUE: no response to pain  RLE: minimal response to stimulation          Medications:  Continuousheparin (porcine) in D5W, Last Rate: 20 Units/kg/hr (12/27/21 1128)    Scheduledatorvastatin, 40 mg, Daily  EScitalopram oxalate, 5 mg, Daily  lisinopriL, 5 mg, Daily  metoprolol tartrate, 25 mg, BID  polyethylene glycol, 17 g, Daily  senna-docusate 8.6-50 mg, 1 tablet, BID  sodium chloride, 2 g, TID    PRNacetaminophen, 650 mg, Q6H PRN  hydrALAZINE, 10 mg, Q6H PRN  labetalol, 10 mg, Q6H PRN  magnesium oxide, 800 mg, PRN  magnesium oxide, 800 mg, PRN  ondansetron, 4 mg, Q8H PRN  potassium bicarbonate, 35 mEq, PRN  potassium bicarbonate, 50 mEq, PRN  potassium bicarbonate, 60 mEq, PRN  potassium, sodium phosphates, 2 packet, PRN  potassium, sodium phosphates, 2 packet, PRN  potassium, sodium phosphates, 2 packet, PRN  sodium chloride 0.9%, 10 mL, PRN      Today I personally reviewed pertinent medications, lines/drains/airways, imaging, cardiology results, laboratory results, microbiology results, notably: US     Diet  Diet Dysphagia Pureed (IDDSI Level 4) [facility]; [place] Thick  Diet Dysphagia Pureed (IDDSI Level 4) [facility]; [place] Thick

## 2021-12-27 NOTE — ASSESSMENT & PLAN NOTE
Visualized on CTA chest 12/25  L apical segmental pulmonary artery embolism  US LE negative for DVT  Continue heparin gtt

## 2021-12-27 NOTE — PLAN OF CARE
[facility]  Discharge Reassessment    Primary Care Provider: Primary Doctor No    Expected Discharge Date: 1/4/2022     Per MD: Will monitor in ICU today, likely transfer to SDU tomorrow if exam remains stable   Patient with Medicaid Pending.  Unable to send rehab referrals until patient gets a Medicaid plan approved and assigned.      Reassessment (most recent)     Discharge Reassessment - 12/27/21 1310        Discharge Reassessment    Assessment Type Discharge Planning Reassessment     Did the patient's condition or plan change since previous assessment? No     Communicated ANGIE with patient/caregiver Date not available/Unable to determine     Discharge Plan A Rehab     Discharge Plan B Home     DME Needed Upon Discharge  none     Discharge Barriers Identified Unisured     Why the patient remains in the hospital Requires continued medical care;Insurance issues               Alma Denton RN, CCRN-K, [facility]  Neuro-Critical Care   X [phone]

## 2021-12-27 NOTE — ASSESSMENT & PLAN NOTE
Small to moderate sized (1.6x1.1 cm) layered, protruding apical mass c/w thrombus.    -Noted on ECHO from 12/21  -Strict HR and BP control  -SBP goal <160  -lopressor started     12/23 Heparin gtt started, min intensity, no bolus  12/24 petechial hmg noted; subtherapeutic ptt  12/25: cth remains stable   12/26: NAEO, remains therapeutic

## 2021-12-27 NOTE — PLAN OF CARE
Pt continues to progress towards treatment goals established in POC. Will reassess as appropriate.    Problem: Physical Therapy Goal  Goal: Physical Therapy Goal  Description: Goals to be met by: 2022     Patient will increase functional independence with mobility by performin. Supine to sit with moderate assistance  2. Sit to supine with minimum assistance  3. Rolling to Left with maximal assistance  4. Rolling to Right with minimum assistance  5. Sit to stand transfer with moderate assistance  6. Bed to chair transfer with moderate assistance using LRAD as needed  7. Gait  x 10 feet with maximal assistance using LRAD as needed  8. Lower extremity exercise program x10 reps per handout, with independence   Outcome: Ongoing, Progressing

## 2021-12-27 NOTE — PT/OT/SLP PROGRESS
"Speech Language Pathology Treatment    Patient Name:  Jimmy Leger   MRN:  [id number]   [id number]    Admitting Diagnosis: Stroke due to embolism of left middle cerebral artery    Recommendations:                 General Recommendations:  Dysphagia therapy, Speech/language therapy and Cognitive-linguistic therapy  Diet recommendations:  Puree, Liquid Diet Level: Nectar Thick   Aspiration Precautions:   · 1 small bite/sip at a time,   · Assistance with meals and Assistance with thickening liquids,  ·  HOB to 90 degrees  · Meds crushed in puree   · Continue to monitor for signs and symptoms of aspiration and discontinue oral feeding should you notice any of the following: watery eyes, reddened facial area, wet vocal quality, increased work of breathing, change in respiratory status, increased congestion, coughing, fever, etc.  General Precautions: Standard, aspiration,aphasia,fall,pureed diet,nectar thick  Communication strategies:  yes/no questions only and go to room if call light pushed    Subjective     Communicated with RN prior to entry.   Patient awake and cooperative.   Patient's sister entered room during ST session.  Patient goals: Suspected approximation of, "water."    Pain/Comfort:  ·  no pain reported or indicated    Objective:     Has the patient been evaluated by SLP for swallowing?   Yes  Keep patient NPO? No     Speech/language/cognition: Majority of vocalizations consistent of unintelligible jargon. He answered simple y/n questions with 75% acc. He identified objects in a f.o. 2, repeated simple words, followed simple directions, completed automatic speech tasks, and named common objects with <50% acc despite cues.      Swallowing: Patient seen for an ongoing swallowing assessment. Patient assessed with ice chips x2, teaspoon sips of water x2, teaspoon sips of nectar thick liquid x4 ounces, and teaspoon bites of apple sauce. He presented with coughing/choking following thin liquid trials. Patient " with a productive cough. Oral suctioning provided. No overt s/s of aspiration noted with all nectar thick and puree trials.     Education: SLP provided patient and family education on ways to target language impairments outside of ST session, SLP recommendations, SLP role, s/s and risks of aspiration, safe swallow precautions, and POC. All questions addressed within SLP scope, patient's sister verbalized understanding of all discussed, and patient's sister is in agreement with POC. SLP communicated recommendations with RN and MD.     Assessment:     Jimmy Leger is a 56 y.o. male with an SLP diagnosis of Aphasia, Dysphagia, Dysarthria and Cognitive-Linguistic Impairment.  He presents with increased tolerance of po trials. ST will continue to follow.     Goals:   Multidisciplinary Problems     SLP Goals        Problem: SLP Goal    Goal Priority Disciplines Outcome   SLP Goal     SLP Ongoing, Progressing   Description: Speech Language Pathology Goals  Goals expected to be met by 12/30:  1. Pt will participate in ongoing swallowing assessment to determine if safe for PO intake.   2. Pt will respond to simple yes/no q's with 60% accuracy given max cues.   3. Pt will model 1-step commands on 1/5 trials given max cues.   4. Pt will repeat single words with 50% intelligibility given max cues.   5. Pt will participate in ongoing evaluation of speech/language abilities.                            Plan:     · Patient to be seen:  4 x/week   · Plan of Care expires:  01/22/22  · Plan of Care reviewed with:  patient,sibling   · SLP Follow-Up:  Yes       Discharge recommendations:  rehabilitation facility   Barriers to Discharge:  None    Time Tracking:     SLP Treatment Date:   12/27/21  Speech Start Time:  0929  Speech Stop Time:  0953     Speech Total Time (min):  24 min    Billable Minutes: Speech Therapy Individual 8, Treatment Swallowing Dysfunction 8 and Self Care/Home Management Training 8    12/27/2021

## 2021-12-28 PROBLEM — R47.01 APHASIA: Status: ACTIVE | Noted: 2021-12-28

## 2021-12-28 PROBLEM — I73.9 PAD (PERIPHERAL ARTERY DISEASE): Status: ACTIVE | Noted: 2021-12-28

## 2021-12-28 LAB
APTT BLDCRRT: 47.5 SEC (ref 21–32)
POCT GLUCOSE: 103 MG/DL (ref 70–110)
POCT GLUCOSE: 123 MG/DL (ref 70–110)
POCT GLUCOSE: 123 MG/DL (ref 70–110)

## 2021-12-28 PROCEDURE — 63600175 PHARM REV CODE 636 W HCPCS: Performed by: INTERNAL MEDICINE

## 2021-12-28 PROCEDURE — 20000000 HC ICU ROOM

## 2021-12-28 PROCEDURE — 99223 1ST HOSP IP/OBS HIGH 75: CPT | Mod: ,,, | Performed by: SURGERY

## 2021-12-28 PROCEDURE — 25000003 PHARM REV CODE 250: Performed by: NURSE PRACTITIONER

## 2021-12-28 PROCEDURE — 25000003 PHARM REV CODE 250: Performed by: INTERNAL MEDICINE

## 2021-12-28 PROCEDURE — 99233 SBSQ HOSP IP/OBS HIGH 50: CPT | Mod: ,,,

## 2021-12-28 PROCEDURE — 92526 ORAL FUNCTION THERAPY: CPT

## 2021-12-28 PROCEDURE — 99223 PR INITIAL HOSPITAL CARE,LEVL III: ICD-10-PCS | Mod: ,,, | Performed by: SURGERY

## 2021-12-28 PROCEDURE — 92507 TX SP LANG VOICE COMM INDIV: CPT

## 2021-12-28 PROCEDURE — 99233 PR SUBSEQUENT HOSPITAL CARE,LEVL III: ICD-10-PCS | Mod: ,,,

## 2021-12-28 PROCEDURE — 85730 THROMBOPLASTIN TIME PARTIAL: CPT | Performed by: INTERNAL MEDICINE

## 2021-12-28 PROCEDURE — 25000003 PHARM REV CODE 250

## 2021-12-28 PROCEDURE — 51798 US URINE CAPACITY MEASURE: CPT

## 2021-12-28 PROCEDURE — 25000003 PHARM REV CODE 250: Performed by: PSYCHIATRY & NEUROLOGY

## 2021-12-28 PROCEDURE — 25000003 PHARM REV CODE 250: Performed by: PHYSICIAN ASSISTANT

## 2021-12-28 RX ORDER — ACETAMINOPHEN 325 MG/1
650 TABLET ORAL EVERY 6 HOURS PRN
Status: DISCONTINUED | OUTPATIENT
Start: 2021-12-28 | End: 2022-01-06 | Stop reason: HOSPADM

## 2021-12-28 RX ORDER — LANOLIN ALCOHOL/MO/W.PET/CERES
800 CREAM (GRAM) TOPICAL
Status: DISCONTINUED | OUTPATIENT
Start: 2021-12-28 | End: 2021-12-29

## 2021-12-28 RX ORDER — SODIUM,POTASSIUM PHOSPHATES 280-250MG
2 POWDER IN PACKET (EA) ORAL
Status: DISCONTINUED | OUTPATIENT
Start: 2021-12-28 | End: 2021-12-29

## 2021-12-28 RX ORDER — ESCITALOPRAM OXALATE 5 MG/1
5 TABLET ORAL DAILY
Status: DISCONTINUED | OUTPATIENT
Start: 2021-12-29 | End: 2022-01-06 | Stop reason: HOSPADM

## 2021-12-28 RX ORDER — METOPROLOL TARTRATE 25 MG/1
25 TABLET, FILM COATED ORAL 2 TIMES DAILY
Status: DISCONTINUED | OUTPATIENT
Start: 2021-12-28 | End: 2021-12-29

## 2021-12-28 RX ORDER — LISINOPRIL 5 MG/1
5 TABLET ORAL DAILY
Status: DISCONTINUED | OUTPATIENT
Start: 2021-12-29 | End: 2021-12-30

## 2021-12-28 RX ORDER — LABETALOL HCL 20 MG/4 ML
10 SYRINGE (ML) INTRAVENOUS EVERY 6 HOURS PRN
Status: DISCONTINUED | OUTPATIENT
Start: 2021-12-28 | End: 2022-01-06 | Stop reason: HOSPADM

## 2021-12-28 RX ORDER — AMOXICILLIN 250 MG
1 CAPSULE ORAL 2 TIMES DAILY
Status: DISCONTINUED | OUTPATIENT
Start: 2021-12-28 | End: 2022-01-06 | Stop reason: HOSPADM

## 2021-12-28 RX ORDER — POLYETHYLENE GLYCOL 3350 17 G/17G
17 POWDER, FOR SOLUTION ORAL DAILY
Status: DISCONTINUED | OUTPATIENT
Start: 2021-12-29 | End: 2022-01-06 | Stop reason: HOSPADM

## 2021-12-28 RX ORDER — HYDRALAZINE HYDROCHLORIDE 20 MG/ML
10 INJECTION INTRAMUSCULAR; INTRAVENOUS EVERY 6 HOURS PRN
Status: DISCONTINUED | OUTPATIENT
Start: 2021-12-28 | End: 2022-01-06 | Stop reason: HOSPADM

## 2021-12-28 RX ADMIN — SODIUM CHLORIDE TAB 1 GM 2 G: 1 TAB at 08:12

## 2021-12-28 RX ADMIN — ATORVASTATIN CALCIUM 40 MG: 20 TABLET, FILM COATED ORAL at 08:12

## 2021-12-28 RX ADMIN — SODIUM CHLORIDE TAB 1 GM 2 G: 1 TAB at 03:12

## 2021-12-28 RX ADMIN — SENNOSIDES AND DOCUSATE SODIUM 1 TABLET: 50; 8.6 TABLET ORAL at 08:12

## 2021-12-28 RX ADMIN — HEPARIN SODIUM 20 UNITS/KG/HR: 1000 INJECTION INTRAVENOUS; SUBCUTANEOUS at 09:12

## 2021-12-28 RX ADMIN — METOPROLOL TARTRATE 25 MG: 25 TABLET, FILM COATED ORAL at 08:12

## 2021-12-28 RX ADMIN — SENNOSIDES AND DOCUSATE SODIUM 1 TABLET: 50; 8.6 TABLET ORAL at 09:12

## 2021-12-28 RX ADMIN — METOPROLOL TARTRATE 25 MG: 25 TABLET, FILM COATED ORAL at 09:12

## 2021-12-28 RX ADMIN — POLYETHYLENE GLYCOL 3350 17 G: 17 POWDER, FOR SOLUTION ORAL at 08:12

## 2021-12-28 RX ADMIN — SODIUM CHLORIDE TAB 1 GM 2 G: 1 TAB at 09:12

## 2021-12-28 RX ADMIN — HEPARIN SODIUM 20 UNITS/KG/HR: 1000 INJECTION INTRAVENOUS; SUBCUTANEOUS at 03:12

## 2021-12-28 RX ADMIN — LISINOPRIL 5 MG: 5 TABLET ORAL at 08:12

## 2021-12-28 RX ADMIN — ESCITALOPRAM OXALATE 5 MG: 5 TABLET, FILM COATED ORAL at 08:12

## 2021-12-28 NOTE — ASSESSMENT & PLAN NOTE
55yo M with CHF and recent CVA (MCA) with right sided deficits and PAD s/p SFA and popliteal stents now occluded with distal recon on US. Vasc surg consulted for recs on ASA given high risk for hemorrhagic conversion.     - Intact PT and DP pulses on doppler   - Rec continuing statin    - Would start ASA 81mg daily when safe from a neurological perspective   - Further recs to follow

## 2021-12-28 NOTE — ASSESSMENT & PLAN NOTE
12/25 CTA chest -subseg PE; lactate 1.1; trop+; bnp ~2k; Sx tachypnea (on hepain drip for >36hrs)  -u/s ble -neg DVT  -patient off AC due to loss of insurance coverage   -On heparin gtt since 12/23 for cardiac thrombus, will continue  -Decreasing oxygen requirement (on RA 12/27); O2 sat goals >88%   -No O2 needs when awake, but still requiring 1L NC for sat goals when sleeping

## 2021-12-28 NOTE — HPI
Briefly, Mr. Leger is a 55 y/o M w/ PMHx active tobacco abuse, HFrEF (15-20%), admitted for L MCA stroke with LV thrombus and acute PE. Now with expressive aphasia and mild dysarthria with mild R facial droop and RUE weakness. He has a history of left SFA and PA stents for PAD. Overnight there was difficulty finding distal DP and PT pulses on the left and subsequent US showed occluded SFA and popliteal artery stents w/ 0.57 DRAKE. Vascular surgery is consulted for recs on ASA as vascular neurology assesses that his risk of hemorrhagic transformation is high and the stents are already fully occluded.

## 2021-12-28 NOTE — ASSESSMENT & PLAN NOTE
Small to moderate sized (1.6x1.1 cm) layered, protruding apical mass c/w thrombus.    -Noted on ECHO from 12/21  -Strict HR and BP control  -SBP goal <160  -lopressor started     12/23 Heparin gtt started, min intensity, no bolus  12/24 petechial hmg noted; subtherapeutic ptt  12/25: cth remains stable   12/26-12/28: NAEO, remains therapeutic

## 2021-12-28 NOTE — PROGRESS NOTES
[facility] - Neuro Critical Care  Vascular Neurology  Comprehensive Stroke Center  Progress Note    Assessment/Plan:     * Stroke due to embolism of left middle cerebral artery  55 yo male with PMHx of HTN and CAD who presents as a transfer from [facility]. No intervention was performed(no tPA as OOW, no IR per telestroke provider). CTH at OSH with large L MCA infarct. CTA with distal L M1 occlusion. He was admitted to hospital medicine at OSH and now transferred to [facility] [place] higher level of care. Repeat [facility](12/21) with large L MCA infarct. Echo at OSH with LV global hypokinesis, EF 15-20%, severe atrial enlargement and small to moderate size apical thrombus. Etiology: likely cardioembolic 2/2 low EF(15-20%) and cardiac thrombus.    Repeat CTH with petechial hemorrhage, antithrombotics temporarily held. Repeat CT with stable petechial hemorrhage, patient no longer on hemicrani watch, CTA chest with subsegmental PE. Heparin gtt now resumed - consider transition to oral anticoagulation from 12/30.    Antithrombotics for secondary stroke prevention:   Heparin gtt    Statins for secondary stroke prevention and hyperlipidemia, if present: Statins: Atorvastatin- 40 mg daily     Aggressive risk factor modification: HTN, CAD     Rehab efforts: The patient has been evaluated by a stroke team provider and the therapy needs have been fully considered based off the presenting complaints and exam findings. The following therapy evaluations are needed: PT/OT on board, SLP evaluate and treat, PM&R evaluate for appropriate placement. Recommend IPR.    Diagnostics ordered/pending: None     VTE prophylaxis: Mechanical prophylaxis: Place SCDs  None: Reason for No Pharmacological VTE Prophylaxis: Currently on anticoagulation    BP parameters: Infarct: SBP <180       Single subsegmental pulmonary embolism without acute cor pulmonale  Visualized on CTA chest 12/25  L apical segmental pulmonary artery embolism  US LE negative for DVT  Continue  heparin gtt    Debility  PT/OT to evaluate and treat  Therapy recommending rehab    Cytotoxic cerebral edema  Area of cytotoxic cerebral edema identified when reviewing brain imaging in the territory of the L middle cerebral artery. There is mass effect associated with it. Continue to monitor the patients clinical exam for any worsening of symptoms which may indicate expansion of the stroke or the area of the edema resulting in the clinical change. The pattern is suggestive of embolic etiology.    Clinical exam has remained stable, this suggests that cerebral edema has stabilized. Low probability of clinical deterioration. Neurosurgery signed off, patient no longer on hemicrani watch    Essential hypertension  Stroke RF  SBP<180    Grade III diastolic dysfunction  Newly diagnosed  EF 15-20%  Consider cardiology consult, will likely need GDMT + life vest given EF <35%.    Mural thrombus of cardiac apex  Seen on TTE at outside facility  -TTE 12/21/21: with EF of 15-20%, small to moderate sized (1.6x1.1 cm) layered, protruding apical mass c/w thrombus, global hypokinesis, severe atrial enlargement.  -on heparin gtt and temporarily held due to hemorrhagic conversion, now restarted  -continue heparin gtt till 12/30, ok to transition to oral anticoagulation at that time (consider Eliquis)       12/22 patient in ICU for hemicrani watch, NPO per SLP  12/23: Patient remains in NCC. Min intensity heparin gtt was initiated. Patient aphasic with RS plegia.   12/24-Patient with changes in CT with petechial hemorraghic conversion recommend stopping aspirin and holding heparin. Repeat imaging with worsening exam. On hemicrani watch.   12/27 RLE weakness improved on today's exam, patient with runs of vtach overnight on 12/24-12/25, CTA chest with subsegmental PE, patient restarted on heparin gtt, neurosurgery signed off and patient no longer on hemicrani watch, remains NPO per SLP, therapy recommending inpatient rehab  12/28  Continues on heparin gtt for PE, neuro exam stable with mild improvements, low suspicion for  new or worsening ICH.       STROKE DOCUMENTATION      NIH Scale:  1a. Level of Consciousness: 0-->Alert, keenly responsive  1b. LOC Questions: 0-->Answers both questions correctly  1c. LOC Commands: 1-->Performs one task correctly  2. Best Gaze: 1-->Partial gaze palsy, gaze is abnormal in one or both eyes, but forced deviation or total gaze paresis is not present  3. Visual: 2-->Complete hemianopia  4. Facial Palsy: 2-->Partial paralysis (total or near-total paralysis of lower face)  5a. Motor Arm, Left: 0-->No drift, limb holds 90 (or 45) degrees for full 10 secs  5b. Motor Arm, Right: 4-->No movement  6a. Motor Leg, Left: 0-->No drift, leg holds 30 degree position for full 5 secs  6b. Motor Leg, Right: 2-->Some effort against gravity, leg falls to bed by 5 secs, but has some effort against gravity  7. Limb Ataxia: 0-->Absent  8. Sensory: 0-->Normal, no sensory loss  9. Best Language: 2-->Severe aphasia, all communication is through fragmentary expression, great need for inference, questioning, and guessing by the listener. Range of information that can be exchanged is limited, listener carries burden of. . . (see row details)  10. Dysarthria: 2-->Severe dysarthria, patients speech is so slurred as to be unintelligible in the absence of or out of proportion to any dysphasia, or is mute/anarthric  11. Extinction and Inattention (formerly Neglect): 0-->No abnormality  Total (NIH Stroke Scale): 16       Modified Slick Score: 0  Brenden Coma Scale:    ABCD2 Score:    [id number] Score:   HAS -BLED Score:   ICH Score:   Hunt & Gillis Classification:      Hemorrhagic change of an Ischemic Stroke: Does this patient have an ischemic stroke with hemorrhagic changes? Yes, Grading Scale: HI Type 1 (HI-1) = small petechiae along the margins of the infarct. Is this a symptomatic change?  No - Hemorrhage is not clinically significant      Neurologic Chief Complaint: found down, nonverbal, RSW    Subjective:     Interval History: Patient is seen for follow-up neurological assessment and treatment recommendations: LOS. RLE weakness improved on today's exam. Continues on heparin gtt for subsegmental PE, remains NPO per SLP, PT/OT recommend IPR.    HPI, Past Medical, Family, and Social History remains the same as documented in the initial encounter.     Review of Systems   Constitutional: Positive for activity change and fatigue.   HENT: Positive for trouble swallowing. Negative for facial swelling.    Respiratory: Negative for cough and shortness of breath.    Neurological: Positive for speech difficulty and weakness.     Scheduled Meds:   atorvastatin  40 mg Oral Daily    EScitalopram oxalate  5 mg Per NG tube Daily    lisinopriL  5 mg Per NG tube Daily    metoprolol tartrate  25 mg Per NG tube BID    polyethylene glycol  17 g Per NG tube Daily    senna-docusate 8.6-50 mg  1 tablet Per NG tube BID    sodium chloride  2 g Oral TID     Continuous Infusions:   heparin (porcine) in D5W 20 Units/kg/hr (12/28/21 0355)     PRN Meds:acetaminophen, hydrALAZINE, labetalol, magnesium oxide, magnesium oxide, ondansetron, potassium bicarbonate, potassium bicarbonate, potassium bicarbonate, potassium, sodium phosphates, potassium, sodium phosphates, potassium, sodium phosphates, sodium chloride 0.9%    Objective:     Vital Signs (Most Recent):  Temp: 99 °F (37.2 °C) (12/28/21 0700)  Pulse: 98 (12/28/21 0900)  Resp: (!) 30 (12/28/21 0900)  BP: (!) 125/91 (12/28/21 0900)  SpO2: 98 % (12/28/21 0900)  BP Location: Left arm    Vital Signs Range (Last 24H):  Temp:  [97.8 °F (36.6 °C)-99.5 °F (37.5 °C)]   Pulse:  [77-98]   Resp:  [16-34]   BP: (122-162)/(78-95)   SpO2:  [92 %-100 %]   BP Location: Left arm    Physical Exam  Vitals reviewed.   Constitutional:       General: He is not in acute distress.     Appearance: He is well-developed.   HENT:      Head:  Normocephalic and atraumatic.   Cardiovascular:      Rate and Rhythm: Normal rate.   Pulmonary:      Effort: Pulmonary effort is normal. No respiratory distress.   Skin:     General: Skin is warm and dry.   Neurological:      Mental Status: He is alert.         Neurological Exam:   LOC: alert  Attention Span: Good   Language: Global aphasia, follows simple midline commands  Articulation: Untestable due to severe aphasia   Orientation: Untestable due to severe aphasia   Facial Movement (CN VII): Lower facial weakness on the Right  Motor: Arm left  Normal 5/5  Leg left  Normal 5/5  Arm right  Plegia 0/5  Leg right Plegia 3/5  Sensation: Intact to light touch, temperature and vibration  Tone: Flaccid  RUE     Laboratory:  BMP:   Recent Labs   Lab 12/27/21  0012      K 4.0   *   CO2 21*   BUN 20   CREATININE 0.7   CALCIUM 8.4*     CBC:   Recent Labs   Lab 12/27/21  0012   WBC 10.10   RBC 3.53*   HGB 12.2*   HCT 36.0*      *   MCH 34.6*   MCHC 33.9     Lipid Panel:   Recent Labs   Lab 12/21/21  1911   CHOL 162   LDLCALC 89.2   HDL 58   TRIG 74     Coagulation:   Recent Labs   Lab 12/23/21  1206 12/23/21  1822 12/28/21  0219   INR 1.0  --   --    APTT 24.9   < > 47.5*    < > = values in this interval not displayed.       Diagnostic Results     Brain Imaging   CTH 12/25/21  No significant change from prior.  Evolving large recent left MCA territory infarction with petechial hemorrhagic conversion.  No evidence for significant new hemorrhage or increased mass effect.        CTH 12/24/2021  Evolving large left MCA territory infarction with petechial hemorrhagic conversion.  There is intermediate density components in the left temporal lobe area of infarction suggestive for interval additional petechial hemorrhage.  No evidence for extra-axial hemorrhage or hydrocephalus.     MRI Brain WO. Date: 12/22/21    Large acute  left MCA distribution infarct with modest mass effect. Multiple small remote  scattered infarcts elsewhere as above.  Embolic disease to be considered.     CTH WO 12/21/21  Again demonstrated in better delineated as large left MCA distribution infarct with no associated acute hemorrhage and with localized mass effect resulting in effacement of overlying cortical sulci and partial effacement left sylvian fissure.  No associated acute hemorrhage.     Several small old areas of infarction again noted including cerebellum bilaterally, anterior left perisylvian region, lateral left frontal cortex and posterior right parietal cortex.     CTH WO 12/20/21   Large area loss of gray-white differentiation consistent with acute infarct left MCA distribution involving the left temporal lobe, temporal occipital region and portions of the basal ganglia with localized mass effect including effacement of overlying cortical sulci although with no associated acute hemorrhage.     Several small old areas of infarction are evident including anterior left perisylvian and cerebellum bilaterally. Mild underlying atrophy.        Vessel Imaging:  CTA H/N 12/20/21  Segmental occlusion measuring approximately 10 mm mid and distal M1 segment left MCA with diminished flow distal to the stenosis.     Right cervical carotid circulation--atherosclerotic calcification bifurcation without measurable stenosis.     Left cervical carotid circulation--atherosclerotic calcification distal left common carotid artery and bifurcation with stenosis proximal left ICA measuring 16% utilizing NASCET criteria.  Mild eccentric narrowing mid to distal portion left common carotid artery also noted.     Vertebral arteries--no focal arterial abnormality or measurable stenosis.     Cardiac Evaluation:   TTE with bubble 12/21/21  1. The left ventricle (LV) is dilated with severely depressed systolic function & global hypokinesis.  2. LV ejection fraction is 15-20%. Grade III LV diastolic dysfunction.  3. Small to moderate sized (1.6x1.1 cm)  layered, protruding apical mass c/w thrombus.  4. The right ventricle (RV) is severely enlarged with normal systolic function.   5. Estimated RVSP is moderately elevated (50-70 mmHg). Pulmonary hypertension is present.  6. Severe atrial enlargement.  7. Moderate eccentric mitral regrugitation.  8. Mild tricuspid regurgitation.         Claire Pineda MD  [facility]  Department of Vascular Neurology   [facility] - Neuro Critical Care

## 2021-12-28 NOTE — PHYSICIAN QUERY
PT Name: Jimmy Leger  MR #: [id number]     DOCUMENTATION CLARIFICATION     CDS/:  Frank Nettles RN, CDS                   Contact Information:  [email or web address]    This form is a permanent document in the medical record.    Query Date: December 28, 2021    By submitting this query, we are merely seeking further clarification of documentation.  Please utilize your independent clinical judgment when addressing the question(s) below.    The Medical Record contains the following:  Indicators Supporting Clinical Findings Location in Medical Record   X Decreased LOC, neurological deficits Mr. Leger is a 55 yo male on presentation to [facility], the patient was nonverbal to command, with right sided hemiparesis.    Alert, grunting  Severely dysarthric  Follows commands intermittently, difficult to identify if this is effort dependent or aphasia dependent   H&P 12/21   X [place]head Coma Scale (GCS) [id number] H&P 12/21      Traumatic Injury     X Acute/Chronic Conditions Stroke due to embolism of left middle cerebral artery  Large Left MCA stroke without intervention  Cytotoxic cerebral edema  Essential hypertension  Mural thrombus of cardiac apex  Grade III diastolic dysfunction  Absent pulse in one limb  Single subsegmental pulmonary embolism without acute cor pulmonale   NCC PN 12/27   X Radiology Large area loss of gray-white differentiation left MCA distribution involving the anterior and lateral aspect left temporal lobe and temporal occipital region as well as in portions of the left basal ganglia consistent with acute infarct.  No associated acute intracranial hemorrhage.  Localized mass effect with effacement of overlying cortical sulci.     Localized mass effect with partial compression of the left lateral ventricle.    Again demonstrated in better delineated as large left MCA distribution infarct with no associated acute hemorrhage and with localized mass effect resulting in effacement of overlying cortical  sulci and partial effacement left sylvian fissure   CT 12/20                    CT 12/24      CT 12/21   X Treatment (i.e. IV Steroids, Mannitol, Surgery) Na 145-155 strict, reccomend hypertonic saline PRN per primary team to reach goal      -Rec mannitol per primary team for serum osm goal <320 if Na goal reached and persistent swelling present  --Low threshold for Keppra; no concern for seizure at this time  --HOB >30   NSGY 12/21   X Other NIH 16    NIH 20  H&P 12/21    [facility] Neuro 12/22       Provider, please specify diagnosis or diagnoses associated with above clinical findings.     [  X ] Cerebral Compression     [   ] Other neurological diagnosis (please specify):________     [   ] Clinically Undetermined         Please document in your progress notes daily for the duration of treatment until resolved and include in your discharge summary.

## 2021-12-28 NOTE — PROGRESS NOTES
[facility] - Neuro Critical Care  Neurocritical Care  Progress Note    Admit Date: 12/21/2021  Service Date: 12/28/2021  Length of Stay: 7    Subjective:     Chief Complaint: Stroke due to embolism of left middle cerebral artery    History of Present Illness: Mr. Leger is a 55 yo male with unknown past medical history who presents to [facility] with a L MCA CVA without intervention. He originally presented to [facility] by EMS on 12/20 after being found down by a coworker. LKW uncertain. 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 The coworker checked on the patient the following day, and he was found down with only a shirt, which lead to EMS being called. On presentation to [facility], the patient was nonverbal to command, with right sided hemiparesis. Stroke protocol initiated with Tele stoke evaluation, and the patient was not deemed a candidate for any acute intervention. CT at [facility] with large left MCA distribution infarct with no associated acute hemorrhage  CTA at [facility] significant for segmental occlusion measuring approximately 10 mm mid and distal M1 segment left MCA with diminished flow distal to the stenosis. He has not yet had an MRI. Of note, ECHO at [facility] is significant for an EF of 15-20% with an apical mass consistent with a thrombus. He was started on a statin and DAPT at [facility] but never appeared to have enteral access so was only given ASA suppositories, per chart review. He was transferred to [facility] on 12/21.    He will be admitted to [facility] for hourly neuromonitoring and a higher level of care.          Hospital Course: 12/22/2021NAEO, stroke day 3. NSGY following, stroke team following. Continue to monitor neuro exam closely. Strict BP and HR control since patient with LV thrombus, cannot safely AC yet 2/2 to acute large territory stroke   12/23/2021 NAEON. Heparin gtt started for LV thrombus, cont som-crani  watch.   12/24/2021 exam improving; 24hrs CTH demonstrates petechial hmg will repeat and hold heparin drip if hmg increases otherwise will continue; DHC watch; Na goal 140-150; sbp 100-140  12/25/2021 Vtach runs o/n; tachypnea; CTA chest +subseq PE: continue heparin drip; CTH remains stable   12/26/2021 heparin drip: LV apical thrombus (known) +PE (new subsegmental); sbp 100-160; start SSRI; tachypnea    12/27/2021: Left dorsal pedal pulse not located w/ dopplar uncertain of onset. US of LE.   12/28/2021: Naeo. LE US w/ severe PAD and occluded stents. Vascular Surgery consulted, possible step down to Vascular Neurology.       Interval History:  Naeo. LE US w/ severe PAD and occluded stent in distal segment of left superficial femoral and distal segment of left popliteal. Vascular Surgery consulted, possible step down to Vascular Neurology.    Review of Systems   Constitutional: Negative for chills and fatigue.   HENT: Positive for trouble swallowing (Improving). Negative for facial swelling.    Respiratory: Negative for cough and shortness of breath.    Neurological: Positive for speech difficulty and weakness.   All other systems reviewed and are negative.    Objective:     Vitals:  Temp: 98.9 °F (37.2 °C)  Pulse: 84  Rhythm: normal sinus rhythm  BP: 131/87  MAP (mmHg): 104  Resp: (!) 28  SpO2: 100 %  O2 Device (Oxygen Therapy): room air    Temp  Min: 97.8 °F (36.6 °C)  Max: 99.5 °F (37.5 °C)  Pulse  Min: 77  Max: 98  BP  Min: 122/90  Max: 162/95  MAP (mmHg)  Min: 98  Max: 122  Resp  Min: 16  Max: 34  SpO2  Min: 92 %  Max: 100 %    12/27 0701 - 12/28 0700  In: 773.1 [I.V.:383.1]  Out: 1075 [Urine:1075]   Unmeasured Output  Urine Occurrence: 1  Stool Occurrence: 0  Pad Count: 3       Physical Exam  Vitals and nursing note reviewed.   Constitutional:       General: He is not in acute distress.     Appearance: Normal appearance. He is normal weight. He is not ill-appearing, toxic-appearing or diaphoretic.   HENT:       Right Ear: External ear normal.      Left Ear: External ear normal.      Nose: Nose normal.   Eyes:      Conjunctiva/sclera: Conjunctivae normal.   Cardiovascular:      Rate and Rhythm: Normal rate.      Pulses:           Dorsalis pedis pulses are 2+ on the right side and 0 on the left side.      Comments: L DP pulse undetected w/ doppler   Pulmonary:      Effort: Pulmonary effort is normal. No respiratory distress.      Comments: Sat >90% on RA.   Skin:     Comments: Bilateral lower extremities warm to touch.     L DP pulses detected on doppler.      Neurological:      Comments: E4 V2 M6     Patient awake and alert. Responds to questions at appropriate time, but dysarthric and expressive > receptive aphasia. Not following commands consistently or mimicking, but will follow repeated commands to move lower extremities.     PERRL 3mm. Left gaze preference, but able to cross midline easily.     Motor:   Moves L side spontaneously  LUE: 5/5  LLE: 5/5  RUE: 0/5  RLE: 3/5     Sensory:  LUE, LLE intact  RUE: no response to pain  RLE intact to pain.          Medications:  Continuousheparin (porcine) in D5W, Last Rate: 20 Units/kg/hr (12/28/21 0355)    Scheduledatorvastatin, 40 mg, Daily  [START ON 12/29/2021] EScitalopram oxalate, 5 mg, Daily  [START ON 12/29/2021] lisinopriL, 5 mg, Daily  metoprolol tartrate, 25 mg, BID  [START ON 12/29/2021] polyethylene glycol, 17 g, Daily  senna-docusate 8.6-50 mg, 1 tablet, BID  sodium chloride, 2 g, TID    PRNacetaminophen, 650 mg, Q6H PRN  hydrALAZINE, 10 mg, Q6H PRN  labetalol, 10 mg, Q6H PRN  magnesium oxide, 800 mg, PRN  magnesium oxide, 800 mg, PRN  ondansetron, 4 mg, Q8H PRN  potassium bicarbonate, 35 mEq, PRN  potassium bicarbonate, 50 mEq, PRN  potassium bicarbonate, 60 mEq, PRN  potassium, sodium phosphates, 2 packet, PRN  potassium, sodium phosphates, 2 packet, PRN  potassium, sodium phosphates, 2 packet, PRN  sodium chloride 0.9%, 10 mL, PRN      Today I personally reviewed  pertinent medications, lines/drains/airways, imaging, cardiology results, laboratory results, microbiology results, notably: US     Diet  Diet Dysphagia Pureed (IDDSI Level 4) [facility]; [place] Thick  Diet Dysphagia Pureed (IDDSI Level 4) [facility]; [place] Thick    Assessment/Plan:     Neuro  * Stroke due to embolism of left middle cerebral artery  Large Left MCA stroke without intervention    -Admit to NCC, VN following  -NSGY on board for hemicrani watch  -q1h neuro checks, vital checks  -EKG, ECHO, CXR done  -A1c, TSH, lipid panel, coag panel done   -Daily CBC, CMP, mag, phos  -SBP < 160, prn labetalol and hydralazine  -Daily Statin, ASA  -SCDs, SQH  -CT large left MCA distribution infarct with no associated acute hemorrhage  -CTA segmental occlusion measuring approximately 10 mm mid and distal M1 segment left MCA with diminished flow distal to the stenosis.  -PT/OT/SLP  -MRI brain -Large acute  left MCA distribution infarct with modest mass effect.   Multiple small remote scattered infarcts elsewhere as above.  Embolic disease: LV apical thrombus (known hx)  -Likely step down to Vascular Neurology           Aphasia  -Expressive > receptive w/ dysarthria   -Passed SLP eval and handling pureed diet well.   -improved toleration of nectar thick liquids today     Cytotoxic cerebral edema  -secondary to primary problem    Cardiac/Vascular  Essential hypertension  -SBP < 160  -Prn labetalol and hydralazine      Mural thrombus of cardiac apex  Small to moderate sized (1.6x1.1 cm) layered, protruding apical mass c/w thrombus.    -Noted on ECHO from 12/21  -Strict HR and BP control  -SBP goal <160  -lopressor started     12/23 Heparin gtt started, min intensity, no bolus  12/24 petechial hmg noted; subtherapeutic ptt  12/25: cth remains stable   12/26-12/28: NAEO, remains therapeutic     Other  Absent pulse in one limb  -L dorsal pedal pulse not able to be detected on doppler overnight, unsure of  onset  -bilateral lower extremities mildly cool and equal  -Arterial US of Lower extremity 12/27   · Left DRAKE 0.57, is suggestive of severe left lower extremity arterial disease.  · The left superficial femoral artery is occluded in the proximal and mid segment. There is a occluded stent in the distal segment of the artery.  · The left popliteal artery is occluded throughout most of its course, with a short segment of reconstitution distally.  · The left popliteal artery has an occluded stent in the distal segment. There Is reconstitution of flow distal to the occluded stent, with monophasic waveforms throughout.    -Vascular Surgery consulted for occluded stents    Single subsegmental pulmonary embolism without acute cor pulmonale  12/25 CTA chest -subseg PE; lactate 1.1; trop+; bnp ~2k; Sx tachypnea (on hepain drip for >36hrs)  -u/s ble -neg DVT  -patient off AC due to loss of insurance coverage   -On heparin gtt since 12/23 for cardiac thrombus, will continue  -Decreasing oxygen requirement (on RA 12/27); O2 sat goals >88%   -No O2 needs when awake, but still requiring 1L NC for sat goals when sleeping    Grade III diastolic dysfunction  1. The left ventricle (LV) is dilated with severely depressed systolic function & global hypokinesis.  2. LV ejection fraction is 15-20%. Grade III LV diastolic dysfunction.  3. Small to moderate sized (1.6x1.1 cm) layered, protruding apical mass c/w thrombus.  4. The right ventricle (RV) is severely enlarged with normal systolic function.   5. Estimated RVSP is moderately elevated (50-70 mmHg). Pulmonary hypertension is present.  6. Severe atrial enlargement.  7. Moderate eccentric mitral regrugitation.  8. Mild tricuspid regurgitation.     ECHO 12/21  -monitor I/Os    -resume ACEi            The patient is being Prophylaxed for:  Venous Thromboembolism with: Mechanical or Chemical  Stress Ulcer with: H2B  Ventilator Pneumonia with: not applicable    Activity Orders           Diet Dysphagia Pureed (IDDSI Level 4) [facility]; [place] Thick: Dysphagia 1 (Pureed) starting at 12/27 0955    Straight Cath starting at 12/22 1206    Turn patient starting at 12/21 1800    Elevate HOB starting at 12/21 1717        Full Code    Level III      Margie Burch PA-C  Neurocritical Care  [facility] Neuro Critical Care

## 2021-12-28 NOTE — ASSESSMENT & PLAN NOTE
55 yo male with PMHx of HTN and CAD who presents as a transfer from [facility]. No intervention was performed(no tPA as OOW, no IR per telestroke provider). CTH at OSH with large L MCA infarct. CTA with distal L M1 occlusion. He was admitted to hospital medicine at OSH and now transferred to [facility] for higher level of care. Repeat NCCTH(12/21) with large L MCA infarct. Echo at OSH with LV global hypokinesis, EF 15-20%, severe atrial enlargement and small to moderate size apical thrombus. Etiology: likely cardioembolic 2/2 low EF(15-20%) and cardiac thrombus. Repeat CTH with petechial hemorrhage, antithrombotics temporarily held. Repeat CT with stable petechial hemorrhage, patient no longer on hemicrani watch, CTA chest with subsegmental PE. Heparin gtt resumed, can consider transitioning to oral anticoagulation over the weekend with prior CTH.      Antithrombotics for secondary stroke prevention:   Heparin gtt    Statins for secondary stroke prevention and hyperlipidemia, if present: Statins: Atorvastatin- 40 mg daily     Aggressive risk factor modification: HTN, CAD     Rehab efforts: The patient has been evaluated by a stroke team provider and the therapy needs have been fully considered based off the presenting complaints and exam findings. The following therapy evaluations are needed: PT/OT on board, SLP evaluate and treat, PM&R evaluate for appropriate placement. Recommend IPR.    Diagnostics ordered/pending: None     VTE prophylaxis: Mechanical prophylaxis: Place SCDs  None: Reason for No Pharmacological VTE Prophylaxis: Currently on anticoagulation    BP parameters: Infarct: SBP <180

## 2021-12-28 NOTE — SUBJECTIVE & OBJECTIVE
Interval History:  Naeo. LE US w/ severe PAD and occluded stent in distal segment of left superficial femoral and distal segment of left popliteal. Vascular Surgery consulted, possible step down to Vascular Neurology.    Review of Systems   Constitutional: Negative for chills and fatigue.   HENT: Positive for trouble swallowing (Improving). Negative for facial swelling.    Respiratory: Negative for cough and shortness of breath.    Neurological: Positive for speech difficulty and weakness.   All other systems reviewed and are negative.    Objective:     Vitals:  Temp: 98.9 °F (37.2 °C)  Pulse: 84  Rhythm: normal sinus rhythm  BP: 131/87  MAP (mmHg): 104  Resp: (!) 28  SpO2: 100 %  O2 Device (Oxygen Therapy): room air    Temp  Min: 97.8 °F (36.6 °C)  Max: 99.5 °F (37.5 °C)  Pulse  Min: 77  Max: 98  BP  Min: 122/90  Max: 162/95  MAP (mmHg)  Min: 98  Max: 122  Resp  Min: 16  Max: 34  SpO2  Min: 92 %  Max: 100 %    12/27 0701 - 12/28 0700  In: 773.1 [I.V.:383.1]  Out: 1075 [Urine:1075]   Unmeasured Output  Urine Occurrence: 1  Stool Occurrence: 0  Pad Count: 3       Physical Exam  Vitals and nursing note reviewed.   Constitutional:       General: He is not in acute distress.     Appearance: Normal appearance. He is normal weight. He is not ill-appearing, toxic-appearing or diaphoretic.   HENT:      Right Ear: External ear normal.      Left Ear: External ear normal.      Nose: Nose normal.   Eyes:      Conjunctiva/sclera: Conjunctivae normal.   Cardiovascular:      Rate and Rhythm: Normal rate.      Pulses:           Dorsalis pedis pulses are 2+ on the right side and 0 on the left side.      Comments: L DP pulse undetected w/ doppler   Pulmonary:      Effort: Pulmonary effort is normal. No respiratory distress.      Comments: Sat >90% on RA.   Skin:     Comments: Bilateral lower extremities warm to touch.     L DP pulses detected on doppler.      Neurological:      Comments: E4 V2 M6     Patient awake and alert. Responds  to questions at appropriate time, but dysarthric and expressive > receptive aphasia. Not following commands consistently or mimicking, but will follow repeated commands to move lower extremities.     PERRL 3mm. Left gaze preference, but able to cross midline easily.     Motor:   Moves L side spontaneously  LUE: 5/5  LLE: 5/5  RUE: 0/5  RLE: 3/5     Sensory:  LUE, LLE intact  RUE: no response to pain  RLE intact to pain.          Medications:  Continuousheparin (porcine) in D5W, Last Rate: 20 Units/kg/hr (12/28/21 0355)    Scheduledatorvastatin, 40 mg, Daily  [START ON 12/29/2021] EScitalopram oxalate, 5 mg, Daily  [START ON 12/29/2021] lisinopriL, 5 mg, Daily  metoprolol tartrate, 25 mg, BID  [START ON 12/29/2021] polyethylene glycol, 17 g, Daily  senna-docusate 8.6-50 mg, 1 tablet, BID  sodium chloride, 2 g, TID    PRNacetaminophen, 650 mg, Q6H PRN  hydrALAZINE, 10 mg, Q6H PRN  labetalol, 10 mg, Q6H PRN  magnesium oxide, 800 mg, PRN  magnesium oxide, 800 mg, PRN  ondansetron, 4 mg, Q8H PRN  potassium bicarbonate, 35 mEq, PRN  potassium bicarbonate, 50 mEq, PRN  potassium bicarbonate, 60 mEq, PRN  potassium, sodium phosphates, 2 packet, PRN  potassium, sodium phosphates, 2 packet, PRN  potassium, sodium phosphates, 2 packet, PRN  sodium chloride 0.9%, 10 mL, PRN      Today I personally reviewed pertinent medications, lines/drains/airways, imaging, cardiology results, laboratory results, microbiology results, notably: US     Diet  Diet Dysphagia Pureed (IDDSI Level 4) [facility]; [place] Thick  Diet Dysphagia Pureed (IDDSI Level 4) [facility]; [place] Thick

## 2021-12-28 NOTE — ASSESSMENT & PLAN NOTE
Newly diagnosed  EF 15-20%  Consider cardiology consult, will likely need GDMT + life vest given EF <35%.

## 2021-12-28 NOTE — SUBJECTIVE & OBJECTIVE
Neurologic Chief Complaint: found down, nonverbal, RSW    Subjective:     Interval History: Patient is seen for follow-up neurological assessment and treatment recommendations: LOS. RLE weakness improved on today's exam. Continues on heparin gtt for subsegmental PE, remains NPO per SLP, PT/OT recommend IPR.    HPI, Past Medical, Family, and Social History remains the same as documented in the initial encounter.     Review of Systems   Constitutional: Positive for activity change and fatigue.   HENT: Positive for trouble swallowing. Negative for facial swelling.    Respiratory: Negative for cough and shortness of breath.    Neurological: Positive for speech difficulty and weakness.     Scheduled Meds:   atorvastatin  40 mg Oral Daily    EScitalopram oxalate  5 mg Per NG tube Daily    lisinopriL  5 mg Per NG tube Daily    metoprolol tartrate  25 mg Per NG tube BID    polyethylene glycol  17 g Per NG tube Daily    senna-docusate 8.6-50 mg  1 tablet Per NG tube BID    sodium chloride  2 g Oral TID     Continuous Infusions:   heparin (porcine) in D5W 20 Units/kg/hr (12/28/21 0355)     PRN Meds:acetaminophen, hydrALAZINE, labetalol, magnesium oxide, magnesium oxide, ondansetron, potassium bicarbonate, potassium bicarbonate, potassium bicarbonate, potassium, sodium phosphates, potassium, sodium phosphates, potassium, sodium phosphates, sodium chloride 0.9%    Objective:     Vital Signs (Most Recent):  Temp: 99 °F (37.2 °C) (12/28/21 0700)  Pulse: 98 (12/28/21 0900)  Resp: (!) 30 (12/28/21 0900)  BP: (!) 125/91 (12/28/21 0900)  SpO2: 98 % (12/28/21 0900)  BP Location: Left arm    Vital Signs Range (Last 24H):  Temp:  [97.8 °F (36.6 °C)-99.5 °F (37.5 °C)]   Pulse:  [77-98]   Resp:  [16-34]   BP: (122-162)/(78-95)   SpO2:  [92 %-100 %]   BP Location: Left arm    Physical Exam  Vitals reviewed.   Constitutional:       General: He is not in acute distress.     Appearance: He is well-developed.   HENT:      Head:  Normocephalic and atraumatic.   Cardiovascular:      Rate and Rhythm: Normal rate.   Pulmonary:      Effort: Pulmonary effort is normal. No respiratory distress.   Skin:     General: Skin is warm and dry.   Neurological:      Mental Status: He is alert.         Neurological Exam:   LOC: alert  Attention Span: Good   Language: Global aphasia, follows simple midline commands  Articulation: Untestable due to severe aphasia   Orientation: Untestable due to severe aphasia   Facial Movement (CN VII): Lower facial weakness on the Right  Motor: Arm left  Normal 5/5  Leg left  Normal 5/5  Arm right  Plegia 0/5  Leg right Plegia 3/5  Sensation: Intact to light touch, temperature and vibration  Tone: Flaccid  RUE     Laboratory:  BMP:   Recent Labs   Lab 12/27/21  0012      K 4.0   *   CO2 21*   BUN 20   CREATININE 0.7   CALCIUM 8.4*     CBC:   Recent Labs   Lab 12/27/21  0012   WBC 10.10   RBC 3.53*   HGB 12.2*   HCT 36.0*      *   MCH 34.6*   MCHC 33.9     Lipid Panel:   Recent Labs   Lab 12/21/21  1911   CHOL 162   LDLCALC 89.2   HDL 58   TRIG 74     Coagulation:   Recent Labs   Lab 12/23/21  1206 12/23/21  1822 12/28/21  0219   INR 1.0  --   --    APTT 24.9   < > 47.5*    < > = values in this interval not displayed.       Diagnostic Results     Brain Imaging   CTH 12/25/21  No significant change from prior.  Evolving large recent left MCA territory infarction with petechial hemorrhagic conversion.  No evidence for significant new hemorrhage or increased mass effect.        CTH 12/24/2021  Evolving large left MCA territory infarction with petechial hemorrhagic conversion.  There is intermediate density components in the left temporal lobe area of infarction suggestive for interval additional petechial hemorrhage.  No evidence for extra-axial hemorrhage or hydrocephalus.     MRI Brain WO. Date: 12/22/21    Large acute  left MCA distribution infarct with modest mass effect. Multiple small remote  scattered infarcts elsewhere as above.  Embolic disease to be considered.     CTH WO 12/21/21  Again demonstrated in better delineated as large left MCA distribution infarct with no associated acute hemorrhage and with localized mass effect resulting in effacement of overlying cortical sulci and partial effacement left sylvian fissure.  No associated acute hemorrhage.     Several small old areas of infarction again noted including cerebellum bilaterally, anterior left perisylvian region, lateral left frontal cortex and posterior right parietal cortex.     CTH WO 12/20/21   Large area loss of gray-white differentiation consistent with acute infarct left MCA distribution involving the left temporal lobe, temporal occipital region and portions of the basal ganglia with localized mass effect including effacement of overlying cortical sulci although with no associated acute hemorrhage.     Several small old areas of infarction are evident including anterior left perisylvian and cerebellum bilaterally. Mild underlying atrophy.        Vessel Imaging:  CTA H/N 12/20/21  Segmental occlusion measuring approximately 10 mm mid and distal M1 segment left MCA with diminished flow distal to the stenosis.     Right cervical carotid circulation--atherosclerotic calcification bifurcation without measurable stenosis.     Left cervical carotid circulation--atherosclerotic calcification distal left common carotid artery and bifurcation with stenosis proximal left ICA measuring 16% utilizing NASCET criteria.  Mild eccentric narrowing mid to distal portion left common carotid artery also noted.     Vertebral arteries--no focal arterial abnormality or measurable stenosis.     Cardiac Evaluation:   TTE with bubble 12/21/21  1. The left ventricle (LV) is dilated with severely depressed systolic function & global hypokinesis.  2. LV ejection fraction is 15-20%. Grade III LV diastolic dysfunction.  3. Small to moderate sized (1.6x1.1 cm)  layered, protruding apical mass c/w thrombus.  4. The right ventricle (RV) is severely enlarged with normal systolic function.   5. Estimated RVSP is moderately elevated (50-70 mmHg). Pulmonary hypertension is present.  6. Severe atrial enlargement.  7. Moderate eccentric mitral regrugitation.  8. Mild tricuspid regurgitation.

## 2021-12-28 NOTE — CONSULTS
[facility]  Vascular Surgery  Consult Note    Inpatient consult to Vascular Surgery  Consult performed by: Saran Albarran MD  Consult ordered by: Margie Burch PA-C        Subjective:     Chief Complaint/Reason for Admission: MCA stroke     History of Present Illness:  Mr. Lgeer is a 55 y/o M w/ PMHx active tobacco abuse, HFrEF (15-20%), admitted for L MCA stroke with LV thrombus and acute PE. Now with expressive aphasia and mild dysarthria with mild R facial droop and RUE weakness. He has a history of left SFA and popliteal stents for PAD. There has been some difficulty finding DP and PT pulses on the left and subsequent US showed occluded SFA and popliteal artery stents w/ 0.57 DRAKE. Vascular surgery is consulted for recs on ASA as neurology assesses that his risk of hemorrhagic transformation is high and the stents are already fully occluded.            No medications prior to admission.       Review of patient's allergies indicates:  No Known Allergies    Past Medical History:   Diagnosis Date    Coronary artery disease     Hypertension      No past surgical history on file.  Family History    None       Tobacco Use    Smoking status: Light Tobacco Smoker    Smokeless tobacco: Not on file   Substance and Sexual Activity    Alcohol use: Not Currently    Drug use: Not on file    Sexual activity: Not on file     Review of Systems   Unable to perform ROS: Patient unable to communicate effectively    Objective:     Vital Signs (Most Recent):  Temp: 98.9 °F (37.2 °C) (12/28/21 1100)  Pulse: 84 (12/28/21 1100)  Resp: (!) 28 (12/28/21 1100)  BP: 131/87 (12/28/21 1100)  SpO2: 100 % (12/28/21 1100) Vital Signs (24h Range):  Temp:  [97.8 °F (36.6 °C)-99.5 °F (37.5 °C)] 98.9 °F (37.2 °C)  Pulse:  [77-98] 84  Resp:  [16-34] 28  SpO2:  [92 %-100 %] 100 %  BP: (122-162)/() 131/87     Weight: 76.1 kg (167 lb 12.3 oz)  Body mass index is 23.4 kg/m².    Physical Exam  Vitals and nursing note  reviewed.   Constitutional:       Appearance: Normal appearance.   HENT:      Head: Normocephalic and atraumatic.      Nose: Nose normal.      Mouth/Throat:      Mouth: Mucous membranes are moist.   Eyes:      Extraocular Movements: Extraocular movements intact.      Conjunctiva/sclera: Conjunctivae normal.   Cardiovascular:      Pulses: Normal pulses.      Comments: Biphasic RLE DP and PT pulses, Monophasic L DP, triphasic L PT, 2+ femoral pulses.  Pulmonary:      Effort: Pulmonary effort is normal.   Abdominal:      General: Abdomen is flat.      Palpations: Abdomen is soft.   Musculoskeletal:         General: No swelling, tenderness or signs of injury.   Skin:     General: Skin is warm and dry.   Neurological:      Mental Status: He is alert.      Comments: Right facial droop, RUE weakness.          Significant Labs:  All pertinent labs from the last 24 hours have been reviewed.    Significant Diagnostics:  I have reviewed all pertinent imaging results/findings within the past 24 hours.    Assessment/Plan:     PAD (peripheral artery disease)  57yo M with CHF and recent CVA (MCA) with right sided deficits and PAD s/p SFA and popliteal stents now occluded with distal recon on US. Vasc surg consulted for recs on ASA given high risk for hemorrhagic conversion.     - Intact PT and DP pulses on doppler   - Rec continuing statin    - Would start ASA 81mg daily when safe from a neurological perspective   - Further recs to follow         Saran Albarran MD  Vascular Surgery  [facility]

## 2021-12-28 NOTE — PLAN OF CARE
[facility] Care Plan    POC reviewed with Jimmy Leger and family at 1400. Pt unable to verbalized understanding due to aphagia. NGT Dc'd and pt tolerating pureed diet with nectar thick liquids well. Finishes his plate. Condom cath remain in place. BM today. Zhao has seen pt for ecreaed spulses in left lower and also consulted cardiology for decreased EF. Pt has orders to transfer to NPU. Pt worked with speech today. Two PIVs in bilateral FA remain in place. Heparin gtt running at 20 units and has been theraputic. SBP less then 160 has not been an issues. Questions and concerns addressed. No acute events today. Pt bathed and linens changed. Pt progressing toward goals. Will continue to monitor. See below and flowsheets for full assessment and VS info.       Neuro:  [place] Coma Scale  Best Eye Response: 4-->(E4) spontaneous  Best Motor Response: 6-->(M6) obeys commands  Best Verbal Response: 2-->(V2) incomprehensible speech  [place] Coma Scale Score: 12  Assessment Qualifiers: patient not sedated/intubated  Pupil PERRLA: yes     24 hr Temp:  [97 °F (36.1 °C)-99.5 °F (37.5 °C)]     CV:   Rhythm: normal sinus rhythm  BP goals:   SBP < 160  MAP > 65    Resp:   O2 Device (Oxygen Therapy): room air       Plan: N/A    GI/:  FARZANA Total Score: 0  Diet/Nutrition Received: mechanical/dental soft  Last Bowel Movement: 12/28/21  Voiding Characteristics: external catheter    Intake/Output Summary (Last 24 hours) at 12/28/2021 1622  Last data filed at 12/28/2021 1600  Gross per 24 hour   Intake 1086.51 ml   Output 725 ml   Net 361.51 ml     Unmeasured Output  Urine Occurrence: 500  Stool Occurrence: 0  Pad Count: 3    Labs/Accuchecks:  Recent Labs   Lab 12/27/21  0012   WBC 10.10   RBC 3.53*   HGB 12.2*   HCT 36.0*         Recent Labs   Lab 12/27/21  0012      K 4.0   CO2 21*   *   BUN 20   CREATININE 0.7   ALKPHOS 48*   ALT 22   AST 21   BILITOT 0.8      Recent Labs   Lab 12/23/21  1206 12/23/21  1822  12/28/21  0219   INR 1.0  --   --    APTT 24.9   < > 47.5*    < > = values in this interval not displayed.      Recent Labs   Lab 12/22/21  0301 12/25/21  1039 12/26/21  0610   *  --   --    TROPONINI  --    < > 0.060*    < > = values in this interval not displayed.       Electrolytes: N/A - electrolytes WDL  Accuchecks: ACHS    Gtts:   heparin (porcine) in D5W 20 Units/kg/hr (12/28/21 0355)       LDA/Wounds:  Lines/Drains/Airways       Drain              Male External Urinary Catheter 12/22/21 2000 5 days              Peripheral Intravenous Line                   Peripheral IV - Single Lumen 12/25/21 2204 20 G Anterior;Right Forearm 2 days         Peripheral IV - Single Lumen 12/26/21 2200 20 G Anterior;Left Forearm 1 day                  Wounds: No  Wound care consulted: Yes   Problem: Adult Inpatient Plan of Care  Goal: Plan of Care Review  Outcome: Ongoing, Progressing  Goal: Patient-Specific Goal (Individualized)  Description: Admit Date: (Not on file)    <principal problem not specified>    Past Medical History:  No date: Coronary artery disease  No date: Hypertension    No past surgical history on file.    Individualization:   1. Pt likes dim lights.    Restraints: na             Outcome: Ongoing, Progressing  Goal: Absence of Hospital-Acquired Illness or Injury  Outcome: Ongoing, Progressing  Goal: Optimal Comfort and Wellbeing  Outcome: Ongoing, Progressing  Goal: Readiness for Transition of Care  Outcome: Ongoing, Progressing     Problem: Adjustment to Illness (Stroke, Ischemic/Transient Ischemic Attack)  Goal: Optimal Coping  Outcome: Ongoing, Progressing     Problem: Bowel Elimination Impaired (Stroke, Ischemic/Transient Ischemic Attack)  Goal: Effective Bowel Elimination  Outcome: Ongoing, Progressing     Problem: Cognitive Impairment (Stroke, Ischemic/Transient Ischemic Attack)  Goal: Optimal Cognitive Function  Outcome: Ongoing, Progressing     Problem: Respiratory Compromise (Stroke,  Ischemic/Transient Ischemic Attack)  Goal: Effective Oxygenation and Ventilation  Outcome: Ongoing, Progressing     Problem: Adjustment to Illness (Delirium)  Goal: Optimal Coping  Outcome: Ongoing, Progressing     Problem: Restraint, Nonbehavioral (Nonviolent)  Goal: Absence of Harm or Injury  Outcome: Met     Problem: Adjustment to Illness (Stroke, Hemorrhagic)  Goal: Optimal Coping  Outcome: Ongoing, Progressing     Problem: Pain (Stroke, Hemorrhagic)  Goal: Acceptable Pain Control  Outcome: Ongoing, Progressing     Problem: Swallowing Impairment (Stroke, Hemorrhagic)  Goal: Optimal Eating and Swallowing Without Aspiration  Outcome: Ongoing, Progressing     Problem: Urinary Elimination Impaired (Stroke, Hemorrhagic)  Goal: Effective Urinary Elimination  Outcome: Ongoing, Progressing

## 2021-12-28 NOTE — PROGRESS NOTES
Anticoagulation Stewardship Note Template    Indication:Ischemic CVA embolic due to atrial fibrillation    Intended duration of therapy: Indefinite barring significant improvement in EF    Current anticoagulation;   Anticoagulants     Ordered     Route Frequency Start Stop    12/23/21 1136  heparin (porcine) in 5 % dex  (MINIMAL INTENSITY heparin infusion nomogram - [facility] (Recommended Indications: Acute neurologic injury, LVAD, and other indications where anticoagulation is desired, but bleeding risk is a concern, and/or thrombolytics have been used))         IV Continuous 12/23/21 1145 --          Current antiplatelets; None - vascular surgery consulted regarding severe PAD w/ occluded stents x2 in LLE, recommending starting ASA 81 mg qday when safe from neuro perspective. No acute need for antiplatelets.    Current NSAIDS- None     Contraindications: Large ischemic stroke (>1/3 of MCA territory)    Insurance coverage: Payor: MEDICAID / Plan: PENDING MEDICAID / Product Type: Government /      Patient and Caregiver education and understanding; unable to assess patient w/ mixed expressive and receptive aphasia    Transition plan- [facility] transition plan: transition to oral anticoagulant- per vascular neurology    Follow up for anticoagulation arranged- No follow-ups on file.    Primary care physician- Primary Doctor No; Is [facility]? N/A    Arti Worthington MD, MPH  Neurocritical Care Physician

## 2021-12-28 NOTE — PLAN OF CARE
[facility] Care Plan    POC reviewed with Jimmy Leger and family at 0300. Pt unable to verbalize understanding. Questions and concerns addressed. No acute events overnight. Pt progressing toward goals. Will continue to monitor. See below and flowsheets for full assessment and VS info.     Heparin gtt at 20. Therapeutic.      Neuro:  [place] Coma Scale  Best Eye Response: 4-->(E4) spontaneous  Best Motor Response: 6-->(M6) obeys commands  Best Verbal Response: 2-->(V2) incomprehensible speech  [place] Coma Scale Score: 12  Assessment Qualifiers: patient not sedated/intubated,no eye obstruction present  Pupil PERRLA: yes     24hr Temp:  [97.8 °F (36.6 °C)-99.6 °F (37.6 °C)]     CV:   Rhythm: normal sinus rhythm  BP goals:   SBP < 160  MAP > 65    Resp:   O2 Device (Oxygen Therapy): nasal cannula       Plan: N/A    GI/:  FARZANA Total Score: 0  Diet/Nutrition Received: mechanical/dental soft  Last Bowel Movement: 12/26/21  Voiding Characteristics: external catheter    Intake/Output Summary (Last 24 hours) at 12/28/2021 0644  Last data filed at 12/28/2021 0605  Gross per 24 hour   Intake 759 ml   Output 1025 ml   Net -266 ml     Unmeasured Output  Urine Occurrence: 1  Stool Occurrence: 0  Pad Count: 3    Labs/Accuchecks:  Recent Labs   Lab 12/27/21  0012   WBC 10.10   RBC 3.53*   HGB 12.2*   HCT 36.0*         Recent Labs   Lab 12/27/21  0012      K 4.0   CO2 21*   *   BUN 20   CREATININE 0.7   ALKPHOS 48*   ALT 22   AST 21   BILITOT 0.8      Recent Labs   Lab 12/23/21  1206 12/23/21  1822 12/28/21  0219   INR 1.0  --   --    APTT 24.9   < > 47.5*    < > = values in this interval not displayed.      Recent Labs   Lab 12/22/21  0301 12/25/21  1039 12/26/21  0610   *  --   --    TROPONINI  --    < > 0.060*    < > = values in this interval not displayed.       Electrolytes: No replacement orders  Accuchecks: Q6H    Gtts:   heparin (porcine) in D5W 20 Units/kg/hr (12/28/21 0355)        LDA/Wounds:  Lines/Drains/Airways       Drain                   NG/OG Tube 12/21/21 1410 Left nostril 6 days    Male External Urinary Catheter 12/22/21 2000 5 days              Peripheral Intravenous Line                   Peripheral IV - Single Lumen 12/25/21 2204 20 G Anterior;Right Forearm 2 days         Peripheral IV - Single Lumen 12/26/21 2200 20 G Anterior;Left Forearm 1 day                  Wounds: No  Wound care consulted: No

## 2021-12-28 NOTE — ASSESSMENT & PLAN NOTE
Large Left MCA stroke without intervention    -Admit to NCC, VN following  -NSGY on board for hemicrani watch  -q1h neuro checks, vital checks  -EKG, ECHO, CXR done  -A1c, TSH, lipid panel, coag panel done   -Daily CBC, CMP, mag, phos  -SBP < 160, prn labetalol and hydralazine  -Daily Statin, ASA  -SCDs, SQH  -CT large left MCA distribution infarct with no associated acute hemorrhage  -CTA segmental occlusion measuring approximately 10 mm mid and distal M1 segment left MCA with diminished flow distal to the stenosis.  -PT/OT/SLP  -MRI brain -Large acute  left MCA distribution infarct with modest mass effect.   Multiple small remote scattered infarcts elsewhere as above.  Embolic disease: LV apical thrombus (known hx)  -Likely step down to Vascular Neurology

## 2021-12-28 NOTE — SUBJECTIVE & OBJECTIVE
No medications prior to admission.       Review of patient's allergies indicates:  No Known Allergies    Past Medical History:   Diagnosis Date    Coronary artery disease     Hypertension      No past surgical history on file.  Family History    None       Tobacco Use    Smoking status: Light Tobacco Smoker    Smokeless tobacco: Not on file   Substance and Sexual Activity    Alcohol use: Not Currently    Drug use: Not on file    Sexual activity: Not on file     Review of Systems   Unable to perform ROS: Patient nonverbal     Objective:     Vital Signs (Most Recent):  Temp: 98.9 °F (37.2 °C) (12/28/21 1100)  Pulse: 84 (12/28/21 1100)  Resp: (!) 28 (12/28/21 1100)  BP: 131/87 (12/28/21 1100)  SpO2: 100 % (12/28/21 1100) Vital Signs (24h Range):  Temp:  [97.8 °F (36.6 °C)-99.5 °F (37.5 °C)] 98.9 °F (37.2 °C)  Pulse:  [77-98] 84  Resp:  [16-34] 28  SpO2:  [92 %-100 %] 100 %  BP: (122-162)/() 131/87     Weight: 76.1 kg (167 lb 12.3 oz)  Body mass index is 23.4 kg/m².    Physical Exam  Vitals and nursing note reviewed.   Constitutional:       Appearance: Normal appearance.   HENT:      Head: Normocephalic and atraumatic.      Nose: Nose normal.      Mouth/Throat:      Mouth: Mucous membranes are moist.   Eyes:      Extraocular Movements: Extraocular movements intact.      Conjunctiva/sclera: Conjunctivae normal.   Cardiovascular:      Pulses: Normal pulses.      Comments: Biphasic RLE DP and PT pulses, Monophasic L DP, triphasic L PT, 2+ femoral pulses.  Pulmonary:      Effort: Pulmonary effort is normal.   Abdominal:      General: Abdomen is flat.      Palpations: Abdomen is soft.   Musculoskeletal:         General: No swelling, tenderness or signs of injury.   Skin:     General: Skin is warm and dry.   Neurological:      Mental Status: He is alert.      Comments: Right facial droop, RUE weakness.          Significant Labs:  All pertinent labs from the last 24 hours have been reviewed.    Significant  Diagnostics:  I have reviewed all pertinent imaging results/findings within the past 24 hours.

## 2021-12-28 NOTE — ASSESSMENT & PLAN NOTE
-Expressive > receptive w/ dysarthria   -Passed SLP eval and handling pureed diet well.   -improved toleration of nectar thick liquids today

## 2021-12-28 NOTE — ASSESSMENT & PLAN NOTE
57 yo male with PMHx of HTN and CAD who presents as a transfer from [facility]. No intervention was performed(no tPA as OOW, no IR per telestroke provider). CTH at OSH with large L MCA infarct. CTA with distal L M1 occlusion. He was admitted to hospital medicine at OSH and now transferred to [facility] for higher level of care. Repeat [facility](12/21) with large L MCA infarct. Echo at OSH with LV global hypokinesis, EF 15-20%, severe atrial enlargement and small to moderate size apical thrombus. Etiology: likely cardioembolic 2/2 low EF(15-20%) and cardiac thrombus.    Repeat CTH with petechial hemorrhage, antithrombotics temporarily held. Repeat CT with stable petechial hemorrhage, patient no longer on hemicrani watch, CTA chest with subsegmental PE. Heparin gtt now resumed    Antithrombotics for secondary stroke prevention:   Heparin gtt    Statins for secondary stroke prevention and hyperlipidemia, if present: Statins: Atorvastatin- 40 mg daily     Aggressive risk factor modification: HTN, CAD     Rehab efforts: The patient has been evaluated by a stroke team provider and the therapy needs have been fully considered based off the presenting complaints and exam findings. The following therapy evaluations are needed: PT/OT on board, SLP evaluate and treat, PM&R evaluate for appropriate placement. Recommend IPR.    Diagnostics ordered/pending: None     VTE prophylaxis: Mechanical prophylaxis: Place SCDs  None: Reason for No Pharmacological VTE Prophylaxis: Currently on anticoagulation    BP parameters: Infarct: SBP <180

## 2021-12-28 NOTE — ASSESSMENT & PLAN NOTE
Seen on TTE at outside facility  -TTE 12/21/21: with EF of 15-20%, small to moderate sized (1.6x1.1 cm) layered, protruding apical mass c/w thrombus, global hypokinesis, severe atrial enlargement.  -on heparin gtt and temporarily held due to hemorrhagic conversion, now restarted  -continue heparin gtt until 1/1, ok to transition to oral anticoagulation at that time (consider Eliquis) with prior CTH

## 2021-12-28 NOTE — PT/OT/SLP PROGRESS
"Speech Language Pathology Treatment    Patient Name:  Jimmy Leger   MRN:  [id number]  Admitting Diagnosis: Stroke due to embolism of left middle cerebral artery    Recommendations:                 General Recommendations:  Dysphagia therapy and Speech/language therapy  Diet recommendations:  Puree, Liquid Diet Level: Nectar Thick   Aspiration Precautions: 1 bite/sip at a time, Alternating bites/sips, Assistance with meals and Assistance with thickening liquids, Avoid talking while eating, HOB to 90 degrees, Meds crushed in puree, Monitor for s/s of aspiration, Small bites/sips and Strict aspiration precautions   General Precautions: Standard, aspiration,aphasia,pureed diet,nectar thick  Communication strategies:  yes/no questions only and provide increased time to answer    Subjective     "No"  Patient goals: uto     Pain/Comfort:  · Pain Rating 1: 0/10  · Pain Rating Post-Intervention 1: 0/10    Objective:     Has the patient been evaluated by SLP for swallowing?   Yes  Keep patient NPO? No   Current Respiratory Status:        Pt seen sitting up in the bed with no family present. Nursing cleared therapy to work with pt.  Pt was awake/alert. He was able to count to 4 with therapist but was unable to sing a familiar song. He was unable to complete automatic phrases or sentences given max cues. Most utterances consisted of jargon. He repeated simple bilabials with gross approximation with 20% acc. Pt did not model any simple commands. Responses to simple y/n questions were often undifferentiated and consisted of a combination of head nods/shakes or verbal responses. In conversation speech, pt did produce a clear " no" x2.  In a f=2, he ID'd objects with 33% acc. Nursing reported pt did well with pureed foods and nectar thick liquids. Pt given teaspoon and cup sips of nectar thick liquids. Pt with straws in his cup. Discussed with nursing and pt that pt should only be given very small sips. Straws may be used while pt " still has ng,but limit bolus size. Pt also given teaspoon of thin liquids x3. Pt with poor lip seal and anterior bolus loss noted. Swallow delay noted as well. Pt refused pureed trials.Education provided to pt re: aphasia and dysphagia. White board updated.      Assessment:     Jimmy Leger is a 56 y.o. male with an SLP diagnosis of Aphasia and Dysphagia.  He presents with progress towards goals.    Goals:   Multidisciplinary Problems     SLP Goals        Problem: SLP Goal    Goal Priority Disciplines Outcome   SLP Goal     SLP Ongoing, Progressing   Description: Speech Language Pathology Goals  Goals expected to be met by 12/30:  1. Pt will participate in ongoing swallowing assessment to determine if safe for PO intake.   2. Pt will respond to simple yes/no q's with 60% accuracy given max cues.   3. Pt will model 1-step commands on 1/5 trials given max cues.   4. Pt will repeat single words with 50% intelligibility given max cues.   5. Pt will participate in ongoing evaluation of speech/language abilities.                            Plan:     · Patient to be seen:  4 x/week   · Plan of Care expires:  01/22/22  · Plan of Care reviewed with:  patient   · SLP Follow-Up:  Yes       Discharge recommendations:  rehabilitation facility    Time Tracking:     SLP Treatment Date:   12/28/21  Speech Start Time:  0846  Speech Stop Time:  0903     Speech Total Time (min):  17 min    Billable Minutes: Speech Therapy Individual 9 and Treatment Swallowing Dysfunction 8    12/28/2021

## 2021-12-28 NOTE — ASSESSMENT & PLAN NOTE
-L dorsal pedal pulse not able to be detected on doppler overnight, unsure of onset  -bilateral lower extremities mildly cool and equal  -Arterial US of Lower extremity 12/27   · Left DRAKE 0.57, is suggestive of severe left lower extremity arterial disease.  · The left superficial femoral artery is occluded in the proximal and mid segment. There is a occluded stent in the distal segment of the artery.  · The left popliteal artery is occluded throughout most of its course, with a short segment of reconstitution distally.  · The left popliteal artery has an occluded stent in the distal segment. There Is reconstitution of flow distal to the occluded stent, with monophasic waveforms throughout.    -Vascular Surgery consulted for occluded stents

## 2021-12-28 NOTE — NURSING
0715 Unable to find left pedal pulse using doppler. Able to obtain PT pulse on left lower leg. IVON Georges notified and at bedside. Arterial US ordered. Warm packs placed on lower extremities. WCTM

## 2021-12-29 PROBLEM — I50.40 COMBINED SYSTOLIC AND DIASTOLIC CONGESTIVE HEART FAILURE: Status: ACTIVE | Noted: 2021-12-29

## 2021-12-29 LAB
ALBUMIN SERPL BCP-MCNC: 2.8 G/DL (ref 3.5–5.2)
ALP SERPL-CCNC: 63 U/L (ref 55–135)
ALT SERPL W/O P-5'-P-CCNC: 18 U/L (ref 10–44)
ANION GAP SERPL CALC-SCNC: 5 MMOL/L (ref 8–16)
APTT BLDCRRT: 40.1 SEC (ref 21–32)
AST SERPL-CCNC: 18 U/L (ref 10–40)
BASOPHILS # BLD AUTO: 0.07 K/UL (ref 0–0.2)
BASOPHILS NFR BLD: 0.7 % (ref 0–1.9)
BILIRUB SERPL-MCNC: 0.9 MG/DL (ref 0.1–1)
BUN SERPL-MCNC: 21 MG/DL (ref 6–20)
CALCIUM SERPL-MCNC: 9 MG/DL (ref 8.7–10.5)
CHLORIDE SERPL-SCNC: 113 MMOL/L (ref 95–110)
CO2 SERPL-SCNC: 25 MMOL/L (ref 23–29)
CREAT SERPL-MCNC: 0.8 MG/DL (ref 0.5–1.4)
DIFFERENTIAL METHOD: ABNORMAL
EOSINOPHIL # BLD AUTO: 0.2 K/UL (ref 0–0.5)
EOSINOPHIL NFR BLD: 1.8 % (ref 0–8)
ERYTHROCYTE [DISTWIDTH] IN BLOOD BY AUTOMATED COUNT: 12.3 % (ref 11.5–14.5)
EST. GFR  (AFRICAN AMERICAN): >60 ML/MIN/1.73 M^2
EST. GFR  (NON AFRICAN AMERICAN): >60 ML/MIN/1.73 M^2
GLUCOSE SERPL-MCNC: 107 MG/DL (ref 70–110)
HCT VFR BLD AUTO: 39.7 % (ref 40–54)
HGB BLD-MCNC: 13.1 G/DL (ref 14–18)
IMM GRANULOCYTES # BLD AUTO: 0.05 K/UL (ref 0–0.04)
IMM GRANULOCYTES NFR BLD AUTO: 0.5 % (ref 0–0.5)
LYMPHOCYTES # BLD AUTO: 2.1 K/UL (ref 1–4.8)
LYMPHOCYTES NFR BLD: 19.7 % (ref 18–48)
MAGNESIUM SERPL-MCNC: 2.2 MG/DL (ref 1.6–2.6)
MCH RBC QN AUTO: 34 PG (ref 27–31)
MCHC RBC AUTO-ENTMCNC: 33 G/DL (ref 32–36)
MCV RBC AUTO: 103 FL (ref 82–98)
MONOCYTES # BLD AUTO: 0.9 K/UL (ref 0.3–1)
MONOCYTES NFR BLD: 8 % (ref 4–15)
NEUTROPHILS # BLD AUTO: 7.4 K/UL (ref 1.8–7.7)
NEUTROPHILS NFR BLD: 69.3 % (ref 38–73)
NRBC BLD-RTO: 0 /100 WBC
PHOSPHATE SERPL-MCNC: 2.9 MG/DL (ref 2.7–4.5)
PLATELET # BLD AUTO: 246 K/UL (ref 150–450)
PMV BLD AUTO: 11.2 FL (ref 9.2–12.9)
POCT GLUCOSE: 98 MG/DL (ref 70–110)
POTASSIUM SERPL-SCNC: 3.8 MMOL/L (ref 3.5–5.1)
PROT SERPL-MCNC: 6.8 G/DL (ref 6–8.4)
RBC # BLD AUTO: 3.85 M/UL (ref 4.6–6.2)
SODIUM SERPL-SCNC: 143 MMOL/L (ref 136–145)
WBC # BLD AUTO: 10.66 K/UL (ref 3.9–12.7)

## 2021-12-29 PROCEDURE — 99233 SBSQ HOSP IP/OBS HIGH 50: CPT | Mod: ,,, | Performed by: PSYCHIATRY & NEUROLOGY

## 2021-12-29 PROCEDURE — 92507 TX SP LANG VOICE COMM INDIV: CPT

## 2021-12-29 PROCEDURE — 11000001 HC ACUTE MED/SURG PRIVATE ROOM

## 2021-12-29 PROCEDURE — 85730 THROMBOPLASTIN TIME PARTIAL: CPT | Performed by: INTERNAL MEDICINE

## 2021-12-29 PROCEDURE — 97112 NEUROMUSCULAR REEDUCATION: CPT

## 2021-12-29 PROCEDURE — 99232 PR SUBSEQUENT HOSPITAL CARE,LEVL II: ICD-10-PCS | Mod: ,,, | Performed by: NEUROLOGICAL SURGERY

## 2021-12-29 PROCEDURE — 99222 1ST HOSP IP/OBS MODERATE 55: CPT | Mod: ,,, | Performed by: NURSE PRACTITIONER

## 2021-12-29 PROCEDURE — 85025 COMPLETE CBC W/AUTO DIFF WBC: CPT

## 2021-12-29 PROCEDURE — 25000003 PHARM REV CODE 250: Performed by: INTERNAL MEDICINE

## 2021-12-29 PROCEDURE — 63600175 PHARM REV CODE 636 W HCPCS: Performed by: INTERNAL MEDICINE

## 2021-12-29 PROCEDURE — 99233 PR SUBSEQUENT HOSPITAL CARE,LEVL III: ICD-10-PCS | Mod: ,,, | Performed by: INTERNAL MEDICINE

## 2021-12-29 PROCEDURE — 25000003 PHARM REV CODE 250

## 2021-12-29 PROCEDURE — 36415 COLL VENOUS BLD VENIPUNCTURE: CPT | Performed by: INTERNAL MEDICINE

## 2021-12-29 PROCEDURE — 25000003 PHARM REV CODE 250: Performed by: PSYCHIATRY & NEUROLOGY

## 2021-12-29 PROCEDURE — 97110 THERAPEUTIC EXERCISES: CPT

## 2021-12-29 PROCEDURE — 84100 ASSAY OF PHOSPHORUS: CPT

## 2021-12-29 PROCEDURE — 99233 SBSQ HOSP IP/OBS HIGH 50: CPT | Mod: ,,, | Performed by: INTERNAL MEDICINE

## 2021-12-29 PROCEDURE — 80053 COMPREHEN METABOLIC PANEL: CPT

## 2021-12-29 PROCEDURE — 99232 SBSQ HOSP IP/OBS MODERATE 35: CPT | Mod: ,,, | Performed by: NEUROLOGICAL SURGERY

## 2021-12-29 PROCEDURE — 25000003 PHARM REV CODE 250: Performed by: PHYSICIAN ASSISTANT

## 2021-12-29 PROCEDURE — 83735 ASSAY OF MAGNESIUM: CPT

## 2021-12-29 PROCEDURE — 99222 PR INITIAL HOSPITAL CARE,LEVL II: ICD-10-PCS | Mod: ,,, | Performed by: NURSE PRACTITIONER

## 2021-12-29 PROCEDURE — 92526 ORAL FUNCTION THERAPY: CPT

## 2021-12-29 PROCEDURE — 97535 SELF CARE MNGMENT TRAINING: CPT

## 2021-12-29 PROCEDURE — 99233 PR SUBSEQUENT HOSPITAL CARE,LEVL III: ICD-10-PCS | Mod: ,,, | Performed by: PSYCHIATRY & NEUROLOGY

## 2021-12-29 RX ORDER — METOPROLOL TARTRATE 25 MG/1
50 TABLET, FILM COATED ORAL 2 TIMES DAILY
Status: DISCONTINUED | OUTPATIENT
Start: 2021-12-29 | End: 2021-12-29

## 2021-12-29 RX ORDER — SODIUM,POTASSIUM PHOSPHATES 280-250MG
1 POWDER IN PACKET (EA) ORAL ONCE
Status: COMPLETED | OUTPATIENT
Start: 2021-12-29 | End: 2021-12-29

## 2021-12-29 RX ORDER — METOPROLOL SUCCINATE 50 MG/1
50 TABLET, EXTENDED RELEASE ORAL DAILY
Status: DISCONTINUED | OUTPATIENT
Start: 2021-12-30 | End: 2021-12-30

## 2021-12-29 RX ORDER — METOPROLOL TARTRATE 25 MG/1
50 TABLET, FILM COATED ORAL 2 TIMES DAILY
Status: COMPLETED | OUTPATIENT
Start: 2021-12-29 | End: 2021-12-29

## 2021-12-29 RX ADMIN — SENNOSIDES AND DOCUSATE SODIUM 1 TABLET: 50; 8.6 TABLET ORAL at 09:12

## 2021-12-29 RX ADMIN — POTASSIUM & SODIUM PHOSPHATES POWDER PACK 280-160-250 MG 1 PACKET: 280-160-250 PACK at 01:12

## 2021-12-29 RX ADMIN — SODIUM CHLORIDE TAB 1 GM 1 G: 1 TAB at 09:12

## 2021-12-29 RX ADMIN — METOPROLOL TARTRATE 25 MG: 25 TABLET, FILM COATED ORAL at 09:12

## 2021-12-29 RX ADMIN — LISINOPRIL 5 MG: 5 TABLET ORAL at 09:12

## 2021-12-29 RX ADMIN — SODIUM CHLORIDE TAB 1 GM 2 G: 1 TAB at 09:12

## 2021-12-29 RX ADMIN — METOPROLOL TARTRATE 50 MG: 25 TABLET, FILM COATED ORAL at 09:12

## 2021-12-29 RX ADMIN — ESCITALOPRAM OXALATE 5 MG: 5 TABLET, FILM COATED ORAL at 09:12

## 2021-12-29 RX ADMIN — POLYETHYLENE GLYCOL 3350 17 G: 17 POWDER, FOR SOLUTION ORAL at 09:12

## 2021-12-29 RX ADMIN — HEPARIN SODIUM 20 UNITS/KG/HR: 1000 INJECTION INTRAVENOUS; SUBCUTANEOUS at 03:12

## 2021-12-29 RX ADMIN — SODIUM CHLORIDE TAB 1 GM 1 G: 1 TAB at 02:12

## 2021-12-29 RX ADMIN — ATORVASTATIN CALCIUM 40 MG: 20 TABLET, FILM COATED ORAL at 09:12

## 2021-12-29 NOTE — ASSESSMENT & PLAN NOTE
Mr. Jimmy Leger is a 56 y.o. male with reported but unconfirmed CAD, HTN, PAD (known stents in LLE) who was found to have severely decreased EF on echo, with apical thrombus and subsegmental PE in setting of CVA.  Past medical history is unclear as patient has difficulty communicating given CVA, however patient reports some dyspnea prior to hospitalization but no chest pain.  Patient is hemodynamically stable and satting appropriately on RA, without any evidence of JVD or lower extremity edema.  Trop 0.08->0.06, BNP 1947.  EKG with sinus tachycardia with R axis deviation, with RVH and incomplete RBBB.  TTE showed LV dilation with global hypokinesis, EF 15-20%, GIII LVDD, severely enlarged RV, pulmonary HTN, severe atrial enlargement, and small to moderate apical thrombus.  No previous echo to compare.     Recommendations:  - unclear etiology of newly depressed EF, however does not appear decompensated at this time  - increase metoprolol tartrate 50 mg BID today, with goal of increasing to 200 mg total daily dose if tolerated  - switch lisinopril to entresto 24-25mg daily  - start spironolactone 12.5 mg daily if tolerated  - start on SGLT2 inhibitor at discharge with BMP follow up in 1 week  - do not recommend life vest for primary prevention as etiology of new cardiomyopathy is unclear and life vests do not result in overall mortality benefit for NICM with EF<35%  - dietary and fluid restrictions   - will need ischemic work up which can be done outpatient   n/a

## 2021-12-29 NOTE — HPI
Jimmy Leger is a 56-year-old male with PMHx of HTN, CAD. Patient transferred to [facility] on 12/21 for L MCA infarct. CTA with distal L M1 occlusion. Repeat CTH with large L MCA infarct. Echo at OSH with LV global hypokinesis, EF 15-20%, severe atrial enlargement and small to moderate size apical thrombus. Per VN etiology likely cardioembolic 2/2 low EF. CTA chest with subsegmental PE. Began Heparin drip.     Functional History: Patient lives in [place] and has support from sister. Prior to admission, I.

## 2021-12-29 NOTE — SUBJECTIVE & OBJECTIVE
Past Medical History:   Diagnosis Date    Coronary artery disease     Hypertension        No past surgical history on file.    Review of patient's allergies indicates:  No Known Allergies    No current facility-administered medications on file prior to encounter.     No current outpatient medications on file prior to encounter.     Family History    None       Tobacco Use    Smoking status: Light Tobacco Smoker    Smokeless tobacco: Not on file   Substance and Sexual Activity    Alcohol use: Not Currently    Drug use: Not on file    Sexual activity: Not on file     Review of Systems   Constitutional: Negative for fever.   Cardiovascular: Positive for chest pain. Negative for leg swelling and palpitations.   Respiratory: Positive for shortness of breath.    Gastrointestinal: Negative for nausea and vomiting.   Neurological: Positive for focal weakness.     Objective:     Vital Signs (Most Recent):  Temp: 98.1 °F (36.7 °C) (12/29/21 1542)  Pulse: 84 (12/29/21 1542)  Resp: 16 (12/29/21 1542)  BP: 121/81 (12/29/21 1542)  SpO2: (!) 93 % (12/29/21 1542) Vital Signs (24h Range):  Temp:  [97.8 °F (36.6 °C)-99.8 °F (37.7 °C)] 98.1 °F (36.7 °C)  Pulse:  [76-96] 84  Resp:  [16-29] 16  SpO2:  [92 %-100 %] 93 %  BP: (109-148)/(66-96) 121/81     Weight: 76.1 kg (167 lb 12.3 oz)  Body mass index is 23.4 kg/m².    SpO2: (!) 93 %  O2 Device (Oxygen Therapy): room air      Intake/Output Summary (Last 24 hours) at 12/29/2021 1654  Last data filed at 12/29/2021 1300  Gross per 24 hour   Intake 308.36 ml   Output 601 ml   Net -292.64 ml       Lines/Drains/Airways     Drain            Male External Urinary Catheter 12/22/21 2000 6 days          Peripheral Intravenous Line                 Peripheral IV - Single Lumen 12/25/21 2204 20 G Anterior;Right Forearm 3 days         Peripheral IV - Single Lumen 12/26/21 2200 20 G Anterior;Left Forearm 2 days                Physical Exam  Constitutional:       Appearance: He is ill-appearing.    HENT:      Head: Normocephalic and atraumatic.      Mouth/Throat:      Mouth: Mucous membranes are dry.   Eyes:      Pupils: Pupils are equal, round, and reactive to light.   Cardiovascular:      Rate and Rhythm: Normal rate and regular rhythm.      Chest Wall: PMI is displaced.      Heart sounds: Gallop present. S3 and S4 sounds present.    Pulmonary:      Effort: Pulmonary effort is normal.      Breath sounds: Normal breath sounds.   Abdominal:      General: Abdomen is flat. Bowel sounds are normal.      Palpations: Abdomen is soft.   Neurological:      Mental Status: He is alert.      Comments: Right sided hemiparesis         Significant Labs:   CMP   Recent Labs   Lab 12/29/21  0645      K 3.8   *   CO2 25      BUN 21*   CREATININE 0.8   CALCIUM 9.0   PROT 6.8   ALBUMIN 2.8*   BILITOT 0.9   ALKPHOS 63   AST 18   ALT 18   ANIONGAP 5*   ESTGFRAFRICA >60.0   EGFRNONAA >60.0    and CBC   Recent Labs   Lab 12/29/21  0645   WBC 10.66   HGB 13.1*   HCT 39.7*          Significant Imaging: Echocardiogram:   2D echo with color flow doppler: No results found for this or any previous visit. and Transthoracic echo (TTE) complete (Cupid Only):   Results for orders placed or performed during the hospital encounter of 12/20/21   Echo Saline Bubble? Yes   Result Value Ref Range    BSA 1.98 m2    TDI SEPTAL 0.03 m/s    LV LATERAL E/E' RATIO 9.00 m/s    LV SEPTAL E/E' RATIO 21.00 m/s    LA WIDTH 5.00 cm    Right Atrial Pressure (from IVC) 8 mmHg    RIGHT VENTRICLE FREE WALL THICKNESS 0.60     IVC diameter 2.22 cm    RV mid diameter 3.34 cm    QEF 27 %    EF 15 %    Left Ventricular Outflow Tract Mean Velocity 0.4800451114347 cm/s    Left Ventricular Outflow Tract Mean Gradient 0.48 mmHg    Pulmonary Valve Mean Velocity 0.26 m/s    TDI LATERAL 0.07 m/s    PV PEAK VELOCITY 0.57 cm/s    LVIDd 7.32 (A) 3.5 - 6.0 cm    IVS 0.88 0.6 - 1.1 cm    Posterior Wall 0.90 0.6 - 1.1 cm    LVIDs 6.70 (A) 2.1 - 4.0 cm     FS 8 28 - 44 %    LA volume 114.74 cm3    Sinus 3.25 cm    STJ 2.84 cm    Ascending aorta 3.59 cm    LV mass 301.24 g    LA size 4.33 cm    RVDD 3.27 cm    TAPSE 2.46 cm    Right ventricular length in diastole (apical 4-chamber view) 9.47 cm    RV S' 13 cm/s    RA area 20.2 cm2    Left Ventricle Relative Wall Thickness 0.25 cm    AV mean gradient 2 mmHg    AV valve area 2.41 cm2    AV Velocity Ratio 0.43     AV index (prosthetic) 0.41     MV valve area p 1/2 method 7.32 cm2    E/A ratio 2.52     Mean e' 0.05 m/s    E wave deceleration time 103.771937622809145 msec    Pulm vein S/D ratio 0.33     LVOT diameter 2.75 cm    LVOT area 5.9 cm2    LVOT peak navdeep 0.44 m/s    LVOT peak VTI 6.70 cm    Ao peak navdeep 1.03 m/s    Ao VTI 16.5 cm    RVOT prox diameter 2.88 cm    RVOT area 6.51 cm2    LVOT stroke volume 39.77 cm3    AV peak gradient 4 mmHg    TV rest pulmonary artery pressure 51 mmHg    E/E' ratio 12.60 m/s    MV Peak E Navdeep 0.63 m/s    TR Max Navdeep 3.26 m/s    MV stenosis pressure 1/2 time 30.162381593371591 ms    MV Peak A Navdeep 0.25 m/s    PV Peak S Navdeep 0.50049143049002 m/s    PV Peak D Navdeep 0.79 m/s    LV Systolic Volume 230.98 mL    LV Systolic Volume Index 116.7 mL/m2    LV Diastolic Volume 282.54 mL    LV Diastolic Volume Index 142.70 mL/m2    LA Volume Index 57.9 mL/m2    LV Mass Index 152 g/m2    Right Atrium Volume Systolic 70.98 mL    Echo EF Estimated 18 %    RA Major Axis 5.24 cm    Left Atrium Minor Axis 6.21 cm    Left Atrium Major Axis 6.26 cm    Triscuspid Valve Regurgitation Peak Gradient 43 mmHg    LA Volume Index (Mod) 48.9 mL/m2    LA volume (mod) 96.87 cm3    RA Width 4.94 cm    Narrative    1. The left ventricle (LV) is dilated with severely depressed systolic   function & global hypokinesis.  2. LV ejection fraction is 15-20%. Grade III LV diastolic dysfunction.  3. Small to moderate sized (1.6x1.1 cm) layered, protruding apical mass   c/w thrombus.  4. The right ventricle (RV) is severely  enlarged with normal systolic   function.   5. Estimated RVSP is moderately elevated (50-70 mmHg). Pulmonary   hypertension is present.  6. Severe atrial enlargement.  7. Moderate eccentric mitral regrugitation.  8. Mild tricuspid regurgitation.

## 2021-12-29 NOTE — PT/OT/SLP PROGRESS
Physical Therapy Treatment    Patient Name:  Jimmy Leger   MRN:  [id number]    Therapy tech utilized during this treatment due to patient complexity and physical assistance required to ensure safe mobilization     Recommendations:     Discharge Recommendations:  rehabilitation facility   Discharge Equipment Recommendations:  (TBD @ next level of care)   Barriers to discharge: increased (QA)    Highest Level of Mobility: STS  Assistance Required: mod(A)-max(A)X2 persons    Assessment:     Jimmy Leger is a 56 y.o. male admitted with a medical diagnosis of Stroke due to embolism of left middle cerebral artery.  He presents with the following impairments/functional limitations:  weakness,impaired endurance,impaired self care skills,impaired functional mobilty,gait instability,impaired balance,decreased lower extremity function,decreased safety awareness,decreased upper extremity function. Jimmy Leger would benefit from acute PT intervention to address listed functional deficits, provide patient/caregiver education, reduce fall risk, and maximize (I) and safety with functional mobility.    Pt met with HOB elevated and agreeable to PT treatment. Pt continues to demo expressive aphasia, however he makes more attempts to communicate today than previous session. He follows most 1-step commands appropriately and is notably making an effort to move his R side. Trace R LE quads activity noted today and pt able to complete AAROM LAQs with palpable quads contraction. Pt able to complete 2 STS trials today, 1 max(a)x2 and the other mod(A)x2 persons.    Pt is progressing towards acute PT goals appropriately and continues to benefit from acute PT sessions. After hospital discharge, pt would benefit from inpatient rehab to maximize rehab potential. Pt is motivated and can tolerate 3 hours of intensive therapy services.    Rehab Prognosis: Good; patient would benefit from acute skilled PT services to address these deficits and reach  "maximum level of function.      Plan:     During this hospitalization, patient to be seen 4 x/week to address the identified rehab impairments via gait training,therapeutic activities,therapeutic exercises,neuromuscular re-education and progress toward the following goals:    · Plan of Care Expires:  01/22/22    This plan of care has been discussed with the patient/caregiver, who was included in its development and is in agreement with the identified goals and treatment plan.     Subjective     Communicated with RN prior to session.  Patient agreeable to participate.     Pain/Comfort:  Pain Rating 1: 0/10  Pain Rating Post-Intervention 1: 0/10    Chief Complaint: Stroke due to embolism of L MCA, aphasia   Patient/Family Comments/goals: Pt aphasic       Objective:     Patient found HOB elevated with blood pressure cuff,pulse ox (continuous),telemetry,Condom Catheter,peripheral IV,bed alarm  upon PT entry to room.    General Precautions: Standard, aspiration,nectar thick,aphasia,fall,pureed diet   Orthopedic Precautions:N/A   Braces: N/A         Exams:     Cognition:  o Pt is alert and cooperative  o Command following: Pt follows most simple 1-step commands. Pt responds best to 1-word commands, like "up" or "down"    Functional Mobility:     Bed Mobility:  · Supine to Sit: Moderate Assistance on R side of bed  · Sit to Supine: Moderate Assistance  · Scooting anteriorly to EOB to plant feet on floor: Moderate Assistance    Transfers:   · Sit to Stand Transfer:  ·  Maximum Assistance and 2 persons  from EOB with HHAx2 AD (trial 1)  · Moderate Assistance and 2 persons from EOB with HHAx2 (trial 2)  · PT blocking pt's R LE to prevent knee buckling  · Pt with R LE placed into excessive hip adduction, PT provided physical assist for proper placement  · PT provided tactile cues and physical assistance for improved upright posture             Gait:  · Patient unable at this time    Balance:  · Static Sit:    · Min Assist at " EOB   · Fair: Patient able to maintain balance with handhold support; may require occasional minimal assistance.   · R-sided lean  · R UE supported with pillow  · Static Stand:   · Max Assist with Hand-held assist x 2  · Poor: Patient requires handhold support and moderate to maximal assistance to maintain position      Therapeutic Activities/Exercises      Patient assisted with functional mobility as noted above   Patient completed seated reaching exercise with L UE. Pt initially required max verbal and tactile cues for initiation of task, however after 2-3 reps pt able to complete without cues. Pt with notable LOB when reaching across his body to the R with L UE. Therapy tech positioned behind pt.   Patient educated on the importance of early mobility to prevent functional decline during hospital stay   Patient educated on PT POC and role of PT in acute care   White board updated regarding patient's safest level of mobility with staff assistance, RN also updated.     AM-PAC 6 CLICK MOBILITY  Turning over in bed (including adjusting bedclothes, sheets and blankets)?: 2  Sitting down on and standing up from a chair with arms (e.g., wheelchair, bedside commode, etc.): 2  Moving from lying on back to sitting on the side of the bed?: 2  Moving to and from a bed to a chair (including a wheelchair)?: 2  Need to walk in hospital room?: 1  Climbing 3-5 steps with a railing?: 1  Basic Mobility Total Score: 10     Patient left HOB elevated with all lines intact, call button in reach, bed alarm on and RN notified.        History/Goals:     PAST MEDICAL HISTORY:  Past Medical History:   Diagnosis Date    Coronary artery disease     Hypertension        No past surgical history on file.    GOALS:   Multidisciplinary Problems     Physical Therapy Goals        Problem: Physical Therapy Goal    Goal Priority Disciplines Outcome Goal Variances Interventions   Physical Therapy Goal     PT, PT/OT Ongoing, Progressing      Description: Goals to be met by: 2022     Patient will increase functional independence with mobility by performin. Supine to sit with moderate assistance  2. Sit to supine with minimum assistance  3. Rolling to Left with maximal assistance  4. Rolling to Right with minimum assistance  5. Sit to stand transfer with moderate assistance  6. Bed to chair transfer with moderate assistance using LRAD as needed  7. Gait  x 10 feet with maximal assistance using LRAD as needed  8. Lower extremity exercise program x10 reps per handout, with independence                    Time Tracking:     PT Received On: 21  PT Start Time: 1439     PT Stop Time: 1503  PT Total Time (min): 24 min     Billable Minutes: Therapeutic Exercise 24      Debra Mistry, PT  2021  Pager# [phone]

## 2021-12-29 NOTE — CONSULTS
[facility] - Neurosurgery ([facility])  Cardiology  Consult Note    Patient Name: Jimmy Leger  MRN: [id number]  Admission Date: 12/21/2021  Hospital Length of Stay: 8 days  Code Status: Full Code   Attending Provider: Abbey Brandon MD   Consulting Provider: Nic Ly MD  Primary Care Physician: Primary Doctor No  Principal Problem:Stroke due to embolism of left middle cerebral artery    Patient information was obtained from patient, past medical records and ER records.     Inpatient consult to Cardiology  Consult performed by: Nic Ly MD  Consult ordered by: Margie Burch PA-C  Reason for consult: decreased EF, apical thrombus        Subjective:     Chief Complaint:  CVA    HPI:   Mr. Jimmy Leger is a 56 y.o. male with reported but unconfirmed CAD, HTN, PAD (known stents in LLE) who presents today for evaluation of incidental LV thrombus and severely reduced ejection fraction in setting of CVA.  Most of history taken from records, as patient is dysarthric.  Per chart review, patient was brought to [facility] on 12/20 after being found down for unknown period of time.  Work up showed large L MCA stroke and [place] [facility] [place] however patient was not a candidate for intervention.  TTE on 12/21 showed LV dilation, EF 15-20%, and small to moderate size apical thrombus.  CTA chest showed subsegemental PE.  Patient was started on heparin drip, which was held briefly due to petechial hemorrhage noted on repeat CTH, but was restarted after repeat CTH showed that this was stable.  Per patient, he had been short of breath prior to coming into the hospital, however denied any previous chest pain or heart disease, however it was noted that he has poor health care follow up.  Cardiology consulted for evaluation of newly decreased EF.        Past Medical History:   Diagnosis Date    Coronary artery disease     Hypertension        No past surgical history on file.    Review of patient's  allergies indicates:  No Known Allergies    No current facility-administered medications on file prior to encounter.     No current outpatient medications on file prior to encounter.     Family History    None       Tobacco Use    Smoking status: Light Tobacco Smoker    Smokeless tobacco: Not on file   Substance and Sexual Activity    Alcohol use: Not Currently    Drug use: Not on file    Sexual activity: Not on file     Review of Systems   Constitutional: Negative for fever.   Cardiovascular: Positive for chest pain. Negative for leg swelling and palpitations.   Respiratory: Positive for shortness of breath.    Gastrointestinal: Negative for nausea and vomiting.   Neurological: Positive for focal weakness.     Objective:     Vital Signs (Most Recent):  Temp: 98.1 °F (36.7 °C) (12/29/21 1542)  Pulse: 84 (12/29/21 1542)  Resp: 16 (12/29/21 1542)  BP: 121/81 (12/29/21 1542)  SpO2: (!) 93 % (12/29/21 1542) Vital Signs (24h Range):  Temp:  [97.8 °F (36.6 °C)-99.8 °F (37.7 °C)] 98.1 °F (36.7 °C)  Pulse:  [76-96] 84  Resp:  [16-29] 16  SpO2:  [92 %-100 %] 93 %  BP: (109-148)/(66-96) 121/81     Weight: 76.1 kg (167 lb 12.3 oz)  Body mass index is 23.4 kg/m².    SpO2: (!) 93 %  O2 Device (Oxygen Therapy): room air      Intake/Output Summary (Last 24 hours) at 12/29/2021 1654  Last data filed at 12/29/2021 1300  Gross per 24 hour   Intake 308.36 ml   Output 601 ml   Net -292.64 ml       Lines/Drains/Airways     Drain            Male External Urinary Catheter 12/22/21 2000 6 days          Peripheral Intravenous Line                 Peripheral IV - Single Lumen 12/25/21 2204 20 G Anterior;Right Forearm 3 days         Peripheral IV - Single Lumen 12/26/21 2200 20 G Anterior;Left Forearm 2 days                Physical Exam  Constitutional:       Appearance: He is ill-appearing.   HENT:      Head: Normocephalic and atraumatic.      Mouth/Throat:      Mouth: Mucous membranes are dry.   Eyes:      Pupils: Pupils are equal,  round, and reactive to light.   Cardiovascular:      Rate and Rhythm: Normal rate and regular rhythm.      Chest Wall: PMI is displaced.      Heart sounds: Gallop present. S3 and S4 sounds present.    Pulmonary:      Effort: Pulmonary effort is normal.      Breath sounds: Normal breath sounds.   Abdominal:      General: Abdomen is flat. Bowel sounds are normal.      Palpations: Abdomen is soft.   Neurological:      Mental Status: He is alert.      Comments: Right sided hemiparesis         Significant Labs:   CMP   Recent Labs   Lab 12/29/21  0645      K 3.8   *   CO2 25      BUN 21*   CREATININE 0.8   CALCIUM 9.0   PROT 6.8   ALBUMIN 2.8*   BILITOT 0.9   ALKPHOS 63   AST 18   ALT 18   ANIONGAP 5*   ESTGFRAFRICA >60.0   EGFRNONAA >60.0    and CBC   Recent Labs   Lab 12/29/21  0645   WBC 10.66   HGB 13.1*   HCT 39.7*          Significant Imaging: Echocardiogram:   2D echo with color flow doppler: No results found for this or any previous visit. and Transthoracic echo (TTE) complete (Cupid Only):   Results for orders placed or performed during the hospital encounter of 12/20/21   Echo Saline Bubble? Yes   Result Value Ref Range    BSA 1.98 m2    TDI SEPTAL 0.03 m/s    LV LATERAL E/E' RATIO 9.00 m/s    LV SEPTAL E/E' RATIO 21.00 m/s    LA WIDTH 5.00 cm    Right Atrial Pressure (from IVC) 8 mmHg    RIGHT VENTRICLE FREE WALL THICKNESS 0.60     IVC diameter 2.22 cm    RV mid diameter 3.34 cm    QEF 27 %    EF 15 %    Left Ventricular Outflow Tract Mean Velocity 0.9864693584044 cm/s    Left Ventricular Outflow Tract Mean Gradient 0.48 mmHg    Pulmonary Valve Mean Velocity 0.26 m/s    TDI LATERAL 0.07 m/s    PV PEAK VELOCITY 0.57 cm/s    LVIDd 7.32 (A) 3.5 - 6.0 cm    IVS 0.88 0.6 - 1.1 cm    Posterior Wall 0.90 0.6 - 1.1 cm    LVIDs 6.70 (A) 2.1 - 4.0 cm    FS 8 28 - 44 %    LA volume 114.74 cm3    Sinus 3.25 cm    STJ 2.84 cm    Ascending aorta 3.59 cm    LV mass 301.24 g    LA size 4.33 cm     RVDD 3.27 cm    TAPSE 2.46 cm    Right ventricular length in diastole (apical 4-chamber view) 9.47 cm    RV S' 13 cm/s    RA area 20.2 cm2    Left Ventricle Relative Wall Thickness 0.25 cm    AV mean gradient 2 mmHg    AV valve area 2.41 cm2    AV Velocity Ratio 0.43     AV index (prosthetic) 0.41     MV valve area p 1/2 method 7.32 cm2    E/A ratio 2.52     Mean e' 0.05 m/s    E wave deceleration time [id number] msec    Pulm vein S/D ratio 0.33     LVOT diameter 2.75 cm    LVOT area 5.9 cm2    LVOT peak navdeep 0.44 m/s    LVOT peak VTI 6.70 cm    Ao peak navdeep 1.03 m/s    Ao VTI 16.5 cm    RVOT prox diameter 2.88 cm    RVOT area 6.51 cm2    LVOT stroke volume 39.77 cm3    AV peak gradient 4 mmHg    TV rest pulmonary artery pressure 51 mmHg    E/E' ratio 12.60 m/s    MV Peak E Navdeep 0.63 m/s    TR Max Navdeep 3.26 m/s    MV stenosis pressure 1/2 time 30.265319005499501 ms    MV Peak A Navdeep 0.25 m/s    PV Peak S Navdeep 0.05213880059576 m/s    PV Peak D Navdeep 0.79 m/s    LV Systolic Volume 230.98 mL    LV Systolic Volume Index 116.7 mL/m2    LV Diastolic Volume 282.54 mL    LV Diastolic Volume Index 142.70 mL/m2    LA Volume Index 57.9 mL/m2    LV Mass Index 152 g/m2    Right Atrium Volume Systolic 70.98 mL    Echo EF Estimated 18 %    RA Major Axis 5.24 cm    Left Atrium Minor Axis 6.21 cm    Left Atrium Major Axis 6.26 cm    Triscuspid Valve Regurgitation Peak Gradient 43 mmHg    LA Volume Index (Mod) 48.9 mL/m2    LA volume (mod) 96.87 cm3    RA Width 4.94 cm    Narrative    1. The left ventricle (LV) is dilated with severely depressed systolic   function & global hypokinesis.  2. LV ejection fraction is 15-20%. Grade III LV diastolic dysfunction.  3. Small to moderate sized (1.6x1.1 cm) layered, protruding apical mass   c/w thrombus.  4. The right ventricle (RV) is severely enlarged with normal systolic   function.   5. Estimated RVSP is moderately elevated (50-70 mmHg). Pulmonary   hypertension is present.  6. Severe  atrial enlargement.  7. Moderate eccentric mitral regrugitation.  8. Mild tricuspid regurgitation.       Assessment and Plan:     Combined systolic and diastolic congestive heart failure  Mr. Jimmy Leger is a 56 y.o. male with reported but unconfirmed CAD, HTN, PAD (known stents in LLE) who was found to have severely decreased EF on echo, with apical thrombus and subsegmental PE in setting of CVA.  Past medical history is unclear as patient has difficulty communicating given CVA, however patient reports some dyspnea prior to hospitalization but no chest pain.  Patient is hemodynamically stable and satting appropriately on RA, without any evidence of JVD or lower extremity edema.  Trop 0.08->0.06, BNP 1947.  EKG with sinus tachycardia with R axis deviation, with RVH and incomplete RBBB.  TTE showed LV dilation with global hypokinesis, EF 15-20%, GIII LVDD, severely enlarged RV, pulmonary HTN, severe atrial enlargement, and small to moderate apical thrombus.  No previous echo to compare.     Recommendations:  - unclear etiology of newly depressed EF, however does not appear decompensated at this time  - increase metoprolol tartrate 50 mg BID today, with goal of increasing to 200 mg total daily dose if tolerated  - switch lisinopril to entresto 24-25mg daily  - start spironolactone 12.5 mg daily if tolerated  - start on SGLT2 inhibitor at discharge with BMP follow up in 1 week  - do not recommend life vest for primary prevention as etiology of new cardiomyopathy is unclear and life vests do not result in overall mortality benefit for NICM with EF<35%  - dietary and fluid restrictions   - will need ischemic work up which can be done outpatient    Mural thrombus of cardiac apex  Patient with 1.6 x 1.1 cm apical mural thrombus found incidentally on TTE. Started on heparin drip    Recommendations:  - continue heparin drip, agree with transitioning to full anticoagulation on DOAC once OK per neurology/neurosurgery  - will  need repeat TTE for evaluation of resolution of thrombus  - continue cardiac monitoring    Single subsegmental pulmonary embolism without acute cor pulmonale  CTA showing pulmonary embolism in left apical segmental pulmonary artery.  Patient satting appropriately on room air, intermittently tachycardic, but not hypotensive.  EKG shows evidence of right axis deviation, RBBB, SI QIII TIII pattern.  Echo showing severe RV enlargement with pulmonary hypertension, mild tricuspid regurgitation.  Patient started on heparin drip.    Recommendations:  - See Mural thrombus of cardiac apex        VTE Risk Mitigation (From admission, onward)         Ordered     heparin 25,000 units in dextrose 5% 250 ml (100 units/mL) infusion MINIMAL INTENSITY nomogram - [facility]  Continuous        Question Answer Comment   Heparin Infusion Adjustment (DO NOT MODIFY ANSWER) [email or web address] MINIMAL  INTENSITY nomogram for [facility] [email or web address]    Begin at (in units/kg/hr) 12        12/23/21 1136     Place sequential compression device  Until discontinued         12/21/21 1727     Reason for No Pharmacological VTE Prophylaxis  Once        Question:  Reasons:  Answer:  Risk of Bleeding    12/21/21 1727     IP VTE LOW RISK PATIENT  Once         12/21/21 1727                Thank you for your consult. I will follow-up with patient. Please contact us if you have any additional questions.    Nic Ly MD  Cardiology   [facility] - Neurosurgery ([facility])

## 2021-12-29 NOTE — SUBJECTIVE & OBJECTIVE
Past Medical History:   Diagnosis Date    Coronary artery disease     Hypertension      No past surgical history on file.  Review of patient's allergies indicates:  No Known Allergies    Scheduled Medications:    atorvastatin  40 mg Oral Daily    EScitalopram oxalate  5 mg Oral Daily    lisinopriL  5 mg Oral Daily    metoprolol tartrate  25 mg Oral BID    polyethylene glycol  17 g Oral Daily    senna-docusate 8.6-50 mg  1 tablet Oral BID    sodium chloride  1 g Oral TID       PRN Medications: acetaminophen, hydrALAZINE, labetalol, ondansetron, sodium chloride 0.9%    Family History    None       Tobacco Use    Smoking status: Light Tobacco Smoker    Smokeless tobacco: Not on file   Substance and Sexual Activity    Alcohol use: Not Currently    Drug use: Not on file    Sexual activity: Not on file     Review of Systems   Constitutional: Positive for activity change.   Musculoskeletal: Positive for gait problem.   Neurological: Positive for speech difficulty and weakness.   Psychiatric/Behavioral: Positive for confusion.     Objective:     Vital Signs (Most Recent):  Temp: 97.8 °F (36.6 °C) (12/29/21 1130)  Pulse: 92 (12/29/21 1130)  Resp: 16 (12/29/21 1130)  BP: (!) 126/96 (12/29/21 1130)  SpO2: (!) 92 % (12/29/21 1130)    Vital Signs (24h Range):  Temp:  [97 °F (36.1 °C)-99.8 °F (37.7 °C)] 97.8 °F (36.6 °C)  Pulse:  [76-96] 92  Resp:  [16-29] 16  SpO2:  [92 %-100 %] 92 %  BP: (109-148)/(66-96) 126/96     Body mass index is 23.4 kg/m².    Physical Exam  Vitals and nursing note reviewed.   Constitutional:       Appearance: Normal appearance.   HENT:      Nose: Nose normal.      Mouth/Throat:      Mouth: Mucous membranes are moist.   Pulmonary:      Effort: Pulmonary effort is normal. No respiratory distress.   Musculoskeletal:      Comments: R sided weakness   Skin:     General: Skin is warm.   Neurological:      Mental Status: He is alert.      Comments: Aphasia   Following one step commands    Psychiatric:         Behavior: Behavior normal.            Diagnostic Results: Labs: Reviewed  ECG: Reviewed  CT: Reviewed

## 2021-12-29 NOTE — PROGRESS NOTES
Cyrus Higgins - Neurosurgery ([facility])  Vascular Neurology  Comprehensive Stroke Center  Progress Note    Assessment/Plan:     * Stroke due to embolism of left middle cerebral artery  57 yo male with PMHx of HTN and CAD who presents as a transfer from [facility]. No intervention was performed(no tPA as OOW, no IR per telestroke provider). CTH at OSH with large L MCA infarct. CTA with distal L M1 occlusion. He was admitted to hospital medicine at OSH and now transferred to [facility] for higher level of care. Repeat [facility](12/21) with large L MCA infarct. Echo at OSH with LV global hypokinesis, EF 15-20%, severe atrial enlargement and small to moderate size apical thrombus. Etiology: likely cardioembolic 2/2 low EF(15-20%) and cardiac thrombus. Repeat CTH with petechial hemorrhage, antithrombotics temporarily held. Repeat CT with stable petechial hemorrhage, patient no longer on hemicrani watch, CTA chest with subsegmental PE. Heparin gtt resumed, can consider transitioning to oral anticoagulation over the weekend with prior CTH.      Antithrombotics for secondary stroke prevention:   Heparin gtt    Statins for secondary stroke prevention and hyperlipidemia, if present: Statins: Atorvastatin- 40 mg daily     Aggressive risk factor modification: HTN, CAD     Rehab efforts: The patient has been evaluated by a stroke team provider and the therapy needs have been fully considered based off the presenting complaints and exam findings. The following therapy evaluations are needed: PT/OT on board, SLP evaluate and treat, PM&R evaluate for appropriate placement. Recommend IPR.    Diagnostics ordered/pending: None     VTE prophylaxis: Mechanical prophylaxis: Place SCDs  None: Reason for No Pharmacological VTE Prophylaxis: Currently on anticoagulation    BP parameters: Infarct: SBP <180       PAD (peripheral artery disease)  -Patient with significant PAD w/ occluded stents x 2 in sup femoral and popliteal artery  -Vascular surgery consulted  who recommended ASA when safe from neuro perspective. Okay to start ASA over there weekend with CTH before.     Single subsegmental pulmonary embolism without acute cor pulmonale  Visualized on CTA chest 12/25  L apical segmental pulmonary artery embolism  US LE negative for DVT  Continue heparin gtt    Debility  PT/OT to evaluate and treat  Therapy recommending rehab    Cytotoxic cerebral edema  Area of cytotoxic cerebral edema identified when reviewing brain imaging in the territory of the L middle cerebral artery. There is mass effect associated with it. Continue to monitor the patients clinical exam for any worsening of symptoms which may indicate expansion of the stroke or the area of the edema resulting in the clinical change. The pattern is suggestive of embolic etiology.    Clinical exam has remained stable, this suggests that cerebral edema has stabilized. Low probability of clinical deterioration. Neurosurgery signed off, patient no longer on hemicrani watch    Essential hypertension  Stroke RF  SBP<180    Grade III diastolic dysfunction  Newly diagnosed  EF 15-20%  Consider cardiology consult, will likely need GDMT + life vest given EF <35%.    Mural thrombus of cardiac apex  Seen on TTE at outside facility  -TTE 12/21/21: with EF of 15-20%, small to moderate sized (1.6x1.1 cm) layered, protruding apical mass c/w thrombus, global hypokinesis, severe atrial enlargement.  -on heparin gtt and temporarily held due to hemorrhagic conversion, now restarted  -continue heparin gtt until 1/1, ok to transition to oral anticoagulation at that time (consider Eliquis) with prior CTH         12/22 patient in ICU for hemicrani watch, NPO per SLP  12/23: Patient remains in NCC. Min intensity heparin gtt was initiated. Patient aphasic with RS plegia.   12/24-Patient with changes in CT with petechial hemorraghic conversion recommend stopping aspirin and holding heparin. Repeat imaging with worsening exam. On hemicrani  watch.   12/27 RLE weakness improved on today's exam, patient with runs of vtach overnight on 12/24-12/25, CTA chest with subsegmental PE, patient restarted on heparin gtt, neurosurgery signed off and patient no longer on hemicrani watch, remains NPO per SLP, therapy recommending inpatient rehab  12/28 Continues on heparin gtt for PE, neuro exam stable with mild improvements, low suspicion for  new or worsening ICH.   12/29-Neuro exam stable. Recs for IPR. Overnight, patient had 11 run of vtach. Mg 2.2. P 2.9, will replete P to 4. On heparin gtt. Can transition to DOAC over the weekend.  Patient pending medicaid.       STROKE DOCUMENTATION        NIH Scale:  1a. Level of Consciousness: 0-->Alert, keenly responsive  1b. LOC Questions: 0-->Answers both questions correctly  1c. LOC Commands: 0-->Performs both tasks correctly  2. Best Gaze: 0-->Normal  3. Visual: 0-->No visual loss  4. Facial Palsy: 1-->Minor paralysis (flattened nasolabial fold, asymmetry on smiling)  5a. Motor Arm, Left: 0-->No drift, limb holds 90 (or 45) degrees for full 10 secs  5b. Motor Arm, Right: 4-->No movement  6a. Motor Leg, Left: 0-->No drift, leg holds 30 degree position for full 5 secs  6b. Motor Leg, Right: 3-->No effort against gravity, leg falls to bed immediately  7. Limb Ataxia: 0-->Absent  8. Sensory: 1-->Mild-to-moderate sensory loss, patient feels pinprick is less sharp or is dull on the affected side, or there is a loss of superficial pain with pinprick, but patient is aware of being touched  9. Best Language: 2-->Severe aphasia, all communication is through fragmentary expression, great need for inference, questioning, and guessing by the listener. Range of information that can be exchanged is limited, listener carries burden of. . . (see row details)  10. Dysarthria: 2-->Severe dysarthria, patients speech is so slurred as to be unintelligible in the absence of or out of proportion to any dysphasia, or is mute/anarthric  11.  Extinction and Inattention (formerly Neglect): 0-->No abnormality  Total (NIH Stroke Scale): 13       Modified Whitfield Score: 0  Brenden Coma Scale:    ABCD2 Score:    [id number] Score:   HAS -BLED Score:   ICH Score:   Hunt & Gillis Classification:      Hemorrhagic change of an Ischemic Stroke: Does this patient have an ischemic stroke with hemorrhagic changes? Yes, Grading Scale: HI Type 1 (HI-1) = small petechiae along the margins of the infarct. Is this a symptomatic change?  No - Hemorrhage is not clinically significant     Neurologic Chief Complaint: found down, nonverbal, RSW    Subjective:     Interval History: Patient is seen for follow-up neurological assessment and treatment recommendations:   Neuro exam stable. Recs for IPR. On heparin gtt. Can transition to DOAC over the weekend.  Patient pending medicaid.     HPI, Past Medical, Family, and Social History remains the same as documented in the initial encounter.     Review of Systems   Constitutional: Positive for activity change and fatigue.   HENT: Positive for trouble swallowing. Negative for facial swelling.    Respiratory: Negative for cough and shortness of breath.    Cardiovascular: Negative for leg swelling.   Gastrointestinal: Negative for vomiting.   Skin: Negative for pallor and rash.   Neurological: Positive for speech difficulty and weakness.     Scheduled Meds:   atorvastatin  40 mg Oral Daily    EScitalopram oxalate  5 mg Oral Daily    lisinopriL  5 mg Oral Daily    metoprolol tartrate  25 mg Oral BID    polyethylene glycol  17 g Oral Daily    senna-docusate 8.6-50 mg  1 tablet Oral BID    sodium chloride  2 g Oral TID     Continuous Infusions:   heparin (porcine) in D5W 20 Units/kg/hr (12/28/21 2137)     PRN Meds:acetaminophen, hydrALAZINE, labetalol, ondansetron, sodium chloride 0.9%    Objective:     Vital Signs (Most Recent):  Temp: 97.8 °F (36.6 °C) (12/29/21 1130)  Pulse: 92 (12/29/21 1130)  Resp: 16 (12/29/21 1130)  BP: (!)  126/96 (12/29/21 1130)  SpO2: (!) 92 % (12/29/21 1130)  BP Location: Left arm    Vital Signs Range (Last 24H):  Temp:  [97 °F (36.1 °C)-99.8 °F (37.7 °C)]   Pulse:  [73-96]   Resp:  [16-31]   BP: (109-148)/(66-96)   SpO2:  [92 %-100 %]   BP Location: Left arm    Physical Exam  Vitals and nursing note reviewed.   Constitutional:       General: He is not in acute distress.     Appearance: He is well-developed. He is not diaphoretic.   HENT:      Head: Normocephalic and atraumatic.      Right Ear: External ear normal.      Left Ear: External ear normal.      Nose: Nose normal.   Eyes:      Extraocular Movements: Extraocular movements intact.      Conjunctiva/sclera: Conjunctivae normal.   Cardiovascular:      Rate and Rhythm: Normal rate.   Pulmonary:      Effort: Pulmonary effort is normal. No respiratory distress.   Abdominal:      General: There is no distension.   Musculoskeletal:      Right lower leg: No edema.      Left lower leg: No edema.   Skin:     General: Skin is warm and dry.   Neurological:      Mental Status: He is alert.      Sensory: Sensory deficit present.      Motor: Weakness present.         Neurological Exam:   LOC: alert  Attention Span: Good   Language: Global aphasia, follows simple midline commands  Articulation: Untestable due to severe aphasia   Orientation: Untestable due to severe aphasia   Motor: Arm left  Normal 5/5  Leg left  Normal 5/5  Arm right  Plegia 0/5  Leg right Paresis 1/5  Sensation: Intact to light touch throughout  Tone: Flaccid  RUE     Laboratory:  BMP:   Recent Labs   Lab 12/29/21  0645      K 3.8   *   CO2 25   BUN 21*   CREATININE 0.8   CALCIUM 9.0     CBC:   Recent Labs   Lab 12/29/21  0645   WBC 10.66   RBC 3.85*   HGB 13.1*   HCT 39.7*      *   MCH 34.0*   MCHC 33.0     Lipid Panel:   No results for input(s): CHOL, LDLCALC, HDL, TRIG in the last 168 hours.  Coagulation:   Recent Labs   Lab 12/23/21  1206 12/23/21  1822 12/29/21  0300   INR  1.0  --   --    APTT 24.9   < > 40.1*    < > = values in this interval not displayed.       Diagnostic Results     Brain Imaging   CTH 12/25/21  No significant change from prior.  Evolving large recent left MCA territory infarction with petechial hemorrhagic conversion.  No evidence for significant new hemorrhage or increased mass effect.        CTH 12/24/2021  Evolving large left MCA territory infarction with petechial hemorrhagic conversion.  There is intermediate density components in the left temporal lobe area of infarction suggestive for interval additional petechial hemorrhage.  No evidence for extra-axial hemorrhage or hydrocephalus.     MRI Brain WO. Date: 12/22/21    Large acute  left MCA distribution infarct with modest mass effect. Multiple small remote scattered infarcts elsewhere as above.  Embolic disease to be considered.     CTH WO 12/21/21  Again demonstrated in better delineated as large left MCA distribution infarct with no associated acute hemorrhage and with localized mass effect resulting in effacement of overlying cortical sulci and partial effacement left sylvian fissure.  No associated acute hemorrhage.     Several small old areas of infarction again noted including cerebellum bilaterally, anterior left perisylvian region, lateral left frontal cortex and posterior right parietal cortex.     CTH WO 12/20/21   Large area loss of gray-white differentiation consistent with acute infarct left MCA distribution involving the left temporal lobe, temporal occipital region and portions of the basal ganglia with localized mass effect including effacement of overlying cortical sulci although with no associated acute hemorrhage.     Several small old areas of infarction are evident including anterior left perisylvian and cerebellum bilaterally. Mild underlying atrophy.        Vessel Imaging:  CTA H/N 12/20/21  Segmental occlusion measuring approximately 10 mm mid and distal M1 segment left MCA with  diminished flow distal to the stenosis.     Right cervical carotid circulation--atherosclerotic calcification bifurcation without measurable stenosis.     Left cervical carotid circulation--atherosclerotic calcification distal left common carotid artery and bifurcation with stenosis proximal left ICA measuring 16% utilizing NASCET criteria.  Mild eccentric narrowing mid to distal portion left common carotid artery also noted.     Vertebral arteries--no focal arterial abnormality or measurable stenosis.     Cardiac Evaluation:   TTE with bubble 12/21/21  1. The left ventricle (LV) is dilated with severely depressed systolic function & global hypokinesis.  2. LV ejection fraction is 15-20%. Grade III LV diastolic dysfunction.  3. Small to moderate sized (1.6x1.1 cm) layered, protruding apical mass c/w thrombus.  4. The right ventricle (RV) is severely enlarged with normal systolic function.   5. Estimated RVSP is moderately elevated (50-70 mmHg). Pulmonary hypertension is present.  6. Severe atrial enlargement.  7. Moderate eccentric mitral regrugitation.  8. Mild tricuspid regurgitation.         Alis Kohli PA-C  Comprehensive Stroke Center  Department of Vascular Neurology   [facility] - Neurosurgery [facility])

## 2021-12-29 NOTE — PT/OT/SLP PROGRESS
Speech Language Pathology Treatment    Patient Name:  Jimmy Leger   MRN:  [id number]  Admitting Diagnosis: Stroke due to embolism of left middle cerebral artery    Recommendations:                 General Recommendations:  Dysphagia therapy, Speech/language therapy and Cognitive-linguistic therapy  Diet recommendations:  Puree, Liquid Diet Level: Nectar Thick   Aspiration Precautions: 1 bite/sip at a time, Alternating bites/sips, Assistance with meals and Assistance with thickening liquids, Check for pocketing/oral residue, Eliminate distractions, Feed only when awake/alert, HOB to 90 degrees, Meds crushed in puree, Monitor for s/s of aspiration, Small bites/sips and Strict aspiration precautions   To achieve nectar-thickened liquids, please use 6 oz of any liquid to 1 packet of thickener.  General Precautions: Standard, aspiration,pureed diet,nectar thick,aphasia  Communication strategies:  yes/no questions only, provide increased time to answer and go to room if call light pushed    Subjective     Patient awake and alert  Communicated with nurse prior to session     Pain/Comfort:  · Pain Rating 1: 0/10  · Pain Rating Post-Intervention 1: 0/10    Respiratory Status: Room air    Objective:     Has the patient been evaluated by SLP for swallowing?   Yes  Keep patient NPO? No     Patient sitting up in bed with RN present for majority of session. Patient continued to demonstrate significant right[facility]sided [facility]. He was unable to produce an adequate volitional cough/throat clear. He initially tolerated thin liquids x4 (via cup-edge and straw) with no overt signs of aspiration, but as session progressed he demonstrated increased impulsivity with intake and coughing with thins. During trials of puree x3 (tsp pudding), he demonstrated prolonged A-P transit and mild right buccal residue which required liquid wash and cueing to clear. Solids deferred 2' to oral dysphagia and dentition. Patient continued to  demonstrate moderate-severe dysarthria. His speech was ~50% intelligible in known contexts with single word utterances, but largely unintelligible in unknown contexts. He was WFL on simple yes/no questions and ~40% on binary choice questions with mod assist. Patient unable to identify target images or letters on comm board. SLP educated patient regarding recs, but he would benefit from continued instruction. Patient left in bed with call light within reach.     Assessment:     Jimmy Leger is a 56 y.o. male with an SLP diagnosis of Aphasia, Dysphagia, Dysarthria and Cognitive-Linguistic Impairment. ST to continue to follow.     Goals:   Multidisciplinary Problems     SLP Goals        Problem: SLP Goal    Goal Priority Disciplines Outcome   SLP Goal     SLP Ongoing, Progressing   Description: Speech Language Pathology Goals  Goals expected to be met by 12/30:  1. Pt will participate in ongoing swallowing assessment to determine if safe for PO intake.   2. Pt will respond to simple yes/no q's with 60% accuracy given max cues.   3. Pt will model 1-step commands on 1/5 trials given max cues.   4. Pt will repeat single words with 50% intelligibility given max cues.   5. Pt will participate in ongoing evaluation of speech/language abilities.                            Plan:     · Patient to be seen:  4 x/week   · Plan of Care expires:  01/22/22  · Plan of Care reviewed with:  patient   · SLP Follow-Up:  Yes       Discharge recommendations:  rehabilitation facility   Barriers to Discharge:  None    Time Tracking:     SLP Treatment Date:   12/29/21  Speech Start Time:  0939  Speech Stop Time:  1004     Speech Total Time (min):  25 min    Billable Minutes: Speech Therapy Individual 9, Treatment Swallowing Dysfunction 8 and Self Care/Home Management Training 8    Mian Junior CCC-SLP  Speech-Language Pathology  Pager: [phone]   12/29/2021

## 2021-12-29 NOTE — CONSULTS
Cyrus Higgins - Neurosurgery ([facility])  Physical Medicine & Rehab  Consult Note    Patient Name: Jimmy Leger  MRN: [id number]  Admission Date: 12/21/2021  Hospital Length of Stay: 8 days  Attending Physician: Abbey Brandon MD     Inpatient consult to Physical Medicine & Rehabilitation  Consult performed by: Laura Polk NP  Consult requested by:  Abbey Brandon MD    Collaborating Physician: Nova Hill MD  Reason for Consult:  Assess rehabilitation needs     Consults  Subjective:     Principal Problem: Stroke due to embolism of left middle cerebral artery    HPI: Jimmy Leger is a 56-year-old male with PMHx of HTN, CAD. Patient transferred to [facility] on 12/21 for L MCA infarct. CTA with distal L M1 occlusion. Repeat CTH with large L MCA infarct. Echo at OSH with LV global hypokinesis, EF 15-20%, severe atrial enlargement and small to moderate size apical thrombus. Per VN etiology likely cardioembolic 2/2 low EF. CTA chest with subsegmental PE. Began Heparin drip.     Functional History: Patient lives in [place] and has support from sister. Prior to admission, I.       Hospital Course: 12/27/21: Participated w/ PT. Bed mob maxA x 2ppl. Sit to stand maxA x 2 ppl.       Past Medical History:   Diagnosis Date    Coronary artery disease     Hypertension      No past surgical history on file.  Review of patient's allergies indicates:  No Known Allergies    Scheduled Medications:    atorvastatin  40 mg Oral Daily    EScitalopram oxalate  5 mg Oral Daily    lisinopriL  5 mg Oral Daily    metoprolol tartrate  25 mg Oral BID    polyethylene glycol  17 g Oral Daily    senna-docusate 8.6-50 mg  1 tablet Oral BID    sodium chloride  1 g Oral TID       PRN Medications: acetaminophen, hydrALAZINE, labetalol, ondansetron, sodium chloride 0.9%    Family History    None       Tobacco Use    Smoking status: Light Tobacco Smoker    Smokeless tobacco: Not on file   Substance and Sexual Activity    Alcohol use: Not  Currently    Drug use: Not on file    Sexual activity: Not on file     Review of Systems   Constitutional: Positive for activity change.   Musculoskeletal: Positive for gait problem.   Neurological: Positive for speech difficulty and weakness.   Psychiatric/Behavioral: Positive for confusion.     Objective:     Vital Signs (Most Recent):  Temp: 97.8 °F (36.6 °C) (12/29/21 1130)  Pulse: 92 (12/29/21 1130)  Resp: 16 (12/29/21 1130)  BP: (!) 126/96 (12/29/21 1130)  SpO2: (!) 92 % (12/29/21 1130)    Vital Signs (24h Range):  Temp:  [97 °F (36.1 °C)-99.8 °F (37.7 °C)] 97.8 °F (36.6 °C)  Pulse:  [76-96] 92  Resp:  [16-29] 16  SpO2:  [92 %-100 %] 92 %  BP: (109-148)/(66-96) 126/96     Body mass index is 23.4 kg/m².    Physical Exam  Vitals and nursing note reviewed.   Constitutional:       Appearance: Normal appearance.   HENT:      Nose: Nose normal.      Mouth/Throat:      Mouth: Mucous membranes are moist.   Pulmonary:      Effort: Pulmonary effort is normal. No respiratory distress.   Musculoskeletal:      Comments: R sided weakness   Skin:     General: Skin is warm.   Neurological:      Mental Status: He is alert.      Comments: Aphasia   Following one step commands   Psychiatric:         Behavior: Behavior normal.            Diagnostic Results: Labs: Reviewed  ECG: Reviewed  CT: Reviewed    Assessment/Plan:     * Stroke due to embolism of left middle cerebral artery  - CTA with distal L M1 occlusion.   - Repeat CTH with large L MCA infarct.   - Per VN etiology likely cardioembolic 2/2 low EF.     Single subsegmental pulmonary embolism without acute cor pulmonale  - CTA chest with subsegmental PE.  - Began Heparin drip.     Debility  - Related to prolonged/acute hospital course.     Recommendations  -  Encourage mobility, OOB in chair at least 3 hours per day, and early ambulation as appropriate  -  PT/OT evaluate and treat  -  Pain management  -  Monitor for and prevent skin breakdown and pressure ulcers  · Early  mobility, repositioning/weight shifting every 20-30 minutes when sitting, turn patient every 2 hours, proper mattress/overlay and chair cushioning, pressure relief/heel protector boots  -  DVT prophylaxis    -  Reviewed discharge options (IP rehab, SNF, HH therapy, and OP therapy)    PM&R Recommendation:     At this time, the PM&R team has reviewed this patient's ongoing medical case including inpatient diagnosis, medical history, clinical examination, labs, vitals, current social and functional history to provide the post-acute recommendation as follows:     RECOMMENDATIONS: inpatient rehabilitation due to good motivation/participation with therapies and good potential for recovery.    MEDICAL STABILITY:     At this time, barriers for post-acute rehab admission include transition to anticoagulant and completion of work up.     We will continue to follow.     Thank you for your consult.     Laura Polk NP  Department of Physical Medicine & Rehab  [facility] - Neurosurgery ([facility])

## 2021-12-29 NOTE — PT/OT/SLP PROGRESS
Occupational Therapy Treatment    Patient Name:  Jimmy Leger   MRN:  [id number]  Admit Date: 12/21/2021  Admitting Diagnosis:  Stroke due to embolism of left middle cerebral artery   Length of Stay: 8 days  Recent Surgery: * No surgery found *      Recommendations:     Discharge Recommendations: rehabilitation facility  Discharge Equipment Recommendations:  other (see comments) (TBD (progress pending))  Barriers to discharge:  Other (Comment) (Increased skilled assistance required)    Plan:     Patient to be seen 5 x/week to address the above listed problems via self-care/home management,therapeutic activities,therapeutic exercises,neuromuscular re-education,sensory integration  · Plan of Care Expires: 01/04/22  · Plan of Care Reviewed with: patient    Assessment:   Jimmy Leger is a 56 y.o. male with a medical diagnosis of Stroke due to embolism of left middle cerebral artery.  He presents with the following performance deficits affecting function: weakness,impaired endurance,impaired sensation,impaired self care skills,gait instability,impaired functional mobilty,impaired balance,decreased safety awareness,decreased lower extremity function,decreased upper extremity function,impaired fine motor,edema,impaired coordination,decreased coordination,decreased ROM.      Pt tolerated session fairly this date and was willing to participate. Demonstrating continued right hemiparesis, RUE edema, decreased activity tolerance, impaired endurance, impaired balance, impaired coordination, and decreased safety awareness, requiring increased time and assistance to complete functional tasks. Patient completed bed mobility with Max A this date. Tolerated EOB sitting for ~18 minutes with SBA-CGA while engaged in PROM to RUE. Patient remains aphasic however, demonstrated concern on right hemiparesis at this time, explained within OT scope of practice purpose of skilled therapy services and goals to work on right side strength. Patient  "completed R lateral scooting along EOB with Total A and R LE knee blocking. Patient with observed attempts at task initiation throughout session, trace muscle movement observed. Patient is progressing towards established goals, and continues to benefit from acute skilled OT services to increase functional performance and improve quality of life. OT to continue to recommend Rehab at D/C to improve pt functional independence and increase patient safety before returning home.      Rehab Prognosis: Fair; patient would benefit from acute skilled OT services to address these deficits and reach maximum level of function.        Subjective   Communicated with: Nurse prior to session.  Patient found HOB elevated with Condom Catheter,bed alarm,peripheral IV,telemetry upon OT entry to room. Pt agreeable to participate at this time.    Patient: "Alright" -patient aphasic    Pain/Comfort:  · Pain Rating 1: 0/10  · Pain Rating Post-Intervention 1: 0/10    Objective:   Patient found with: Condom Catheter,bed alarm,peripheral IV,telemetry   General Precautions: Standard, Cardiac aphasia,aspiration,pureed diet,nectar thick   Orthopedic Precautions:N/A   Braces: N/A   Oxygen Device: Room Air  Vitals: /81 (BP Location: Left arm, Patient Position: Lying)   Pulse 84   Temp 98.1 °F (36.7 °C) (Oral)   Resp 16   Ht 5' 11" (1.803 m)   Wt 76.1 kg (167 lb 12.3 oz)   SpO2 (!) 93%   BMI 23.40 kg/m²     Out[facility] Measures:  [facility]    Cognition:   · Alert  · Command following: follows one-step commands  · Communication: aphasia    Occupational Performance:  Bed Mobility:    · Patient completed Rolling/Turning to Right with maximal assistance  · Patient completed Supine to Sit with maximal assistance on R side of bed  · Scooting anteriorly to EOB to have both feet planted on floor: maximal assistance  · Patient completed Sit to Supine with maximal assistance on R side of bed  · Scooting to HOB in supine: dependent and " drawsheet pull    Functional Mobility/Transfers:   Static Sitting EOB: SBA-CGA   Patient completed right lateral scooting along EOB with Total A with R knee blocking x 2 trials      Activities of Daily Living:  · Grooming: stand by assistance to wash face with wet towel, patient observed with accurate B face awareness    AMPAC 6 Click ADL:  AMPAC Total Score: 9    Treatment & Education:  -Pt education on OT role and POC.  -Importance of E/OOB activity with staff assistance, emphasis on daily participation  -Safety during functional transfer and mobility ensured  -Patient provided with education on importance of Bilateral UB/LB integration during functional tasks for improvement in functional performance.   -Education provided/reviewed, questions answered within OT scope of practice.   -Patient will continue to require reinforcement to demonstrate understanding and learning this date.         Patient left HOB elevated with all lines intact, call button in reach and bed alarm on    GOALS:   Multidisciplinary Problems     Occupational Therapy Goals        Problem: Occupational Therapy Goal    Goal Priority Disciplines Outcome Interventions   Occupational Therapy Goal     OT, PT/OT Ongoing, Progressing    Description: Goals to be met by: 1/10/22     Patient will increase functional independence with ADLs by performing:      UE Dressing with Minimal Assistance.  LE Dressing with Moderate Assistance.  Grooming while seated with Moderate Assistance.  Toileting from bedside commode with Maximum Assistance for hygiene and clothing management.   Sitting at edge of bed x5 minutes with Minimal Assistance.  Rolling to Bilateral with Minimal Assistance.   Supine to sit with Minimal Assistance.  Toilet transfer to bedside commode with Moderate Assistance.  Upper extremity exercise program AAROM x10 reps, with assistance as needed.                      Time Tracking:     OT Date of Treatment: 12/29/21  OT Start Time: 1054  OT  Stop Time: 1122  OT Total Time (min): 28 min    Billable Minutes:Self Care/Home Management 14  Neuromuscular Re-education 14      12/29/2021

## 2021-12-29 NOTE — ASSESSMENT & PLAN NOTE
CTA showing pulmonary embolism in left apical segmental pulmonary artery.  Patient satting appropriately on room air, intermittently tachycardic, but not hypotensive.  EKG shows evidence of right axis deviation, RBBB, SI QIII TIII pattern.  Echo showing severe RV enlargement with pulmonary hypertension, mild tricuspid regurgitation.  Patient started on heparin drip.    Recommendations:  - See Mural thrombus of cardiac apex

## 2021-12-29 NOTE — ASSESSMENT & PLAN NOTE
Mr. Jimmy Leger is a 56 y.o. male with reported but unconfirmed CAD, HTN, PAD (known stents in LLE) who was found to have severely decreased EF on echo, with apical thrombus and subsegmental PE in setting of CVA.  Past medical history is unclear as patient has difficulty communicating given CVA, however patient reports some dyspnea prior to hospitalization but no chest pain.  Patient is hemodynamically stable and satting appropriately on RA, without any evidence of JVD or lower extremity edema.  Trop 0.08->0.06, BNP 1947.  EKG with sinus tachycardia with R axis deviation, with RVH and incomplete RBBB.  TTE showed LV dilation with global hypokinesis, EF 15-20%, GIII LVDD, severely enlarged RV, pulmonary HTN, severe atrial enlargement, and small to moderate apical thrombus.  No previous echo to compare.     Recommendations:  - unclear etiology of newly depressed EF, however does not appear decompensated at this time  - increase metoprolol tartrate 50 mg BID today, with goal of increasing to 200 mg total daily dose if tolerated  - switch lisinopril to entresto 24-25mg daily  - start spironolactone 12.5 mg daily if tolerated  - start on SGLT2 inhibitor at discharge with BMP follow up in 1 week  - do not recommend life vest for primary prevention as etiology of new cardiomyopathy is unclear and life vests do not result in overall mortality benefit for NICM with EF<35%  - dietary and fluid restrictions   - will need ischemic work up which can be done outpatient

## 2021-12-29 NOTE — ASSESSMENT & PLAN NOTE
- CTA with distal L M1 occlusion.   - Repeat CTH with large L MCA infarct.   - Per VN etiology likely cardioembolic 2/2 low EF.

## 2021-12-29 NOTE — HPI
Mr. Jimmy Leger is a 56 y.o. male with reported but unconfirmed CAD, HTN, PAD (known stents in LLE) who presents today for evaluation of incidental LV thrombus and severely reduced ejection fraction in setting of CVA.  Most of history taken from records, as patient is dysarthric.  Per chart review, patient was brought to [facility] on 12/20 after being found down for unknown period of time.  Work up showed large L MCA stroke and was transferred to [facility] for further evaluation, however patient was not a candidate for intervention.  TTE on 12/21 showed LV dilation, EF 15-20%, and small to moderate size apical thrombus.  CTA chest showed subsegemental PE.  Patient was started on heparin drip, which was held briefly due to petechial hemorrhage noted on repeat CTH, but was restarted after repeat CTH showed that this was stable.  Per patient, he had been short of breath prior to coming into the hospital, however denied any previous chest pain or heart disease, however it was noted that he has poor health care follow up.  Cardiology consulted for evaluation of newly decreased EF.

## 2021-12-29 NOTE — PROGRESS NOTES
[facility] - Neurosurgery ([facility])  Adult Nutrition  Progress Note    SUMMARY       Recommendations    1. Change diet to Cardiac, texture per SLP.     2. If PO <50%, add Boost Pudding BID.    -If liquids advanced to thin, can add Boost Plus BID.     3. RD following.     Goals: Pt will meet and tolerate >75% EEN/EPN by RD f/u  Nutrition Goal Status: goal met  Communication of RD Recs:  (POC)    Assessment and Plan    Nutrition Problem  Inadequate energy intake     Related to (etiology):   Inability to consume sufficient energy     Signs and Symptoms (as evidenced by):   NPO with no alternate means of nutrition  Confusion      Interventions (treatment strategy):  Collaboration with other providers  Enteral nutrition     Nutrition Diagnosis Status:   Resolved        Malnutrition Assessment                 Orbital Region (Subcutaneous Fat Loss): well nourished  Upper Arm Region (Subcutaneous Fat Loss): well nourished   Rastafarian Region (Muscle Loss): well nourished                 Reason for Assessment    Reason For Assessment: RD follow-up  Diagnosis:  (Stroke)  Relevant Medical History: HTN, CAD  Interdisciplinary Rounds: did not attend  General Information Comments: Pts diet advanced on 12/27, Pt with 100% PO intake so NGT removed and TFs stopped. Pt hard to understand and follow commands 2/2 aphasia. RD helped Pt sit up in bed and bring breakfast tray towards him. Unsure of appetite PTA for chewing/swallowing difficulties. UBW ~165# per graph review. 13# wt gain noted x 4 months with 6# wt loss x1 week. Unable to perform NFPE 2/2 being uncooperative. Pt visually appears nourished, RD to monitor.  Nutrition Discharge Planning: Adequate intake    Nutrition Risk Screen    Nutrition Risk Screen: tube feeding or parenteral nutrition    Nutrition/Diet History    Spiritual, Cultural Beliefs, Presybeterian Practices, Values that Affect Care: no  Food Allergies: NKFA  Factors Affecting Nutritional Intake: None identified at this  "time    Anthropometrics    Temp: 98.7 °F (37.1 °C)  Height Method: Estimated  Height: 5' 11" (180.3 cm)  Height (inches): 71 in  Weight Method: Bed Scale  Weight: 76.1 kg (167 lb 12.3 oz)  Weight (lb): 167.77 lb  Ideal Body Weight (IBW), Male: 172 lb  % Ideal Body Weight, Male (lb): 100.58 %  BMI (Calculated): 23.4  BMI Grade: 18.5-24.9 - normal  Usual Body Weight (UBW), k kg (per chart)  % Usual Body Weight: 104.85  % Weight Change From Usual Weight: 4.63 %     Lab/Procedures/Meds    Pertinent Labs Reviewed: reviewed  Pertinent Labs Comments: BUN 21  Pertinent Medications Reviewed: reviewed  Pertinent Medications Comments: [id number], polyethylene glycol    Estimated/Assessed Needs    Weight Used For Calorie Calculations: 76.1 kg (167 lb 12.3 oz)  Energy Calorie Requirements (kcal):  kcal  Energy Need Method: Lefor-St Jeor (x1.25 AF)  Protein Requirements: 76-91g (1-1.2g/kg)  Weight Used For Protein Calculations: 76.1 kg (167 lb 12.3 oz)  Fluid Requirements (mL): 1ml/kcal or per MD  Estimated Fluid Requirement Method: RDA Method  RDA Method (mL):      Nutrition Prescription Ordered    Current Diet Order: Dysphagia Pureed; Nectar thick liquids    Evaluation of Received Nutrient/Fluid Intake    I/O: +4200  Energy Calories Required: not meeting needs  Protein Required: not meeting needs  Comments: LBM   Tolerance: tolerating  % Intake of Estimated Energy Needs: 75 - 100 %  % Meal Intake: 75 - 100 %    Nutrition Risk    Level of Risk/Frequency of Follow-up: low     Monitor and Evaluation    Food and Nutrient Intake: energy intake,enteral nutrition intake  Food and Nutrient Adminstration: enteral and parenteral nutrition administration  Knowledge/Beliefs/Attitudes: food and nutrition knowledge/skill  Physical Activity and Function: nutrition-related ADLs and IADLs  Anthropometric Measurements: weight,weight change  Biochemical Data, Medical Tests and Procedures: electrolyte and renal " panel,gastrointestinal profile,glucose/endocrine profile,inflammatory profile,lipid profile  Nutrition-Focused Physical Findings: overall appearance     Nutrition Follow-Up    RD Follow-up?: Yes

## 2021-12-29 NOTE — ASSESSMENT & PLAN NOTE
Patient with 1.6 x 1.1 cm apical mural thrombus found incidentally on TTE. Started on heparin drip    Recommendations:  - continue heparin drip, agree with transitioning to full anticoagulation on DOAC once OK per neurology/neurosurgery  - will need repeat TTE for evaluation of resolution of thrombus  - continue cardiac monitoring

## 2021-12-29 NOTE — ASSESSMENT & PLAN NOTE
-Patient with significant PAD w/ occluded stents x 2 in sup femoral and popliteal artery  -Vascular surgery consulted who recommended ASA when safe from neuro perspective. Okay to start ASA over there weekend with CTH before.

## 2021-12-29 NOTE — PLAN OF CARE
Problem: SLP Goal  Goal: SLP Goal  Description: Speech Language Pathology Goals  Goals expected to be met by 12/30:  1. Pt will participate in ongoing swallowing assessment to determine if safe for PO intake.   2. Pt will respond to simple yes/no q's with 60% accuracy given max cues.   3. Pt will model 1-step commands on 1/5 trials given max cues.   4. Pt will repeat single words with 50% intelligibility given max cues.   5. Pt will participate in ongoing evaluation of speech/language abilities.   Outcome: Ongoing, Progressing     Patient seen for ongoing therapy. SLP recommending continued Puree Diet/Nectar-thick Liquids at this time.    Mian Junior CCC-SLP  Speech-Language Pathology  Pager: [phone]

## 2021-12-29 NOTE — SUBJECTIVE & OBJECTIVE
Neurologic Chief Complaint: found down, nonverbal, RSW    Subjective:     Interval History: Patient is seen for follow-up neurological assessment and treatment recommendations:   Neuro exam stable. Recs for IPR. On heparin gtt. Can transition to DOAC over the weekend.  Patient pending medicaid.     HPI, Past Medical, Family, and Social History remains the same as documented in the initial encounter.     Review of Systems   Constitutional: Positive for activity change and fatigue.   HENT: Positive for trouble swallowing. Negative for facial swelling.    Respiratory: Negative for cough and shortness of breath.    Cardiovascular: Negative for leg swelling.   Gastrointestinal: Negative for vomiting.   Skin: Negative for pallor and rash.   Neurological: Positive for speech difficulty and weakness.     Scheduled Meds:   atorvastatin  40 mg Oral Daily    EScitalopram oxalate  5 mg Oral Daily    lisinopriL  5 mg Oral Daily    metoprolol tartrate  25 mg Oral BID    polyethylene glycol  17 g Oral Daily    senna-docusate 8.6-50 mg  1 tablet Oral BID    sodium chloride  2 g Oral TID     Continuous Infusions:   heparin (porcine) in D5W 20 Units/kg/hr (12/28/21 2137)     PRN Meds:acetaminophen, hydrALAZINE, labetalol, ondansetron, sodium chloride 0.9%    Objective:     Vital Signs (Most Recent):  Temp: 97.8 °F (36.6 °C) (12/29/21 1130)  Pulse: 92 (12/29/21 1130)  Resp: 16 (12/29/21 1130)  BP: (!) 126/96 (12/29/21 1130)  SpO2: (!) 92 % (12/29/21 1130)  BP Location: Left arm    Vital Signs Range (Last 24H):  Temp:  [97 °F (36.1 °C)-99.8 °F (37.7 °C)]   Pulse:  [73-96]   Resp:  [16-31]   BP: (109-148)/(66-96)   SpO2:  [92 %-100 %]   BP Location: Left arm    Physical Exam  Vitals and nursing note reviewed.   Constitutional:       General: He is not in acute distress.     Appearance: He is well-developed. He is not diaphoretic.   HENT:      Head: Normocephalic and atraumatic.      Right Ear: External ear normal.      Left Ear:  External ear normal.      Nose: Nose normal.   Eyes:      Extraocular Movements: Extraocular movements intact.      Conjunctiva/sclera: Conjunctivae normal.   Cardiovascular:      Rate and Rhythm: Normal rate.   Pulmonary:      Effort: Pulmonary effort is normal. No respiratory distress.   Abdominal:      General: There is no distension.   Musculoskeletal:      Right lower leg: No edema.      Left lower leg: No edema.   Skin:     General: Skin is warm and dry.   Neurological:      Mental Status: He is alert.      Sensory: Sensory deficit present.      Motor: Weakness present.         Neurological Exam:   LOC: alert  Attention Span: Good   Language: Global aphasia, follows simple midline commands  Articulation: Untestable due to severe aphasia   Orientation: Untestable due to severe aphasia   Motor: Arm left  Normal 5/5  Leg left  Normal 5/5  Arm right  Plegia 0/5  Leg right Paresis 1/5  Sensation: Intact to light touch throughout  Tone: Flaccid  RUE     Laboratory:  BMP:   Recent Labs   Lab 12/29/21  0645      K 3.8   *   CO2 25   BUN 21*   CREATININE 0.8   CALCIUM 9.0     CBC:   Recent Labs   Lab 12/29/21  0645   WBC 10.66   RBC 3.85*   HGB 13.1*   HCT 39.7*      *   MCH 34.0*   MCHC 33.0     Lipid Panel:   No results for input(s): CHOL, LDLCALC, HDL, TRIG in the last 168 hours.  Coagulation:   Recent Labs   Lab 12/23/21  1206 12/23/21  1822 12/29/21  0300   INR 1.0  --   --    APTT 24.9   < > 40.1*    < > = values in this interval not displayed.       Diagnostic Results     Brain Imaging   CTH 12/25/21  No significant change from prior.  Evolving large recent left MCA territory infarction with petechial hemorrhagic conversion.  No evidence for significant new hemorrhage or increased mass effect.        CTH 12/24/2021  Evolving large left MCA territory infarction with petechial hemorrhagic conversion.  There is intermediate density components in the left temporal lobe area of infarction  suggestive for interval additional petechial hemorrhage.  No evidence for extra-axial hemorrhage or hydrocephalus.     MRI Brain WO. Date: 12/22/21    Large acute  left MCA distribution infarct with modest mass effect. Multiple small remote scattered infarcts elsewhere as above.  Embolic disease to be considered.     CTH WO 12/21/21  Again demonstrated in better delineated as large left MCA distribution infarct with no associated acute hemorrhage and with localized mass effect resulting in effacement of overlying cortical sulci and partial effacement left sylvian fissure.  No associated acute hemorrhage.     Several small old areas of infarction again noted including cerebellum bilaterally, anterior left perisylvian region, lateral left frontal cortex and posterior right parietal cortex.     CTH WO 12/20/21   Large area loss of gray-white differentiation consistent with acute infarct left MCA distribution involving the left temporal lobe, temporal occipital region and portions of the basal ganglia with localized mass effect including effacement of overlying cortical sulci although with no associated acute hemorrhage.     Several small old areas of infarction are evident including anterior left perisylvian and cerebellum bilaterally. Mild underlying atrophy.        Vessel Imaging:  CTA H/N 12/20/21  Segmental occlusion measuring approximately 10 mm mid and distal M1 segment left MCA with diminished flow distal to the stenosis.     Right cervical carotid circulation--atherosclerotic calcification bifurcation without measurable stenosis.     Left cervical carotid circulation--atherosclerotic calcification distal left common carotid artery and bifurcation with stenosis proximal left ICA measuring 16% utilizing NASCET criteria.  Mild eccentric narrowing mid to distal portion left common carotid artery also noted.     Vertebral arteries--no focal arterial abnormality or measurable stenosis.     Cardiac Evaluation:   TTE  with bubble 12/21/21  1. The left ventricle (LV) is dilated with severely depressed systolic function & global hypokinesis.  2. LV ejection fraction is 15-20%. Grade III LV diastolic dysfunction.  3. Small to moderate sized (1.6x1.1 cm) layered, protruding apical mass c/w thrombus.  4. The right ventricle (RV) is severely enlarged with normal systolic function.   5. Estimated RVSP is moderately elevated (50-70 mmHg). Pulmonary hypertension is present.  6. Severe atrial enlargement.  7. Moderate eccentric mitral regrugitation.  8. Mild tricuspid regurgitation.

## 2021-12-30 PROBLEM — M25.511 RIGHT SHOULDER PAIN: Status: ACTIVE | Noted: 2021-12-30

## 2021-12-30 LAB
APTT BLDCRRT: 38.3 SEC (ref 21–32)
APTT BLDCRRT: 41 SEC (ref 21–32)
BNP SERPL-MCNC: 1013 PG/ML (ref 0–99)
POCT GLUCOSE: 107 MG/DL (ref 70–110)
POCT GLUCOSE: 108 MG/DL (ref 70–110)
POCT GLUCOSE: 109 MG/DL (ref 70–110)
POCT GLUCOSE: 115 MG/DL (ref 70–110)
POCT GLUCOSE: 116 MG/DL (ref 70–110)
POCT GLUCOSE: 91 MG/DL (ref 70–110)

## 2021-12-30 PROCEDURE — 85730 THROMBOPLASTIN TIME PARTIAL: CPT | Mod: 91 | Performed by: PSYCHIATRY & NEUROLOGY

## 2021-12-30 PROCEDURE — 99232 PR SUBSEQUENT HOSPITAL CARE,LEVL II: ICD-10-PCS | Mod: ,,, | Performed by: PSYCHIATRY & NEUROLOGY

## 2021-12-30 PROCEDURE — 36415 COLL VENOUS BLD VENIPUNCTURE: CPT | Performed by: PSYCHIATRY & NEUROLOGY

## 2021-12-30 PROCEDURE — 25000003 PHARM REV CODE 250: Performed by: PSYCHIATRY & NEUROLOGY

## 2021-12-30 PROCEDURE — 97112 NEUROMUSCULAR REEDUCATION: CPT

## 2021-12-30 PROCEDURE — 25000003 PHARM REV CODE 250

## 2021-12-30 PROCEDURE — 92507 TX SP LANG VOICE COMM INDIV: CPT

## 2021-12-30 PROCEDURE — 36415 COLL VENOUS BLD VENIPUNCTURE: CPT | Performed by: PHYSICIAN ASSISTANT

## 2021-12-30 PROCEDURE — 85730 THROMBOPLASTIN TIME PARTIAL: CPT | Performed by: INTERNAL MEDICINE

## 2021-12-30 PROCEDURE — 99232 SBSQ HOSP IP/OBS MODERATE 35: CPT | Mod: ,,, | Performed by: PSYCHIATRY & NEUROLOGY

## 2021-12-30 PROCEDURE — 97535 SELF CARE MNGMENT TRAINING: CPT

## 2021-12-30 PROCEDURE — 94761 N-INVAS EAR/PLS OXIMETRY MLT: CPT

## 2021-12-30 PROCEDURE — 63600175 PHARM REV CODE 636 W HCPCS: Performed by: INTERNAL MEDICINE

## 2021-12-30 PROCEDURE — 99900035 HC TECH TIME PER 15 MIN (STAT)

## 2021-12-30 PROCEDURE — 25000003 PHARM REV CODE 250: Performed by: STUDENT IN AN ORGANIZED HEALTH CARE EDUCATION/TRAINING PROGRAM

## 2021-12-30 PROCEDURE — 11000001 HC ACUTE MED/SURG PRIVATE ROOM

## 2021-12-30 PROCEDURE — 36415 COLL VENOUS BLD VENIPUNCTURE: CPT | Performed by: INTERNAL MEDICINE

## 2021-12-30 PROCEDURE — 97530 THERAPEUTIC ACTIVITIES: CPT

## 2021-12-30 PROCEDURE — 92526 ORAL FUNCTION THERAPY: CPT

## 2021-12-30 PROCEDURE — 94760 N-INVAS EAR/PLS OXIMETRY 1: CPT

## 2021-12-30 PROCEDURE — 25000003 PHARM REV CODE 250: Performed by: INTERNAL MEDICINE

## 2021-12-30 PROCEDURE — 83880 ASSAY OF NATRIURETIC PEPTIDE: CPT | Performed by: PHYSICIAN ASSISTANT

## 2021-12-30 RX ORDER — METOPROLOL SUCCINATE 50 MG/1
50 TABLET, EXTENDED RELEASE ORAL 2 TIMES DAILY
Status: DISCONTINUED | OUTPATIENT
Start: 2021-12-30 | End: 2022-01-06

## 2021-12-30 RX ADMIN — SENNOSIDES AND DOCUSATE SODIUM 1 TABLET: 50; 8.6 TABLET ORAL at 09:12

## 2021-12-30 RX ADMIN — SODIUM CHLORIDE TAB 1 GM 1 G: 1 TAB at 09:12

## 2021-12-30 RX ADMIN — METOPROLOL SUCCINATE 50 MG: 50 TABLET, EXTENDED RELEASE ORAL at 09:12

## 2021-12-30 RX ADMIN — ATORVASTATIN CALCIUM 40 MG: 20 TABLET, FILM COATED ORAL at 09:12

## 2021-12-30 RX ADMIN — SODIUM CHLORIDE TAB 1 GM 1 G: 1 TAB at 04:12

## 2021-12-30 RX ADMIN — ESCITALOPRAM OXALATE 5 MG: 5 TABLET, FILM COATED ORAL at 09:12

## 2021-12-30 RX ADMIN — POLYETHYLENE GLYCOL 3350 17 G: 17 POWDER, FOR SOLUTION ORAL at 09:12

## 2021-12-30 RX ADMIN — HEPARIN SODIUM 20 UNITS/KG/HR: 1000 INJECTION INTRAVENOUS; SUBCUTANEOUS at 09:12

## 2021-12-30 NOTE — ASSESSMENT & PLAN NOTE
Newly diagnosed  EF 15-20%  Cardiology consulted who recommended increasing metoprolol to 50 BID with goal of 200, switching lisinopril to entresto 24-25, starting spironolactone 12.5 if tolerated, and SGLT2 at discharge with BMP f/u in 1 week. Per cards, no lifevest recommended. Ischemic workup as outpatient. Repeat TTE for evaluation of resolution of thrombus.

## 2021-12-30 NOTE — PT/OT/SLP PROGRESS
Occupational Therapy Treatment    Patient Name:  Jimmy Leger   MRN:  [id number]  Admit Date: 12/21/2021  Admitting Diagnosis:  Stroke due to embolism of left middle cerebral artery   Length of Stay: 9 days  Recent Surgery: * No surgery found *      Recommendations:     Discharge Recommendations: rehabilitation facility  Discharge Equipment Recommendations:  other (see comments) (TBD (progress pending))  Barriers to discharge:  Other (Comment) (Increased skilled assistance required)    Plan:     Patient to be seen 5 x/week to address the above listed problems via self-care/home management,therapeutic activities,therapeutic exercises,neuromuscular re-education,sensory integration  · Plan of Care Expires: 01/04/22  · Plan of Care Reviewed with: patient    Assessment:   Jimmy Leger is a 56 y.o. male with a medical diagnosis of Stroke due to embolism of left middle cerebral artery.  He presents with the following performance deficits affecting function: weakness,impaired endurance,impaired self care skills,impaired functional mobilty,gait instability,impaired balance,pain,decreased safety awareness,decreased lower extremity function,decreased upper extremity function,impaired fine motor,impaired coordination,decreased coordination,decreased ROM,abnormal tone,impaired sensation.      Pt tolerated session fairly this date and was eager to participate. Demonstrating continued right side hemiparesis, increased tone, decreased activity tolerance, impaired endurance, impaired coordination, impaired balance and decreased safety awareness, requiring increased time and assistance to complete functional tasks. Patient complete bed mobility with max-Mod A while HOB elevated. Patient tolerated EOB sitting for ~18 minutes while engaged in grooming/hygiene tasks. Patient observed with grooming apraxia, requiring visual and tactile cueing for task completion. PROM performed to RUNADYA while HOB elevated, patient observed with slight tone  "and continued trace movements.  Patient is progressing towards established goals, and continues to benefit from acute skilled OT services to increase functional performance and improve quality of life. OT to continue to recommend Rehab at D/C to improve pt functional independence and increase patient safety before returning home.      Rehab Prognosis: Fair; patient would benefit from acute skilled OT services to address these deficits and reach maximum level of function.        Subjective   Communicated with: Nurse prior to session.  Patient found HOB elevated with bed alarm,telemetry,peripheral IV,Condom Catheter upon OT entry to room. Pt agreeable to participate at this time.    Patient: "Did I do good?" -following session, not clear, slurred speech    Pain/Comfort:  · Pain Rating 1: 0/10  · Pain Rating Post-Intervention 1: 0/10    Objective:   Patient found with: bed alarm,telemetry,peripheral IV,Condom Catheter   General Precautions: Standard, Cardiac aphasia,aspiration,fall,nectar thick,pureed diet   Orthopedic Precautions:N/A   Braces: N/A   Oxygen Device: Room Air  Vitals: /83   Pulse 76   Temp 97.6 °F (36.4 °C)   Resp 15   Ht 5' 11" (1.803 m)   Wt 76.1 kg (167 lb 12.3 oz)   SpO2 98%   BMI 23.40 kg/m²     Outcome Measures:  [facility] 6 Click ADL: 13    Cognition:   · Alert  · Command following: follows one-step commands  · Communication: expressive aphasia and dysarthria    Occupational Performance:  Bed Mobility:    · Patient completed Rolling/Turning to Right with maximal assistance  · Patient completed Supine to Sit with moderate assistance on R side of bed; patient with initiation of pushing into R shoulder/elbow  · Scooting anteriorly to EOB to have both feet planted on floor: minimum assistance  · Patient completed Sit to Supine with maximal assistance on R side of bed  · Scooting to HOB in supine: contact guard assistance with cueing for technique    Functional Mobility/Transfers:   Static " Sitting EOB: SBA   Patient completed posterior and R lateral scooting with Min A along EOB       Activities of Daily Living:  · Grooming: moderate assistance to perform oral care, wash face and comb hair; required set up assist and visual/tactile cueing for technique    AMPAC 6 Click ADL:  AMPAC Total Score: 13    Treatment & Education:  -Pt education on OT role and POC.  -Importance of E/OOB activity with staff assistance, emphasis on daily participation  -Patient tolerated EOB sitting for ~18 minutes with SBA  -RUE PROM while HOB elevated within all planes  -Safety during functional transfer and mobility ensured  -Patient provided with education on importance of Bilateral UB/LB integration during functional tasks for improvement in functional performance.   -Education provided/reviewed, questions answered within OT scope of practice.   -Patient will continue to require reinforcement to demonstrate understanding and learning this date.         Patient left HOB elevated with all lines intact, call button in reach, bed alarm on and nurse present    GOALS:   Multidisciplinary Problems     Occupational Therapy Goals        Problem: Occupational Therapy Goal    Goal Priority Disciplines Outcome Interventions   Occupational Therapy Goal     OT, PT/OT Ongoing, Progressing    Description: Goals to be met by: 1/10/22     Patient will increase functional independence with ADLs by performing:      UE Dressing with Minimal Assistance.  LE Dressing with Moderate Assistance.  Grooming while seated with Moderate Assistance.  Toileting from bedside commode with Maximum Assistance for hygiene and clothing management.   Sitting at edge of bed x5 minutes with Minimal Assistance.  Rolling to Bilateral with Minimal Assistance.   Supine to sit with Minimal Assistance.  Toilet transfer to bedside commode with Moderate Assistance.  Upper extremity exercise program AAROM x10 reps, with assistance as needed.                      Time  Tracking:     OT Date of Treatment: 12/30/21  OT Start Time: 1039  OT Stop Time: 1109  OT Total Time (min): 30 min    Billable Minutes:Self Care/Home Management 15  Neuromuscular Re-education 15      12/30/2021

## 2021-12-30 NOTE — PLAN OF CARE
[facility] (Hospital)  Discharge Reassessment    Primary Care Provider: Primary Doctor No    Expected Discharge Date: 1/4/2022     Not medically ready, pending medicaid screening    Reassessment (most recent)     Discharge Reassessment - 12/30/21 1016        Discharge Reassessment    Assessment Type Discharge Planning Reassessment     Did the patient's condition or plan change since previous assessment? No     Discharge Plan discussed with: Sibling     Name(s) and Number(s) Brooke Leger (sister) [phone]     Communicated AGNIE with patient/caregiver Date not available/Unable to determine     Discharge Plan A Rehab     Discharge Plan B Home;Community Services     DME Needed Upon Discharge  other (see comments)   tbd    Discharge Barriers Identified Unisured     Why the patient remains in the hospital Requires continued medical care        Post-Acute Status    Post-Acute Authorization Placement     Post-Acute Placement Status Pending state direction/certification     Discharge Delays Payor Issues

## 2021-12-30 NOTE — CARE UPDATE
"RAPID RESPONSE NURSE CHART REVIEW       Chart Reviewed: 12/30/2021, 9:24 AM    MRN: [id number]  Bed: 926/926 A    Dx: Stroke due to embolism of left middle cerebral artery    Jimmy Leger has a past medical history of Combined systolic and diastolic congestive heart failure, Coronary artery disease, Hypertension, and Mural thrombus of cardiac apex.    Last VS: /83   Pulse 76   Temp 97.6 °F (36.4 °C)   Resp 15   Ht 5' 11" (1.803 m)   Wt 76.1 kg (167 lb 12.3 oz)   SpO2 98%   BMI 23.40 kg/m²     24H Vital Sign Range:  Temp:  [97.6 °F (36.4 °C)-99.2 °F (37.3 °C)]   Pulse:  [62-94]   Resp:  [15-18]   BP: (115-130)/(78-96)   SpO2:  [92 %-98 %]     Level of Consciousness (AVPU): alert    Recent Labs     12/29/21  0645   WBC 10.66   HGB 13.1*   HCT 39.7*          Recent Labs     12/29/21  0645      K 3.8   *   CO2 25   CREATININE 0.8      PHOS 2.9   MG 2.2        No results for input(s): PH, PCO2, PO2, HCO3, POCSATURATED, BE in the last 72 hours.     OXYGEN:  Flow (L/min): 1     O2 Device (Oxygen Therapy): room air    MEWS score: 1    Charge Elicia HORNER contacted. No concerns verbalized at this time. Instructed to call [phone] for further concerns or assistance.    Susan Grider RN        "

## 2021-12-30 NOTE — PLAN OF CARE
Patient is Alert and vital signs are currently stable. Plan of care reviewed with patient but he is unable to verbalize understanding because he has aphasia. Patient has a condom cath in place. Patient is on Telemetry monitoring. Patient is being turned and repositioned every 2 hours per staff. Patient is eating and tolerating his pureed  Cardiac diet with nectar thick liquids. Heparin drip continues to run at 20 units and continues to be therapeutic at the present time. Patient had a bowel movement today. Patient bathed , linens were changed, and incontinent care was provided. No s/s of any pain or discomfort noted. No s/s of any respiratory distress noted. No s/s of an adverse reaction noted to medication therapy. Patient attempted to place his left leg out of the bed this evening and the staff was alerted by the bed alarm.  The bed alarm remains in place. No apparent distress noted. Will continue to monitor and report any changes.

## 2021-12-30 NOTE — SUBJECTIVE & OBJECTIVE
Neurologic Chief Complaint: found down, nonverbal, RSW    Subjective:     Interval History: Patient is seen for follow-up neurological assessment and treatment recommendations:   Neuro exam remains stable. Pending medicaid.     HPI, Past Medical, Family, and Social History remains the same as documented in the initial encounter.     Review of Systems   Constitutional: Positive for fatigue. Negative for fever.   HENT: Positive for trouble swallowing. Negative for facial swelling.    Respiratory: Negative for cough and shortness of breath.    Cardiovascular: Negative for leg swelling.   Gastrointestinal: Negative for vomiting.   Skin: Negative for pallor and rash.   Neurological: Positive for facial asymmetry, speech difficulty and weakness.     Scheduled Meds:   atorvastatin  40 mg Oral Daily    EScitalopram oxalate  5 mg Oral Daily    lisinopriL  5 mg Oral Daily    metoprolol succinate  50 mg Oral BID    polyethylene glycol  17 g Oral Daily    senna-docusate 8.6-50 mg  1 tablet Oral BID    sodium chloride  1 g Oral TID     Continuous Infusions:   heparin (porcine) in D5W 20 Units/kg/hr (12/30/21 0500)     PRN Meds:acetaminophen, hydrALAZINE, labetalol, ondansetron, sodium chloride 0.9%    Objective:     Vital Signs (Most Recent):  Temp: 98.6 °F (37 °C) (12/30/21 0332)  Pulse: 71 (12/30/21 0332)  Resp: 18 (12/30/21 0332)  BP: 119/83 (12/30/21 0332)  SpO2: (!) 93 % (12/30/21 0332)  BP Location: Left arm    Vital Signs Range (Last 24H):  Temp:  [97.7 °F (36.5 °C)-99.2 °F (37.3 °C)]   Pulse:  [62-94]   Resp:  [16-18]   BP: (115-126)/(78-96)   SpO2:  [92 %-93 %]   BP Location: Left arm    Physical Exam  Vitals and nursing note reviewed.   Constitutional:       General: He is not in acute distress.     Appearance: He is well-developed. He is not diaphoretic.   HENT:      Head: Normocephalic and atraumatic.      Right Ear: External ear normal.      Left Ear: External ear normal.      Nose: Nose normal.   Eyes:       Extraocular Movements: Extraocular movements intact.      Conjunctiva/sclera: Conjunctivae normal.   Cardiovascular:      Rate and Rhythm: Normal rate.   Pulmonary:      Effort: Pulmonary effort is normal. No respiratory distress.   Abdominal:      General: Abdomen is flat. There is no distension.   Musculoskeletal:      Right lower leg: No edema.      Left lower leg: No edema.   Skin:     General: Skin is warm and dry.   Neurological:      Mental Status: He is alert.      Sensory: Sensory deficit present.      Motor: Weakness present.   Psychiatric:         Behavior: Behavior normal.         Neurological Exam:   LOC: alert  Attention Span: Good   Language: Global aphasia, follows simple midline commands  Articulation: Dysarthria  Orientation: Untestable due to severe aphasia   Motor: Arm left  Normal 5/5  Leg left  Normal 5/5  Arm right  Plegia 0/5  Leg right Paresis 3/5  Sensation: right hypoesthesia  Tone: Flaccid  RUE     Laboratory:  BMP:   Recent Labs   Lab 12/29/21  0645      K 3.8   *   CO2 25   BUN 21*   CREATININE 0.8   CALCIUM 9.0     CBC:   Recent Labs   Lab 12/29/21  0645   WBC 10.66   RBC 3.85*   HGB 13.1*   HCT 39.7*      *   MCH 34.0*   MCHC 33.0     Lipid Panel:   No results for input(s): CHOL, LDLCALC, HDL, TRIG in the last 168 hours.  Coagulation:   Recent Labs   Lab 12/23/21  1206 12/23/21  1822 12/29/21  0300   INR 1.0  --   --    APTT 24.9   < > 40.1*    < > = values in this interval not displayed.       Diagnostic Results     Brain Imaging   CTH 12/25/21  No significant change from prior.  Evolving large recent left MCA territory infarction with petechial hemorrhagic conversion.  No evidence for significant new hemorrhage or increased mass effect.        CTH 12/24/2021  Evolving large left MCA territory infarction with petechial hemorrhagic conversion.  There is intermediate density components in the left temporal lobe area of infarction suggestive for interval  additional petechial hemorrhage.  No evidence for extra-axial hemorrhage or hydrocephalus.     MRI Brain WO. Date: 12/22/21    Large acute  left MCA distribution infarct with modest mass effect. Multiple small remote scattered infarcts elsewhere as above.  Embolic disease to be considered.     CTH WO 12/21/21  Again demonstrated in better delineated as large left MCA distribution infarct with no associated acute hemorrhage and with localized mass effect resulting in effacement of overlying cortical sulci and partial effacement left sylvian fissure.  No associated acute hemorrhage.     Several small old areas of infarction again noted including cerebellum bilaterally, anterior left perisylvian region, lateral left frontal cortex and posterior right parietal cortex.     CTH WO 12/20/21   Large area loss of gray-white differentiation consistent with acute infarct left MCA distribution involving the left temporal lobe, temporal occipital region and portions of the basal ganglia with localized mass effect including effacement of overlying cortical sulci although with no associated acute hemorrhage.     Several small old areas of infarction are evident including anterior left perisylvian and cerebellum bilaterally. Mild underlying atrophy.        Vessel Imaging:  US LE Veins 12/25/21  No evidence of deep venous thrombosis in either lower extremity.    CTA H/N 12/20/21  Segmental occlusion measuring approximately 10 mm mid and distal M1 segment left MCA with diminished flow distal to the stenosis.     Right cervical carotid circulation--atherosclerotic calcification bifurcation without measurable stenosis.     Left cervical carotid circulation--atherosclerotic calcification distal left common carotid artery and bifurcation with stenosis proximal left ICA measuring 16% utilizing NASCET criteria.  Mild eccentric narrowing mid to distal portion left common carotid artery also noted.     Vertebral arteries--no focal arterial  abnormality or measurable stenosis.     Cardiac Evaluation:   TTE with bubble 12/21/21  1. The left ventricle (LV) is dilated with severely depressed systolic function & global hypokinesis.  2. LV ejection fraction is 15-20%. Grade III LV diastolic dysfunction.  3. Small to moderate sized (1.6x1.1 cm) layered, protruding apical mass c/w thrombus.  4. The right ventricle (RV) is severely enlarged with normal systolic function.   5. Estimated RVSP is moderately elevated (50-70 mmHg). Pulmonary hypertension is present.  6. Severe atrial enlargement.  7. Moderate eccentric mitral regrugitation.  8. Mild tricuspid regurgitation.     Other:  CTA Chest Non coronary 12/25/21  Pulmonary embolism left apical segmental pulmonary artery.     Moderate to large right and moderate left bilateral pleural effusions     Interstitial opacities throughout the lungs bilaterally with superimposed ill-defined consolidation right upper and to lesser degree left upper lobes which may represent superimposed developing pneumonia.     Scattered interlobular septal thickening throughout the lungs concerning for component of vascular congestion and edema.

## 2021-12-30 NOTE — ASSESSMENT & PLAN NOTE
Visualized on CTA chest 12/25  L apical segmental pulmonary artery embolism  EKG with R axis deviation, RBBB.  US LE negative for DVT  Continue heparin gtt

## 2021-12-30 NOTE — ASSESSMENT & PLAN NOTE
-Patient with significant PAD w/ occluded stents x 2 in sup femoral and popliteal artery  -Vascular surgery consulted who recommended ASA when safe from neuro perspective. Okay to start ASA over the weekend following CTH if stable.

## 2021-12-30 NOTE — ASSESSMENT & PLAN NOTE
55 yo male with PMHx of HTN and CAD who presents as a transfer from [facility]. No intervention was performed(no tPA as OOW, no IR per telestroke provider). CTH at OSH with large L MCA infarct. CTA with distal L M1 occlusion. He was admitted to hospital medicine at OSH and now transferred to [facility] [place] higher level of care. Repeat [facility]CTH(12/21) with large L MCA infarct. Echo at OSH with LV global hypokinesis, EF 15-20%, severe atrial enlargement and small to moderate size apical thrombus. Etiology: likely cardioembolic 2/2 low EF(15-20%) and cardiac thrombus. Repeat CTH with petechial hemorrhage, antithrombotics temporarily held. Repeat CT with stable petechial hemorrhage, patient no longer on hemicrani watch, CTA chest with subsegmental PE. Heparin gtt resumed, can transition to a DOAC over the weekend if repeat CTH stable.  -Neuro exam stable. Patient pending medicaid.      Antithrombotics for secondary stroke prevention:   Heparin gtt    Statins for secondary stroke prevention and hyperlipidemia, if present: Statins: Atorvastatin- 40 mg daily     Aggressive risk factor modification: HTN, CAD     Rehab efforts: The patient has been evaluated by a stroke team provider and the therapy needs have been fully considered based off the presenting complaints and exam findings. The following therapy evaluations are needed: PT/OT on board, SLP evaluate and treat, PM&R evaluate for appropriate placement. Recommend IPR.    Diagnostics ordered/pending: Other: XR right shoulder    VTE prophylaxis: Mechanical prophylaxis: Place SCDs  None: Reason for No Pharmacological VTE Prophylaxis: Currently on anticoagulation    BP parameters: Infarct: SBP <180

## 2021-12-30 NOTE — PLAN OF CARE
Pt remains free from falls and injuries. PIVs remain; heparin gtt infusing as ordered. BG checked; no supplemental insulin or dextrose required. No neuro changes. Pt restless at times and frustrated d/t communication barriers/expressive aphasia. Attempted to  pt to use one word at a time and answer in yes/no format/nonverbal communication to more easily make needs know. Emotional support provided. Pt repositioned and incontinence care done frequently. VSS, no acute issues overnight. Plan of care reviewed with pt.

## 2021-12-30 NOTE — PT/OT/SLP PROGRESS
Physical Therapy Treatment    Patient Name:  Jimmy Leger   MRN:  [id number]    Recommendations:     Discharge Recommendations:  rehabilitation facility   Discharge Equipment Recommendations:  (TBD)   Barriers to discharge: Increased level of assist    Assessment:     Jimmy Leger is a 56 y.o. male admitted with a medical diagnosis of Stroke due to embolism of left middle cerebral artery.  He presents with the following impairments/functional limitations: weakness,impaired endurance,impaired self care skills,impaired functional mobilty,gait instability,impaired balance,decreased upper extremity function,decreased lower extremity function,decreased safety awareness,impaired coordination,decreased ROM,abnormal tone,impaired sensation. These deficits affect their roles and responsibilities in which they were able to complete prior to admit. Jimmy Leger would continue to benefit from acute PT intervention to improve quality of life and focus on recovery of impairments. Once medically stable, recommending pt discharge to Inpatient Rehabilitation Facility. Patient demonstrates expressive aphasia but attempts to communicate. Trace RLE activation noted while performing bed mobility, but unable to elicit contraction for MMT. Able to complete 1 sit to stand with maximal assist and 4 lateral scoots with maximal assist.    Rehab Prognosis: Good; patient continues to benefit from acute skilled PT services to address these deficits and reach maximum level of function.  Patient remains most appropriate to discharge to rehabilitation facility.  Recent Surgery: * No surgery found *      Plan:     During this hospitalization, patient to be seen 4 x/week to address the identified rehab impairments via gait training,therapeutic activities,therapeutic exercises,neuromuscular re-education and progress toward the following goals:    · Plan of Care Expires:  01/22/22    Subjective     Chief Complaint: Unable to assess due to  "aphasia  Patient/Family Comments/Goals: "I'm alright"  Pain/Comfort:  · Pain Rating 1: 0/10  · Pain Rating Post-Intervention 1: 0/10    Objective:     Communicated with RN prior to session. Patient found HOB elevated with bed alarm,telemetry,peripheral IV,Condom Catheter upon PT entry to room.     General Precautions: Standard, aphasia,aspiration,fall,nectar thick,pureed diet   Orthopedic Precautions:N/A   Braces: N/A    Functional Mobility:  · Bed Mobility:     · Rolling Right: moderate assistance  · Scooting: minimum assistance to scoot anteriorly to EOB with patient using L bedrail, maximal assist to scoot laterally to right 4 times  · Supine to Sit: moderate assistance  · Sit to Supine: maximal assistance  · Transfers:     · Sit to Stand: maximal assistance with hand-held assist, assisted with R foot placement, R knee blocked  · Gait: Deferred due to poor standing balance   · Balance:   · Static Sitting: Fair, able to maintain for 6 minute(s) with stand by - minimum assistance, demonstrates R lean able to be corrected with cuing, patient utilizing L bedrail but able to maintain balance without  · Dynamic Sitting: Fair: Patient accepts minimal challenge, contact guard assistance  · Static Standing: Poor, able to maintain for 20 seconds with maximal assistance, patient requires verbal and tactile cues for upright posture, R knee blocked  · Dynamic Standing: not assessed this visit    AM-PAC 6 CLICK MOBILITY  Turning over in bed (including adjusting bedclothes, sheets and blankets)?: 2  Sitting down on and standing up from a chair with arms (e.g., wheelchair, bedside commode, etc.): 2  Moving from lying on back to sitting on the side of the bed?: 2  Moving to and from a bed to a chair (including a wheelchair)?: 2  Need to walk in hospital room?: 1  Climbing 3-5 steps with a railing?: 1  Basic Mobility Total Score: 10     Therapeutic Activities and Exercises:  Patient educated on role of acute care PT and PT " POC  Whiteboard updated  Once returned to supine, patient noted to possibly be soiled. RN notified and stated she would call for assist   Completed 4 reaches with L UE while sitting edge of bed, requires verbal and visual cues to initiate, no LOB noted    Patient left HOB elevated with all lines intact, call button in reach, RN notified and bed alarm on.    GOALS:   Multidisciplinary Problems     Physical Therapy Goals        Problem: Physical Therapy Goal    Goal Priority Disciplines Outcome Goal Variances Interventions   Physical Therapy Goal     PT, PT/OT Ongoing, Progressing     Description: Goals to be met by: 2022     Patient will increase functional independence with mobility by performin. Supine to sit with moderate assistance  2. Sit to supine with minimum assistance  3. Rolling to Left with maximal assistance  4. Rolling to Right with minimum assistance  5. Sit to stand transfer with moderate assistance  6. Bed to chair transfer with moderate assistance using LRAD as needed  7. Gait  x 10 feet with maximal assistance using LRAD as needed  8. Lower extremity exercise program x10 reps per handout, with independence                    Time Tracking:     PT Received On: 21  PT Start Time: 1555     PT Stop Time: 1619  PT Total Time (min): 24 min     Billable Minutes: Therapeutic Activity 14 min and Neuromuscular Re-education 10 min    Treatment Type: Treatment  PT/PTA: PT     PTA Visit Number: 0     2021

## 2021-12-30 NOTE — ASSESSMENT & PLAN NOTE
Seen on TTE at outside facility  -TTE 12/21/21: with EF of 15-20%, small to moderate sized (1.6x1.1 cm) layered, protruding apical mass c/w thrombus, global hypokinesis, severe atrial enlargement.  -on heparin gtt and temporarily held due to hemorrhagic conversion, now restarted  -continue heparin gtt until 1/1, ok to transition to oral anticoagulation at that time (consider Eliquis) with prior CTH  -repeat echo in 3 months for resolution of thrombus

## 2021-12-30 NOTE — PLAN OF CARE
12/30/21 1545   Post-Acute Status   Post-Acute Authorization Placement   Post-Acute Placement Status Referrals Sent   Discharge Delays None known at this time   Discharge Plan   Discharge Plan A Rehab   Discharge Plan B Community Services;Home       Per CM communication with [facility] office pt now has [facility].  CM updated Brooke Leger (sister) [phone] with this information and discussed the rehab facilities w/I 50 [place] radius of [facility]. Ms Cox preferences are: 1. [facility] & 2. [facility]. CM sent referrals

## 2021-12-30 NOTE — PT/OT/SLP PROGRESS
"Speech Language Pathology Treatment    Patient Name:  Jimmy Lgeer   MRN:  [id number]  Admitting Diagnosis: Stroke due to embolism of left middle cerebral artery    Recommendations:                 General Recommendations:  Dysphagia therapy and Speech/language therapy  Diet recommendations:  Puree, Liquid Diet Level: Nectar Thick   Aspiration Precautions: 1 bite/sip at a time, Assistance with meals and Assistance with thickening liquids, HOB to 90 degrees, Meds crushed in puree, Monitor for s/s of aspiration, No straws, Small bites/sips and Strict aspiration precautions   General Precautions: Standard, aspiration,aphasia,fall,nectar thick,pureed diet  Communication strategies:  yes/no questions only and provide increased time to answer    Subjective     "No"  Patient goals: uto    Pain/Comfort:  · Pain Rating 1: 0/10  · Pain Rating Post-Intervention 1: 0/10    Respiratory Status: Room air    Objective:     Has the patient been evaluated by SLP for swallowing?   Yes  Keep patient NPO? No   Current Respiratory Status:        Pt seen bedside with no family present. Cleared to see pt per nursing. Pt awake/alert. Pt able to grossly approximate counting to 4 with max cues and several attempts with therapist. He did not attempt to sing. Responses to automatic phrases were jargon and pt completed automatic sentences with gross approximation and max cues with 40% acc.He was able to model a simple bilabial only 20% of the attempts.Speech remained severely dysarthric and with significant aphasia. Pt responded to simple y/n questions with 60% acc and modeled simple commands with 50% acc. He did not attempt to ID objects in a f=2. Pt given trials of thin liquids via teaspoon and cup sips. Pt tolerated 1st three sips without difficulty. On the 4th sip, pt took slightly larger sip. Pt immediately turned very red in the face and attempted to cough but was unable to produce a cough. Pt finally able to cough and coughed for several " seconds. No further trials given so pt could fully recover. Nursing was notified. Education provided to pt re: aphasia and swallowing. Discussed with nursing to avoid straws and continue with strict aspiration precautions. White board updated.     Assessment:     Jimmy Leger is a 56 y.o. male with an SLP diagnosis of Aphasia, Dysphagia and Dysarthria.  He presents with overt s/s of aspiration with thin liquids.     Goals:   Multidisciplinary Problems     SLP Goals        Problem: SLP Goal    Goal Priority Disciplines Outcome   SLP Goal     SLP Ongoing, Progressing   Description: Speech Language Pathology Goals  Goals expected to be met by 12/30:  1. Pt will participate in ongoing swallowing assessment to determine if safe for PO intake.   2. Pt will respond to simple yes/no q's with 60% accuracy given max cues.   3. Pt will model 1-step commands on 1/5 trials given max cues.   4. Pt will repeat single words with 50% intelligibility given max cues.   5. Pt will participate in ongoing evaluation of speech/language abilities.                            Plan:     · Patient to be seen:  4 x/week   · Plan of Care expires:  01/22/22  · Plan of Care reviewed with:  patient   · SLP Follow-Up:  Yes       Discharge recommendations:  rehabilitation facility   Barriers to Discharge:  None    Time Tracking:     SLP Treatment Date:   12/30/21  Speech Start Time:  1020  Speech Stop Time:  1039     Speech Total Time (min):  19 min    Billable Minutes: Speech Therapy Individual 10 and Treatment Swallowing Dysfunction 9    12/30/2021

## 2021-12-30 NOTE — PROGRESS NOTES
Cyrus Higgins - Neurosurgery ([facility])  Vascular Neurology  Comprehensive Stroke Center  Progress Note    Assessment/Plan:     * Stroke due to embolism of left middle cerebral artery  55 yo male with PMHx of HTN and CAD who presents as a transfer from [facility]. No intervention was performed(no tPA as OOW, no IR per telestroke provider). CTH at OSH with large L MCA infarct. CTA with distal L M1 occlusion. He was admitted to hospital medicine at OSH and now transferred to [facility] for higher level of care. Repeat [facility](12/21) with large L MCA infarct. Echo at OSH with LV global hypokinesis, EF 15-20%, severe atrial enlargement and small to moderate size apical thrombus. Etiology: likely cardioembolic 2/2 low EF(15-20%) and cardiac thrombus. Repeat CTH with petechial hemorrhage, antithrombotics temporarily held. Repeat CT with stable petechial hemorrhage, patient no longer on hemicrani watch, CTA chest with subsegmental PE. Heparin gtt resumed, can transition to a DOAC over the weekend if repeat CTH stable.  -Neuro exam stable. Patient pending medicaid.      Antithrombotics for secondary stroke prevention:   Heparin gtt    Statins for secondary stroke prevention and hyperlipidemia, if present: Statins: Atorvastatin- 40 mg daily     Aggressive risk factor modification: HTN, CAD     Rehab efforts: The patient has been evaluated by a stroke team provider and the therapy needs have been fully considered based off the presenting complaints and exam findings. The following therapy evaluations are needed: PT/OT on board, SLP evaluate and treat, PM&R evaluate for appropriate placement. Recommend IPR.    Diagnostics ordered/pending: XR right shoulder     VTE prophylaxis: Mechanical prophylaxis: Place SCDs  None: Reason for No Pharmacological VTE Prophylaxis: Currently on anticoagulation    BP parameters: Infarct: SBP <180       Combined systolic and diastolic congestive heart failure  Newly diagnosed  EF 15-20%  Cardiology consulted who  recommended increasing metoprolol to 50 BID with goal of 200, switching lisinopril to entresto 24-25, starting spironolactone 12.5 if tolerated, and SGLT2 at discharge with BMP f/u in 1 week. Per cards, no lifevest recommended. Ischemic workup as outpatient. Repeat TTE for evaluation of resolution of thrombus.     PAD (peripheral artery disease)  -Patient with significant PAD w/ occluded stents x 2 in sup femoral and popliteal artery  -Vascular surgery consulted who recommended ASA when safe from neuro perspective. Okay to start ASA over the weekend following CTH if stable.    Single subsegmental pulmonary embolism without acute cor pulmonale  Visualized on CTA chest 12/25  L apical segmental pulmonary artery embolism  EKG with R axis deviation, RBBB  US LE negative for DVT  Continue heparin gtt    Debility  PT/OT to evaluate and treat  Therapy recommending rehab    Cytotoxic cerebral edema  Area of cytotoxic cerebral edema identified when reviewing brain imaging in the territory of the L middle cerebral artery. There is mass effect associated with it. Continue to monitor the patients clinical exam for any worsening of symptoms which may indicate expansion of the stroke or the area of the edema resulting in the clinical change. The pattern is suggestive of embolic etiology.    Clinical exam has remained stable, this suggests that cerebral edema has stabilized. Low probability of clinical deterioration. Neurosurgery signed off, patient no longer on hemicrani watch    Essential hypertension  Stroke RF  SBP<180    Mural thrombus of cardiac apex  Seen on TTE at outside facility  -TTE 12/21/21: with EF of 15-20%, small to moderate sized (1.6x1.1 cm) layered, protruding apical mass c/w thrombus, global hypokinesis, severe atrial enlargement.  -on heparin gtt and temporarily held due to hemorrhagic conversion, now restarted  -continue heparin gtt until 1/1, ok to transition to oral anticoagulation at that time (consider  Eliquis) with prior CTH  -repeat echo in 3 months for resolution of thrombus    Right shoulder pain      -Patient with right shoulder pain.      -XR right shoulder ordered and pending.      12/22 patient in ICU for hemicrani watch, NPO per SLP  12/23: Patient remains in NCC. Min intensity heparin gtt was initiated. Patient aphasic with RS plegia.   12/24-Patient with changes in CT with petechial hemorraghic conversion recommend stopping aspirin and holding heparin. Repeat imaging with worsening exam. On hemicrani watch.   12/27 RLE weakness improved on today's exam, patient with runs of vtach overnight on 12/24-12/25, CTA chest with subsegmental PE, patient restarted on heparin gtt, neurosurgery signed off and patient no longer on hemicrani watch, remains NPO per SLP, therapy recommending inpatient rehab  12/28 Continues on heparin gtt for PE, neuro exam stable with mild improvements, low suspicion for  new or worsening ICH.   12/29-Neuro exam stable. Recs for IPR. Overnight, patient had 11 run of vtach. Mg 2.2. P 2.9, will replete P to 4. On heparin gtt. Can transition to DOAC over the weekend.  Patient pending medicaid.  Cardio had been consulted by [facility] and they recommended full AC for thrombus, repeat TTE in 3 months for resolution of thrombus, and no need for life vest at this time.   12/30-Neuro exam remains stable. Patient with significant right shoulder pain. XR right shoulder ordered. Metoprolol increased to 50 BID and lisinopril switched to entresto 24-26.Pending medicaid.       STROKE DOCUMENTATION        NIH Scale:  1a. Level of Consciousness: 0-->Alert, keenly responsive  1b. LOC Questions: 1-->Answers one question correctly  1c. LOC Commands: 1-->Performs one task correctly  2. Best Gaze: 0-->Normal  3. Visual: 1-->Partial hemianopia  4. Facial Palsy: 1-->Minor paralysis (flattened nasolabial fold, asymmetry on smiling)  5a. Motor Arm, Left: 0-->No drift, limb holds 90 (or 45) degrees for full 10  secs  5b. Motor Arm, Right: 4-->No movement  6a. Motor Leg, Left: 0-->No drift, leg holds 30 degree position for full 5 secs  6b. Motor Leg, Right: 2-->Some effort against gravity, leg falls to bed by 5 secs, but has some effort against gravity  7. Limb Ataxia: 0-->Absent  8. Sensory: 1-->Mild-to-moderate sensory loss, patient feels pinprick is less sharp or is dull on the affected side, or there is a loss of superficial pain with pinprick, but patient is aware of being touched  9. Best Language: 2-->Severe aphasia, all communication is through fragmentary expression, great need for inference, questioning, and guessing by the listener. Range of information that can be exchanged is limited, listener carries burden of. . . (see row details)  10. Dysarthria: 2-->Severe dysarthria, patients speech is so slurred as to be unintelligible in the absence of or out of proportion to any dysphasia, or is mute/anarthric  11. Extinction and Inattention (formerly Neglect): 1-->Visual, tactile, auditory, spatial, or personal inattention or extinction to bilateral simultaneous stimulation in one of the sensory modalities  Total (NIH Stroke Scale): 16       Modified Slick Score: 0  Winona Coma Scale:    ABCD2 Score:    [id number] Score:   HAS -BLED Score:   ICH Score:   Hunt & Gillis Classification:      Hemorrhagic change of an Ischemic Stroke: Does this patient have an ischemic stroke with hemorrhagic changes? No     Neurologic Chief Complaint: found down, nonverbal, RSW    Subjective:     Interval History: Patient is seen for follow-up neurological assessment and treatment recommendations:   Neuro exam remains stable. Pending medicaid.     HPI, Past Medical, Family, and Social History remains the same as documented in the initial encounter.     Review of Systems   Constitutional: Positive for fatigue. Negative for fever.   HENT: Positive for trouble swallowing. Negative for facial swelling.    Respiratory: Negative for cough and  shortness of breath.    Cardiovascular: Negative for leg swelling.   Gastrointestinal: Negative for vomiting.   Skin: Negative for pallor and rash.   Neurological: Positive for facial asymmetry, speech difficulty and weakness.     Scheduled Meds:   atorvastatin  40 mg Oral Daily    EScitalopram oxalate  5 mg Oral Daily    lisinopriL  5 mg Oral Daily    metoprolol succinate  50 mg Oral BID    polyethylene glycol  17 g Oral Daily    senna-docusate 8.6-50 mg  1 tablet Oral BID    sodium chloride  1 g Oral TID     Continuous Infusions:   heparin (porcine) in D5W 20 Units/kg/hr (12/30/21 0500)     PRN Meds:acetaminophen, hydrALAZINE, labetalol, ondansetron, sodium chloride 0.9%    Objective:     Vital Signs (Most Recent):  Temp: 98.6 °F (37 °C) (12/30/21 0332)  Pulse: 71 (12/30/21 0332)  Resp: 18 (12/30/21 0332)  BP: 119/83 (12/30/21 0332)  SpO2: (!) 93 % (12/30/21 0332)  BP Location: Left arm    Vital Signs Range (Last 24H):  Temp:  [97.7 °F (36.5 °C)-99.2 °F (37.3 °C)]   Pulse:  [62-94]   Resp:  [16-18]   BP: (115-126)/(78-96)   SpO2:  [92 %-93 %]   BP Location: Left arm    Physical Exam  Vitals and nursing note reviewed.   Constitutional:       General: He is not in acute distress.     Appearance: He is well-developed. He is not diaphoretic.   HENT:      Head: Normocephalic and atraumatic.      Right Ear: External ear normal.      Left Ear: External ear normal.      Nose: Nose normal.   Eyes:      Extraocular Movements: Extraocular movements intact.      Conjunctiva/sclera: Conjunctivae normal.   Cardiovascular:      Rate and Rhythm: Normal rate.   Pulmonary:      Effort: Pulmonary effort is normal. No respiratory distress.   Abdominal:      General: Abdomen is flat. There is no distension.   Musculoskeletal:      Right lower leg: No edema.      Left lower leg: No edema.   Skin:     General: Skin is warm and dry.   Neurological:      Mental Status: He is alert.      Sensory: Sensory deficit present.       Motor: Weakness present.   Psychiatric:         Behavior: Behavior normal.         Neurological Exam:   LOC: alert  Attention Span: Good   Language: Global aphasia, follows simple midline commands  Articulation: Dysarthria  Orientation: Untestable due to severe aphasia   Motor: Arm left  Normal 5/5  Leg left  Normal 5/5  Arm right  Plegia 0/5  Leg right Paresis 3/5  Sensation: right hypoesthesia  Tone: Flaccid  RUE     Laboratory:  BMP:   Recent Labs   Lab 12/29/21  0645      K 3.8   *   CO2 25   BUN 21*   CREATININE 0.8   CALCIUM 9.0     CBC:   Recent Labs   Lab 12/29/21  0645   WBC 10.66   RBC 3.85*   HGB 13.1*   HCT 39.7*      *   MCH 34.0*   MCHC 33.0     Lipid Panel:   No results for input(s): CHOL, LDLCALC, HDL, TRIG in the last 168 hours.  Coagulation:   Recent Labs   Lab 12/23/21  1206 12/23/21  1822 12/29/21  0300   INR 1.0  --   --    APTT 24.9   < > 40.1*    < > = values in this interval not displayed.       Diagnostic Results     Brain Imaging   CTH 12/25/21  No significant change from prior.  Evolving large recent left MCA territory infarction with petechial hemorrhagic conversion.  No evidence for significant new hemorrhage or increased mass effect.        CTH 12/24/2021  Evolving large left MCA territory infarction with petechial hemorrhagic conversion.  There is intermediate density components in the left temporal lobe area of infarction suggestive for interval additional petechial hemorrhage.  No evidence for extra-axial hemorrhage or hydrocephalus.     MRI Brain WO. Date: 12/22/21    Large acute  left MCA distribution infarct with modest mass effect. Multiple small remote scattered infarcts elsewhere as above.  Embolic disease to be considered.     CTH WO 12/21/21  Again demonstrated in better delineated as large left MCA distribution infarct with no associated acute hemorrhage and with localized mass effect resulting in effacement of overlying cortical sulci and partial  effacement left sylvian fissure.  No associated acute hemorrhage.     Several small old areas of infarction again noted including cerebellum bilaterally, anterior left perisylvian region, lateral left frontal cortex and posterior right parietal cortex.     CTH WO 12/20/21   Large area loss of gray-white differentiation consistent with acute infarct left MCA distribution involving the left temporal lobe, temporal occipital region and portions of the basal ganglia with localized mass effect including effacement of overlying cortical sulci although with no associated acute hemorrhage.     Several small old areas of infarction are evident including anterior left perisylvian and cerebellum bilaterally. Mild underlying atrophy.        Vessel Imaging:  US LE Veins 12/25/21  No evidence of deep venous thrombosis in either lower extremity.    CTA H/N 12/20/21  Segmental occlusion measuring approximately 10 mm mid and distal M1 segment left MCA with diminished flow distal to the stenosis.     Right cervical carotid circulation--atherosclerotic calcification bifurcation without measurable stenosis.     Left cervical carotid circulation--atherosclerotic calcification distal left common carotid artery and bifurcation with stenosis proximal left ICA measuring 16% utilizing NASCET criteria.  Mild eccentric narrowing mid to distal portion left common carotid artery also noted.     Vertebral arteries--no focal arterial abnormality or measurable stenosis.     Cardiac Evaluation:   TTE with bubble 12/21/21  1. The left ventricle (LV) is dilated with severely depressed systolic function & global hypokinesis.  2. LV ejection fraction is 15-20%. Grade III LV diastolic dysfunction.  3. Small to moderate sized (1.6x1.1 cm) layered, protruding apical mass c/w thrombus.  4. The right ventricle (RV) is severely enlarged with normal systolic function.   5. Estimated RVSP is moderately elevated (50-70 mmHg). Pulmonary hypertension is  present.  6. Severe atrial enlargement.  7. Moderate eccentric mitral regrugitation.  8. Mild tricuspid regurgitation.     Other:  CTA Chest Non coronary 12/25/21  Pulmonary embolism left apical segmental pulmonary artery.     Moderate to large right and moderate left bilateral pleural effusions     Interstitial opacities throughout the lungs bilaterally with superimposed ill-defined consolidation right upper and to lesser degree left upper lobes which may represent superimposed developing pneumonia.     Scattered interlobular septal thickening throughout the lungs concerning for component of vascular congestion and edema.        Alis Kohli PA-C  Comprehensive Stroke Center  Department of Vascular Neurology   [facility] Neurosurgery [facility])

## 2021-12-31 LAB
ALBUMIN SERPL BCP-MCNC: 2.9 G/DL (ref 3.5–5.2)
ALP SERPL-CCNC: 66 U/L (ref 55–135)
ALT SERPL W/O P-5'-P-CCNC: 20 U/L (ref 10–44)
ANION GAP SERPL CALC-SCNC: 7 MMOL/L (ref 8–16)
APTT BLDCRRT: 36.1 SEC (ref 21–32)
AST SERPL-CCNC: 19 U/L (ref 10–40)
BASOPHILS # BLD AUTO: 0.09 K/UL (ref 0–0.2)
BASOPHILS NFR BLD: 0.9 % (ref 0–1.9)
BILIRUB SERPL-MCNC: 0.8 MG/DL (ref 0.1–1)
BUN SERPL-MCNC: 19 MG/DL (ref 6–20)
CALCIUM SERPL-MCNC: 8.9 MG/DL (ref 8.7–10.5)
CHLORIDE SERPL-SCNC: 111 MMOL/L (ref 95–110)
CO2 SERPL-SCNC: 24 MMOL/L (ref 23–29)
CREAT SERPL-MCNC: 0.7 MG/DL (ref 0.5–1.4)
DIFFERENTIAL METHOD: ABNORMAL
EOSINOPHIL # BLD AUTO: 0.3 K/UL (ref 0–0.5)
EOSINOPHIL NFR BLD: 2.7 % (ref 0–8)
ERYTHROCYTE [DISTWIDTH] IN BLOOD BY AUTOMATED COUNT: 11.9 % (ref 11.5–14.5)
EST. GFR  (AFRICAN AMERICAN): >60 ML/MIN/1.73 M^2
EST. GFR  (NON AFRICAN AMERICAN): >60 ML/MIN/1.73 M^2
GLUCOSE SERPL-MCNC: 104 MG/DL (ref 70–110)
HCT VFR BLD AUTO: 39.7 % (ref 40–54)
HGB BLD-MCNC: 13.2 G/DL (ref 14–18)
IMM GRANULOCYTES # BLD AUTO: 0.04 K/UL (ref 0–0.04)
IMM GRANULOCYTES NFR BLD AUTO: 0.4 % (ref 0–0.5)
LYMPHOCYTES # BLD AUTO: 1.6 K/UL (ref 1–4.8)
LYMPHOCYTES NFR BLD: 17 % (ref 18–48)
MAGNESIUM SERPL-MCNC: 2.1 MG/DL (ref 1.6–2.6)
MCH RBC QN AUTO: 33.3 PG (ref 27–31)
MCHC RBC AUTO-ENTMCNC: 33.2 G/DL (ref 32–36)
MCV RBC AUTO: 100 FL (ref 82–98)
MONOCYTES # BLD AUTO: 0.9 K/UL (ref 0.3–1)
MONOCYTES NFR BLD: 9.1 % (ref 4–15)
NEUTROPHILS # BLD AUTO: 6.6 K/UL (ref 1.8–7.7)
NEUTROPHILS NFR BLD: 69.9 % (ref 38–73)
NRBC BLD-RTO: 0 /100 WBC
PHOSPHATE SERPL-MCNC: 3.1 MG/DL (ref 2.7–4.5)
PLATELET # BLD AUTO: 290 K/UL (ref 150–450)
PMV BLD AUTO: 11.2 FL (ref 9.2–12.9)
POCT GLUCOSE: 107 MG/DL (ref 70–110)
POCT GLUCOSE: 107 MG/DL (ref 70–110)
POCT GLUCOSE: 113 MG/DL (ref 70–110)
POCT GLUCOSE: 113 MG/DL (ref 70–110)
POTASSIUM SERPL-SCNC: 3.6 MMOL/L (ref 3.5–5.1)
PROT SERPL-MCNC: 6.9 G/DL (ref 6–8.4)
RBC # BLD AUTO: 3.96 M/UL (ref 4.6–6.2)
SODIUM SERPL-SCNC: 142 MMOL/L (ref 136–145)
WBC # BLD AUTO: 9.51 K/UL (ref 3.9–12.7)

## 2021-12-31 PROCEDURE — 94760 N-INVAS EAR/PLS OXIMETRY 1: CPT

## 2021-12-31 PROCEDURE — 85730 THROMBOPLASTIN TIME PARTIAL: CPT | Performed by: PSYCHIATRY & NEUROLOGY

## 2021-12-31 PROCEDURE — 25000003 PHARM REV CODE 250

## 2021-12-31 PROCEDURE — 84100 ASSAY OF PHOSPHORUS: CPT

## 2021-12-31 PROCEDURE — 25000003 PHARM REV CODE 250: Performed by: INTERNAL MEDICINE

## 2021-12-31 PROCEDURE — 85025 COMPLETE CBC W/AUTO DIFF WBC: CPT

## 2021-12-31 PROCEDURE — 80053 COMPREHEN METABOLIC PANEL: CPT

## 2021-12-31 PROCEDURE — 36415 COLL VENOUS BLD VENIPUNCTURE: CPT | Performed by: PSYCHIATRY & NEUROLOGY

## 2021-12-31 PROCEDURE — 25000003 PHARM REV CODE 250: Performed by: PSYCHIATRY & NEUROLOGY

## 2021-12-31 PROCEDURE — 99233 PR SUBSEQUENT HOSPITAL CARE,LEVL III: ICD-10-PCS | Mod: ,,, | Performed by: PSYCHIATRY & NEUROLOGY

## 2021-12-31 PROCEDURE — 63600175 PHARM REV CODE 636 W HCPCS: Performed by: INTERNAL MEDICINE

## 2021-12-31 PROCEDURE — 94761 N-INVAS EAR/PLS OXIMETRY MLT: CPT

## 2021-12-31 PROCEDURE — 99233 SBSQ HOSP IP/OBS HIGH 50: CPT | Mod: ,,, | Performed by: PSYCHIATRY & NEUROLOGY

## 2021-12-31 PROCEDURE — 25000003 PHARM REV CODE 250: Performed by: PHYSICIAN ASSISTANT

## 2021-12-31 PROCEDURE — 11000001 HC ACUTE MED/SURG PRIVATE ROOM

## 2021-12-31 PROCEDURE — 83735 ASSAY OF MAGNESIUM: CPT

## 2021-12-31 PROCEDURE — 25000003 PHARM REV CODE 250: Performed by: STUDENT IN AN ORGANIZED HEALTH CARE EDUCATION/TRAINING PROGRAM

## 2021-12-31 PROCEDURE — 99900035 HC TECH TIME PER 15 MIN (STAT)

## 2021-12-31 RX ORDER — ASPIRIN 81 MG/1
81 TABLET ORAL DAILY
Status: DISCONTINUED | OUTPATIENT
Start: 2021-12-31 | End: 2022-01-06 | Stop reason: HOSPADM

## 2021-12-31 RX ORDER — WARFARIN SODIUM 5 MG/1
5 TABLET ORAL DAILY
Status: DISCONTINUED | OUTPATIENT
Start: 2021-12-31 | End: 2022-01-02

## 2021-12-31 RX ADMIN — ASPIRIN 81 MG: 81 TABLET, COATED ORAL at 02:12

## 2021-12-31 RX ADMIN — SODIUM CHLORIDE TAB 1 GM 1 G: 1 TAB at 09:12

## 2021-12-31 RX ADMIN — METOPROLOL SUCCINATE 50 MG: 50 TABLET, EXTENDED RELEASE ORAL at 08:12

## 2021-12-31 RX ADMIN — WARFARIN SODIUM 5 MG: 5 TABLET ORAL at 04:12

## 2021-12-31 RX ADMIN — POLYETHYLENE GLYCOL 3350 17 G: 17 POWDER, FOR SOLUTION ORAL at 09:12

## 2021-12-31 RX ADMIN — SENNOSIDES AND DOCUSATE SODIUM 1 TABLET: 50; 8.6 TABLET ORAL at 08:12

## 2021-12-31 RX ADMIN — METOPROLOL SUCCINATE 50 MG: 50 TABLET, EXTENDED RELEASE ORAL at 09:12

## 2021-12-31 RX ADMIN — ATORVASTATIN CALCIUM 40 MG: 20 TABLET, FILM COATED ORAL at 09:12

## 2021-12-31 RX ADMIN — SACUBITRIL AND VALSARTAN 1 TABLET: 24; 26 TABLET, FILM COATED ORAL at 08:12

## 2021-12-31 RX ADMIN — SODIUM CHLORIDE TAB 1 GM 1 G: 1 TAB at 08:12

## 2021-12-31 RX ADMIN — ESCITALOPRAM OXALATE 5 MG: 5 TABLET, FILM COATED ORAL at 09:12

## 2021-12-31 RX ADMIN — HEPARIN SODIUM 20 UNITS/KG/HR: 1000 INJECTION INTRAVENOUS; SUBCUTANEOUS at 04:12

## 2021-12-31 RX ADMIN — HEPARIN SODIUM 20 UNITS/KG/HR: 1000 INJECTION INTRAVENOUS; SUBCUTANEOUS at 12:12

## 2021-12-31 RX ADMIN — SENNOSIDES AND DOCUSATE SODIUM 1 TABLET: 50; 8.6 TABLET ORAL at 09:12

## 2021-12-31 RX ADMIN — SACUBITRIL AND VALSARTAN 1 TABLET: 24; 26 TABLET, FILM COATED ORAL at 09:12

## 2021-12-31 NOTE — ASSESSMENT & PLAN NOTE
-Patient with significant PAD w/ occluded stents x 2 in sup femoral and popliteal artery  -Vascular surgery consulted who recommended ASA when safe from neuro perspective.   - ASA started on 12/31

## 2021-12-31 NOTE — CARE UPDATE
RAPID RESPONSE NURSE ROUND       Rounding completed with charge RN, Poppy. No concerns verbalized at this time. Instructed to call [phone] for further concerns or assistance.

## 2021-12-31 NOTE — PLAN OF CARE
Problem: Adult Inpatient Plan of Care  Goal: Plan of Care Review  Outcome: Ongoing, Progressing  Goal: Optimal Comfort and Wellbeing  Outcome: Ongoing, Progressing     Problem: Adjustment to Illness (Stroke, Ischemic/Transient Ischemic Attack)  Goal: Optimal Coping  Outcome: Ongoing, Progressing     Problem: Communication Impairment (Stroke, Ischemic/Transient Ischemic Attack)  Goal: Improved Communication Skills  Outcome: Ongoing, Progressing       POC and stroke booklet reviewed with pt at the beside. No evidence of learning noted related to pt having expressive aphasia. Stroke booklet remains at the beside. Activities supervised. No events noted at present. Cardiac monitoring ongoing. See flow sheet. Continues with Heparin drip as ordered and is currently therapeutic. Tele sitter ordered for fall prevention this writer was informed that pt will be placed on list due no cameras are available at present. Bed low and locked with call light within reach. TM.

## 2021-12-31 NOTE — PROGRESS NOTES
Cyrus Higgins - Neurosurgery ([facility])  Vascular Neurology  Comprehensive Stroke Center  Progress Note    Assessment/Plan:     * Stroke due to embolism of left middle cerebral artery  57 yo male with PMHx of HTN and CAD presents as a transfer from [facility] for large L MCA infarct. CTA with distal L M1 occlusion. No tpa, OOW. No IR, large core infarct. Echo (at OSH) with LV global hypokinesis, EF 15-20%, severe atrial enlargement, and apical thrombus. Repeat CTH with petechial hemorrhage, anticoagulation temporarily held. Repeat CT with stable petechial hemorrhage. During admission obtained CTA chest which showed subsegmental PE. Heparin gtt resumed with plans to transition to Coumadin.     Patient started on Coumadin for apical thrombus today. Pending IPR placement.       Etiology: likely cardioembolic 2/2 low EF(15-20%) and cardiac thrombus.     Antithrombotics for secondary stroke prevention:   Heparin gtt transitioned to Coumadin (5mg) with INR goal 2-3    Statins for secondary stroke prevention and hyperlipidemia, if present: Statins: Atorvastatin- 40 mg daily     Aggressive risk factor modification: HTN, CAD     Rehab efforts: The patient has been evaluated by a stroke team provider and the therapy needs have been fully considered based off the presenting complaints and exam findings. The following therapy evaluations are needed: PT/OT on board, SLP evaluate and treat, PM&R evaluate for appropriate placement. Recommend IPR.    Diagnostics ordered/pending: None     VTE prophylaxis: Mechanical prophylaxis: Place SCDs  None: Reason for No Pharmacological VTE Prophylaxis: Currently on anticoagulation    BP parameters: Infarct: SBP <180       Right shoulder pain  Patient with right shoulder pain.  XR right shoulder negative for fractures and dislocations    Combined systolic and diastolic congestive heart failure  Newly diagnosed  EF 15-20%    Cardiology consulted, appreciate recs   -Discontinue Lisinopril  -Start  Entresto 49/51mg   -Anticoagulation for 3 months with follow up echo   -Follow up with cardiology as outpatient  - At discharge start SGLT2 inhibitor with BMP follow up in 1 week; spironolactone 12.5mg daily when able to tolerate; increase metoprolol 50mg BID with goal of increasing to 200mg total daily when tolerable, no life vest recommended at this time     Currently on: Entresto 24-26 mg BID, metoprolol 50mg BID      PAD (peripheral artery disease)  -Patient with significant PAD w/ occluded stents x 2 in sup femoral and popliteal artery  -Vascular surgery consulted who recommended ASA when safe from neuro perspective.   - ASA started on 12/31    Single subsegmental pulmonary embolism without acute cor pulmonale  Visualized on CTA chest 12/25  L apical segmental pulmonary artery embolism  EKG with R axis deviation, RBBB.  US LE negative for DVT  Continue heparin gtt with plans to transition to Coumadin    Debility  PT/OT to evaluate and treat  Therapy recommending rehab    Cytotoxic cerebral edema  Area of cytotoxic cerebral edema identified when reviewing brain imaging in the territory of the L middle cerebral artery. There is mass effect associated with it. Continue to monitor the patients clinical exam for any worsening of symptoms which may indicate expansion of the stroke or the area of the edema resulting in the clinical change. The pattern is suggestive of embolic etiology.    Clinical exam has remained stable, this suggests that cerebral edema has stabilized. Low probability of clinical deterioration. Neurosurgery signed off, patient no longer on hemicrani watch    Essential hypertension  Stroke RF  SBP goal normotension  On Entresto, lisinopril discontinued     Grade III diastolic dysfunction  See HF     Mural thrombus of cardiac apex  TTE at outside facility 12/21/21:  EF of 15-20%, small to moderate sized (1.6x1.1 cm) layered, protruding apical mass c/w thrombus, global hypokinesis, severe atrial  enlargement.    -on heparin gtt and temporarily held due to hemorrhagic conversion, now restarted  -Transitioning Heparin gtt to Coumadin (first dose scheduled for 12/31/21)   -repeat echo in 3 months for resolution of thrombus         12/22 patient in ICU for hemicrani watch, NPO per SLP  12/23: Patient remains in [facility]. Min intensity heparin gtt was initiated. Patient aphasic with RS plegia.   12/24-Patient with changes in CT with petechial hemorraghic conversion recommend stopping aspirin and holding heparin. Repeat imaging with worsening exam. On hemicrani watch.   12/27 RLE weakness improved on today's exam, patient with runs of vtach overnight on 12/24-12/25, CTA chest with subsegmental PE, patient restarted on heparin gtt, neurosurgery signed off and patient no longer on hemicrani watch, remains NPO per SLP, therapy recommending inpatient rehab  12/28 Continues on heparin gtt for PE, neuro exam stable with mild improvements, low suspicion for  new or worsening ICH.   12/29-Neuro exam stable. Recs for IPR. Overnight, patient had 11 run of vtach. Mg 2.2. P 2.9, will replete P to 4. On heparin gtt. Can transition to DOAC over the weekend.  Patient pending medicaid.  Cardio had been consulted by [facility] and they recommended full AC for thrombus, repeat TTE in 3 months for resolution of thrombus, and no need for life vest at this time.   12/30-Neuro exam remains stable. Patient with significant right shoulder pain. XR right shoulder ordered. Metoprolol increased to 50 BID and lisinopril switched to entresto 24-26.Pending medicaid.   12/31: Patient now has medicaid. Referrals were submitted. Started transition to Coumadin today for LV thrombus. XR R shoulder negative.       STROKE DOCUMENTATION        NIH Scale:  1a. Level of Consciousness: 0-->Alert, keenly responsive  1b. LOC Questions: 2-->Answers neither question correctly  1c. LOC Commands: 1-->Performs one task correctly  2. Best Gaze: 0-->Normal  3. Visual:  1-->Partial hemianopia  4. Facial Palsy: 1-->Minor paralysis (flattened nasolabial fold, asymmetry on smiling)  5a. Motor Arm, Left: 0-->No drift, limb holds 90 (or 45) degrees for full 10 secs  5b. Motor Arm, Right: 4-->No movement  6a. Motor Leg, Left: 0-->No drift, leg holds 30 degree position for full 5 secs  6b. Motor Leg, Right: 4-->No movement  7. Limb Ataxia: 0-->Absent  8. Sensory: 1-->Mild-to-moderate sensory loss, patient feels pinprick is less sharp or is dull on the affected side, or there is a loss of superficial pain with pinprick, but patient is aware of being touched  9. Best Language: 2-->Severe aphasia, all communication is through fragmentary expression, great need for inference, questioning, and guessing by the listener. Range of information that can be exchanged is limited, listener carries burden of. . . (see row details)  10. Dysarthria: 2-->Severe dysarthria, patients speech is so slurred as to be unintelligible in the absence of or out of proportion to any dysphasia, or is mute/anarthric  11. Extinction and Inattention (formerly Neglect): 0-->No abnormality  Total (NIH Stroke Scale): 18       Modified Troy Score: 0  Brenden Coma Scale:    ABCD2 Score:    [id number] Score:   HAS -BLED Score:   ICH Score:   Hunt & Gillis Classification:      Hemorrhagic change of an Ischemic Stroke: Does this patient have an ischemic stroke with hemorrhagic changes? Yes, Grading Scale: HI Type 1 (HI-1) = small petechiae along the margins of the infarct. Is this a symptomatic change?  No - Hemorrhage is not clinically significant     Neurologic Chief Complaint: found down, nonverbal, RSW    Subjective:     Interval History: Patient is seen for follow-up neurological assessment and treatment recommendations:   Patient now has medicaid. Referrals were submitted. Started transition to Coumadin today for LV thrombus. XR R shoulder negative.     HPI, Past Medical, Family, and Social History remains the same as  documented in the initial encounter.     Review of Systems   Unable to perform ROS: Mental status change   Constitutional: Negative for fever.   HENT: Positive for trouble swallowing. Negative for facial swelling.    Respiratory: Negative for cough, choking and shortness of breath.    Cardiovascular: Negative for leg swelling.   Gastrointestinal: Negative for constipation, diarrhea and vomiting.   Skin: Negative for pallor and rash.   Neurological: Positive for facial asymmetry, speech difficulty and weakness.   Psychiatric/Behavioral: Negative for agitation.     Scheduled Meds:   aspirin  81 mg Oral Daily    atorvastatin  40 mg Oral Daily    EScitalopram oxalate  5 mg Oral Daily    metoprolol succinate  50 mg Oral BID    polyethylene glycol  17 g Oral Daily    sacubitriL-valsartan  1 tablet Oral BID    senna-docusate 8.6-50 mg  1 tablet Oral BID    sodium chloride  1 g Oral BID    warfarin  5 mg Oral Daily     Continuous Infusions:   heparin (porcine) in D5W 20 Units/kg/hr (12/31/21 0018)     PRN Meds:acetaminophen, hydrALAZINE, labetalol, ondansetron, sodium chloride 0.9%    Objective:     Vital Signs (Most Recent):  Temp: 98.2 °F (36.8 °C) (12/31/21 0718)  Pulse: 85 (12/31/21 0718)  Resp: 16 (12/31/21 0718)  BP: (!) 152/99 (12/31/21 0718)  SpO2: (!) 92 % (12/31/21 0718)  BP Location: Right arm    Vital Signs Range (Last 24H):  Temp:  [96.8 °F (36 °C)-98.8 °F (37.1 °C)]   Pulse:  [68-85]   Resp:  [15-18]   BP: (119-152)/(73-99)   SpO2:  [92 %-96 %]   BP Location: Right arm    Physical Exam  Vitals and nursing note reviewed.   Constitutional:       General: He is not in acute distress.     Appearance: He is well-developed and normal weight. He is not diaphoretic.   HENT:      Head: Normocephalic and atraumatic.      Right Ear: External ear normal.      Left Ear: External ear normal.      Nose: Nose normal. No rhinorrhea.   Eyes:      General: No scleral icterus.        Right eye: No discharge.          Left eye: No discharge.      Extraocular Movements: Extraocular movements intact.   Cardiovascular:      Rate and Rhythm: Normal rate.   Pulmonary:      Effort: Pulmonary effort is normal. No respiratory distress.   Abdominal:      General: Abdomen is flat. There is no distension.   Musculoskeletal:      Right lower leg: No edema.      Left lower leg: No edema.   Skin:     General: Skin is warm and dry.   Neurological:      Mental Status: He is alert.      Cranial Nerves: Dysarthria present.      Sensory: Sensory deficit present.      Motor: Weakness present.      Comments: Aphasia   Psychiatric:         Behavior: Behavior normal.         Neurological Exam:   LOC: alert  Attention Span: Good   Language: Global aphasia, follows simple midline commands  Articulation: Dysarthria  Orientation: Untestable due to severe aphasia   Motor: Arm left  Normal 5/5  Leg left  Normal 5/5  Arm right  Plegia 0/5  Leg right Paresis 3/5  Sensation: right hypoesthesia  Tone: Flaccid  RUE     Laboratory:  BMP:   Recent Labs   Lab 12/31/21  0741      K 3.6   *   CO2 24   BUN 19   CREATININE 0.7   CALCIUM 8.9     CBC:   Recent Labs   Lab 12/31/21  0741   WBC 9.51   RBC 3.96*   HGB 13.2*   HCT 39.7*      *   MCH 33.3*   MCHC 33.2     Lipid Panel:   No results for input(s): CHOL, LDLCALC, HDL, TRIG in the last 168 hours.  Coagulation:   Recent Labs   Lab 12/31/21  0729   APTT 36.1*       Diagnostic Results     Brain Imaging   CTH 12/25/21  No significant change from prior.  Evolving large recent left MCA territory infarction with petechial hemorrhagic conversion.  No evidence for significant new hemorrhage or increased mass effect.        CTH 12/24/2021  Evolving large left MCA territory infarction with petechial hemorrhagic conversion.  There is intermediate density components in the left temporal lobe area of infarction suggestive for interval additional petechial hemorrhage.  No evidence for extra-axial  hemorrhage or hydrocephalus.     MRI Brain WO. Date: 12/22/21    Large acute  left MCA distribution infarct with modest mass effect. Multiple small remote scattered infarcts elsewhere as above.  Embolic disease to be considered.     CTH WO 12/21/21  Again demonstrated in better delineated as large left MCA distribution infarct with no associated acute hemorrhage and with localized mass effect resulting in effacement of overlying cortical sulci and partial effacement left sylvian fissure.  No associated acute hemorrhage.     Several small old areas of infarction again noted including cerebellum bilaterally, anterior left perisylvian region, lateral left frontal cortex and posterior right parietal cortex.     CTH WO 12/20/21   Large area loss of gray-white differentiation consistent with acute infarct left MCA distribution involving the left temporal lobe, temporal occipital region and portions of the basal ganglia with localized mass effect including effacement of overlying cortical sulci although with no associated acute hemorrhage.     Several small old areas of infarction are evident including anterior left perisylvian and cerebellum bilaterally. Mild underlying atrophy.        Vessel Imaging:  US LE Veins 12/25/21  No evidence of deep venous thrombosis in either lower extremity.    CTA H/N 12/20/21  Segmental occlusion measuring approximately 10 mm mid and distal M1 segment left MCA with diminished flow distal to the stenosis.     Right cervical carotid circulation--atherosclerotic calcification bifurcation without measurable stenosis.     Left cervical carotid circulation--atherosclerotic calcification distal left common carotid artery and bifurcation with stenosis proximal left ICA measuring 16% utilizing NASCET criteria.  Mild eccentric narrowing mid to distal portion left common carotid artery also noted.     Vertebral arteries--no focal arterial abnormality or measurable stenosis.     Cardiac  Evaluation:   TTE with bubble 12/21/21  1. The left ventricle (LV) is dilated with severely depressed systolic function & global hypokinesis.  2. LV ejection fraction is 15-20%. Grade III LV diastolic dysfunction.  3. Small to moderate sized (1.6x1.1 cm) layered, protruding apical mass c/w thrombus.  4. The right ventricle (RV) is severely enlarged with normal systolic function.   5. Estimated RVSP is moderately elevated (50-70 mmHg). Pulmonary hypertension is present.  6. Severe atrial enlargement.  7. Moderate eccentric mitral regrugitation.  8. Mild tricuspid regurgitation.     Other:  CTA Chest Non coronary 12/25/21  Pulmonary embolism left apical segmental pulmonary artery.     Moderate to large right and moderate left bilateral pleural effusions     Interstitial opacities throughout the lungs bilaterally with superimposed ill-defined consolidation right upper and to lesser degree left upper lobes which may represent superimposed developing pneumonia.     Scattered interlobular septal thickening throughout the lungs concerning for component of vascular congestion and edema.        Clovis Kate PA-C  Comprehensive Stroke Center  Department of Vascular Neurology   [facility])

## 2021-12-31 NOTE — ASSESSMENT & PLAN NOTE
Visualized on CTA chest 12/25  L apical segmental pulmonary artery embolism  EKG with R axis deviation, RBBB.  US LE negative for DVT  Continue heparin gtt with plans to transition to Coumadin

## 2021-12-31 NOTE — SUBJECTIVE & OBJECTIVE
Neurologic Chief Complaint: found down, nonverbal, RSW    Subjective:     Interval History: Patient is seen for follow-up neurological assessment and treatment recommendations:   Patient now has medicaid. Referrals were submitted. Started transition to Coumadin today for LV thrombus. XR R shoulder negative.     HPI, Past Medical, Family, and Social History remains the same as documented in the initial encounter.     Review of Systems   Unable to perform ROS: Mental status change   Constitutional: Negative for fever.   HENT: Positive for trouble swallowing. Negative for facial swelling.    Respiratory: Negative for cough, choking and shortness of breath.    Cardiovascular: Negative for leg swelling.   Gastrointestinal: Negative for constipation, diarrhea and vomiting.   Skin: Negative for pallor and rash.   Neurological: Positive for facial asymmetry, speech difficulty and weakness.   Psychiatric/Behavioral: Negative for agitation.     Scheduled Meds:   aspirin  81 mg Oral Daily    atorvastatin  40 mg Oral Daily    EScitalopram oxalate  5 mg Oral Daily    metoprolol succinate  50 mg Oral BID    polyethylene glycol  17 g Oral Daily    sacubitriL-valsartan  1 tablet Oral BID    senna-docusate 8.6-50 mg  1 tablet Oral BID    sodium chloride  1 g Oral BID    warfarin  5 mg Oral Daily     Continuous Infusions:   heparin (porcine) in D5W 20 Units/kg/hr (12/31/21 0018)     PRN Meds:acetaminophen, hydrALAZINE, labetalol, ondansetron, sodium chloride 0.9%    Objective:     Vital Signs (Most Recent):  Temp: 98.2 °F (36.8 °C) (12/31/21 0718)  Pulse: 85 (12/31/21 0718)  Resp: 16 (12/31/21 0718)  BP: (!) 152/99 (12/31/21 0718)  SpO2: (!) 92 % (12/31/21 0718)  BP Location: Right arm    Vital Signs Range (Last 24H):  Temp:  [96.8 °F (36 °C)-98.8 °F (37.1 °C)]   Pulse:  [68-85]   Resp:  [15-18]   BP: (119-152)/(73-99)   SpO2:  [92 %-96 %]   BP Location: Right arm    Physical Exam  Vitals and nursing note reviewed.    Constitutional:       General: He is not in acute distress.     Appearance: He is well-developed and normal weight. He is not diaphoretic.   HENT:      Head: Normocephalic and atraumatic.      Right Ear: External ear normal.      Left Ear: External ear normal.      Nose: Nose normal. No rhinorrhea.   Eyes:      General: No scleral icterus.        Right eye: No discharge.         Left eye: No discharge.      Extraocular Movements: Extraocular movements intact.   Cardiovascular:      Rate and Rhythm: Normal rate.   Pulmonary:      Effort: Pulmonary effort is normal. No respiratory distress.   Abdominal:      General: Abdomen is flat. There is no distension.   Musculoskeletal:      Right lower leg: No edema.      Left lower leg: No edema.   Skin:     General: Skin is warm and dry.   Neurological:      Mental Status: He is alert.      Cranial Nerves: Dysarthria present.      Sensory: Sensory deficit present.      Motor: Weakness present.      Comments: Aphasia   Psychiatric:         Behavior: Behavior normal.         Neurological Exam:   LOC: alert  Attention Span: Good   Language: Global aphasia, follows simple midline commands  Articulation: Dysarthria  Orientation: Untestable due to severe aphasia   Motor: Arm left  Normal 5/5  Leg left  Normal 5/5  Arm right  Plegia 0/5  Leg right Paresis 3/5  Sensation: right hypoesthesia  Tone: Flaccid  RUE     Laboratory:  BMP:   Recent Labs   Lab 12/31/21  0741      K 3.6   *   CO2 24   BUN 19   CREATININE 0.7   CALCIUM 8.9     CBC:   Recent Labs   Lab 12/31/21  0741   WBC 9.51   RBC 3.96*   HGB 13.2*   HCT 39.7*      *   MCH 33.3*   MCHC 33.2     Lipid Panel:   No results for input(s): CHOL, LDLCALC, HDL, TRIG in the last 168 hours.  Coagulation:   Recent Labs   Lab 12/31/21  0729   APTT 36.1*       Diagnostic Results     Brain Imaging   CTH 12/25/21  No significant change from prior.  Evolving large recent left MCA territory infarction with  petechial hemorrhagic conversion.  No evidence for significant new hemorrhage or increased mass effect.        CTH 12/24/2021  Evolving large left MCA territory infarction with petechial hemorrhagic conversion.  There is intermediate density components in the left temporal lobe area of infarction suggestive for interval additional petechial hemorrhage.  No evidence for extra-axial hemorrhage or hydrocephalus.     MRI Brain WO. Date: 12/22/21    Large acute  left MCA distribution infarct with modest mass effect. Multiple small remote scattered infarcts elsewhere as above.  Embolic disease to be considered.     CTH WO 12/21/21  Again demonstrated in better delineated as large left MCA distribution infarct with no associated acute hemorrhage and with localized mass effect resulting in effacement of overlying cortical sulci and partial effacement left sylvian fissure.  No associated acute hemorrhage.     Several small old areas of infarction again noted including cerebellum bilaterally, anterior left perisylvian region, lateral left frontal cortex and posterior right parietal cortex.     CTH WO 12/20/21   Large area loss of gray-white differentiation consistent with acute infarct left MCA distribution involving the left temporal lobe, temporal occipital region and portions of the basal ganglia with localized mass effect including effacement of overlying cortical sulci although with no associated acute hemorrhage.     Several small old areas of infarction are evident including anterior left perisylvian and cerebellum bilaterally. Mild underlying atrophy.        Vessel Imaging:  US LE Veins 12/25/21  No evidence of deep venous thrombosis in either lower extremity.    CTA H/N 12/20/21  Segmental occlusion measuring approximately 10 mm mid and distal M1 segment left MCA with diminished flow distal to the stenosis.     Right cervical carotid circulation--atherosclerotic calcification bifurcation without measurable  stenosis.     Left cervical carotid circulation--atherosclerotic calcification distal left common carotid artery and bifurcation with stenosis proximal left ICA measuring 16% utilizing NASCET criteria.  Mild eccentric narrowing mid to distal portion left common carotid artery also noted.     Vertebral arteries--no focal arterial abnormality or measurable stenosis.     Cardiac Evaluation:   TTE with bubble 12/21/21  1. The left ventricle (LV) is dilated with severely depressed systolic function & global hypokinesis.  2. LV ejection fraction is 15-20%. Grade III LV diastolic dysfunction.  3. Small to moderate sized (1.6x1.1 cm) layered, protruding apical mass c/w thrombus.  4. The right ventricle (RV) is severely enlarged with normal systolic function.   5. Estimated RVSP is moderately elevated (50-70 mmHg). Pulmonary hypertension is present.  6. Severe atrial enlargement.  7. Moderate eccentric mitral regrugitation.  8. Mild tricuspid regurgitation.     Other:  CTA Chest Non coronary 12/25/21  Pulmonary embolism left apical segmental pulmonary artery.     Moderate to large right and moderate left bilateral pleural effusions     Interstitial opacities throughout the lungs bilaterally with superimposed ill-defined consolidation right upper and to lesser degree left upper lobes which may represent superimposed developing pneumonia.     Scattered interlobular septal thickening throughout the lungs concerning for component of vascular congestion and edema.

## 2021-12-31 NOTE — ASSESSMENT & PLAN NOTE
55 yo male with PMHx of HTN and CAD presents as a transfer from [facility] for large L MCA infarct. CTA with distal L M1 occlusion. No tpa, OOW. No IR, large core infarct. Echo (at OSH) with LV global hypokinesis, EF 15-20%, severe atrial enlargement, and apical thrombus. Repeat CTH with petechial hemorrhage, anticoagulation temporarily held. Repeat CT with stable petechial hemorrhage. During admission obtained CTA chest which showed subsegmental PE. Heparin gtt resumed with plans to transition to Coumadin.     Patient started on Coumadin for apical thrombus today. Pending IPR placement.       Etiology: likely cardioembolic 2/2 low EF(15-20%) and cardiac thrombus.     Antithrombotics for secondary stroke prevention:   Heparin gtt transitioned to Coumadin (5mg) with INR goal 2-3    Statins for secondary stroke prevention and hyperlipidemia, if present: Statins: Atorvastatin- 40 mg daily     Aggressive risk factor modification: HTN, CAD     Rehab efforts: The patient has been evaluated by a stroke team provider and the therapy needs have been fully considered based off the presenting complaints and exam findings. The following therapy evaluations are needed: PT/OT on board, SLP evaluate and treat, PM&R evaluate for appropriate placement. Recommend IPR.    Diagnostics ordered/pending: None     VTE prophylaxis: Mechanical prophylaxis: Place SCDs  None: Reason for No Pharmacological VTE Prophylaxis: Currently on anticoagulation    BP parameters: Infarct: SBP <180

## 2021-12-31 NOTE — ASSESSMENT & PLAN NOTE
Newly diagnosed  EF 15-20%    Cardiology consulted, appreciate recs   -Discontinue Lisinopril  -Start Entresto 49/51mg   -Anticoagulation for 3 months with follow up echo   -Follow up with cardiology as outpatient  - At discharge start SGLT2 inhibitor with BMP follow up in 1 week; spironolactone 12.5mg daily when able to tolerate; increase metoprolol 50mg BID with goal of increasing to 200mg total daily when tolerable, no life vest recommended at this time     Currently on: Entresto 24-26 mg BID, metoprolol 50mg BID

## 2021-12-31 NOTE — PLAN OF CARE
Patient is alert at the present time. No s/s of any pain or discomfort noted. Patient is on telemetry. No s/s of hypo/hyperglycemia noted. Patient is being turned and repositioned every 2 hours. Patient is eating and tolerating his diet. Condom cath remains in place and incontinent care is provided as needed. Heparin drip continues to run at 20 units and it is therapeutic at the present time. Bed alarm remains in place. Patient had a bowel movement today. Plan of care reviewed with patient, but he has aphasia so the patient is unable to verbalize understanding. No s/s of any respiratory distress noted at the present time Will continue to monitor any report changes

## 2021-12-31 NOTE — ASSESSMENT & PLAN NOTE
TTE at outside facility 12/21/21:  EF of 15-20%, small to moderate sized (1.6x1.1 cm) layered, protruding apical mass c/w thrombus, global hypokinesis, severe atrial enlargement.    -on heparin gtt and temporarily held due to hemorrhagic conversion, now restarted  -Transitioning Heparin gtt to Coumadin (first dose scheduled for 12/31/21)   -repeat echo in 3 months for resolution of thrombus

## 2022-01-01 LAB
APTT BLDCRRT: 31.5 SEC (ref 21–32)
APTT BLDCRRT: 40.4 SEC (ref 21–32)
APTT BLDCRRT: 40.4 SEC (ref 21–32)
APTT BLDCRRT: 57.6 SEC (ref 21–32)
APTT BLDCRRT: 57.7 SEC (ref 21–32)
INR PPP: 1.1 (ref 0.8–1.2)
POCT GLUCOSE: 107 MG/DL (ref 70–110)
POCT GLUCOSE: 115 MG/DL (ref 70–110)
POCT GLUCOSE: 115 MG/DL (ref 70–110)
PROTHROMBIN TIME: 11.9 SEC (ref 9–12.5)

## 2022-01-01 PROCEDURE — 63600175 PHARM REV CODE 636 W HCPCS: Performed by: INTERNAL MEDICINE

## 2022-01-01 PROCEDURE — 85730 THROMBOPLASTIN TIME PARTIAL: CPT | Mod: 91 | Performed by: PHYSICIAN ASSISTANT

## 2022-01-01 PROCEDURE — 25000003 PHARM REV CODE 250: Performed by: STUDENT IN AN ORGANIZED HEALTH CARE EDUCATION/TRAINING PROGRAM

## 2022-01-01 PROCEDURE — 36415 COLL VENOUS BLD VENIPUNCTURE: CPT | Performed by: PSYCHIATRY & NEUROLOGY

## 2022-01-01 PROCEDURE — 11000001 HC ACUTE MED/SURG PRIVATE ROOM

## 2022-01-01 PROCEDURE — 25000003 PHARM REV CODE 250: Performed by: INTERNAL MEDICINE

## 2022-01-01 PROCEDURE — 99233 SBSQ HOSP IP/OBS HIGH 50: CPT | Mod: ,,, | Performed by: PSYCHIATRY & NEUROLOGY

## 2022-01-01 PROCEDURE — 99233 PR SUBSEQUENT HOSPITAL CARE,LEVL III: ICD-10-PCS | Mod: ,,, | Performed by: PSYCHIATRY & NEUROLOGY

## 2022-01-01 PROCEDURE — 25000003 PHARM REV CODE 250: Performed by: PSYCHIATRY & NEUROLOGY

## 2022-01-01 PROCEDURE — 85610 PROTHROMBIN TIME: CPT | Performed by: PSYCHIATRY & NEUROLOGY

## 2022-01-01 PROCEDURE — 85730 THROMBOPLASTIN TIME PARTIAL: CPT | Performed by: PSYCHIATRY & NEUROLOGY

## 2022-01-01 PROCEDURE — 36415 COLL VENOUS BLD VENIPUNCTURE: CPT | Performed by: PHYSICIAN ASSISTANT

## 2022-01-01 PROCEDURE — 25000003 PHARM REV CODE 250

## 2022-01-01 PROCEDURE — 25000003 PHARM REV CODE 250: Performed by: PHYSICIAN ASSISTANT

## 2022-01-01 PROCEDURE — 85730 THROMBOPLASTIN TIME PARTIAL: CPT | Mod: 91 | Performed by: PSYCHIATRY & NEUROLOGY

## 2022-01-01 RX ADMIN — ESCITALOPRAM OXALATE 5 MG: 5 TABLET, FILM COATED ORAL at 09:01

## 2022-01-01 RX ADMIN — METOPROLOL SUCCINATE 50 MG: 50 TABLET, EXTENDED RELEASE ORAL at 09:01

## 2022-01-01 RX ADMIN — SODIUM CHLORIDE TAB 1 GM 1 G: 1 TAB at 08:01

## 2022-01-01 RX ADMIN — SENNOSIDES AND DOCUSATE SODIUM 1 TABLET: 50; 8.6 TABLET ORAL at 09:01

## 2022-01-01 RX ADMIN — SODIUM CHLORIDE TAB 1 GM 1 G: 1 TAB at 09:01

## 2022-01-01 RX ADMIN — HEPARIN SODIUM 22 UNITS/KG/HR: 1000 INJECTION INTRAVENOUS; SUBCUTANEOUS at 09:01

## 2022-01-01 RX ADMIN — SPIRONOLACTONE 12.5 MG: 25 TABLET, FILM COATED ORAL at 09:01

## 2022-01-01 RX ADMIN — METOPROLOL SUCCINATE 50 MG: 50 TABLET, EXTENDED RELEASE ORAL at 08:01

## 2022-01-01 RX ADMIN — SACUBITRIL AND VALSARTAN 1 TABLET: 24; 26 TABLET, FILM COATED ORAL at 09:01

## 2022-01-01 RX ADMIN — WARFARIN SODIUM 5 MG: 5 TABLET ORAL at 05:01

## 2022-01-01 RX ADMIN — ASPIRIN 81 MG: 81 TABLET, COATED ORAL at 09:01

## 2022-01-01 RX ADMIN — POLYETHYLENE GLYCOL 3350 17 G: 17 POWDER, FOR SOLUTION ORAL at 09:01

## 2022-01-01 RX ADMIN — ATORVASTATIN CALCIUM 40 MG: 20 TABLET, FILM COATED ORAL at 09:01

## 2022-01-01 RX ADMIN — SACUBITRIL AND VALSARTAN 1 TABLET: 24; 26 TABLET, FILM COATED ORAL at 08:01

## 2022-01-01 RX ADMIN — SENNOSIDES AND DOCUSATE SODIUM 1 TABLET: 50; 8.6 TABLET ORAL at 08:01

## 2022-01-01 NOTE — ASSESSMENT & PLAN NOTE
57 yo male with PMHx of HTN and CAD presents as a transfer from [facility] for large L MCA infarct. CTA with distal L M1 occlusion. No tpa, OOW. No IR, large core infarct. Echo (at OSH) with LV global hypokinesis, EF 15-20%, severe atrial enlargement, and apical thrombus. Repeat CTH with petechial hemorrhage, anticoagulation temporarily held. Repeat CT with stable petechial hemorrhage. During admission obtained CTA chest which showed subsegmental PE. Coumadin started on 12/31, still not therapeutic.     Pending IPR placement.       Etiology: likely cardioembolic 2/2 low EF(15-20%) and cardiac thrombus.     Antithrombotics for secondary stroke prevention:   Heparin gtt transitioned to Coumadin (5mg) with INR goal 2-3    Statins for secondary stroke prevention and hyperlipidemia, if present: Statins: Atorvastatin- 40 mg daily     Aggressive risk factor modification: HTN, CAD     Rehab efforts: The patient has been evaluated by a stroke team provider and the therapy needs have been fully considered based off the presenting complaints and exam findings. The following therapy evaluations are needed: PT/OT on board, SLP evaluate and treat, PM&R evaluate for appropriate placement. Recommend IPR.    Diagnostics ordered/pending: None     VTE prophylaxis: Mechanical prophylaxis: Place SCDs  None: Reason for No Pharmacological VTE Prophylaxis: Currently on anticoagulation    BP parameters: Infarct: SBP <180

## 2022-01-01 NOTE — PLAN OF CARE
Stroke booklet and plan of care reviewed with patient. Patient has expressive aphasia  And he was unable to verbalize his understanding. Patient is alert . Heparin is currently running at 22 units at a rate of 16.9 ml/hr. Patient is being turned and repositioned every 2 hours.  Patient appears to be in good spirits today. He realizes that it is his birthday and he is receptive to his birthday wishes and appears to be happy. Patient has a condom catheter in place. Incontinent care provided as needed. Accuchecks have been discontinued. No s/s of hypo/hyperglyvemia noted.Patient is on telemetry. Bed alarm remains in place. Will continue to monitor and report changes

## 2022-01-01 NOTE — SUBJECTIVE & OBJECTIVE
Neurologic Chief Complaint: found down, nonverbal, RSW    Subjective:     Interval History: Patient is seen for follow-up neurological assessment and treatment recommendations:   Patient's feet cold on exam, doppler with normal pulses bilaterally. INR 1.1 today. When therapeutic, will be ready for IPR placement.    HPI, Past Medical, Family, and Social History remains the same as documented in the initial encounter.     Review of Systems   Unable to perform ROS: Mental status change   Constitutional: Negative for fever.   HENT: Positive for trouble swallowing. Negative for facial swelling.    Respiratory: Negative for cough, choking and shortness of breath.    Cardiovascular: Negative for leg swelling.   Gastrointestinal: Negative for constipation, diarrhea and vomiting.   Skin: Negative for pallor and rash.   Neurological: Positive for facial asymmetry, speech difficulty and weakness.   Psychiatric/Behavioral: Negative for agitation.     Scheduled Meds:   aspirin  81 mg Oral Daily    atorvastatin  40 mg Oral Daily    EScitalopram oxalate  5 mg Oral Daily    metoprolol succinate  50 mg Oral BID    polyethylene glycol  17 g Oral Daily    sacubitriL-valsartan  1 tablet Oral BID    senna-docusate 8.6-50 mg  1 tablet Oral BID    sodium chloride  1 g Oral BID    spironolactone  12.5 mg Oral Daily    warfarin  5 mg Oral Daily     Continuous Infusions:   heparin (porcine) in D5W 22 Units/kg/hr (01/01/22 0915)     PRN Meds:acetaminophen, hydrALAZINE, labetalol, ondansetron, sodium chloride 0.9%    Objective:     Vital Signs (Most Recent):  Temp: 97.9 °F (36.6 °C) (01/01/22 1124)  Pulse: 78 (01/01/22 1124)  Resp: 16 (01/01/22 1124)  BP: 135/89 (01/01/22 1124)  SpO2: 96 % (01/01/22 1124)  BP Location: Left arm    Vital Signs Range (Last 24H):  Temp:  [97.5 °F (36.4 °C)-98.1 °F (36.7 °C)]   Pulse:  [59-94]   Resp:  [15-17]   BP: (122-135)/(78-90)   SpO2:  [94 %-96 %]   BP Location: Left arm    Physical Exam  Vitals  and nursing note reviewed.   Constitutional:       General: He is not in acute distress.     Appearance: He is well-developed and normal weight. He is not diaphoretic.   HENT:      Head: Normocephalic and atraumatic.      Right Ear: External ear normal.      Left Ear: External ear normal.      Nose: Nose normal. No rhinorrhea.   Eyes:      General: No scleral icterus.        Right eye: No discharge.         Left eye: No discharge.      Extraocular Movements: Extraocular movements intact.   Cardiovascular:      Rate and Rhythm: Normal rate.   Pulmonary:      Effort: Pulmonary effort is normal. No respiratory distress.   Abdominal:      General: Abdomen is flat. There is no distension.   Musculoskeletal:      Right lower leg: No edema.      Left lower leg: No edema.   Skin:     General: Skin is dry.   Neurological:      Mental Status: He is alert.      Cranial Nerves: Dysarthria present.      Sensory: Sensory deficit present.      Motor: Weakness present.      Comments: Aphasia   Psychiatric:         Behavior: Behavior normal.         Neurological Exam:   LOC: alert  Attention Span: Good   Language: Global aphasia, follows simple midline commands  Articulation: Dysarthria  Orientation: Untestable due to severe aphasia   Motor: Arm left  Normal 5/5  Leg left  Normal 5/5  Arm right  Plegia 0/5  Leg right Paresis 3/5  Sensation: right hypoesthesia  Tone: Flaccid  RUE     Laboratory:  BMP:   Recent Labs   Lab 12/31/21  0741      K 3.6   *   CO2 24   BUN 19   CREATININE 0.7   CALCIUM 8.9     CBC:   Recent Labs   Lab 12/31/21  0741   WBC 9.51   RBC 3.96*   HGB 13.2*   HCT 39.7*      *   MCH 33.3*   MCHC 33.2     Lipid Panel:   No results for input(s): CHOL, LDLCALC, HDL, TRIG in the last 168 hours.  Coagulation:   Recent Labs   Lab 01/01/22  0509 01/01/22  0509 01/01/22  0546   INR 1.1  --   --    APTT 57.6*   < > 31.5    < > = values in this interval not displayed.       Diagnostic Results      Brain Imaging   CTH 12/25/21  No significant change from prior.  Evolving large recent left MCA territory infarction with petechial hemorrhagic conversion.  No evidence for significant new hemorrhage or increased mass effect.        CTH 12/24/2021  Evolving large left MCA territory infarction with petechial hemorrhagic conversion.  There is intermediate density components in the left temporal lobe area of infarction suggestive for interval additional petechial hemorrhage.  No evidence for extra-axial hemorrhage or hydrocephalus.     MRI Brain WO. Date: 12/22/21    Large acute  left MCA distribution infarct with modest mass effect. Multiple small remote scattered infarcts elsewhere as above.  Embolic disease to be considered.     CTH WO 12/21/21  Again demonstrated in better delineated as large left MCA distribution infarct with no associated acute hemorrhage and with localized mass effect resulting in effacement of overlying cortical sulci and partial effacement left sylvian fissure.  No associated acute hemorrhage.     Several small old areas of infarction again noted including cerebellum bilaterally, anterior left perisylvian region, lateral left frontal cortex and posterior right parietal cortex.     CTH WO 12/20/21   Large area loss of gray-white differentiation consistent with acute infarct left MCA distribution involving the left temporal lobe, temporal occipital region and portions of the basal ganglia with localized mass effect including effacement of overlying cortical sulci although with no associated acute hemorrhage.     Several small old areas of infarction are evident including anterior left perisylvian and cerebellum bilaterally. Mild underlying atrophy.        Vessel Imaging:  US LE Veins 12/25/21  No evidence of deep venous thrombosis in either lower extremity.    CTA H/N 12/20/21  Segmental occlusion measuring approximately 10 mm mid and distal M1 segment left MCA with diminished flow distal to  the stenosis.     Right cervical carotid circulation--atherosclerotic calcification bifurcation without measurable stenosis.     Left cervical carotid circulation--atherosclerotic calcification distal left common carotid artery and bifurcation with stenosis proximal left ICA measuring 16% utilizing NASCET criteria.  Mild eccentric narrowing mid to distal portion left common carotid artery also noted.     Vertebral arteries--no focal arterial abnormality or measurable stenosis.     Cardiac Evaluation:   TTE with bubble 12/21/21  1. The left ventricle (LV) is dilated with severely depressed systolic function & global hypokinesis.  2. LV ejection fraction is 15-20%. Grade III LV diastolic dysfunction.  3. Small to moderate sized (1.6x1.1 cm) layered, protruding apical mass c/w thrombus.  4. The right ventricle (RV) is severely enlarged with normal systolic function.   5. Estimated RVSP is moderately elevated (50-70 mmHg). Pulmonary hypertension is present.  6. Severe atrial enlargement.  7. Moderate eccentric mitral regrugitation.  8. Mild tricuspid regurgitation.     Other:  CTA Chest Non coronary 12/25/21  Pulmonary embolism left apical segmental pulmonary artery.     Moderate to large right and moderate left bilateral pleural effusions     Interstitial opacities throughout the lungs bilaterally with superimposed ill-defined consolidation right upper and to lesser degree left upper lobes which may represent superimposed developing pneumonia.     Scattered interlobular septal thickening throughout the lungs concerning for component of vascular congestion and edema.

## 2022-01-01 NOTE — PROGRESS NOTES
Cyrus Higgins - Neurosurgery ([facility])  Vascular Neurology  Comprehensive Stroke Center  Progress Note    Assessment/Plan:     * Stroke due to embolism of left middle cerebral artery  55 yo male with PMHx of HTN and CAD presents as a transfer from [facility] for large L MCA infarct. CTA with distal L M1 occlusion. No tpa, OOW. No IR, large core infarct. Echo (at OSH) with LV global hypokinesis, EF 15-20%, severe atrial enlargement, and apical thrombus. Repeat CTH with petechial hemorrhage, anticoagulation temporarily held. Repeat CT with stable petechial hemorrhage. During admission obtained CTA chest which showed subsegmental PE. Coumadin started on 12/31, still not therapeutic.     Pending IPR placement.       Etiology: likely cardioembolic 2/2 low EF(15-20%) and cardiac thrombus.     Antithrombotics for secondary stroke prevention:   Heparin gtt transitioned to Coumadin (5mg) with INR goal 2-3    Statins for secondary stroke prevention and hyperlipidemia, if present: Statins: Atorvastatin- 40 mg daily     Aggressive risk factor modification: HTN, CAD     Rehab efforts: The patient has been evaluated by a stroke team provider and the therapy needs have been fully considered based off the presenting complaints and exam findings. The following therapy evaluations are needed: PT/OT on board, SLP evaluate and treat, PM&R evaluate for appropriate placement. Recommend IPR.    Diagnostics ordered/pending: None     VTE prophylaxis: Mechanical prophylaxis: Place SCDs  None: Reason for No Pharmacological VTE Prophylaxis: Currently on anticoagulation    BP parameters: Infarct: SBP <180       Right shoulder pain  Patient with right shoulder pain.  XR right shoulder negative for fractures and dislocations    Combined systolic and diastolic congestive heart failure  Newly diagnosed  EF 15-20%    Cardiology consulted, appreciate recs   -Discontinue Lisinopril  -Start Entresto 49/51mg   -Anticoagulation for 3 months with follow up echo    -Follow up with cardiology as outpatient  - At discharge start SGLT2 inhibitor with BMP follow up in 1 week; spironolactone 12.5mg daily when able to tolerate; increase metoprolol 50mg BID with goal of increasing to 200mg total daily when tolerable, no life vest recommended at this time     Currently on: Entresto 24-26 mg BID, metoprolol 50mg BID      PAD (peripheral artery disease)  -Patient with significant PAD w/ occluded stents x 2 in sup femoral and popliteal artery  -Vascular surgery consulted who recommended ASA when safe from neuro perspective.   - ASA started on 12/31    Single subsegmental pulmonary embolism without acute cor pulmonale  Visualized on CTA chest 12/25  L apical segmental pulmonary artery embolism  EKG with R axis deviation, RBBB.  US LE negative for DVT  Continue heparin gtt with plans to transition to Coumadin    Debility  PT/OT to evaluate and treat  Therapy recommending rehab    Cytotoxic cerebral edema  Area of cytotoxic cerebral edema identified when reviewing brain imaging in the territory of the L middle cerebral artery. There is mass effect associated with it. Continue to monitor the patients clinical exam for any worsening of symptoms which may indicate expansion of the stroke or the area of the edema resulting in the clinical change. The pattern is suggestive of embolic etiology.    Clinical exam has remained stable, this suggests that cerebral edema has stabilized. Low probability of clinical deterioration. Neurosurgery signed off, patient no longer on hemicrani watch    Essential hypertension  Stroke RF  SBP goal normotension  On Entresto, lisinopril discontinued     Grade III diastolic dysfunction  See HF     Mural thrombus of cardiac apex  TTE at outside facility 12/21/21:  EF of 15-20%, small to moderate sized (1.6x1.1 cm) layered, protruding apical mass c/w thrombus, global hypokinesis, severe atrial enlargement.    -on heparin gtt and temporarily held due to hemorrhagic  conversion, now restarted  -Transitioning Heparin gtt to Coumadin (first dose scheduled for 12/31/21)   -repeat echo in 3 months for resolution of thrombus           12/22 patient in ICU for hemicrani watch, NPO per SLP  12/23: Patient remains in NCC. Min intensity heparin gtt was initiated. Patient aphasic with RS plegia.   12/24-Patient with changes in CT with petechial hemorraghic conversion recommend stopping aspirin and holding heparin. Repeat imaging with worsening exam. On hemicrani watch.   12/27 RLE weakness improved on today's exam, patient with runs of vtach overnight on 12/24-12/25, CTA chest with subsegmental PE, patient restarted on heparin gtt, neurosurgery signed off and patient no longer on hemicrani watch, remains NPO per SLP, therapy recommending inpatient rehab  12/28 Continues on heparin gtt for PE, neuro exam stable with mild improvements, low suspicion for  new or worsening ICH.   12/29-Neuro exam stable. Recs for IPR. Overnight, patient had 11 run of vtach. Mg 2.2. P 2.9, will replete P to 4. On heparin gtt. Can transition to DOAC over the weekend.  Patient pending medicaid.  Cardio had been consulted by [facility] and they recommended full AC for thrombus, repeat TTE in 3 months for resolution of thrombus, and no need for life vest at this time.   12/30-Neuro exam remains stable. Patient with significant right shoulder pain. XR right shoulder ordered. Metoprolol increased to 50 BID and lisinopril switched to entresto 24-26.Pending medicaid.   12/31: Patient now has medicaid. Referrals were submitted. Started transition to Coumadin today for LV thrombus. XR R shoulder negative.   01/01/2022 Patient's feet cold on exam, doppler with normal pulses bilaterally. INR 1.1 today. When therapeutic, will be ready for IPR placement.         STROKE DOCUMENTATION        NIH Scale:    1a. Level of Consciousness: 0-->Alert, keenly responsive  1b. LOC Questions: 2-->Answers neither question correctly  1c. LOC  Commands: 1-->Performs one task correctly  2. Best Gaze: 0-->Normal  3. Visual: 1-->Partial hemianopia  4. Facial Palsy: 1-->Minor paralysis (flattened nasolabial fold, asymmetry on smiling)  5a. Motor Arm, Left: 0-->No drift, limb holds 90 (or 45) degrees for full 10 secs  5b. Motor Arm, Right: 4-->No movement  6a. Motor Leg, Left: 0-->No drift, leg holds 30 degree position for full 5 secs  6b. Motor Leg, Right: 4-->No movement  7. Limb Ataxia: 0-->Absent  8. Sensory: 1-->Mild-to-moderate sensory loss, patient feels pinprick is less sharp or is dull on the affected side, or there is a loss of superficial pain with pinprick, but patient is aware of being touched  9. Best Language: 2-->Severe aphasia, all communication is through fragmentary expression, great need for inference, questioning, and guessing by the listener. Range of information that can be exchanged is limited, listener carries burden of. . . (see row details)  10. Dysarthria: 2-->Severe dysarthria, patients speech is so slurred as to be unintelligible in the absence of or out of proportion to any dysphasia, or is mute/anarthric  11. Extinction and Inattention (formerly Neglect): 0-->No abnormality      Modified Slick Score: 0  Brenden Coma Scale:    ABCD2 Score:    [id number] Score:   HAS -BLED Score:   ICH Score:   Hunt & Gillis Classification:      Hemorrhagic change of an Ischemic Stroke: Does this patient have an ischemic stroke with hemorrhagic changes? HI Type 1 (HI-1) = small petechiae along the margins of the infarct. Is this a symptomatic change?  No - Hemorrhage is not clinically significant     Neurologic Chief Complaint: found down, nonverbal, RSW    Subjective:     Interval History: Patient is seen for follow-up neurological assessment and treatment recommendations:   Patient's feet cold on exam, doppler with normal pulses bilaterally. INR 1.1 today. When therapeutic, will be ready for IPR placement.    HPI, Past Medical, Family, and  Social History remains the same as documented in the initial encounter.     Review of Systems   Unable to perform ROS: Mental status change   Constitutional: Negative for fever.   HENT: Positive for trouble swallowing. Negative for facial swelling.    Respiratory: Negative for cough, choking and shortness of breath.    Cardiovascular: Negative for leg swelling.   Gastrointestinal: Negative for constipation, diarrhea and vomiting.   Skin: Negative for pallor and rash.   Neurological: Positive for facial asymmetry, speech difficulty and weakness.   Psychiatric/Behavioral: Negative for agitation.     Scheduled Meds:   aspirin  81 mg Oral Daily    atorvastatin  40 mg Oral Daily    EScitalopram oxalate  5 mg Oral Daily    metoprolol succinate  50 mg Oral BID    polyethylene glycol  17 g Oral Daily    sacubitriL-valsartan  1 tablet Oral BID    senna-docusate 8.6-50 mg  1 tablet Oral BID    sodium chloride  1 g Oral BID    spironolactone  12.5 mg Oral Daily    warfarin  5 mg Oral Daily     Continuous Infusions:   heparin (porcine) in D5W 22 Units/kg/hr (01/01/22 0915)     PRN Meds:acetaminophen, hydrALAZINE, labetalol, ondansetron, sodium chloride 0.9%    Objective:     Vital Signs (Most Recent):  Temp: 97.9 °F (36.6 °C) (01/01/22 1124)  Pulse: 78 (01/01/22 1124)  Resp: 16 (01/01/22 1124)  BP: 135/89 (01/01/22 1124)  SpO2: 96 % (01/01/22 1124)  BP Location: Left arm    Vital Signs Range (Last 24H):  Temp:  [97.5 °F (36.4 °C)-98.1 °F (36.7 °C)]   Pulse:  [59-94]   Resp:  [15-17]   BP: (122-135)/(78-90)   SpO2:  [94 %-96 %]   BP Location: Left arm    Physical Exam  Vitals and nursing note reviewed.   Constitutional:       General: He is not in acute distress.     Appearance: He is well-developed and normal weight. He is not diaphoretic.   HENT:      Head: Normocephalic and atraumatic.      Right Ear: External ear normal.      Left Ear: External ear normal.      Nose: Nose normal. No rhinorrhea.   Eyes:       General: No scleral icterus.        Right eye: No discharge.         Left eye: No discharge.      Extraocular Movements: Extraocular movements intact.   Cardiovascular:      Rate and Rhythm: Normal rate.   Pulmonary:      Effort: Pulmonary effort is normal. No respiratory distress.   Abdominal:      General: Abdomen is flat. There is no distension.   Musculoskeletal:      Right lower leg: No edema.      Left lower leg: No edema.   Skin:     General: Skin is dry.   Neurological:      Mental Status: He is alert.      Cranial Nerves: Dysarthria present.      Sensory: Sensory deficit present.      Motor: Weakness present.      Comments: Aphasia   Psychiatric:         Behavior: Behavior normal.         Neurological Exam:   LOC: alert  Attention Span: Good   Language: Global aphasia, follows simple midline commands  Articulation: Dysarthria  Orientation: Untestable due to severe aphasia   Motor: Arm left  Normal 5/5  Leg left  Normal 5/5  Arm right  Plegia 0/5  Leg right Paresis 3/5  Sensation: right hypoesthesia  Tone: Flaccid  RUE     Laboratory:  BMP:   Recent Labs   Lab 12/31/21  0741      K 3.6   *   CO2 24   BUN 19   CREATININE 0.7   CALCIUM 8.9     CBC:   Recent Labs   Lab 12/31/21  0741   WBC 9.51   RBC 3.96*   HGB 13.2*   HCT 39.7*      *   MCH 33.3*   MCHC 33.2     Lipid Panel:   No results for input(s): CHOL, LDLCALC, HDL, TRIG in the last 168 hours.  Coagulation:   Recent Labs   Lab 01/01/22  0509 01/01/22  0509 01/01/22  0546   INR 1.1  --   --    APTT 57.6*   < > 31.5    < > = values in this interval not displayed.       Diagnostic Results     Brain Imaging   CTH 12/25/21  No significant change from prior.  Evolving large recent left MCA territory infarction with petechial hemorrhagic conversion.  No evidence for significant new hemorrhage or increased mass effect.        CTH 12/24/2021  Evolving large left MCA territory infarction with petechial hemorrhagic conversion.  There is  intermediate density components in the left temporal lobe area of infarction suggestive for interval additional petechial hemorrhage.  No evidence for extra-axial hemorrhage or hydrocephalus.     MRI Brain WO. Date: 12/22/21    Large acute  left MCA distribution infarct with modest mass effect. Multiple small remote scattered infarcts elsewhere as above.  Embolic disease to be considered.     CTH WO 12/21/21  Again demonstrated in better delineated as large left MCA distribution infarct with no associated acute hemorrhage and with localized mass effect resulting in effacement of overlying cortical sulci and partial effacement left sylvian fissure.  No associated acute hemorrhage.     Several small old areas of infarction again noted including cerebellum bilaterally, anterior left perisylvian region, lateral left frontal cortex and posterior right parietal cortex.     CTH WO 12/20/21   Large area loss of gray-white differentiation consistent with acute infarct left MCA distribution involving the left temporal lobe, temporal occipital region and portions of the basal ganglia with localized mass effect including effacement of overlying cortical sulci although with no associated acute hemorrhage.     Several small old areas of infarction are evident including anterior left perisylvian and cerebellum bilaterally. Mild underlying atrophy.        Vessel Imaging:  US LE Veins 12/25/21  No evidence of deep venous thrombosis in either lower extremity.    CTA H/N 12/20/21  Segmental occlusion measuring approximately 10 mm mid and distal M1 segment left MCA with diminished flow distal to the stenosis.     Right cervical carotid circulation--atherosclerotic calcification bifurcation without measurable stenosis.     Left cervical carotid circulation--atherosclerotic calcification distal left common carotid artery and bifurcation with stenosis proximal left ICA measuring 16% utilizing NASCET criteria.  Mild eccentric narrowing mid  to distal portion left common carotid artery also noted.     Vertebral arteries--no focal arterial abnormality or measurable stenosis.     Cardiac Evaluation:   TTE with bubble 12/21/21  1. The left ventricle (LV) is dilated with severely depressed systolic function & global hypokinesis.  2. LV ejection fraction is 15-20%. Grade III LV diastolic dysfunction.  3. Small to moderate sized (1.6x1.1 cm) layered, protruding apical mass c/w thrombus.  4. The right ventricle (RV) is severely enlarged with normal systolic function.   5. Estimated RVSP is moderately elevated (50-70 mmHg). Pulmonary hypertension is present.  6. Severe atrial enlargement.  7. Moderate eccentric mitral regrugitation.  8. Mild tricuspid regurgitation.     Other:  CTA Chest Non coronary 12/25/21  Pulmonary embolism left apical segmental pulmonary artery.     Moderate to large right and moderate left bilateral pleural effusions     Interstitial opacities throughout the lungs bilaterally with superimposed ill-defined consolidation right upper and to lesser degree left upper lobes which may represent superimposed developing pneumonia.     Scattered interlobular septal thickening throughout the lungs concerning for component of vascular congestion and edema.        Clovis Kate PA-C  Comprehensive Stroke Center  Department of Vascular Neurology   [facility] Neurosurgery [facility])

## 2022-01-01 NOTE — PLAN OF CARE
Patient is alert at the present time. Patient is being turned and repositioned every 2 hours. No s/s of hypo/ hyperglycemia noted. Patient has a condom catheter in place and it is draining mehnaz colored urine to the BSDB. Patient is on telemetry.Heparin continue to run at 20 units and for the last few days it has been therapeutic. However today the PTT wasn't therapeutic and this was reported to the provider. No new orders in regards to the heparin thus far will recheck the PTT in the am. Bed alarm remains in place. Plan of care and stroke booklet at the bedside were reviewed with the patient. However the patient has expressive aphasia so he was unable to verbalize understanding. Patient is eating and tolerating his current diet. No apparent distress noted. Will continue to monitor and report any changes.

## 2022-01-02 LAB
ALBUMIN SERPL BCP-MCNC: 2.7 G/DL (ref 3.5–5.2)
ALP SERPL-CCNC: 69 U/L (ref 55–135)
ALT SERPL W/O P-5'-P-CCNC: 19 U/L (ref 10–44)
ANION GAP SERPL CALC-SCNC: 11 MMOL/L (ref 8–16)
APTT BLDCRRT: 32.1 SEC (ref 21–32)
APTT BLDCRRT: 45.1 SEC (ref 21–32)
AST SERPL-CCNC: 20 U/L (ref 10–40)
BASOPHILS # BLD AUTO: 0.1 K/UL (ref 0–0.2)
BASOPHILS NFR BLD: 0.9 % (ref 0–1.9)
BILIRUB SERPL-MCNC: 0.7 MG/DL (ref 0.1–1)
BUN SERPL-MCNC: 12 MG/DL (ref 6–20)
CALCIUM SERPL-MCNC: 8.2 MG/DL (ref 8.7–10.5)
CHLORIDE SERPL-SCNC: 110 MMOL/L (ref 95–110)
CO2 SERPL-SCNC: 21 MMOL/L (ref 23–29)
CREAT SERPL-MCNC: 0.7 MG/DL (ref 0.5–1.4)
DIFFERENTIAL METHOD: ABNORMAL
EOSINOPHIL # BLD AUTO: 0.4 K/UL (ref 0–0.5)
EOSINOPHIL NFR BLD: 3.3 % (ref 0–8)
ERYTHROCYTE [DISTWIDTH] IN BLOOD BY AUTOMATED COUNT: 12.2 % (ref 11.5–14.5)
EST. GFR  (AFRICAN AMERICAN): >60 ML/MIN/1.73 M^2
EST. GFR  (NON AFRICAN AMERICAN): >60 ML/MIN/1.73 M^2
GLUCOSE SERPL-MCNC: 85 MG/DL (ref 70–110)
HCT VFR BLD AUTO: 42.9 % (ref 40–54)
HGB BLD-MCNC: 14 G/DL (ref 14–18)
IMM GRANULOCYTES # BLD AUTO: 0.04 K/UL (ref 0–0.04)
IMM GRANULOCYTES NFR BLD AUTO: 0.4 % (ref 0–0.5)
INR PPP: 1.1 (ref 0.8–1.2)
LYMPHOCYTES # BLD AUTO: 2.7 K/UL (ref 1–4.8)
LYMPHOCYTES NFR BLD: 24.8 % (ref 18–48)
MAGNESIUM SERPL-MCNC: 2 MG/DL (ref 1.6–2.6)
MCH RBC QN AUTO: 34 PG (ref 27–31)
MCHC RBC AUTO-ENTMCNC: 32.6 G/DL (ref 32–36)
MCV RBC AUTO: 104 FL (ref 82–98)
MONOCYTES # BLD AUTO: 1 K/UL (ref 0.3–1)
MONOCYTES NFR BLD: 9 % (ref 4–15)
NEUTROPHILS # BLD AUTO: 6.8 K/UL (ref 1.8–7.7)
NEUTROPHILS NFR BLD: 61.6 % (ref 38–73)
NRBC BLD-RTO: 0 /100 WBC
PHOSPHATE SERPL-MCNC: 2.8 MG/DL (ref 2.7–4.5)
PLATELET # BLD AUTO: 270 K/UL (ref 150–450)
PMV BLD AUTO: 11.5 FL (ref 9.2–12.9)
POTASSIUM SERPL-SCNC: 4.3 MMOL/L (ref 3.5–5.1)
PROT SERPL-MCNC: 6 G/DL (ref 6–8.4)
PROTHROMBIN TIME: 11.9 SEC (ref 9–12.5)
RBC # BLD AUTO: 4.12 M/UL (ref 4.6–6.2)
SODIUM SERPL-SCNC: 142 MMOL/L (ref 136–145)
WBC # BLD AUTO: 10.99 K/UL (ref 3.9–12.7)

## 2022-01-02 PROCEDURE — 84100 ASSAY OF PHOSPHORUS: CPT

## 2022-01-02 PROCEDURE — 85025 COMPLETE CBC W/AUTO DIFF WBC: CPT

## 2022-01-02 PROCEDURE — 36415 COLL VENOUS BLD VENIPUNCTURE: CPT

## 2022-01-02 PROCEDURE — 25000003 PHARM REV CODE 250

## 2022-01-02 PROCEDURE — 99233 SBSQ HOSP IP/OBS HIGH 50: CPT | Mod: ,,, | Performed by: PSYCHIATRY & NEUROLOGY

## 2022-01-02 PROCEDURE — 99233 PR SUBSEQUENT HOSPITAL CARE,LEVL III: ICD-10-PCS | Mod: ,,, | Performed by: PSYCHIATRY & NEUROLOGY

## 2022-01-02 PROCEDURE — 85730 THROMBOPLASTIN TIME PARTIAL: CPT | Mod: 91 | Performed by: PSYCHIATRY & NEUROLOGY

## 2022-01-02 PROCEDURE — 85610 PROTHROMBIN TIME: CPT | Performed by: PSYCHIATRY & NEUROLOGY

## 2022-01-02 PROCEDURE — 80053 COMPREHEN METABOLIC PANEL: CPT

## 2022-01-02 PROCEDURE — 25000003 PHARM REV CODE 250: Performed by: NURSE PRACTITIONER

## 2022-01-02 PROCEDURE — 25000003 PHARM REV CODE 250: Performed by: PSYCHIATRY & NEUROLOGY

## 2022-01-02 PROCEDURE — 36415 COLL VENOUS BLD VENIPUNCTURE: CPT | Performed by: PSYCHIATRY & NEUROLOGY

## 2022-01-02 PROCEDURE — 11000001 HC ACUTE MED/SURG PRIVATE ROOM

## 2022-01-02 PROCEDURE — 63600175 PHARM REV CODE 636 W HCPCS: Performed by: INTERNAL MEDICINE

## 2022-01-02 PROCEDURE — 85730 THROMBOPLASTIN TIME PARTIAL: CPT | Performed by: PSYCHIATRY & NEUROLOGY

## 2022-01-02 PROCEDURE — 83735 ASSAY OF MAGNESIUM: CPT

## 2022-01-02 PROCEDURE — 25000003 PHARM REV CODE 250: Performed by: PHYSICIAN ASSISTANT

## 2022-01-02 PROCEDURE — 25000003 PHARM REV CODE 250: Performed by: INTERNAL MEDICINE

## 2022-01-02 PROCEDURE — 25000003 PHARM REV CODE 250: Performed by: STUDENT IN AN ORGANIZED HEALTH CARE EDUCATION/TRAINING PROGRAM

## 2022-01-02 RX ORDER — WARFARIN 7.5 MG/1
7.5 TABLET ORAL DAILY
Status: DISCONTINUED | OUTPATIENT
Start: 2022-01-02 | End: 2022-01-03

## 2022-01-02 RX ADMIN — SENNOSIDES AND DOCUSATE SODIUM 1 TABLET: 50; 8.6 TABLET ORAL at 09:01

## 2022-01-02 RX ADMIN — HEPARIN SODIUM 20 UNITS/KG/HR: 1000 INJECTION INTRAVENOUS; SUBCUTANEOUS at 01:01

## 2022-01-02 RX ADMIN — SACUBITRIL AND VALSARTAN 1 TABLET: 24; 26 TABLET, FILM COATED ORAL at 10:01

## 2022-01-02 RX ADMIN — SACUBITRIL AND VALSARTAN 1 TABLET: 24; 26 TABLET, FILM COATED ORAL at 09:01

## 2022-01-02 RX ADMIN — METOPROLOL SUCCINATE 50 MG: 50 TABLET, EXTENDED RELEASE ORAL at 10:01

## 2022-01-02 RX ADMIN — HEPARIN SODIUM 21 UNITS/KG/HR: 1000 INJECTION INTRAVENOUS; SUBCUTANEOUS at 05:01

## 2022-01-02 RX ADMIN — ASPIRIN 81 MG: 81 TABLET, COATED ORAL at 09:01

## 2022-01-02 RX ADMIN — ESCITALOPRAM OXALATE 5 MG: 5 TABLET, FILM COATED ORAL at 09:01

## 2022-01-02 RX ADMIN — WARFARIN SODIUM 7.5 MG: 7.5 TABLET ORAL at 05:01

## 2022-01-02 RX ADMIN — SENNOSIDES AND DOCUSATE SODIUM 1 TABLET: 50; 8.6 TABLET ORAL at 10:01

## 2022-01-02 RX ADMIN — SPIRONOLACTONE 12.5 MG: 25 TABLET, FILM COATED ORAL at 09:01

## 2022-01-02 RX ADMIN — METOPROLOL SUCCINATE 50 MG: 50 TABLET, EXTENDED RELEASE ORAL at 09:01

## 2022-01-02 RX ADMIN — SODIUM CHLORIDE TAB 1 GM 1 G: 1 TAB at 09:01

## 2022-01-02 RX ADMIN — POLYETHYLENE GLYCOL 3350 17 G: 17 POWDER, FOR SOLUTION ORAL at 09:01

## 2022-01-02 RX ADMIN — ATORVASTATIN CALCIUM 40 MG: 20 TABLET, FILM COATED ORAL at 09:01

## 2022-01-02 NOTE — ASSESSMENT & PLAN NOTE
Newly diagnosed  EF 15-20%    Cardiology consulted, appreciate recs   -Discontinue Lisinopril  -Start Entresto 49/51mg   -Anticoagulation for 3 months with follow up echo   -Follow up with cardiology as outpatient  - At discharge start SGLT2 inhibitor with BMP follow up in 1 week; spironolactone 12.5mg daily when able to tolerate; increase metoprolol 50mg BID with goal of increasing to 200mg total daily when tolerable, no life vest recommended at this time     Currently on: Entresto 24-26 mg BID, metoprolol 50mg BID, spironalactone 12.5

## 2022-01-02 NOTE — PLAN OF CARE
Plan of care and stroke booklet reviewed with the patient at bedside. Patient responded yes and appears to be trying to talk more. Patient has expressive aphasia so it is hard to understand his responses at times, Patient assisted with sitting up his meals. Patient feeds himself and has eaten at least 75 percent of his meals today. Patient is on Telemetry. No s/s of any pain or discomfort noted at the present time, Heparin is currently infusing at 21 units at a rate of 16.1 ml and hour. No s/s of an adverse reaction noted to medication therapy.Patient assisted with being turned and repositioned. Incontinent care provided as needed, Bed alarm remains in place. No apparent distress noted. Will continue to monitor and report any changes.

## 2022-01-02 NOTE — ASSESSMENT & PLAN NOTE
TTE at outside facility 12/21/21:  EF of 15-20%, small to moderate sized (1.6x1.1 cm) layered, protruding apical mass c/w thrombus, global hypokinesis, severe atrial enlargement.    -on heparin gtt and temporarily held due to hemorrhagic conversion, now restarted  -on Heparin gtt to Coumadin bridge  NR 1.1 today, coumadin increased to 7.5mg.   -repeat echo in 3 months for resolution of thrombus

## 2022-01-02 NOTE — PROGRESS NOTES
Cyrus Higgins - Neurosurgery ([facility]  Vascular Neurology  Comprehensive Stroke Center  Progress Note    Assessment/Plan:     * Stroke due to embolism of left middle cerebral artery  55 yo male with PMHx of HTN and CAD presents as a transfer from [facility] for large L MCA infarct. CTA with distal L M1 occlusion. No tpa, OOW. No IR, large core infarct. Echo (at OSH) with LV global hypokinesis, EF 15-20%, severe atrial enlargement, and apical thrombus. Repeat CTH with petechial hemorrhage, anticoagulation temporarily held. Repeat CT with stable petechial hemorrhage. During admission obtained CTA chest which showed subsegmental PE. On heparing gtt with Coumadin bridge, pending rehab once coumadin is therapeutic.       Etiology: likely cardioembolic 2/2 low EF(15-20%) and cardiac thrombus.     Antithrombotics for secondary stroke prevention:   Heparin gtt with Coumadin (5mg) bridge with INR goal 2-3    Statins for secondary stroke prevention and hyperlipidemia, if present: Statins: Atorvastatin- 40 mg daily     Aggressive risk factor modification: HTN, CAD     Rehab efforts: The patient has been evaluated by a stroke team provider and the therapy needs have been fully considered based off the presenting complaints and exam findings. The following therapy evaluations are needed: PT/OT on board, SLP evaluate and treat, PM&R evaluate for appropriate placement. Recommend IPR.    Diagnostics ordered/pending: None     VTE prophylaxis: Mechanical prophylaxis: Place SCDs  None: Reason for No Pharmacological VTE Prophylaxis: Currently on anticoagulation    BP parameters: <140      Right shoulder pain  Patient with right shoulder pain.  XR right shoulder negative for fractures and dislocations    Combined systolic and diastolic congestive heart failure  Newly diagnosed  EF 15-20%    Cardiology consulted, appreciate recs   -Discontinue Lisinopril  -Start Entresto 49/51mg   -Anticoagulation for 3 months with follow up echo   -Follow up  with cardiology as outpatient  - At discharge start SGLT2 inhibitor with BMP follow up in 1 week; spironolactone 12.5mg daily when able to tolerate; increase metoprolol 50mg BID with goal of increasing to 200mg total daily when tolerable, no life vest recommended at this time     Currently on: Entresto 24-26 mg BID, metoprolol 50mg BID, spironalactone 12.5      PAD (peripheral artery disease)  -Patient with significant PAD w/ occluded stents x 2 in sup femoral and popliteal artery  -Vascular surgery consulted who recommended ASA when safe from neuro perspective.   - ASA started on 12/31    Aphasia  Therapy to evaluate and treat    Single subsegmental pulmonary embolism without acute cor pulmonale  Visualized on CTA chest 12/25  L apical segmental pulmonary artery embolism  EKG with R axis deviation, RBBB.  US LE negative for DVT  Continue heparin gtt with  Coumadin bridge    Debility  PT/OT to evaluate and treat  Therapy recommending rehab    Cytotoxic cerebral edema  Area of cytotoxic cerebral edema identified when reviewing brain imaging in the territory of the L middle cerebral artery. There is mass effect associated with it. Continue to monitor the patients clinical exam for any worsening of symptoms which may indicate expansion of the stroke or the area of the edema resulting in the clinical change. The pattern is suggestive of embolic etiology.    Clinical exam has remained stable, this suggests that cerebral edema has stabilized. Low probability of clinical deterioration. Neurosurgery signed off, patient no longer on hemicrani watch    Essential hypertension  Stroke RF  SBP goal normotension  On GDMT    Grade III diastolic dysfunction  See HF     Mural thrombus of cardiac apex  TTE at outside facility 12/21/21:  EF of 15-20%, small to moderate sized (1.6x1.1 cm) layered, protruding apical mass c/w thrombus, global hypokinesis, severe atrial enlargement.    -on heparin gtt and temporarily held due to  hemorrhagic conversion, now restarted  -on Heparin gtt to Coumadin bridge  NR 1.1 today, coumadin increased to 7.5mg.   -repeat echo in 3 months for resolution of thrombus           12/22 patient in ICU for hemicrani watch, NPO per SLP  12/23: Patient remains in NCC. Min intensity heparin gtt was initiated. Patient aphasic with RS plegia.   12/24-Patient with changes in CT with petechial hemorraghic conversion recommend stopping aspirin and holding heparin. Repeat imaging with worsening exam. On hemicrani watch.   12/27 RLE weakness improved on today's exam, patient with runs of vtach overnight on 12/24-12/25, CTA chest with subsegmental PE, patient restarted on heparin gtt, neurosurgery signed off and patient no longer on hemicrani watch, remains NPO per SLP, therapy recommending inpatient rehab  12/28 Continues on heparin gtt for PE, neuro exam stable with mild improvements, low suspicion for  new or worsening ICH.   12/29-Neuro exam stable. Recs for IPR. Overnight, patient had 11 run of vtach. Mg 2.2. P 2.9, will replete P to 4. On heparin gtt. Can transition to DOAC over the weekend.  Patient pending medicaid.  Cardio had been consulted by [facility] and they recommended full AC for thrombus, repeat TTE in 3 months for resolution of thrombus, and no need for life vest at this time.   12/30-Neuro exam remains stable. Patient with significant right shoulder pain. XR right shoulder ordered. Metoprolol increased to 50 BID and lisinopril switched to entresto 24-26.Pending medicaid.   12/31: Patient now has medicaid. Referrals were submitted. Started transition to Coumadin today for LV thrombus. XR R shoulder negative.   01/01/2022 Patient's feet cold on exam, doppler with normal pulses bilaterally. INR 1.1 today. When therapeutic, will be ready for IPR placement.   1/2-Neuro exam improving. Patient refusing vital signs and nursing care, spoke with patient about why it was necessary. INR 1.1 today, coumadin increased to  7.5mg.       STROKE DOCUMENTATION        NIH Scale:  1a. Level of Consciousness: 0-->Alert, keenly responsive  1b. LOC Questions: 1-->Answers one question correctly  1c. LOC Commands: 1-->Performs one task correctly  2. Best Gaze: 0-->Normal  3. Visual: 0-->No visual loss  4. Facial Palsy: 1-->Minor paralysis (flattened nasolabial fold, asymmetry on smiling)  5a. Motor Arm, Left: 0-->No drift, limb holds 90 (or 45) degrees for full 10 secs  5b. Motor Arm, Right: 4-->No movement  6a. Motor Leg, Left: 0-->No drift, leg holds 30 degree position for full 5 secs  6b. Motor Leg, Right: 2-->Some effort against gravity, leg falls to bed by 5 secs, but has some effort against gravity  7. Limb Ataxia: 0-->Absent  8. Sensory: 1-->Mild-to-moderate sensory loss, patient feels pinprick is less sharp or is dull on the affected side, or there is a loss of superficial pain with pinprick, but patient is aware of being touched  9. Best Language: 1-->Mild-to-moderate aphasia, some obvious loss of fluency or facility of comprehension, without significant limitation on ideas expressed or form of expression. Reduction of speech and/or comprehension, however, makes conversation. . . (see row details)  10. Dysarthria: 2-->Severe dysarthria, patients speech is so slurred as to be unintelligible in the absence of or out of proportion to any dysphasia, or is mute/anarthric  11. Extinction and Inattention (formerly Neglect): 1-->Visual, tactile, auditory, spatial, or personal inattention or extinction to bilateral simultaneous stimulation in one of the sensory modalities  Total (NIH Stroke Scale): 14       Modified Slick Score: 0  Brenden Coma Scale:    ABCD2 Score:    [id number] Score:   HAS -BLED Score:   ICH Score:   Hunt & Gillis Classification:      Hemorrhagic change of an Ischemic Stroke: Does this patient have an ischemic stroke with hemorrhagic changes? Yes, Grading Scale: HI Type 1 (HI-1) = small petechiae along the margins of the  infarct. Is this a symptomatic change?  Yes - NIHSS increase of 4 or more points directly related to imaging changes.    Neurologic Chief Complaint: found down, nonverbal, RSW    Subjective:     Interval History: Patient is seen for follow-up neurological assessment and treatment recommendations:   Neuro exam improving. Patient refusing vital signs and nursing care, spoke with patient about why it was necessary.NR 1.1 today, coumadin increased to 7.5mg.     HPI, Past Medical, Family, and Social History remains the same as documented in the initial encounter.     Review of Systems   Constitutional: Negative for chills and fever.   HENT: Negative for facial swelling and trouble swallowing.    Eyes: Negative for photophobia and visual disturbance.   Respiratory: Negative for cough and shortness of breath.    Cardiovascular: Negative for leg swelling.   Gastrointestinal: Negative for constipation, diarrhea and vomiting.   Genitourinary: Negative for difficulty urinating.   Skin: Negative for pallor and rash.   Neurological: Positive for facial asymmetry, speech difficulty and weakness.   Psychiatric/Behavioral: Negative for agitation.     Scheduled Meds:   aspirin  81 mg Oral Daily    atorvastatin  40 mg Oral Daily    EScitalopram oxalate  5 mg Oral Daily    metoprolol succinate  50 mg Oral BID    polyethylene glycol  17 g Oral Daily    sacubitriL-valsartan  1 tablet Oral BID    senna-docusate 8.6-50 mg  1 tablet Oral BID    sodium chloride  1 g Oral BID    spironolactone  12.5 mg Oral Daily    warfarin  5 mg Oral Daily     Continuous Infusions:   heparin (porcine) in D5W 21 Units/kg/hr (01/02/22 0600)     PRN Meds:acetaminophen, hydrALAZINE, labetalol, ondansetron, sodium chloride 0.9%    Objective:     Vital Signs (Most Recent):  Temp: (P) 98 °F (36.7 °C) (01/02/22 0800)  Pulse: (P) 68 (01/02/22 0800)  Resp: (P) 18 (01/02/22 0800)  BP: (P) 121/81 (01/02/22 0800)  SpO2: (P) 98 % (01/02/22 0800)  BP Location:  (P) Left arm    Vital Signs Range (Last 24H):  Temp:  [97.9 °F (36.6 °C)-98.8 °F (37.1 °C)]   Pulse:  [60-84]   Resp:  [16-18]   BP: (120-135)/(75-89)   SpO2:  [92 %-98 %]   BP Location: (P) Left arm    Physical Exam  Vitals and nursing note reviewed.   Constitutional:       General: He is not in acute distress.     Appearance: He is well-developed and normal weight. He is not diaphoretic.   HENT:      Head: Normocephalic and atraumatic.      Right Ear: External ear normal.      Left Ear: External ear normal.      Nose: Nose normal. No rhinorrhea.   Eyes:      General: No scleral icterus.        Right eye: No discharge.         Left eye: No discharge.      Extraocular Movements: Extraocular movements intact.   Cardiovascular:      Rate and Rhythm: Normal rate.   Pulmonary:      Effort: Pulmonary effort is normal. No respiratory distress.   Abdominal:      General: Abdomen is flat. There is no distension.   Musculoskeletal:      Right lower leg: No edema.      Left lower leg: No edema.   Skin:     General: Skin is dry.   Neurological:      Mental Status: He is alert.      Cranial Nerves: Dysarthria present.      Sensory: Sensory deficit present.      Motor: Weakness present.      Comments: Aphasia   Psychiatric:         Behavior: Behavior normal.         Neurological Exam:   LOC: alert  Attention Span: Good   Language: expressive aphasia  Articulation: Dysarthria  Orientation: Untestable due to severe aphasia   Motor: Arm left  Normal 5/5  Leg left  Normal 5/5  Arm right  Plegia 0/5  Leg right Paresis 3/5  Sensation: right hypoesthesia  Tone: Flaccid  RUE     Laboratory:  BMP:   Recent Labs   Lab 01/02/22  0342      K 4.3      CO2 21*   BUN 12   CREATININE 0.7   CALCIUM 8.2*     CBC:   Recent Labs   Lab 01/02/22  0342   WBC 10.99   RBC 4.12*   HGB 14.0   HCT 42.9      *   MCH 34.0*   MCHC 32.6     Lipid Panel:   No results for input(s): CHOL, LDLCALC, HDL, TRIG in the last 168  hours.  Coagulation:   Recent Labs   Lab 01/02/22  0342   INR 1.1   APTT 32.1*       Diagnostic Results     Brain Imaging   CTH 12/25/21  No significant change from prior.  Evolving large recent left MCA territory infarction with petechial hemorrhagic conversion.  No evidence for significant new hemorrhage or increased mass effect.        CTH 12/24/2021  Evolving large left MCA territory infarction with petechial hemorrhagic conversion.  There is intermediate density components in the left temporal lobe area of infarction suggestive for interval additional petechial hemorrhage.  No evidence for extra-axial hemorrhage or hydrocephalus.     MRI Brain WO. Date: 12/22/21    Large acute  left MCA distribution infarct with modest mass effect. Multiple small remote scattered infarcts elsewhere as above.  Embolic disease to be considered.     CTH WO 12/21/21  Again demonstrated in better delineated as large left MCA distribution infarct with no associated acute hemorrhage and with localized mass effect resulting in effacement of overlying cortical sulci and partial effacement left sylvian fissure.  No associated acute hemorrhage.     Several small old areas of infarction again noted including cerebellum bilaterally, anterior left perisylvian region, lateral left frontal cortex and posterior right parietal cortex.     CTH WO 12/20/21   Large area loss of gray-white differentiation consistent with acute infarct left MCA distribution involving the left temporal lobe, temporal occipital region and portions of the basal ganglia with localized mass effect including effacement of overlying cortical sulci although with no associated acute hemorrhage.     Several small old areas of infarction are evident including anterior left perisylvian and cerebellum bilaterally. Mild underlying atrophy.        Vessel Imaging:  US LE Veins 12/25/21  No evidence of deep venous thrombosis in either lower extremity.    CTA H/N 12/20/21  Segmental  occlusion measuring approximately 10 mm mid and distal M1 segment left MCA with diminished flow distal to the stenosis.     Right cervical carotid circulation--atherosclerotic calcification bifurcation without measurable stenosis.     Left cervical carotid circulation--atherosclerotic calcification distal left common carotid artery and bifurcation with stenosis proximal left ICA measuring 16% utilizing NASCET criteria.  Mild eccentric narrowing mid to distal portion left common carotid artery also noted.     Vertebral arteries--no focal arterial abnormality or measurable stenosis.     Cardiac Evaluation:   TTE with bubble 12/21/21  1. The left ventricle (LV) is dilated with severely depressed systolic function & global hypokinesis.  2. LV ejection fraction is 15-20%. Grade III LV diastolic dysfunction.  3. Small to moderate sized (1.6x1.1 cm) layered, protruding apical mass c/w thrombus.  4. The right ventricle (RV) is severely enlarged with normal systolic function.   5. Estimated RVSP is moderately elevated (50-70 mmHg). Pulmonary hypertension is present.  6. Severe atrial enlargement.  7. Moderate eccentric mitral regrugitation.  8. Mild tricuspid regurgitation.     Other:  CTA Chest Non coronary 12/25/21  Pulmonary embolism left apical segmental pulmonary artery.     Moderate to large right and moderate left bilateral pleural effusions     Interstitial opacities throughout the lungs bilaterally with superimposed ill-defined consolidation right upper and to lesser degree left upper lobes which may represent superimposed developing pneumonia.     Scattered interlobular septal thickening throughout the lungs concerning for component of vascular congestion and edema.        Blanca Ragsdale NP  Comprehensive Stroke Center  Department of Vascular Neurology   [facility])

## 2022-01-02 NOTE — SUBJECTIVE & OBJECTIVE
Neurologic Chief Complaint: found down, nonverbal, RSW    Subjective:     Interval History: Patient is seen for follow-up neurological assessment and treatment recommendations:   Neuro exam improving. Patient refusing vital signs and nursing care, spoke with patient about why it was necessary.NR 1.1 today, coumadin increased to 7.5mg.     HPI, Past Medical, Family, and Social History remains the same as documented in the initial encounter.     Review of Systems   Constitutional: Negative for chills and fever.   HENT: Negative for facial swelling and trouble swallowing.    Eyes: Negative for photophobia and visual disturbance.   Respiratory: Negative for cough and shortness of breath.    Cardiovascular: Negative for leg swelling.   Gastrointestinal: Negative for constipation, diarrhea and vomiting.   Genitourinary: Negative for difficulty urinating.   Skin: Negative for pallor and rash.   Neurological: Positive for facial asymmetry, speech difficulty and weakness.   Psychiatric/Behavioral: Negative for agitation.     Scheduled Meds:   aspirin  81 mg Oral Daily    atorvastatin  40 mg Oral Daily    EScitalopram oxalate  5 mg Oral Daily    metoprolol succinate  50 mg Oral BID    polyethylene glycol  17 g Oral Daily    sacubitriL-valsartan  1 tablet Oral BID    senna-docusate 8.6-50 mg  1 tablet Oral BID    sodium chloride  1 g Oral BID    spironolactone  12.5 mg Oral Daily    warfarin  5 mg Oral Daily     Continuous Infusions:   heparin (porcine) in D5W 21 Units/kg/hr (01/02/22 0600)     PRN Meds:acetaminophen, hydrALAZINE, labetalol, ondansetron, sodium chloride 0.9%    Objective:     Vital Signs (Most Recent):  Temp: (P) 98 °F (36.7 °C) (01/02/22 0800)  Pulse: (P) 68 (01/02/22 0800)  Resp: (P) 18 (01/02/22 0800)  BP: (P) 121/81 (01/02/22 0800)  SpO2: (P) 98 % (01/02/22 0800)  BP Location: (P) Left arm    Vital Signs Range (Last 24H):  Temp:  [97.9 °F (36.6 °C)-98.8 °F (37.1 °C)]   Pulse:  [60-84]   Resp:   [16-18]   BP: (120-135)/(75-89)   SpO2:  [92 %-98 %]   BP Location: (P) Left arm    Physical Exam  Vitals and nursing note reviewed.   Constitutional:       General: He is not in acute distress.     Appearance: He is well-developed and normal weight. He is not diaphoretic.   HENT:      Head: Normocephalic and atraumatic.      Right Ear: External ear normal.      Left Ear: External ear normal.      Nose: Nose normal. No rhinorrhea.   Eyes:      General: No scleral icterus.        Right eye: No discharge.         Left eye: No discharge.      Extraocular Movements: Extraocular movements intact.   Cardiovascular:      Rate and Rhythm: Normal rate.   Pulmonary:      Effort: Pulmonary effort is normal. No respiratory distress.   Abdominal:      General: Abdomen is flat. There is no distension.   Musculoskeletal:      Right lower leg: No edema.      Left lower leg: No edema.   Skin:     General: Skin is dry.   Neurological:      Mental Status: He is alert.      Cranial Nerves: Dysarthria present.      Sensory: Sensory deficit present.      Motor: Weakness present.      Comments: Aphasia   Psychiatric:         Behavior: Behavior normal.         Neurological Exam:   LOC: alert  Attention Span: Good   Language: expressive aphasia  Articulation: Dysarthria  Orientation: Untestable due to severe aphasia   Motor: Arm left  Normal 5/5  Leg left  Normal 5/5  Arm right  Plegia 0/5  Leg right Paresis 3/5  Sensation: right hypoesthesia  Tone: Flaccid  RUE     Laboratory:  BMP:   Recent Labs   Lab 01/02/22  0342      K 4.3      CO2 21*   BUN 12   CREATININE 0.7   CALCIUM 8.2*     CBC:   Recent Labs   Lab 01/02/22  0342   WBC 10.99   RBC 4.12*   HGB 14.0   HCT 42.9      *   MCH 34.0*   MCHC 32.6     Lipid Panel:   No results for input(s): CHOL, LDLCALC, HDL, TRIG in the last 168 hours.  Coagulation:   Recent Labs   Lab 01/02/22  0342   INR 1.1   APTT 32.1*       Diagnostic Results     Brain Imaging   CTH  12/25/21  No significant change from prior.  Evolving large recent left MCA territory infarction with petechial hemorrhagic conversion.  No evidence for significant new hemorrhage or increased mass effect.        CTH 12/24/2021  Evolving large left MCA territory infarction with petechial hemorrhagic conversion.  There is intermediate density components in the left temporal lobe area of infarction suggestive for interval additional petechial hemorrhage.  No evidence for extra-axial hemorrhage or hydrocephalus.     MRI Brain WO. Date: 12/22/21    Large acute  left MCA distribution infarct with modest mass effect. Multiple small remote scattered infarcts elsewhere as above.  Embolic disease to be considered.     CTH WO 12/21/21  Again demonstrated in better delineated as large left MCA distribution infarct with no associated acute hemorrhage and with localized mass effect resulting in effacement of overlying cortical sulci and partial effacement left sylvian fissure.  No associated acute hemorrhage.     Several small old areas of infarction again noted including cerebellum bilaterally, anterior left perisylvian region, lateral left frontal cortex and posterior right parietal cortex.     CTH WO 12/20/21   Large area loss of gray-white differentiation consistent with acute infarct left MCA distribution involving the left temporal lobe, temporal occipital region and portions of the basal ganglia with localized mass effect including effacement of overlying cortical sulci although with no associated acute hemorrhage.     Several small old areas of infarction are evident including anterior left perisylvian and cerebellum bilaterally. Mild underlying atrophy.        Vessel Imaging:  US LE Veins 12/25/21  No evidence of deep venous thrombosis in either lower extremity.    CTA H/N 12/20/21  Segmental occlusion measuring approximately 10 mm mid and distal M1 segment left MCA with diminished flow distal to the stenosis.     Right  cervical carotid circulation--atherosclerotic calcification bifurcation without measurable stenosis.     Left cervical carotid circulation--atherosclerotic calcification distal left common carotid artery and bifurcation with stenosis proximal left ICA measuring 16% utilizing NASCET criteria.  Mild eccentric narrowing mid to distal portion left common carotid artery also noted.     Vertebral arteries--no focal arterial abnormality or measurable stenosis.     Cardiac Evaluation:   TTE with bubble 12/21/21  1. The left ventricle (LV) is dilated with severely depressed systolic function & global hypokinesis.  2. LV ejection fraction is 15-20%. Grade III LV diastolic dysfunction.  3. Small to moderate sized (1.6x1.1 cm) layered, protruding apical mass c/w thrombus.  4. The right ventricle (RV) is severely enlarged with normal systolic function.   5. Estimated RVSP is moderately elevated (50-70 mmHg). Pulmonary hypertension is present.  6. Severe atrial enlargement.  7. Moderate eccentric mitral regrugitation.  8. Mild tricuspid regurgitation.     Other:  CTA Chest Non coronary 12/25/21  Pulmonary embolism left apical segmental pulmonary artery.     Moderate to large right and moderate left bilateral pleural effusions     Interstitial opacities throughout the lungs bilaterally with superimposed ill-defined consolidation right upper and to lesser degree left upper lobes which may represent superimposed developing pneumonia.     Scattered interlobular septal thickening throughout the lungs concerning for component of vascular congestion and edema.

## 2022-01-02 NOTE — ASSESSMENT & PLAN NOTE
Visualized on CTA chest 12/25  L apical segmental pulmonary artery embolism  EKG with R axis deviation, RBBB.  US LE negative for DVT  Continue heparin gtt with  Coumadin bridge

## 2022-01-02 NOTE — ASSESSMENT & PLAN NOTE
55 yo male with PMHx of HTN and CAD presents as a transfer from [facility] for large L MCA infarct. CTA with distal L M1 occlusion. No tpa, OOW. No IR, large core infarct. Echo (at OSH) with LV global hypokinesis, EF 15-20%, severe atrial enlargement, and apical thrombus. Repeat CTH with petechial hemorrhage, anticoagulation temporarily held. Repeat CT with stable petechial hemorrhage. During admission obtained CTA chest which showed subsegmental PE. On heparing gtt with Coumadin bridge, pending rehab once coumadin is therapeutic.       Etiology: likely cardioembolic 2/2 low EF(15-20%) and cardiac thrombus.     Antithrombotics for secondary stroke prevention:   Heparin gtt with Coumadin (5mg) bridge with INR goal 2-3    Statins for secondary stroke prevention and hyperlipidemia, if present: Statins: Atorvastatin- 40 mg daily     Aggressive risk factor modification: HTN, CAD     Rehab efforts: The patient has been evaluated by a stroke team provider and the therapy needs have been fully considered based off the presenting complaints and exam findings. The following therapy evaluations are needed: PT/OT on board, SLP evaluate and treat, PM&R evaluate for appropriate placement. Recommend IPR.    Diagnostics ordered/pending: None     VTE prophylaxis: Mechanical prophylaxis: Place SCDs  None: Reason for No Pharmacological VTE Prophylaxis: Currently on anticoagulation    BP parameters: <140

## 2022-01-03 LAB
APTT BLDCRRT: 57.1 SEC (ref 21–32)
INR PPP: 1.1 (ref 0.8–1.2)
PROTHROMBIN TIME: 12.4 SEC (ref 9–12.5)

## 2022-01-03 PROCEDURE — 94761 N-INVAS EAR/PLS OXIMETRY MLT: CPT

## 2022-01-03 PROCEDURE — 25000003 PHARM REV CODE 250

## 2022-01-03 PROCEDURE — 36415 COLL VENOUS BLD VENIPUNCTURE: CPT | Performed by: PHYSICIAN ASSISTANT

## 2022-01-03 PROCEDURE — 99232 PR SUBSEQUENT HOSPITAL CARE,LEVL II: ICD-10-PCS | Mod: ,,, | Performed by: NURSE PRACTITIONER

## 2022-01-03 PROCEDURE — 63600175 PHARM REV CODE 636 W HCPCS: Performed by: INTERNAL MEDICINE

## 2022-01-03 PROCEDURE — 99232 SBSQ HOSP IP/OBS MODERATE 35: CPT | Mod: ,,, | Performed by: NURSE PRACTITIONER

## 2022-01-03 PROCEDURE — 99233 SBSQ HOSP IP/OBS HIGH 50: CPT | Mod: ,,, | Performed by: PSYCHIATRY & NEUROLOGY

## 2022-01-03 PROCEDURE — 25000003 PHARM REV CODE 250: Performed by: INTERNAL MEDICINE

## 2022-01-03 PROCEDURE — 36415 COLL VENOUS BLD VENIPUNCTURE: CPT | Performed by: PSYCHIATRY & NEUROLOGY

## 2022-01-03 PROCEDURE — 25000003 PHARM REV CODE 250: Performed by: STUDENT IN AN ORGANIZED HEALTH CARE EDUCATION/TRAINING PROGRAM

## 2022-01-03 PROCEDURE — 92507 TX SP LANG VOICE COMM INDIV: CPT

## 2022-01-03 PROCEDURE — 25000003 PHARM REV CODE 250: Performed by: PHYSICIAN ASSISTANT

## 2022-01-03 PROCEDURE — 85610 PROTHROMBIN TIME: CPT | Performed by: PSYCHIATRY & NEUROLOGY

## 2022-01-03 PROCEDURE — 85730 THROMBOPLASTIN TIME PARTIAL: CPT | Performed by: PSYCHIATRY & NEUROLOGY

## 2022-01-03 PROCEDURE — 99233 PR SUBSEQUENT HOSPITAL CARE,LEVL III: ICD-10-PCS | Mod: ,,, | Performed by: PSYCHIATRY & NEUROLOGY

## 2022-01-03 PROCEDURE — 25000003 PHARM REV CODE 250: Performed by: PSYCHIATRY & NEUROLOGY

## 2022-01-03 PROCEDURE — 92526 ORAL FUNCTION THERAPY: CPT

## 2022-01-03 PROCEDURE — 97530 THERAPEUTIC ACTIVITIES: CPT

## 2022-01-03 PROCEDURE — 11000001 HC ACUTE MED/SURG PRIVATE ROOM

## 2022-01-03 RX ORDER — WARFARIN SODIUM 5 MG/1
10 TABLET ORAL DAILY
Status: DISCONTINUED | OUTPATIENT
Start: 2022-01-03 | End: 2022-01-06 | Stop reason: HOSPADM

## 2022-01-03 RX ADMIN — ESCITALOPRAM OXALATE 5 MG: 5 TABLET, FILM COATED ORAL at 10:01

## 2022-01-03 RX ADMIN — SACUBITRIL AND VALSARTAN 1 TABLET: 24; 26 TABLET, FILM COATED ORAL at 10:01

## 2022-01-03 RX ADMIN — ATORVASTATIN CALCIUM 40 MG: 20 TABLET, FILM COATED ORAL at 10:01

## 2022-01-03 RX ADMIN — POLYETHYLENE GLYCOL 3350 17 G: 17 POWDER, FOR SOLUTION ORAL at 10:01

## 2022-01-03 RX ADMIN — SENNOSIDES AND DOCUSATE SODIUM 1 TABLET: 50; 8.6 TABLET ORAL at 09:01

## 2022-01-03 RX ADMIN — METOPROLOL SUCCINATE 50 MG: 50 TABLET, EXTENDED RELEASE ORAL at 10:01

## 2022-01-03 RX ADMIN — WARFARIN SODIUM 10 MG: 5 TABLET ORAL at 05:01

## 2022-01-03 RX ADMIN — SPIRONOLACTONE 12.5 MG: 25 TABLET, FILM COATED ORAL at 10:01

## 2022-01-03 RX ADMIN — HEPARIN SODIUM 19 UNITS/KG/HR: 1000 INJECTION INTRAVENOUS; SUBCUTANEOUS at 11:01

## 2022-01-03 RX ADMIN — SENNOSIDES AND DOCUSATE SODIUM 1 TABLET: 50; 8.6 TABLET ORAL at 10:01

## 2022-01-03 RX ADMIN — ASPIRIN 81 MG: 81 TABLET, COATED ORAL at 10:01

## 2022-01-03 NOTE — ASSESSMENT & PLAN NOTE
TTE at outside facility 12/21/21:  EF of 15-20%, small to moderate sized (1.6x1.1 cm) layered, protruding apical mass c/w thrombus, global hypokinesis, severe atrial enlargement.    -on heparin gtt and temporarily held due to hemorrhagic conversion, now restarted  -on Heparin gtt to Coumadin bridge  NR 1.1 today, coumadin increased to 10mg.   -repeat echo in 3 months for resolution of thrombus

## 2022-01-03 NOTE — PT/OT/SLP PROGRESS
"Speech Language Pathology Treatment    Patient Name:  Jimmy Leger   MRN:  [id number]  Admitting Diagnosis: Stroke due to embolism of left middle cerebral artery    Recommendations:                 General Recommendations:  Dysphagia therapy and Speech/language therapy  Diet recommendations:  Puree, Liquid Diet Level: Nectar Thick   Aspiration Precautions: 1 bite/sip at a time, Alternating bites/sips, Assistance with meals and Assistance with thickening liquids, Avoid talking while eating, Check for pocketing/oral residue, Eliminate distractions, Feed only when awake/alert, Frequent oral care, HOB to 90 degrees, Ice chips sparingly, Monitor for s/s of aspiration, Remain upright 30 minutes post meal, Small bites/sips and Strict aspiration precautions   General Precautions: Standard, aphasia,aspiration,fall,pureed diet,nectar thick  Communication strategies:  go to room if call light pushed and pt continues to exhibit both receptive and expressive aphasia.    Subjective     "I'm doing good." pt's response was approximated when SLP asked how he was doing upon entry.     Pain/Comfort:  Pain Rating 1:  (no indications of pain)    Respiratory Status: Room air    Objective:     Has the patient been evaluated by SLP for swallowing?   Yes  Keep patient NPO? No   Current Respiratory Status:        Pt seen for ongoing swallowing assessment/dysphagia therapy and aphasia therapy after given clearance by nurse.  Pt accepted the following PO trials: thin water via 1/2 tsp x 1, full tsp x 3, cup sip x 1, and straw sip x 1; nectar thick liquid via tsp x 1 and straw x 2.  Pt exhibited right anterior loss for cup sip of thin water and anterior loss with throat clear after straw sip of thin water.  Further delayed coughing was also present.  Pt accepted nectar thick liquid trials with good oral containment and no overt s/s of aspiration.  Swallow responses noted to be delayed.  Recommendations are to continue pureed diet with nectar " thick liquids.  Pt was able to open mouth upon command x 1, revealing persistent right oral motor weakness/droop. He was unable to perform this oral posture again despite repeated cues and modeling. He also was unable to protrude tongue upon command despite cues and modeling.  Pt attempted to count from 1-5, but verbal output was unintelligible.  He approximated 2 out of 12 months of th year when asked to recite and given cues.  Pt repeated bilabial CV syllables/words with 40% accuracy IND/60% given cues.  Session ended with call button left within reach of pt.    Assessment:     Jimmy Leger is a 57 y.o. male with an SLP diagnosis of Aphasia, Dysphagia and Dysarthria.      Goals:   Multidisciplinary Problems     SLP Goals        Problem: SLP Goal    Goal Priority Disciplines Outcome   SLP Goal     SLP Ongoing, Progressing   Description: Speech Language Pathology Goals  Goals expected to be met by 1/10:  1. Pt will participate in ongoing swallowing assessment to determine if safe for further diet advancement.  2. Pt will tolerate pureed diet and nectar thick liquids without s/s of aspiration.  3. Pt will respond to simple yes/no q's with 60% accuracy given max cues.   4. Pt will model 1-step commands on 2/5 trials given max cues.   5. Pt will repeat single words with 60% intelligibility given max cues.   6. Pt will participate in ongoing evaluation of speech/language abilities.       Goals expected to be met by 12/30:  1. Pt will participate in ongoing swallowing assessment to determine if safe for PO intake. Goal met  2. Pt will respond to simple yes/no q's with 60% accuracy given max cues. ongoing  3. Pt will model 1-step commands on 1/5 trials given max cues. Goal met  4. Pt will repeat single words with 50% intelligibility given max cues. Goal met for bilabial CV syllable/words  5. Pt will participate in ongoing evaluation of speech/language abilities. ongoing                           Plan:     · Patient to be  seen:  4 x/week   · Plan of Care expires:  01/22/22  · Plan of Care reviewed with:  patient   · SLP Follow-Up:  Yes       Discharge recommendations:  rehabilitation facility     Time Tracking:     SLP Treatment Date:   01/03/22  Speech Start Time:  1109  Speech Stop Time:  1125     Speech Total Time (min):  16 min    Billable Minutes: Speech Therapy Individual 8 and Treatment Swallowing Dysfunction 8    01/03/2022

## 2022-01-03 NOTE — PLAN OF CARE
Pt continues to progress towards treatment goals established in POC. Will reassess as appropriate.    Problem: Physical Therapy Goal  Goal: Physical Therapy Goal  Description: Goals to be met by: 2022     Patient will increase functional independence with mobility by performin. Supine to sit with moderate assistance            -Met 3, revised to min(A)  2. Sit to supine with minimum assistance            -Met 1/3, revised to CGA  3. Rolling to Left with maximal assistance  4. Rolling to Right with minimum assistance  5. Sit to stand transfer with moderate assistance  6. Bed to chair transfer with moderate assistance using LRAD as needed  7. Gait  x 10 feet with maximal assistance using LRAD as needed  8. Lower extremity exercise program x10 reps per handout, with independence   Outcome: Ongoing, Progressing

## 2022-01-03 NOTE — PROGRESS NOTES
[facility] - Neurosurgery ([facility])  Vascular Neurology  Comprehensive Stroke Center  Progress Note    Assessment/Plan:     * Stroke due to embolism of left middle cerebral artery  55 yo male with PMHx of HTN and CAD presents as a transfer from [facility] for large L MCA infarct. CTA with distal L M1 occlusion. No tpa, OOW. No IR, large core infarct. Echo (at OSH) with LV global hypokinesis, EF 15-20%, severe atrial enlargement, and apical thrombus. Repeat CTH with petechial hemorrhage, anticoagulation temporarily held. Repeat CT with stable petechial hemorrhage. During admission obtained CTA chest which showed subsegmental PE. On heparing gtt with Coumadin bridge, coumadin is still not therapeutic. Pending IPR placement.       Etiology: likely cardioembolic 2/2 low EF(15-20%) and cardiac thrombus.     Antithrombotics for secondary stroke prevention:   Heparin gtt with Coumadin (5mg) bridge with INR goal 2-3    Statins for secondary stroke prevention and hyperlipidemia, if present: Statins: Atorvastatin- 40 mg daily     Aggressive risk factor modification: HTN, CAD     Rehab efforts: The patient has been evaluated by a stroke team provider and the therapy needs have been fully considered based off the presenting complaints and exam findings. The following therapy evaluations are needed: PT/OT on board, SLP evaluate and treat, PM&R evaluate for appropriate placement. Recommend IPR.    Diagnostics ordered/pending: None     VTE prophylaxis: Mechanical prophylaxis: Place SCDs  None: Reason for No Pharmacological VTE Prophylaxis: Currently on anticoagulation    BP parameters: <140      Right shoulder pain  Patient with right shoulder pain.  XR right shoulder negative for fractures and dislocations    Combined systolic and diastolic congestive heart failure  Newly diagnosed  EF 15-20%    Cardiology consulted, appreciate recs   -Discontinue Lisinopril  -Start Entresto 49/51mg   -Anticoagulation for 3 months with follow up echo    -Follow up with cardiology as outpatient  - At discharge start SGLT2 inhibitor with BMP follow up in 1 week; spironolactone 12.5mg daily when able to tolerate; increase metoprolol 50mg BID with goal of increasing to 200mg total daily when tolerable, no life vest recommended at this time     Currently on: Entresto 24-26 mg BID, metoprolol 50mg BID, spironalactone 12.5      PAD (peripheral artery disease)  -Patient with significant PAD w/ occluded stents x 2 in sup femoral and popliteal artery  -Vascular surgery consulted who recommended ASA when safe from neuro perspective.   - ASA started on 12/31    Aphasia  Therapy to evaluate and treat    Single subsegmental pulmonary embolism without acute cor pulmonale  Visualized on CTA chest 12/25  L apical segmental pulmonary artery embolism  EKG with R axis deviation, RBBB.  US LE negative for DVT  Continue heparin gtt with  Coumadin bridge    Debility  PT/OT to evaluate and treat  Therapy recommending rehab    Cytotoxic cerebral edema  Area of cytotoxic cerebral edema identified when reviewing brain imaging in the territory of the L middle cerebral artery. There is mass effect associated with it. Continue to monitor the patients clinical exam for any worsening of symptoms which may indicate expansion of the stroke or the area of the edema resulting in the clinical change. The pattern is suggestive of embolic etiology.    Clinical exam has remained stable, this suggests that cerebral edema has stabilized. Low probability of clinical deterioration. Neurosurgery signed off, patient no longer on hemicrani watch    Essential hypertension  Stroke RF  SBP goal normotension  On GDMT    Grade III diastolic dysfunction  See HF     Mural thrombus of cardiac apex  TTE at outside facility 12/21/21:  EF of 15-20%, small to moderate sized (1.6x1.1 cm) layered, protruding apical mass c/w thrombus, global hypokinesis, severe atrial enlargement.    -on heparin gtt and temporarily held  due to hemorrhagic conversion, now restarted  -on Heparin gtt to Coumadin bridge  NR 1.1 today, coumadin increased to 10mg.   -repeat echo in 3 months for resolution of thrombus           12/22 patient in ICU for hemicrani watch, NPO per SLP  12/23: Patient remains in NCC. Min intensity heparin gtt was initiated. Patient aphasic with RS plegia.   12/24-Patient with changes in CT with petechial hemorraghic conversion recommend stopping aspirin and holding heparin. Repeat imaging with worsening exam. On hemicrani watch.   12/27 RLE weakness improved on today's exam, patient with runs of vtach overnight on 12/24-12/25, CTA chest with subsegmental PE, patient restarted on heparin gtt, neurosurgery signed off and patient no longer on hemicrani watch, remains NPO per SLP, therapy recommending inpatient rehab  12/28 Continues on heparin gtt for PE, neuro exam stable with mild improvements, low suspicion for  new or worsening ICH.   12/29-Neuro exam stable. Recs for IPR. Overnight, patient had 11 run of vtach. Mg 2.2. P 2.9, will replete P to 4. On heparin gtt. Can transition to DOAC over the weekend.  Patient pending medicaid.  Cardio had been consulted by [facility] and they recommended full AC for thrombus, repeat TTE in 3 months for resolution of thrombus, and no need for life vest at this time.   12/30-Neuro exam remains stable. Patient with significant right shoulder pain. XR right shoulder ordered. Metoprolol increased to 50 BID and lisinopril switched to entresto 24-26.Pending medicaid.   12/31: Patient now has medicaid. Referrals were submitted. Started transition to Coumadin today for LV thrombus. XR R shoulder negative.   01/01/2022 Patient's feet cold on exam, doppler with normal pulses bilaterally. INR 1.1 today. When therapeutic, will be ready for IPR placement.   1/2-Neuro exam improving. Patient refusing vital signs and nursing care, spoke with patient about why it was necessary. INR 1.1 today, coumadin increased  to 7.5mg.   01/03/2022 Patient's INR 1.1 again today. Increased to Coumadin 10mg. Waiting for therapeutic INR and IPR placement.       STROKE DOCUMENTATION        NIH Scale:    1a. Level of Consciousness: 0-->Alert, keenly responsive  1b. LOC Questions: 1-->Answers one question correctly  1c. LOC Commands: 1-->Performs one task correctly  2. Best Gaze: 0-->Normal  3. Visual: 0-->No visual loss  4. Facial Palsy: 1-->Minor paralysis (flattened nasolabial fold, asymmetry on smiling)  5a. Motor Arm, Left: 0-->No drift, limb holds 90 (or 45) degrees for full 10 secs  5b. Motor Arm, Right: 4-->No movement  6a. Motor Leg, Left: 0-->No drift, leg holds 30 degree position for full 5 secs  6b. Motor Leg, Right: 2-->Some effort against gravity, leg falls to bed by 5 secs, but has some effort against gravity  7. Limb Ataxia: 0-->Absent  8. Sensory: 1-->Mild-to-moderate sensory loss, patient feels pinprick is less sharp or is dull on the affected side, or there is a loss of superficial pain with pinprick, but patient is aware of being touched  9. Best Language: 1-->Mild-to-moderate aphasia, some obvious loss of fluency or facility of comprehension, without significant limitation on ideas expressed or form of expression. Reduction of speech and/or comprehension, however, makes conversation. . . (see row details)  10. Dysarthria: 2-->Severe dysarthria, patients speech is so slurred as to be unintelligible in the absence of or out of proportion to any dysphasia, or is mute/anarthric  11. Extinction and Inattention (formerly Neglect): 1-->Visual, tactile, auditory, spatial, or personal inattention or extinction to bilateral simultaneous stimulation in one of the sensory modalities  Total (NIH Stroke Scale): 14       Modified Shafter Score: 0  [place] Coma Scale:    ABCD2 Score:    [id number] Score:   HAS -BLED Score:   ICH Score:   Hunt & Gillis Classification:      Hemorrhagic change of an Ischemic Stroke: Does this patient have an  ischemic stroke with hemorrhagic changes? HI Type 1 (HI-1) = small petechiae along the margins of the infarct. Is this a symptomatic change?  No - Hemorrhage is not clinically significant     Neurologic Chief Complaint: found down, nonverbal, RSW    Subjective:     Interval History: Patient is seen for follow-up neurological assessment and treatment recommendations:   Patient's INR 1.1 again today. Increased to Coumadin 10mg. Waiting for therapeutic INR and IPR placement.     HPI, Past Medical, Family, and Social History remains the same as documented in the initial encounter.     Review of Systems   Constitutional: Negative for chills and fever.   HENT: Negative for facial swelling and trouble swallowing.    Eyes: Negative for photophobia and visual disturbance.   Respiratory: Negative for cough and shortness of breath.    Cardiovascular: Negative for leg swelling.   Gastrointestinal: Negative for constipation, diarrhea and vomiting.   Genitourinary: Negative for difficulty urinating.   Skin: Negative for pallor and rash.   Neurological: Positive for facial asymmetry, speech difficulty and weakness.   Psychiatric/Behavioral: Negative for agitation.     Scheduled Meds:   aspirin  81 mg Oral Daily    atorvastatin  40 mg Oral Daily    EScitalopram oxalate  5 mg Oral Daily    metoprolol succinate  50 mg Oral BID    polyethylene glycol  17 g Oral Daily    sacubitriL-valsartan  1 tablet Oral BID    senna-docusate 8.6-50 mg  1 tablet Oral BID    spironolactone  12.5 mg Oral Daily    warfarin  10 mg Oral Daily     Continuous Infusions:   heparin (porcine) in D5W 21 Units/kg/hr (01/02/22 1729)     PRN Meds:acetaminophen, hydrALAZINE, labetalol, ondansetron, sodium chloride 0.9%    Objective:     Vital Signs (Most Recent):  Temp: 96.4 °F (35.8 °C) (01/03/22 0712)  Pulse: 74 (01/03/22 0712)  Resp: 16 (01/03/22 0305)  BP: 120/69 (01/03/22 0712)  SpO2: 96 % (01/03/22 0712)  BP Location: Left arm    Vital Signs Range  (Last 24H):  Temp:  [96.4 °F (35.8 °C)-98.6 °F (37 °C)]   Pulse:  [53-79]   Resp:  [16-17]   BP: (106-133)/(69-86)   SpO2:  [94 %-98 %]   BP Location: Left arm    Physical Exam  Vitals and nursing note reviewed.   Constitutional:       General: He is not in acute distress.     Appearance: He is well-developed and normal weight. He is not diaphoretic.   HENT:      Head: Normocephalic and atraumatic.      Right Ear: External ear normal.      Left Ear: External ear normal.      Nose: Nose normal. No rhinorrhea.   Eyes:      General: No scleral icterus.        Right eye: No discharge.         Left eye: No discharge.      Extraocular Movements: Extraocular movements intact.   Cardiovascular:      Rate and Rhythm: Normal rate.   Pulmonary:      Effort: Pulmonary effort is normal. No respiratory distress.   Abdominal:      General: Abdomen is flat. There is no distension.   Musculoskeletal:      Right lower leg: No edema.      Left lower leg: No edema.   Skin:     General: Skin is dry.   Neurological:      Mental Status: He is alert.      Cranial Nerves: Dysarthria present.      Sensory: Sensory deficit present.      Motor: Weakness present.      Comments: Aphasia   Psychiatric:         Behavior: Behavior normal.         Neurological Exam:   LOC: alert  Attention Span: Good   Language: expressive aphasia  Articulation: Dysarthria  Orientation: Untestable due to severe aphasia   Motor: Arm left  Normal 5/5  Leg left  Normal 5/5  Arm right  Plegia 0/5  Leg right Paresis 3/5  Sensation: right hypoesthesia  Tone: Flaccid  RUE     Laboratory:  BMP:   Recent Labs   Lab 01/02/22  0342      K 4.3      CO2 21*   BUN 12   CREATININE 0.7   CALCIUM 8.2*     CBC:   Recent Labs   Lab 01/02/22  0342   WBC 10.99   RBC 4.12*   HGB 14.0   HCT 42.9      *   MCH 34.0*   MCHC 32.6     Lipid Panel:   No results for input(s): CHOL, LDLCALC, HDL, TRIG in the last 168 hours.  Coagulation:   Recent Labs   Lab  01/03/22  0724   INR 1.1   APTT 57.1*       Diagnostic Results     Brain Imaging   CTH 12/25/21  No significant change from prior.  Evolving large recent left MCA territory infarction with petechial hemorrhagic conversion.  No evidence for significant new hemorrhage or increased mass effect.        CTH 12/24/2021  Evolving large left MCA territory infarction with petechial hemorrhagic conversion.  There is intermediate density components in the left temporal lobe area of infarction suggestive for interval additional petechial hemorrhage.  No evidence for extra-axial hemorrhage or hydrocephalus.     MRI Brain WO. Date: 12/22/21    Large acute  left MCA distribution infarct with modest mass effect. Multiple small remote scattered infarcts elsewhere as above.  Embolic disease to be considered.     CTH WO 12/21/21  Again demonstrated in better delineated as large left MCA distribution infarct with no associated acute hemorrhage and with localized mass effect resulting in effacement of overlying cortical sulci and partial effacement left sylvian fissure.  No associated acute hemorrhage.     Several small old areas of infarction again noted including cerebellum bilaterally, anterior left perisylvian region, lateral left frontal cortex and posterior right parietal cortex.     CTH WO 12/20/21   Large area loss of gray-white differentiation consistent with acute infarct left MCA distribution involving the left temporal lobe, temporal occipital region and portions of the basal ganglia with localized mass effect including effacement of overlying cortical sulci although with no associated acute hemorrhage.     Several small old areas of infarction are evident including anterior left perisylvian and cerebellum bilaterally. Mild underlying atrophy.        Vessel Imaging:  US LE Veins 12/25/21  No evidence of deep venous thrombosis in either lower extremity.    CTA H/N 12/20/21  Segmental occlusion measuring approximately 10 mm  mid and distal M1 segment left MCA with diminished flow distal to the stenosis.     Right cervical carotid circulation--atherosclerotic calcification bifurcation without measurable stenosis.     Left cervical carotid circulation--atherosclerotic calcification distal left common carotid artery and bifurcation with stenosis proximal left ICA measuring 16% utilizing NASCET criteria.  Mild eccentric narrowing mid to distal portion left common carotid artery also noted.     Vertebral arteries--no focal arterial abnormality or measurable stenosis.     Cardiac Evaluation:   TTE with bubble 12/21/21  1. The left ventricle (LV) is dilated with severely depressed systolic function & global hypokinesis.  2. LV ejection fraction is 15-20%. Grade III LV diastolic dysfunction.  3. Small to moderate sized (1.6x1.1 cm) layered, protruding apical mass c/w thrombus.  4. The right ventricle (RV) is severely enlarged with normal systolic function.   5. Estimated RVSP is moderately elevated (50-70 mmHg). Pulmonary hypertension is present.  6. Severe atrial enlargement.  7. Moderate eccentric mitral regrugitation.  8. Mild tricuspid regurgitation.     Other:  CTA Chest Non coronary 12/25/21  Pulmonary embolism left apical segmental pulmonary artery.     Moderate to large right and moderate left bilateral pleural effusions     Interstitial opacities throughout the lungs bilaterally with superimposed ill-defined consolidation right upper and to lesser degree left upper lobes which may represent superimposed developing pneumonia.     Scattered interlobular septal thickening throughout the lungs concerning for component of vascular congestion and edema.        Clovis Kate PA-C  Comprehensive Stroke Center  Department of Vascular Neurology   [facility] - Neurosurgery [facility])

## 2022-01-03 NOTE — PLAN OF CARE
Problem: SLP Goal  Goal: SLP Goal  Description: Speech Language Pathology Goals  Goals expected to be met by 1/10:  1. Pt will participate in ongoing swallowing assessment to determine if safe for further diet advancement.  2. Pt will tolerate pureed diet and nectar thick liquids without s/s of aspiration.  3. Pt will respond to simple yes/no q's with 60% accuracy given max cues.   4. Pt will model 1-step commands on 2/5 trials given max cues.   5. Pt will repeat single words with 60% intelligibility given max cues.   6. Pt will participate in ongoing evaluation of speech/language abilities.       Goals expected to be met by 12/30:  1. Pt will participate in ongoing swallowing assessment to determine if safe for PO intake. Goal met  2. Pt will respond to simple yes/no q's with 60% accuracy given max cues. ongoing  3. Pt will model 1-step commands on 1/5 trials given max cues. Goal met  4. Pt will repeat single words with 50% intelligibility given max cues. Goal met for bilabial CV syllable/words  5. Pt will participate in ongoing evaluation of speech/language abilities. ongoing          Outcome: Ongoing, Progressing  Goals revised.

## 2022-01-03 NOTE — ASSESSMENT & PLAN NOTE
55 yo male with PMHx of HTN and CAD presents as a transfer from [facility] for large L MCA infarct. CTA with distal L M1 occlusion. No tpa, OOW. No IR, large core infarct. Echo (at OSH) with LV global hypokinesis, EF 15-20%, severe atrial enlargement, and apical thrombus. Repeat CTH with petechial hemorrhage, anticoagulation temporarily held. Repeat CT with stable petechial hemorrhage. During admission obtained CTA chest which showed subsegmental PE. On heparing gtt with Coumadin bridge, coumadin is still not therapeutic. Pending IPR placement.       Etiology: likely cardioembolic 2/2 low EF(15-20%) and cardiac thrombus.     Antithrombotics for secondary stroke prevention:   Heparin gtt with Coumadin (5mg) bridge with INR goal 2-3    Statins for secondary stroke prevention and hyperlipidemia, if present: Statins: Atorvastatin- 40 mg daily     Aggressive risk factor modification: HTN, CAD     Rehab efforts: The patient has been evaluated by a stroke team provider and the therapy needs have been fully considered based off the presenting complaints and exam findings. The following therapy evaluations are needed: PT/OT on board, SLP evaluate and treat, PM&R evaluate for appropriate placement. Recommend IPR.    Diagnostics ordered/pending: None     VTE prophylaxis: Mechanical prophylaxis: Place SCDs  None: Reason for No Pharmacological VTE Prophylaxis: Currently on anticoagulation    BP parameters: <140

## 2022-01-03 NOTE — PT/OT/SLP PROGRESS
Physical Therapy Treatment     Patient Name:  Jimmy Leger   MRN:  [id number]      Recommendations:     Discharge Recommendations:  rehabilitation facility   Discharge Equipment Recommendations:  (tbd)   Barriers to discharge: increased (a)    Highest Level of Mobility: STS  Assistance Required: Max(A) with HHA    Assessment:     Jimmy Leger is a 57 y.o. male admitted with a medical diagnosis of Stroke due to embolism of left middle cerebral artery.  He presents with the following impairments/functional limitations:  weakness,impaired endurance,decreased ROM,impaired sensation,impaired self care skills,impaired functional mobilty,gait instability,decreased safety awareness,impaired balance,pain,decreased lower extremity function,decreased upper extremity function,impaired coordination. Jimmy Leger would benefit from acute PT intervention to address listed functional deficits, provide patient/caregiver education, reduce fall risk, and maximize (I) and safety with functional mobility.    Pt met with HOB elevated and agreeable to PT treatment. Pt participates well in PT session and makes an effort to speak throughout despite expressive aphasia. Today's acute PT focus was on therapeutic activities and transfer training to maximize function. Pt able to complete 5 STS trials with max(A) and noted trace quadriceps contraction in R LE. Pt tolerated 12 minutes sitting EOB with L UE support and SBA.    Pt is progressing towards acute PT goals appropriately and continues to benefit from acute PT sessions. After hospital discharge, pt would benefit from inpatient rehab to maximize rehab potential. Pt is motivated and can tolerate 3 hours of intensive therapy services.    Rehab Prognosis: Good; patient would benefit from acute skilled PT services to address these deficits and reach maximum level of function.      Plan:     During this hospitalization, patient to be seen 4 x/week to address the identified rehab impairments via  gait training,therapeutic activities,therapeutic exercises,neuromuscular re-education and progress toward the following goals:    · Plan of Care Expires:  01/22/22    This plan of care has been discussed with the patient/caregiver, who was included in its development and is in agreement with the identified goals and treatment plan.     Subjective     Communicated with RN prior to session.  Patient agreeable to participate.     Pain/Comfort:  · Pain Rating 1:  (pt did not rate, groans with PROM of R UE)  · Location - Side 1: Right  · Location - Orientation 1: generalized  · Location 1: arm  · Pain Addressed 1: Reposition,Distraction  · Pain Rating Post-Intervention 1:  (no pain at rest)    Chief Complaint: Stroke due to embolism of L MCA    Patient/Family Comments/goals: Return to PLOF      Objective:     Patient found HOB elevated with bed alarm,telemetry,peripheral IV,Condom Catheter  upon PT entry to room.    General Precautions: Standard, aphasia,aspiration,fall,nectar thick,pureed diet   Orthopedic Precautions:N/A   Braces: N/A         Exams:     Cognition:  o Pt is alert and cooperative  o Command following: intact, pt follows 1-step commands appropriately.     Functional Mobility:    Bed Mobility:  · Supine to Sit: Moderate Assistance on L side of bed  · Pt requires total(A) for advancement of R LE  · Sit to Supine: Moderate Assistance  · Rolling L: Minimal Assistance  · Rolling R: Maximum Assistance  · Scooting anteriorly to EOB to plant feet on floor: Minimal Assistance    Transfers:   · Sit to Stand Transfer: Maximum Assistance  from EOB with HHAx2  · X5 trials from EOB  · PT blocking pt's R LE to prevent buckling and providing support to R UE to prevent shoulder subluxation.  · Pt demos good initiation and rocking momentum to transfer  · Pt tolerated each standing trial ~15 seconds  · Bed to Chair: Activity did not occur , not appropriate at this time             Gait:  · Deferred due to poor standing  balance    Balance:   · Static Sit:   · Stand-By Assist at EOB x 12 minutes  · Good: Patient able to maintain balance without handhold support, limited postural sway.   · Pt with L UE support on bedrail  · Dynamic sit:  · Fair: Patient accepts minimal challenge; able to maintain balance while turning head/trunk.  · Static Stand:   · Max Assist with Hand-held assist x 2  · Poor: Patient requires handhold support and moderate to maximal assistance to maintain position  · Dynamic Stand:  · Poor: Patient unable to accept challenge or move without loss of balance.      Therapeutic Activities/Exercises      Patient assisted with functional mobility as noted above   Patient completed cross-body reaching with L UE while seated EOB for dynamic sitting balance activity. Patient able to visually track past midline to reach target positioned on the R. Patient completed x10 reps with L UE.       STSx5 from EOB with max(A)  o Pt tolerated each standing trial ~15 seconds   Patient educated on the importance of early mobility to prevent functional decline during hospital stay   Patient educated on PT POC and role of PT in acute care   White board updated regarding patient's safest level of mobility with staff assistance, RN also updated.     AM-PAC 6 CLICK MOBILITY  Turning over in bed (including adjusting bedclothes, sheets and blankets)?: 3  Sitting down on and standing up from a chair with arms (e.g., wheelchair, bedside commode, etc.): 2  Moving from lying on back to sitting on the side of the bed?: 2  Moving to and from a bed to a chair (including a wheelchair)?: 2  Need to walk in hospital room?: 1  Climbing 3-5 steps with a railing?: 1  Basic Mobility Total Score: 11     Patient left HOB elevated with all lines intact, call button in reach, bed alarm on and RN notified.        History/Goals:     PAST MEDICAL HISTORY:  Past Medical History:   Diagnosis Date    Combined systolic and diastolic congestive heart failure  2021    Coronary artery disease     Hypertension     Mural thrombus of cardiac apex 2021       No past surgical history on file.    GOALS:   Multidisciplinary Problems     Physical Therapy Goals        Problem: Physical Therapy Goal    Goal Priority Disciplines Outcome Goal Variances Interventions   Physical Therapy Goal     PT, PT/OT Ongoing, Progressing     Description: Goals to be met by: 2022     Patient will increase functional independence with mobility by performin. Supine to sit with moderate assistance            -Met 1/3, revised to min(A)  2. Sit to supine with minimum assistance            -Met 1/3, revised to CGA  3. Rolling to Left with maximal assistance  4. Rolling to Right with minimum assistance  5. Sit to stand transfer with moderate assistance  6. Bed to chair transfer with moderate assistance using LRAD as needed  7. Gait  x 10 feet with maximal assistance using LRAD as needed  8. Lower extremity exercise program x10 reps per handout, with independence                    Time Tracking:     PT Received On: 22  PT Start Time: 09     PT Stop Time: 938  PT Total Time (min): 31 min     Billable Minutes: Therapeutic Activity 31      Debra Mistry, PT  2022   Pager# [phone]

## 2022-01-03 NOTE — PLAN OF CARE
[facility])  Discharge Reassessment    Primary Care Provider: Primary Doctor No    Expected Discharge Date: 1/6/2022     Not medically ready    Reassessment (most recent)     Discharge Reassessment - 01/03/22 1604        Discharge Reassessment    Assessment Type Discharge Planning Reassessment     Did the patient's condition or plan change since previous assessment? No     Discharge Plan discussed with: Sibling     Name(s) and Number(s) Brooke Leger (sister) [phone]     Communicated ANGIE with patient/caregiver Date not available/Unable to determine     Discharge Plan A Rehab     Discharge Plan B Community Services;Home     DME Needed Upon Discharge  other (see comments)   tbd    Discharge Barriers Identified Underinsured     Why the patient remains in the hospital Requires continued medical care        Post-Acute Status    Post-Acute Authorization Placement     Post-Acute Placement Status Pending post-acute provider review/more information requested

## 2022-01-03 NOTE — PROGRESS NOTES
[facility] ([facility])  Physical Medicine & Rehab  Progress Note    Patient Name: Jimmy Leger  MRN: [id number]  Admission Date: 12/21/2021  Length of Stay: 13 days  Attending Physician: Olaf Kasper MD    Subjective:     Principal Problem:Stroke due to embolism of left middle cerebral artery    Hospital Course:   12/27/21: Participated w/ PT. Bed mob maxA x 2ppl. Sit to stand maxA x 2 ppl.       Interval History 1/3/2022:  Patient is seen for follow-up PM&R evaluation and recommendations: Participating with therapy.     HPI, Past Medical, Family, and Social History remains the same as documented in the initial encounter.    Scheduled Medications:    aspirin  81 mg Oral Daily    atorvastatin  40 mg Oral Daily    EScitalopram oxalate  5 mg Oral Daily    metoprolol succinate  50 mg Oral BID    polyethylene glycol  17 g Oral Daily    sacubitriL-valsartan  1 tablet Oral BID    senna-docusate 8.6-50 mg  1 tablet Oral BID    spironolactone  12.5 mg Oral Daily    warfarin  10 mg Oral Daily       Diagnostic Results: Labs: Reviewed  ECG: Reviewed  CT: Reviewed    PRN Medications: acetaminophen, hydrALAZINE, labetalol, ondansetron, sodium chloride 0.9%    Review of Systems   Constitutional: Positive for activity change. Negative for fatigue and fever.   Musculoskeletal: Positive for gait problem.   Neurological: Positive for speech difficulty and weakness.   Psychiatric/Behavioral: Positive for confusion.     Objective:     Vital Signs (Most Recent):  Temp: 98 °F (36.7 °C) (01/03/22 1115)  Pulse: 77 (01/03/22 1115)  Resp: 16 (01/03/22 0305)  BP: 123/87 (01/03/22 1115)  SpO2: 96 % (01/03/22 1115)    Vital Signs (24h Range):  Temp:  [96.4 °F (35.8 °C)-98.5 °F (36.9 °C)] 98 °F (36.7 °C)  Pulse:  [53-79] 77  Resp:  [16-17] 16  SpO2:  [94 %-98 %] 96 %  BP: (106-130)/(69-87) 123/87     Physical Exam  Vitals and nursing note reviewed.   Constitutional:       Appearance: Normal appearance.   HENT:      Nose: Nose  normal.      Mouth/Throat:      Mouth: Mucous membranes are moist.   Pulmonary:      Effort: Pulmonary effort is normal. No respiratory distress.   Musculoskeletal:      Comments: R sided weakness   Skin:     General: Skin is warm.   Neurological:      Mental Status: He is alert.      Comments: Aphasia   Following one step commands   Psychiatric:         Mood and Affect: Mood normal.         Behavior: Behavior normal.              Assessment/Plan:      * Stroke due to embolism of left middle cerebral artery  - CTA with distal L M1 occlusion.   - Repeat CTH with large L MCA infarct.   - Per VN etiology likely cardioembolic 2/2 low EF.     Single subsegmental pulmonary embolism without acute cor pulmonale  - CTA chest with subsegmental PE.  - Began Heparin drip and transitioning to Coumadin     Debility  - Related to prolonged/acute hospital course.     Recommendations  -  Encourage mobility, OOB in chair at least 3 hours per day, and early ambulation as appropriate  -  PT/OT evaluate and treat  -  Pain management  -  Monitor for and prevent skin breakdown and pressure ulcers  · Early mobility, repositioning/weight shifting every 20-30 minutes when sitting, turn patient every 2 hours, proper mattress/overlay and chair cushioning, pressure relief/heel protector boots  -  DVT prophylaxis    -  Reviewed discharge options (IP rehab, SNF, HH therapy, and OP therapy)    PM&R Recommendation:     At this time, the PM&R team has reviewed this patient's ongoing medical case including inpatient diagnosis, medical history, clinical examination, labs, vitals, current social and functional history to provide the post-acute recommendation as follows:     RECOMMENDATIONS: inpatient rehabilitation due to good motivation/participation with therapies and good potential for recovery.    MEDICAL STABILITY:     At this time, barriers for post-acute rehab admission transition to anticoagulant.     We will continue to follow.     Laura AMAYA  KATIE Polk  Department of Physical Medicine & Rehab   [facility])

## 2022-01-03 NOTE — SUBJECTIVE & OBJECTIVE
Neurologic Chief Complaint: found down, nonverbal, RSW    Subjective:     Interval History: Patient is seen for follow-up neurological assessment and treatment recommendations:   Patient's INR 1.1 again today. Increased to Coumadin 10mg. Waiting for therapeutic INR and IPR placement.     HPI, Past Medical, Family, and Social History remains the same as documented in the initial encounter.     Review of Systems   Constitutional: Negative for chills and fever.   HENT: Negative for facial swelling and trouble swallowing.    Eyes: Negative for photophobia and visual disturbance.   Respiratory: Negative for cough and shortness of breath.    Cardiovascular: Negative for leg swelling.   Gastrointestinal: Negative for constipation, diarrhea and vomiting.   Genitourinary: Negative for difficulty urinating.   Skin: Negative for pallor and rash.   Neurological: Positive for facial asymmetry, speech difficulty and weakness.   Psychiatric/Behavioral: Negative for agitation.     Scheduled Meds:   aspirin  81 mg Oral Daily    atorvastatin  40 mg Oral Daily    EScitalopram oxalate  5 mg Oral Daily    metoprolol succinate  50 mg Oral BID    polyethylene glycol  17 g Oral Daily    sacubitriL-valsartan  1 tablet Oral BID    senna-docusate 8.6-50 mg  1 tablet Oral BID    spironolactone  12.5 mg Oral Daily    warfarin  10 mg Oral Daily     Continuous Infusions:   heparin (porcine) in D5W 21 Units/kg/hr (01/02/22 1729)     PRN Meds:acetaminophen, hydrALAZINE, labetalol, ondansetron, sodium chloride 0.9%    Objective:     Vital Signs (Most Recent):  Temp: 96.4 °F (35.8 °C) (01/03/22 0712)  Pulse: 74 (01/03/22 0712)  Resp: 16 (01/03/22 0305)  BP: 120/69 (01/03/22 0712)  SpO2: 96 % (01/03/22 0712)  BP Location: Left arm    Vital Signs Range (Last 24H):  Temp:  [96.4 °F (35.8 °C)-98.6 °F (37 °C)]   Pulse:  [53-79]   Resp:  [16-17]   BP: (106-133)/(69-86)   SpO2:  [94 %-98 %]   BP Location: Left arm    Physical Exam  Vitals and  nursing note reviewed.   Constitutional:       General: He is not in acute distress.     Appearance: He is well-developed and normal weight. He is not diaphoretic.   HENT:      Head: Normocephalic and atraumatic.      Right Ear: External ear normal.      Left Ear: External ear normal.      Nose: Nose normal. No rhinorrhea.   Eyes:      General: No scleral icterus.        Right eye: No discharge.         Left eye: No discharge.      Extraocular Movements: Extraocular movements intact.   Cardiovascular:      Rate and Rhythm: Normal rate.   Pulmonary:      Effort: Pulmonary effort is normal. No respiratory distress.   Abdominal:      General: Abdomen is flat. There is no distension.   Musculoskeletal:      Right lower leg: No edema.      Left lower leg: No edema.   Skin:     General: Skin is dry.   Neurological:      Mental Status: He is alert.      Cranial Nerves: Dysarthria present.      Sensory: Sensory deficit present.      Motor: Weakness present.      Comments: Aphasia   Psychiatric:         Behavior: Behavior normal.         Neurological Exam:   LOC: alert  Attention Span: Good   Language: expressive aphasia  Articulation: Dysarthria  Orientation: Untestable due to severe aphasia   Motor: Arm left  Normal 5/5  Leg left  Normal 5/5  Arm right  Plegia 0/5  Leg right Paresis 3/5  Sensation: right hypoesthesia  Tone: Flaccid  RUE     Laboratory:  BMP:   Recent Labs   Lab 01/02/22  0342      K 4.3      CO2 21*   BUN 12   CREATININE 0.7   CALCIUM 8.2*     CBC:   Recent Labs   Lab 01/02/22  0342   WBC 10.99   RBC 4.12*   HGB 14.0   HCT 42.9      *   MCH 34.0*   MCHC 32.6     Lipid Panel:   No results for input(s): CHOL, LDLCALC, HDL, TRIG in the last 168 hours.  Coagulation:   Recent Labs   Lab 01/03/22  0724   INR 1.1   APTT 57.1*       Diagnostic Results     Brain Imaging   CT 12/25/21  No significant change from prior.  Evolving large recent left MCA territory infarction with petechial  hemorrhagic conversion.  No evidence for significant new hemorrhage or increased mass effect.        CTH 12/24/2021  Evolving large left MCA territory infarction with petechial hemorrhagic conversion.  There is intermediate density components in the left temporal lobe area of infarction suggestive for interval additional petechial hemorrhage.  No evidence for extra-axial hemorrhage or hydrocephalus.     MRI Brain WO. Date: 12/22/21    Large acute  left MCA distribution infarct with modest mass effect. Multiple small remote scattered infarcts elsewhere as above.  Embolic disease to be considered.     CTH WO 12/21/21  Again demonstrated in better delineated as large left MCA distribution infarct with no associated acute hemorrhage and with localized mass effect resulting in effacement of overlying cortical sulci and partial effacement left sylvian fissure.  No associated acute hemorrhage.     Several small old areas of infarction again noted including cerebellum bilaterally, anterior left perisylvian region, lateral left frontal cortex and posterior right parietal cortex.     CTH WO 12/20/21   Large area loss of gray-white differentiation consistent with acute infarct left MCA distribution involving the left temporal lobe, temporal occipital region and portions of the basal ganglia with localized mass effect including effacement of overlying cortical sulci although with no associated acute hemorrhage.     Several small old areas of infarction are evident including anterior left perisylvian and cerebellum bilaterally. Mild underlying atrophy.        Vessel Imaging:  US LE Veins 12/25/21  No evidence of deep venous thrombosis in either lower extremity.    CTA H/N 12/20/21  Segmental occlusion measuring approximately 10 mm mid and distal M1 segment left MCA with diminished flow distal to the stenosis.     Right cervical carotid circulation--atherosclerotic calcification bifurcation without measurable stenosis.     Left  cervical carotid circulation--atherosclerotic calcification distal left common carotid artery and bifurcation with stenosis proximal left ICA measuring 16% utilizing NASCET criteria.  Mild eccentric narrowing mid to distal portion left common carotid artery also noted.     Vertebral arteries--no focal arterial abnormality or measurable stenosis.     Cardiac Evaluation:   TTE with bubble 12/21/21  1. The left ventricle (LV) is dilated with severely depressed systolic function & global hypokinesis.  2. LV ejection fraction is 15-20%. Grade III LV diastolic dysfunction.  3. Small to moderate sized (1.6x1.1 cm) layered, protruding apical mass c/w thrombus.  4. The right ventricle (RV) is severely enlarged with normal systolic function.   5. Estimated RVSP is moderately elevated (50-70 mmHg). Pulmonary hypertension is present.  6. Severe atrial enlargement.  7. Moderate eccentric mitral regrugitation.  8. Mild tricuspid regurgitation.     Other:  CTA Chest Non coronary 12/25/21  Pulmonary embolism left apical segmental pulmonary artery.     Moderate to large right and moderate left bilateral pleural effusions     Interstitial opacities throughout the lungs bilaterally with superimposed ill-defined consolidation right upper and to lesser degree left upper lobes which may represent superimposed developing pneumonia.     Scattered interlobular septal thickening throughout the lungs concerning for component of vascular congestion and edema.

## 2022-01-03 NOTE — PT/OT/SLP PROGRESS
Occupational Therapy      Patient Name:  Jimmy Leger   MRN:  [id number]    Patient not seen today secondary to pt recently finished working with PT upon OT attempt. Will follow-up as scheduled.    1/3/2022

## 2022-01-03 NOTE — SUBJECTIVE & OBJECTIVE
Interval History 1/3/2022:  Patient is seen for follow-up PM&R evaluation and recommendations: Participating with therapy.     HPI, Past Medical, Family, and Social History remains the same as documented in the initial encounter.    Scheduled Medications:    aspirin  81 mg Oral Daily    atorvastatin  40 mg Oral Daily    EScitalopram oxalate  5 mg Oral Daily    metoprolol succinate  50 mg Oral BID    polyethylene glycol  17 g Oral Daily    sacubitriL-valsartan  1 tablet Oral BID    senna-docusate 8.6-50 mg  1 tablet Oral BID    spironolactone  12.5 mg Oral Daily    warfarin  10 mg Oral Daily       Diagnostic Results: Labs: Reviewed  ECG: Reviewed  CT: Reviewed    PRN Medications: acetaminophen, hydrALAZINE, labetalol, ondansetron, sodium chloride 0.9%    Review of Systems   Constitutional: Positive for activity change. Negative for fatigue and fever.   Musculoskeletal: Positive for gait problem.   Neurological: Positive for speech difficulty and weakness.   Psychiatric/Behavioral: Positive for confusion.     Objective:     Vital Signs (Most Recent):  Temp: 98 °F (36.7 °C) (01/03/22 1115)  Pulse: 77 (01/03/22 1115)  Resp: 16 (01/03/22 0305)  BP: 123/87 (01/03/22 1115)  SpO2: 96 % (01/03/22 1115)    Vital Signs (24h Range):  Temp:  [96.4 °F (35.8 °C)-98.5 °F (36.9 °C)] 98 °F (36.7 °C)  Pulse:  [53-79] 77  Resp:  [16-17] 16  SpO2:  [94 %-98 %] 96 %  BP: (106-130)/(69-87) 123/87     Physical Exam  Vitals and nursing note reviewed.   Constitutional:       Appearance: Normal appearance.   HENT:      Nose: Nose normal.      Mouth/Throat:      Mouth: Mucous membranes are moist.   Pulmonary:      Effort: Pulmonary effort is normal. No respiratory distress.   Musculoskeletal:      Comments: R sided weakness   Skin:     General: Skin is warm.   Neurological:      Mental Status: He is alert.      Comments: Aphasia   Following one step commands   Psychiatric:         Mood and Affect: Mood normal.         Behavior:  Behavior normal.

## 2022-01-04 LAB
ALBUMIN SERPL BCP-MCNC: 2.9 G/DL (ref 3.5–5.2)
ALP SERPL-CCNC: 73 U/L (ref 55–135)
ALT SERPL W/O P-5'-P-CCNC: 16 U/L (ref 10–44)
ANION GAP SERPL CALC-SCNC: 8 MMOL/L (ref 8–16)
APTT BLDCRRT: 45.3 SEC (ref 21–32)
AST SERPL-CCNC: 18 U/L (ref 10–40)
BASOPHILS # BLD AUTO: 0.06 K/UL (ref 0–0.2)
BASOPHILS NFR BLD: 0.7 % (ref 0–1.9)
BILIRUB SERPL-MCNC: 0.6 MG/DL (ref 0.1–1)
BUN SERPL-MCNC: 10 MG/DL (ref 6–20)
CALCIUM SERPL-MCNC: 8.7 MG/DL (ref 8.7–10.5)
CHLORIDE SERPL-SCNC: 107 MMOL/L (ref 95–110)
CO2 SERPL-SCNC: 25 MMOL/L (ref 23–29)
CREAT SERPL-MCNC: 0.7 MG/DL (ref 0.5–1.4)
DIFFERENTIAL METHOD: ABNORMAL
EOSINOPHIL # BLD AUTO: 0.4 K/UL (ref 0–0.5)
EOSINOPHIL NFR BLD: 4.4 % (ref 0–8)
ERYTHROCYTE [DISTWIDTH] IN BLOOD BY AUTOMATED COUNT: 12.2 % (ref 11.5–14.5)
EST. GFR  (AFRICAN AMERICAN): >60 ML/MIN/1.73 M^2
EST. GFR  (NON AFRICAN AMERICAN): >60 ML/MIN/1.73 M^2
GLUCOSE SERPL-MCNC: 103 MG/DL (ref 70–110)
HCT VFR BLD AUTO: 39.1 % (ref 40–54)
HGB BLD-MCNC: 13.2 G/DL (ref 14–18)
IMM GRANULOCYTES # BLD AUTO: 0.04 K/UL (ref 0–0.04)
IMM GRANULOCYTES NFR BLD AUTO: 0.4 % (ref 0–0.5)
INR PPP: 1.3 (ref 0.8–1.2)
LYMPHOCYTES # BLD AUTO: 1.7 K/UL (ref 1–4.8)
LYMPHOCYTES NFR BLD: 18.8 % (ref 18–48)
MAGNESIUM SERPL-MCNC: 2 MG/DL (ref 1.6–2.6)
MCH RBC QN AUTO: 33.8 PG (ref 27–31)
MCHC RBC AUTO-ENTMCNC: 33.8 G/DL (ref 32–36)
MCV RBC AUTO: 100 FL (ref 82–98)
MONOCYTES # BLD AUTO: 0.8 K/UL (ref 0.3–1)
MONOCYTES NFR BLD: 8.6 % (ref 4–15)
NEUTROPHILS # BLD AUTO: 6.2 K/UL (ref 1.8–7.7)
NEUTROPHILS NFR BLD: 67.1 % (ref 38–73)
NRBC BLD-RTO: 0 /100 WBC
PHOSPHATE SERPL-MCNC: 3.4 MG/DL (ref 2.7–4.5)
PLATELET # BLD AUTO: 294 K/UL (ref 150–450)
PMV BLD AUTO: 11.1 FL (ref 9.2–12.9)
POCT GLUCOSE: 117 MG/DL (ref 70–110)
POTASSIUM SERPL-SCNC: 4 MMOL/L (ref 3.5–5.1)
PROT SERPL-MCNC: 6.2 G/DL (ref 6–8.4)
PROTHROMBIN TIME: 14 SEC (ref 9–12.5)
RBC # BLD AUTO: 3.91 M/UL (ref 4.6–6.2)
SODIUM SERPL-SCNC: 140 MMOL/L (ref 136–145)
WBC # BLD AUTO: 9.22 K/UL (ref 3.9–12.7)

## 2022-01-04 PROCEDURE — 25000003 PHARM REV CODE 250: Performed by: PHYSICIAN ASSISTANT

## 2022-01-04 PROCEDURE — 11000001 HC ACUTE MED/SURG PRIVATE ROOM

## 2022-01-04 PROCEDURE — 85025 COMPLETE CBC W/AUTO DIFF WBC: CPT

## 2022-01-04 PROCEDURE — 25000003 PHARM REV CODE 250: Performed by: PSYCHIATRY & NEUROLOGY

## 2022-01-04 PROCEDURE — 36415 COLL VENOUS BLD VENIPUNCTURE: CPT

## 2022-01-04 PROCEDURE — 25000003 PHARM REV CODE 250

## 2022-01-04 PROCEDURE — 84100 ASSAY OF PHOSPHORUS: CPT

## 2022-01-04 PROCEDURE — 85730 THROMBOPLASTIN TIME PARTIAL: CPT | Performed by: PSYCHIATRY & NEUROLOGY

## 2022-01-04 PROCEDURE — 25000003 PHARM REV CODE 250: Performed by: STUDENT IN AN ORGANIZED HEALTH CARE EDUCATION/TRAINING PROGRAM

## 2022-01-04 PROCEDURE — 99233 SBSQ HOSP IP/OBS HIGH 50: CPT | Mod: ,,, | Performed by: PSYCHIATRY & NEUROLOGY

## 2022-01-04 PROCEDURE — 92526 ORAL FUNCTION THERAPY: CPT

## 2022-01-04 PROCEDURE — 85610 PROTHROMBIN TIME: CPT | Performed by: PSYCHIATRY & NEUROLOGY

## 2022-01-04 PROCEDURE — 83735 ASSAY OF MAGNESIUM: CPT

## 2022-01-04 PROCEDURE — 97116 GAIT TRAINING THERAPY: CPT

## 2022-01-04 PROCEDURE — 92507 TX SP LANG VOICE COMM INDIV: CPT

## 2022-01-04 PROCEDURE — 97530 THERAPEUTIC ACTIVITIES: CPT

## 2022-01-04 PROCEDURE — 97535 SELF CARE MNGMENT TRAINING: CPT

## 2022-01-04 PROCEDURE — 80053 COMPREHEN METABOLIC PANEL: CPT

## 2022-01-04 PROCEDURE — 99233 PR SUBSEQUENT HOSPITAL CARE,LEVL III: ICD-10-PCS | Mod: ,,, | Performed by: PSYCHIATRY & NEUROLOGY

## 2022-01-04 RX ADMIN — SACUBITRIL AND VALSARTAN 1 TABLET: 24; 26 TABLET, FILM COATED ORAL at 09:01

## 2022-01-04 RX ADMIN — METOPROLOL SUCCINATE 50 MG: 50 TABLET, EXTENDED RELEASE ORAL at 09:01

## 2022-01-04 RX ADMIN — WARFARIN SODIUM 10 MG: 5 TABLET ORAL at 05:01

## 2022-01-04 RX ADMIN — ESCITALOPRAM OXALATE 5 MG: 5 TABLET, FILM COATED ORAL at 10:01

## 2022-01-04 RX ADMIN — SPIRONOLACTONE 12.5 MG: 25 TABLET, FILM COATED ORAL at 09:01

## 2022-01-04 RX ADMIN — ATORVASTATIN CALCIUM 40 MG: 20 TABLET, FILM COATED ORAL at 09:01

## 2022-01-04 RX ADMIN — POLYETHYLENE GLYCOL 3350 17 G: 17 POWDER, FOR SOLUTION ORAL at 09:01

## 2022-01-04 RX ADMIN — SENNOSIDES AND DOCUSATE SODIUM 1 TABLET: 50; 8.6 TABLET ORAL at 09:01

## 2022-01-04 RX ADMIN — ASPIRIN 81 MG: 81 TABLET, COATED ORAL at 09:01

## 2022-01-04 NOTE — PLAN OF CARE
Goals reviewed and revised as appropriate. Continue POC.    Problem: Physical Therapy Goal  Goal: Physical Therapy Goal  Description: Goals to be met by: 2022     Patient will increase functional independence with mobility by performin. Supine to sit with moderate assistance            -Met 1/3, revised to min(A) - met 2022  Updated: stand by assistance   2. Sit to supine with minimum assistance            -Met 1/3, revised to CGA  3. Rolling to Left with maximal assistance  4. Rolling to Right with minimum assistance  5. Sit to stand transfer with moderate assistance  6. Bed to chair transfer with moderate assistance using LRAD as needed  7. Gait  x 10 feet with maximal assistance using LRAD as needed  8. Lower extremity exercise program x10 reps per handout, with independence   Outcome: Ongoing, Progressing

## 2022-01-04 NOTE — PT/OT/SLP PROGRESS
"Physical Therapy Treatment    Patient Name:  Jimmy Leger   MRN:  [id number]    Recommendations:     Discharge Recommendations:  rehabilitation facility   Discharge Equipment Recommendations:  (TBD)   Barriers to discharge: Increased level of assist    Assessment:     Jimmy Leger is a 57 y.o. male admitted with a medical diagnosis of Stroke due to embolism of left middle cerebral artery.  He presents with the following impairments/functional limitations: weakness,impaired endurance,impaired sensation,impaired self care skills,impaired functional mobilty,gait instability,impaired balance,visual deficits,decreased upper extremity function,decreased lower extremity function,decreased safety awareness,decreased ROM,impaired fine motor,impaired coordination. These deficits affect their roles and responsibilities in which they were able to complete prior to admit. Jimmy Leger would continue to benefit from acute PT intervention to improve quality of life and focus on recovery of impairments. Once medically stable, recommending pt discharge to Inpatient Rehabilitation Facility. Patient able to participate in gait training this visit with maximal assist x2. Able to elicit trace R quad contraction on command for MMT this visit.     Rehab Prognosis: Good; patient continues to benefit from acute skilled PT services to address these deficits and reach maximum level of function.  Patient remains most appropriate to discharge to rehabilitation facility.  Recent Surgery: * No surgery found *      Plan:     During this hospitalization, patient to be seen 4 x/week to address the identified rehab impairments via gait training,therapeutic activities,therapeutic exercises,neuromuscular re-education and progress toward the following goals:    · Plan of Care Expires:  01/22/22    Subjective     Chief Complaint: Groans with PROM of RUE  Patient/Family Comments/Goals: "I'm alright"  Pain/Comfort:  · Pain Rating 1:  (groans with PROM, not " rated)  · Location - Side 1: Right  · Location - Orientation 1: generalized  · Location 1: arm  · Pain Addressed 1: Reposition,Distraction,Cessation of Activity  · Pain Rating Post-Intervention 1:  (not rated)    Objective:     Communicated with RN prior to session. Patient found HOB elevated with bed alarm,telemetry,peripheral IV,Condom Catheter upon PT entry to room.     General Precautions: Standard, aspiration,aphasia,fall,pureed diet,nectar thick   Orthopedic Precautions:N/A   Braces: N/A    Functional Mobility:  · Bed Mobility:     · Scooting: minimum assistance  · Supine to Sit: minimum assistance with bed rail and HOB elevated  · Sit to Supine: minimum assistance with bed rail and HOB elevated  · Transfers:     · Sit to Stand: moderate assistance of 2 persons with hand-held assist, 3 trials  · Gait: Patient ambulated 4 steps forward/back, 3 steps left/right with hand-held assist and maximal assistance of 2 persons. Patient demonstrates unsteady gait, decreased step length, narrow base of support, decreased weight shift, decreased foot clearance, decreased right knee stability and hemiparetic gait. Patient requires maximal assist to step with left, minimal assist to step with left. Also requires assistance to initiate/maintain weight shift and verbal cues for sequencing. R knee blocked throughout. All lines remained intact throughout ambulation trial.  · Balance:   · Static Sitting: Good, able to maintain for 3 minute(s) with stand by assistance  · Dynamic Sitting: Fair: Patient accepts minimal challenge, contact guard assistance  · Static Standing: Poor, able to maintain for 10 seconds with moderate assistance of 2 persons, patient requires verbal cues for R quad activation  · Dynamic Standing: Poor: Patient unable to accept challenge or move without loss of balance, maximal assistance of 2 persons    AM-PAC 6 CLICK MOBILITY  Turning over in bed (including adjusting bedclothes, sheets and blankets)?:  3  Sitting down on and standing up from a chair with arms (e.g., wheelchair, bedside commode, etc.): 2  Moving from lying on back to sitting on the side of the bed?: 3  Moving to and from a bed to a chair (including a wheelchair)?: 2  Need to walk in hospital room?: 2  Climbing 3-5 steps with a railing?: 1  Basic Mobility Total Score: 13     Therapeutic Activities and Exercises:  Patient educated on role of acute care PT and PT POC  Whiteboard updated    Patient left HOB elevated with all lines intact, call button in reach and bed alarm on.    GOALS:   Multidisciplinary Problems     Physical Therapy Goals        Problem: Physical Therapy Goal    Goal Priority Disciplines Outcome Goal Variances Interventions   Physical Therapy Goal     PT, PT/OT Ongoing, Progressing     Description: Goals to be met by: 2022     Patient will increase functional independence with mobility by performin. Supine to sit with moderate assistance            -Met 1/3, revised to min(A) - met 2022  Updated: stand by assistance   2. Sit to supine with minimum assistance            -Met 1/3, revised to CGA  3. Rolling to Left with maximal assistance  4. Rolling to Right with minimum assistance  5. Sit to stand transfer with moderate assistance  6. Bed to chair transfer with moderate assistance using LRAD as needed  7. Gait  x 10 feet with maximal assistance using LRAD as needed  8. Lower extremity exercise program x10 reps per handout, with independence                    Time Tracking:     PT Received On: 22  PT Start Time: 1418     PT Stop Time: 1441  PT Total Time (min): 23 min     Billable Minutes: Gait Training 23 min    Treatment Type: Treatment  PT/PTA: PT     PTA Visit Number: 0     2022    Therapy tech utilized for session to maximize physical performance and safety

## 2022-01-04 NOTE — PLAN OF CARE
Plan of care and stroke booklet reviewed with patient at the bedside. Although the patient has expressive aphasia, the patient is able to say more words and communicate more with staff., There appears to be some level of understanding with the patient . He is following direction and he is cooperative with the staff. Patient is assisted with being turned and repositioned. Patient is assisted with his ADL's and his activities are supervised. Patient is tolerating his diet well and he is able to feed himself if the meal tray is set up for him. Heparin is currently infusing at 19 units at a rate of 14.6ml/hr. PTT was drawn this evening. Still awaiting for thr results from the lab to be resulted. No s/s of an adverse reaction noted to medication therapy. Patient has a condom catheter in place that is draining yellow colored urine to his BSDB.Patient is on telemetry.Bed alarm remains in place. Will continue to monitor and report any changes.

## 2022-01-04 NOTE — PROGRESS NOTES
[facility] - Neurosurgery ([facility])  Vascular Neurology  Comprehensive Stroke Center  Progress Note    Assessment/Plan:     * Stroke due to embolism of left middle cerebral artery  57 yo male with PMHx of HTN and CAD presents as a transfer from [facility] for large L MCA infarct. CTA with distal L M1 occlusion. No tpa, OOW. No IR, large core infarct. Echo (at OSH) with LV global hypokinesis, EF 15-20%, severe atrial enlargement, and apical thrombus. Repeat CTH with petechial hemorrhage, anticoagulation temporarily held. Repeat CT with stable petechial hemorrhage. During admission obtained CTA chest which showed subsegmental PE. On heparing gtt with Coumadin bridge, coumadin is still not therapeutic. Pending IPR placement.       Etiology: likely cardioembolic 2/2 low EF(15-20%) and cardiac thrombus.     Antithrombotics for secondary stroke prevention:   Heparin gtt with Coumadin (5mg) bridge with INR goal 2-3    Statins for secondary stroke prevention and hyperlipidemia, if present: Statins: Atorvastatin- 40 mg daily     Aggressive risk factor modification: HTN, CAD     Rehab efforts: The patient has been evaluated by a stroke team provider and the therapy needs have been fully considered based off the presenting complaints and exam findings. The following therapy evaluations are needed: PT/OT on board, SLP evaluate and treat, PM&R evaluate for appropriate placement. Recommend IPR.    Diagnostics ordered/pending: None     VTE prophylaxis: Mechanical prophylaxis: Place SCDs  None: Reason for No Pharmacological VTE Prophylaxis: Currently on anticoagulation    BP parameters: <140      Right shoulder pain  Patient with right shoulder pain.  XR right shoulder negative for fractures and dislocations    Combined systolic and diastolic congestive heart failure  Newly diagnosed  EF 15-20%    Cardiology consulted, appreciate recs   -Discontinue Lisinopril  -Start Entresto 49/51mg   -Anticoagulation for 3 months with follow up echo    -Follow up with cardiology as outpatient  - At discharge start SGLT2 inhibitor with BMP follow up in 1 week; spironolactone 12.5mg daily when able to tolerate; increase metoprolol 50mg BID with goal of increasing to 200mg total daily when tolerable, no life vest recommended at this time     Currently on: Entresto 24-26 mg BID, metoprolol 50mg BID, spironalactone 12.5      PAD (peripheral artery disease)  -Patient with significant PAD w/ occluded stents x 2 in sup femoral and popliteal artery  -Vascular surgery consulted who recommended ASA when safe from neuro perspective.   - ASA started on 12/31    Aphasia  Therapy to evaluate and treat    Single subsegmental pulmonary embolism without acute cor pulmonale  Visualized on CTA chest 12/25  L apical segmental pulmonary artery embolism  EKG with R axis deviation, RBBB.  US LE negative for DVT  Continue heparin gtt with  Coumadin bridge    Debility  PT/OT to evaluate and treat  Therapy recommending rehab    Cytotoxic cerebral edema  Area of cytotoxic cerebral edema identified when reviewing brain imaging in the territory of the L middle cerebral artery. There is mass effect associated with it. Continue to monitor the patients clinical exam for any worsening of symptoms which may indicate expansion of the stroke or the area of the edema resulting in the clinical change. The pattern is suggestive of embolic etiology.    Clinical exam has remained stable, this suggests that cerebral edema has stabilized. Low probability of clinical deterioration. Neurosurgery signed off, patient no longer on hemicrani watch    Essential hypertension  Stroke RF  SBP goal normotension  On GDMT    Grade III diastolic dysfunction  See HF     Mural thrombus of cardiac apex  TTE at outside facility 12/21/21:  EF of 15-20%, small to moderate sized (1.6x1.1 cm) layered, protruding apical mass c/w thrombus, global hypokinesis, severe atrial enlargement.    -on heparin gtt and temporarily held  due to hemorrhagic conversion, now restarted  -on Heparin gtt to Coumadin bridge   Coumadin increased to 10mg, goal INR 2-3   -repeat echo in 3 months for resolution of thrombus         12/22 patient in ICU for hemicrani watch, NPO per SLP  12/23: Patient remains in NCC. Min intensity heparin gtt was initiated. Patient aphasic with RS plegia.   12/24-Patient with changes in CT with petechial hemorraghic conversion recommend stopping aspirin and holding heparin. Repeat imaging with worsening exam. On hemicrani watch.   12/27 RLE weakness improved on today's exam, patient with runs of vtach overnight on 12/24-12/25, CTA chest with subsegmental PE, patient restarted on heparin gtt, neurosurgery signed off and patient no longer on hemicrani watch, remains NPO per SLP, therapy recommending inpatient rehab  12/28 Continues on heparin gtt for PE, neuro exam stable with mild improvements, low suspicion for  new or worsening ICH.   12/29-Neuro exam stable. Recs for IPR. Overnight, patient had 11 run of vtach. Mg 2.2. P 2.9, will replete P to 4. On heparin gtt. Can transition to DOAC over the weekend.  Patient pending medicaid.  Cardio had been consulted by [facility] and they recommended full AC for thrombus, repeat TTE in 3 months for resolution of thrombus, and no need for life vest at this time.   12/30-Neuro exam remains stable. Patient with significant right shoulder pain. XR right shoulder ordered. Metoprolol increased to 50 BID and lisinopril switched to entresto 24-26.Pending medicaid.   12/31: Patient now has medicaid. Referrals were submitted. Started transition to Coumadin today for LV thrombus. XR R shoulder negative.   01/01/2022 Patient's feet cold on exam, doppler with normal pulses bilaterally. INR 1.1 today. When therapeutic, will be ready for IPR placement.   1/2-Neuro exam improving. Patient refusing vital signs and nursing care, spoke with patient about why it was necessary. INR 1.1 today, coumadin increased to  7.5mg.   01/03/2022 Patient's INR 1.1 again today. Increased to Coumadin 10mg. Waiting for therapeutic INR and IPR placement.   01/04/2022 No new neuro exam changes. INR today is 1.3. Waiting for therapeutic INR levels then will go to IPR.       STROKE DOCUMENTATION        NIH Scale:  1a. Level of Consciousness: 0-->Alert, keenly responsive  1b. LOC Questions: 2-->Answers neither question correctly  1c. LOC Commands: 1-->Performs one task correctly  2. Best Gaze: 0-->Normal  3. Visual: 0-->No visual loss  4. Facial Palsy: 1-->Minor paralysis (flattened nasolabial fold, asymmetry on smiling)  5a. Motor Arm, Left: 0-->No drift, limb holds 90 (or 45) degrees for full 10 secs  5b. Motor Arm, Right: 4-->No movement  6a. Motor Leg, Left: 0-->No drift, leg holds 30 degree position for full 5 secs  6b. Motor Leg, Right: 2-->Some effort against gravity, leg falls to bed by 5 secs, but has some effort against gravity  7. Limb Ataxia: 0-->Absent  8. Sensory: 1-->Mild-to-moderate sensory loss, patient feels pinprick is less sharp or is dull on the affected side, or there is a loss of superficial pain with pinprick, but patient is aware of being touched  9. Best Language: 2-->Severe aphasia, all communication is through fragmentary expression, great need for inference, questioning, and guessing by the listener. Range of information that can be exchanged is limited, listener carries burden of. . . (see row details)  10. Dysarthria: 2-->Severe dysarthria, patients speech is so slurred as to be unintelligible in the absence of or out of proportion to any dysphasia, or is mute/anarthric  11. Extinction and Inattention (formerly Neglect): 0-->No abnormality  Total (NIH Stroke Scale): 15       Modified Rusk Score: 0  [place] Coma Scale:    ABCD2 Score:    [id number] Score:   HAS -BLED Score:   ICH Score:   Hunt & Gillis Classification:      Hemorrhagic change of an Ischemic Stroke: Does this patient have an ischemic stroke with  hemorrhagic changes? No     Neurologic Chief Complaint: found down, nonverbal, RSW    Subjective:     Interval History: Patient is seen for follow-up neurological assessment and treatment recommendations:   No new neuro exam changes. Waiting for Coumadin to be therapeutic to go to rehab.     HPI, Past Medical, Family, and Social History remains the same as documented in the initial encounter.     Review of Systems   Unable to perform ROS: Mental status change   Constitutional: Negative for chills and fever.   HENT: Negative for facial swelling and trouble swallowing.    Eyes: Negative for photophobia and visual disturbance.   Respiratory: Negative for cough and shortness of breath.    Cardiovascular: Negative for leg swelling.   Gastrointestinal: Negative for constipation, diarrhea and vomiting.   Genitourinary: Negative for difficulty urinating.   Skin: Negative for pallor and rash.   Neurological: Positive for facial asymmetry, speech difficulty and weakness.   Psychiatric/Behavioral: Negative for agitation.     Scheduled Meds:   aspirin  81 mg Oral Daily    atorvastatin  40 mg Oral Daily    EScitalopram oxalate  5 mg Oral Daily    metoprolol succinate  50 mg Oral BID    polyethylene glycol  17 g Oral Daily    sacubitriL-valsartan  1 tablet Oral BID    senna-docusate 8.6-50 mg  1 tablet Oral BID    spironolactone  12.5 mg Oral Daily    warfarin  10 mg Oral Daily     Continuous Infusions:   heparin (porcine) in D5W 19 Units/kg/hr (01/03/22 1101)     PRN Meds:acetaminophen, hydrALAZINE, labetalol, ondansetron, sodium chloride 0.9%    Objective:     Vital Signs (Most Recent):  Temp: 98.5 °F (36.9 °C) (01/04/22 0751)  Pulse: 75 (01/04/22 0751)  Resp: 18 (01/04/22 0751)  BP: 133/79 (01/04/22 0751)  SpO2: 98 % (01/04/22 0751)  BP Location: Left arm    Vital Signs Range (Last 24H):  Temp:  [97.3 °F (36.3 °C)-98.5 °F (36.9 °C)]   Pulse:  [59-82]   Resp:  [15-18]   BP: (103-133)/(65-87)   SpO2:  [94 %-98 %]   BP  Location: Left arm    Physical Exam  Vitals and nursing note reviewed.   Constitutional:       General: He is not in acute distress.     Appearance: He is well-developed and normal weight. He is not diaphoretic.   HENT:      Head: Normocephalic and atraumatic.      Right Ear: External ear normal.      Left Ear: External ear normal.      Nose: Nose normal. No rhinorrhea.   Eyes:      General: No scleral icterus.        Right eye: No discharge.         Left eye: No discharge.      Extraocular Movements: Extraocular movements intact.   Cardiovascular:      Rate and Rhythm: Normal rate.   Pulmonary:      Effort: Pulmonary effort is normal. No respiratory distress.   Abdominal:      General: Abdomen is flat. There is no distension.   Musculoskeletal:      Right lower leg: No edema.      Left lower leg: No edema.   Skin:     General: Skin is dry.   Neurological:      Mental Status: He is alert.      Cranial Nerves: Dysarthria present.      Sensory: Sensory deficit present.      Motor: Weakness present.      Comments: Aphasia   Psychiatric:         Behavior: Behavior normal.         Neurological Exam:   LOC: alert  Attention Span: Good   Language: expressive aphasia  Articulation: Dysarthria  Orientation: Untestable due to severe aphasia   Motor: Arm left  Normal 5/5  Leg left  Normal 5/5  Arm right  Plegia 0/5  Leg right Paresis 3/5  Sensation: right hypoesthesia  Tone: Flaccid  RUE     Laboratory:  BMP:   Recent Labs   Lab 01/02/22  0342      K 4.3      CO2 21*   BUN 12   CREATININE 0.7   CALCIUM 8.2*     CBC:   Recent Labs   Lab 01/04/22  0900   WBC 9.22   RBC 3.91*   HGB 13.2*   HCT 39.1*      *   MCH 33.8*   MCHC 33.8     Lipid Panel:   No results for input(s): CHOL, LDLCALC, HDL, TRIG in the last 168 hours.  Coagulation:   Recent Labs   Lab 01/03/22  0724   INR 1.1   APTT 57.1*       Diagnostic Results     Brain Imaging   CT 12/25/21  No significant change from prior.  Evolving large recent  left MCA territory infarction with petechial hemorrhagic conversion.  No evidence for significant new hemorrhage or increased mass effect.        CTH 12/24/2021  Evolving large left MCA territory infarction with petechial hemorrhagic conversion.  There is intermediate density components in the left temporal lobe area of infarction suggestive for interval additional petechial hemorrhage.  No evidence for extra-axial hemorrhage or hydrocephalus.     MRI Brain WO. Date: 12/22/21    Large acute  left MCA distribution infarct with modest mass effect. Multiple small remote scattered infarcts elsewhere as above.  Embolic disease to be considered.     CTH WO 12/21/21  Again demonstrated in better delineated as large left MCA distribution infarct with no associated acute hemorrhage and with localized mass effect resulting in effacement of overlying cortical sulci and partial effacement left sylvian fissure.  No associated acute hemorrhage.     Several small old areas of infarction again noted including cerebellum bilaterally, anterior left perisylvian region, lateral left frontal cortex and posterior right parietal cortex.     CTH WO 12/20/21   Large area loss of gray-white differentiation consistent with acute infarct left MCA distribution involving the left temporal lobe, temporal occipital region and portions of the basal ganglia with localized mass effect including effacement of overlying cortical sulci although with no associated acute hemorrhage.     Several small old areas of infarction are evident including anterior left perisylvian and cerebellum bilaterally. Mild underlying atrophy.        Vessel Imaging:  US LE Veins 12/25/21  No evidence of deep venous thrombosis in either lower extremity.    CTA H/N 12/20/21  Segmental occlusion measuring approximately 10 mm mid and distal M1 segment left MCA with diminished flow distal to the stenosis.     Right cervical carotid circulation--atherosclerotic calcification  bifurcation without measurable stenosis.     Left cervical carotid circulation--atherosclerotic calcification distal left common carotid artery and bifurcation with stenosis proximal left ICA measuring 16% utilizing NASCET criteria.  Mild eccentric narrowing mid to distal portion left common carotid artery also noted.     Vertebral arteries--no focal arterial abnormality or measurable stenosis.     Cardiac Evaluation:   TTE with bubble 12/21/21  1. The left ventricle (LV) is dilated with severely depressed systolic function & global hypokinesis.  2. LV ejection fraction is 15-20%. Grade III LV diastolic dysfunction.  3. Small to moderate sized (1.6x1.1 cm) layered, protruding apical mass c/w thrombus.  4. The right ventricle (RV) is severely enlarged with normal systolic function.   5. Estimated RVSP is moderately elevated (50-70 mmHg). Pulmonary hypertension is present.  6. Severe atrial enlargement.  7. Moderate eccentric mitral regrugitation.  8. Mild tricuspid regurgitation.     Other:  CTA Chest Non coronary 12/25/21  Pulmonary embolism left apical segmental pulmonary artery.     Moderate to large right and moderate left bilateral pleural effusions     Interstitial opacities throughout the lungs bilaterally with superimposed ill-defined consolidation right upper and to lesser degree left upper lobes which may represent superimposed developing pneumonia.     Scattered interlobular septal thickening throughout the lungs concerning for component of vascular congestion and edema.        Clovis Kate PA-C  Comprehensive Stroke Center  Department of Vascular Neurology   [facility] - Neurosurgery \[facility] [place] [facility]\"")

## 2022-01-04 NOTE — PT/OT/SLP PROGRESS
Occupational Therapy   Treatment    Patient Name:  Jimmy Leger   MRN:  [id number]  Admit Date: 12/21/2021  Admitting Diagnosis:  Stroke due to embolism of left middle cerebral artery   Length of Stay: 14 days  Recent Surgery: * No surgery found *           Recommendations:     Discharge Recommendations: rehabilitation facility  Discharge Equipment Recommendations:   (TBD)  Barriers to discharge:   (increased assistance needed)    Plan:     Patient to be seen 5 x/week to address the above listed problems via self-care/home management,therapeutic activities,therapeutic exercises,neuromuscular re-education  · Plan of Care Expires: 01/21/22  · Plan of Care Reviewed with: patient    Assessment:   Jimmy Leger is a 57 y.o. male with a medical diagnosis of Stroke due to embolism of left middle cerebral artery.  He presents with the following performance deficits affecting function: visual deficits,impaired cognition,impaired endurance,weakness,impaired sensation,impaired self care skills,impaired functional mobilty,gait instability,impaired balance,decreased coordination,decreased upper extremity function,decreased lower extremity function,decreased safety awareness,decreased ROM,impaired fine motor,impaired coordination.  Pt would benefit from skilled OT services in order to maximize independence with ADLs and facilitate safe discharge. Pt's clinical condition meets full inpatient rehab criteria. Inpatient rehab will provide to total interdisciplinary treatment approach needed. Pt is at high risk of unplanned readmission due to fall risk. The lower level of care cannot provide total interdisciplinary approach needed. Because of patient's significant decline from PLOF, patient would benefit from Inpatient Rehab to maximize return to PLOF. Pt is an excellent candidate for inpatient rehabilitation, as he has a qualifying diagnosis, displays good activity tolerance, has experienced decline from PLOF, has good family support,  "and is motivated to participate in therapy.     Time spent performing bed mobility, EOB activity w/ focus on standing balance, dynamic sitting and attention to R side    Relevant hx and current limitations:   Affected side: right   Speech: expressive aphasia   Neglect/Inattention: right inattention/neglect   EOB sitting: Min A-SBA    Pt continues to require significant assist with functional mobility and safe completion of ADLs requiring Max-Mod A. Pt with noted improvement with standing and self-care as compared to last session. Patient is making progress towards goals.    Rehab Prognosis: Good; patient would benefit from acute skilled OT services to address these deficits and reach maximum level of function.        Subjective   Communicated with: EVENS Randle prior to session.  Patient found HOB elevated with bed alarm,telemetry,peripheral IV,Condom Catheter upon OT entry to room.    Patient: "ok" "yes"     Pain/Comfort:  · Pain Rating 1:  (c/o R sided pain w/ ROM tasks -unrated)  · Pain Addressed 1: Reposition,Distraction,Cessation of Activity    Objective:   Patient found with: bed alarm,telemetry,peripheral IV,Condom Catheter   General Precautions: Standard, aspiration,aphasia,fall,pureed diet,nectar thick   Orthopedic Precautions:N/A   Braces: N/A   Oxygen Device: Room Air  Vitals: /77   Pulse 76   Temp 97.9 °F (36.6 °C)   Resp 18   Ht 5' 11" (1.803 m)   Wt 73.2 kg (161 lb 6 oz)   SpO2 99%   BMI 22.51 kg/m²     Outcome Measures:  AMPAC 6 Click ADL: 14    Occupational Performance:  Bed Mobility:       Rolling Left:  minimum assistance   Scooting: minimum assistance   Supine to Sit: minimum assistance   Sit to Supine: minimum assistance    Functional Mobility/Transfers:     Patient completed Sit <> Stand Transfer with moderate assistance and of 2 persons  with  hand-held assist   o Pt performed x3 trials EOB, ~30 sec - 1 min ea.   - Pt req'd increased assistance to remain standing d/t " fatigue and R sided lean    Activities of Daily Living:     Grooming: minimum assistance to perform oral hygiene sitting EOB w/ LUE; pt demo'd ability to perform facial hygiene w/ bath wipe standing EOB   Toileting: maximal assistance standing EOB to perform perineal hygiene and clothing management; pt req'd assistance w/ posterior pericare, however able to complete anterior pericare w/ max VC      AMPAC 6 Click ADL:  [facility] Total Score: 14    Therapeutic Exercise and/or Activity:  -AAROM performed RUE in all joints/planes of motion with stretches provided at end range, as indicated for recovery of effective participation in functional tasks, and to bolster proper circulation.  Sustained stretch provided for shoulder flexion and elbow extension at end range.   Assistance and facilitation provided for inferior angle of the scapula during shoulder flexion and abduction.     -RUE joint approximation provided to normalize tone.     Neuromuscular Re-Education:  Pt w/ poor sitting balance sitting EOB that steadily improved from Min A-SBA d/t positioning and engaging pt in core strengthening exercises  Pt participated in exercises that work on dynamic sitting balance: crossing midline and recovering from leaning to far to one side or forward (focus on scanning and tracking to R)  Improved this date, able to correct to self to midline w/ % of the time    Treatment & Education:   Therapist provided facilitation and instruction of proper body mechanics, energy conservation, and fall prevention strategies during tasks listed above.   Pt re-educated on importance of OOB activities with staff member assistance and sitting OOB majority of the day.    Updated communication board with level of assist required (Max A stand pivot) & educated RN/patient that pt is appropriate for transfers and mobility with RN/PCT.       Patient left HOB elevated with all lines intact, call button in reach, bed alarm on and RN  notified    GOALS:   Multidisciplinary Problems     Occupational Therapy Goals        Problem: Occupational Therapy Goal    Goal Priority Disciplines Outcome Interventions   Occupational Therapy Goal     OT, PT/OT Ongoing, Progressing    Description: Goals to be met by: 1/10/22     Patient will increase functional independence with ADLs by performing:      UE Dressing with Minimal Assistance.  LE Dressing with Moderate Assistance.  Grooming while seated with Moderate Assistance.  Toileting from bedside commode with Maximum Assistance for hygiene and clothing management.   Sitting at edge of bed x5 minutes with Minimal Assistance.  Rolling to Bilateral with Minimal Assistance.   Supine to sit with Minimal Assistance.  Toilet transfer to bedside commode with Moderate Assistance.  Upper extremity exercise program AAROM x10 reps, with assistance as needed.                      Time Tracking:     OT Date of Treatment: 01/04/22  OT Start Time: 0956  OT Stop Time: 1020  OT Total Time (min): 24 min    Additional staff present: Rehab Tech    Billable Minutes:  Self Care/Home Management 12  Therapeutic Activity 12      Eunice (Joycelyn), OTR/L  [phone] (Pager #)  1/4/2022

## 2022-01-04 NOTE — PLAN OF CARE
Problem: Adult Inpatient Plan of Care  Goal: Plan of Care Review  Outcome: Ongoing, Progressing  Flowsheets (Taken 1/4/2022 0313)  Plan of Care Reviewed With: patient     Problem: Bowel Elimination Impaired (Stroke, Ischemic/Transient Ischemic Attack)  Goal: Effective Bowel Elimination  Outcome: Ongoing, Progressing     Problem: Cerebral Tissue Perfusion (Stroke, Ischemic/Transient Ischemic Attack)  Goal: Optimal Cerebral Tissue Perfusion  Outcome: Ongoing, Progressing     Problem: Cognitive Impairment (Stroke, Ischemic/Transient Ischemic Attack)  Goal: Optimal Cognitive Function  Outcome: Ongoing, Progressing       POC reviewed and updated with the patient/caregiver. Questions regarding POC were encouraged and addressed with the patient/caregiver.  VSS, see flow-sheets. Patient is AO X 1 (self) at this time. Fall/safety precautions maintained, no signs of injury noted during shift. Patient was repositioned for comfort, bed locked in low position with side rails X 3, bed alarm set, with call light within reach. Patient instructed to call staff for mobility, verbalized understanding.  No acute signs of distress noted at this time.

## 2022-01-04 NOTE — ASSESSMENT & PLAN NOTE
57 yo male with PMHx of HTN and CAD presents as a transfer from [facility] for large L MCA infarct. CTA with distal L M1 occlusion. No tpa, OOW. No IR, large core infarct. Echo (at OSH) with LV global hypokinesis, EF 15-20%, severe atrial enlargement, and apical thrombus. Repeat CTH with petechial hemorrhage, anticoagulation temporarily held. Repeat CT with stable petechial hemorrhage. During admission obtained CTA chest which showed subsegmental PE. On heparing gtt with Coumadin bridge, coumadin is still not therapeutic. Pending IPR placement.       Etiology: likely cardioembolic 2/2 low EF(15-20%) and cardiac thrombus.     Antithrombotics for secondary stroke prevention:   Heparin gtt with Coumadin (5mg) bridge with INR goal 2-3    Statins for secondary stroke prevention and hyperlipidemia, if present: Statins: Atorvastatin- 40 mg daily     Aggressive risk factor modification: HTN, CAD     Rehab efforts: The patient has been evaluated by a stroke team provider and the therapy needs have been fully considered based off the presenting complaints and exam findings. The following therapy evaluations are needed: PT/OT on board, SLP evaluate and treat, PM&R evaluate for appropriate placement. Recommend IPR.    Diagnostics ordered/pending: None     VTE prophylaxis: Mechanical prophylaxis: Place SCDs  None: Reason for No Pharmacological VTE Prophylaxis: Currently on anticoagulation    BP parameters: <140

## 2022-01-04 NOTE — PT/OT/SLP PROGRESS
Speech Language Pathology Treatment    Patient Name:  Jimmy Leger   MRN:  [id number]  Admitting Diagnosis: Stroke due to embolism of left middle cerebral artery    Recommendations:                 General Recommendations:  Dysphagia therapy and Speech/language therapy  Diet recommendations:  Puree, Liquid Diet Level: Nectar Thick   Aspiration Precautions: 1 bite/sip at a time, Alternating bites/sips, Assistance with meals, Check for pocketing/oral residue, Frequent oral care, HOB to 90 degrees, Small bites/sips and Standard aspiration precautions   General Precautions: Standard, aphasia,aspiration,fall,pureed diet,nectar thick  Communication strategies:  yes/no questions only and provide increased time to answer    Subjective     Spoke with RN prior to session. Patient awake and alert, reclined in bed with breakfast tray present. Repositioned patient to be sitting upright and cleaned spilled food off his chest and out of his bed. He was agreeable to additional PO trials.     Pain/Comfort:  · Pain Rating 1: 0/10  · Pain Rating Post-Intervention 1: 0/10    Respiratory Status: Room air    Objective:     Has the patient been evaluated by SLP for swallowing?   Yes  Keep patient NPO? No   Current Respiratory Status:        Repositioned him to be upright in bed prior to PO trials. He continues to require feed assist. Worked on intake of ice chips, thin liquids, nectar thick liquids, puree and mechanical soft solids. With all intake he continues to demonstrated slightly reduce extraction given R sided weakness and anterior spillage out the right corner of the mouth across trials. He had significantly reduced bolus manipulation, mastication, control and transit within oral cavity benefiting from puree/liquid wash to clear residuals. Status post transfer of the bolus, he demonstrated a delayed swallow trigger with thin liquids demonstrating immediate cough response. S/sx of airway compromise were eliminated with use of nectar  "thick liquids. Given poor dentition and poor orolingual control, would recommend he continue his current diet of puree solids and nectar thick liquids.     Worked on speech and language skills. He was able to follow simple 1 step commands with 20% accuracy (1/5), answer simple Y/N questions with 40% accuracy (2/5), and perform automatic speech task with 50% accuracy (counting "1-4,6"). He continues to have poor intelligibility, difficulty with oral motor movements and was not oriented to place, date or time.      Assessment:     Jimmy Leger is a 57 y.o. male with an SLP diagnosis of Aphasia and Dysphagia.     Goals:   Multidisciplinary Problems     SLP Goals        Problem: SLP Goal    Goal Priority Disciplines Outcome   SLP Goal     SLP Ongoing, Progressing   Description: Speech Language Pathology Goals  Goals expected to be met by 1/10:  1. Pt will participate in ongoing swallowing assessment to determine if safe for further diet advancement.  2. Pt will tolerate pureed diet and nectar thick liquids without s/s of aspiration.  3. Pt will respond to simple yes/no q's with 60% accuracy given max cues.   4. Pt will model 1-step commands on 2/5 trials given max cues.   5. Pt will repeat single words with 60% intelligibility given max cues.   6. Pt will participate in ongoing evaluation of speech/language abilities.       Goals expected to be met by 12/30:  1. Pt will participate in ongoing swallowing assessment to determine if safe for PO intake. Goal met  2. Pt will respond to simple yes/no q's with 60% accuracy given max cues. ongoing  3. Pt will model 1-step commands on 1/5 trials given max cues. Goal met  4. Pt will repeat single words with 50% intelligibility given max cues. Goal met for bilabial CV syllable/words  5. Pt will participate in ongoing evaluation of speech/language abilities. ongoing                           Plan:     · Patient to be seen:  4 x/week   · Plan of Care expires:  01/22/22  · Plan of " Care reviewed with:  patient   · SLP Follow-Up:  Yes       Discharge recommendations:  rehabilitation facility   Barriers to Discharge:  Level of Skilled Assistance Needed      Time Tracking:     SLP Treatment Date:   01/04/22  Speech Start Time:  0915  Speech Stop Time:  0933     Speech Total Time (min):  18 min    Billable Minutes: Speech Therapy Individual 8 and Treatment Swallowing Dysfunction 10    01/04/2022

## 2022-01-04 NOTE — SUBJECTIVE & OBJECTIVE
Neurologic Chief Complaint: found down, nonverbal, RSW    Subjective:     Interval History: Patient is seen for follow-up neurological assessment and treatment recommendations:   No new neuro exam changes. Waiting for Coumadin to be therapeutic to go to rehab.     HPI, Past Medical, Family, and Social History remains the same as documented in the initial encounter.     Review of Systems   Unable to perform ROS: Mental status change   Constitutional: Negative for chills and fever.   HENT: Negative for facial swelling and trouble swallowing.    Eyes: Negative for photophobia and visual disturbance.   Respiratory: Negative for cough and shortness of breath.    Cardiovascular: Negative for leg swelling.   Gastrointestinal: Negative for constipation, diarrhea and vomiting.   Genitourinary: Negative for difficulty urinating.   Skin: Negative for pallor and rash.   Neurological: Positive for facial asymmetry, speech difficulty and weakness.   Psychiatric/Behavioral: Negative for agitation.     Scheduled Meds:   aspirin  81 mg Oral Daily    atorvastatin  40 mg Oral Daily    EScitalopram oxalate  5 mg Oral Daily    metoprolol succinate  50 mg Oral BID    polyethylene glycol  17 g Oral Daily    sacubitriL-valsartan  1 tablet Oral BID    senna-docusate 8.6-50 mg  1 tablet Oral BID    spironolactone  12.5 mg Oral Daily    warfarin  10 mg Oral Daily     Continuous Infusions:   heparin (porcine) in D5W 19 Units/kg/hr (01/03/22 1101)     PRN Meds:acetaminophen, hydrALAZINE, labetalol, ondansetron, sodium chloride 0.9%    Objective:     Vital Signs (Most Recent):  Temp: 98.5 °F (36.9 °C) (01/04/22 0751)  Pulse: 75 (01/04/22 0751)  Resp: 18 (01/04/22 0751)  BP: 133/79 (01/04/22 0751)  SpO2: 98 % (01/04/22 0751)  BP Location: Left arm    Vital Signs Range (Last 24H):  Temp:  [97.3 °F (36.3 °C)-98.5 °F (36.9 °C)]   Pulse:  [59-82]   Resp:  [15-18]   BP: (103-133)/(65-87)   SpO2:  [94 %-98 %]   BP Location: Left  arm    Physical Exam  Vitals and nursing note reviewed.   Constitutional:       General: He is not in acute distress.     Appearance: He is well-developed and normal weight. He is not diaphoretic.   HENT:      Head: Normocephalic and atraumatic.      Right Ear: External ear normal.      Left Ear: External ear normal.      Nose: Nose normal. No rhinorrhea.   Eyes:      General: No scleral icterus.        Right eye: No discharge.         Left eye: No discharge.      Extraocular Movements: Extraocular movements intact.   Cardiovascular:      Rate and Rhythm: Normal rate.   Pulmonary:      Effort: Pulmonary effort is normal. No respiratory distress.   Abdominal:      General: Abdomen is flat. There is no distension.   Musculoskeletal:      Right lower leg: No edema.      Left lower leg: No edema.   Skin:     General: Skin is dry.   Neurological:      Mental Status: He is alert.      Cranial Nerves: Dysarthria present.      Sensory: Sensory deficit present.      Motor: Weakness present.      Comments: Aphasia   Psychiatric:         Behavior: Behavior normal.         Neurological Exam:   LOC: alert  Attention Span: Good   Language: expressive aphasia  Articulation: Dysarthria  Orientation: Untestable due to severe aphasia   Motor: Arm left  Normal 5/5  Leg left  Normal 5/5  Arm right  Plegia 0/5  Leg right Paresis 3/5  Sensation: right hypoesthesia  Tone: Flaccid  RUE     Laboratory:  BMP:   Recent Labs   Lab 01/02/22  0342      K 4.3      CO2 21*   BUN 12   CREATININE 0.7   CALCIUM 8.2*     CBC:   Recent Labs   Lab 01/04/22  0900   WBC 9.22   RBC 3.91*   HGB 13.2*   HCT 39.1*      *   MCH 33.8*   MCHC 33.8     Lipid Panel:   No results for input(s): CHOL, LDLCALC, HDL, TRIG in the last 168 hours.  Coagulation:   Recent Labs   Lab 01/03/22  0724   INR 1.1   APTT 57.1*       Diagnostic Results     Brain Imaging   CTH 12/25/21  No significant change from prior.  Evolving large recent left MCA  territory infarction with petechial hemorrhagic conversion.  No evidence for significant new hemorrhage or increased mass effect.        CTH 12/24/2021  Evolving large left MCA territory infarction with petechial hemorrhagic conversion.  There is intermediate density components in the left temporal lobe area of infarction suggestive for interval additional petechial hemorrhage.  No evidence for extra-axial hemorrhage or hydrocephalus.     MRI Brain WO. Date: 12/22/21    Large acute  left MCA distribution infarct with modest mass effect. Multiple small remote scattered infarcts elsewhere as above.  Embolic disease to be considered.     CTH WO 12/21/21  Again demonstrated in better delineated as large left MCA distribution infarct with no associated acute hemorrhage and with localized mass effect resulting in effacement of overlying cortical sulci and partial effacement left sylvian fissure.  No associated acute hemorrhage.     Several small old areas of infarction again noted including cerebellum bilaterally, anterior left perisylvian region, lateral left frontal cortex and posterior right parietal cortex.     CTH WO 12/20/21   Large area loss of gray-white differentiation consistent with acute infarct left MCA distribution involving the left temporal lobe, temporal occipital region and portions of the basal ganglia with localized mass effect including effacement of overlying cortical sulci although with no associated acute hemorrhage.     Several small old areas of infarction are evident including anterior left perisylvian and cerebellum bilaterally. Mild underlying atrophy.        Vessel Imaging:  US LE Veins 12/25/21  No evidence of deep venous thrombosis in either lower extremity.    CTA H/N 12/20/21  Segmental occlusion measuring approximately 10 mm mid and distal M1 segment left MCA with diminished flow distal to the stenosis.     Right cervical carotid circulation--atherosclerotic calcification bifurcation  without measurable stenosis.     Left cervical carotid circulation--atherosclerotic calcification distal left common carotid artery and bifurcation with stenosis proximal left ICA measuring 16% utilizing NASCET criteria.  Mild eccentric narrowing mid to distal portion left common carotid artery also noted.     Vertebral arteries--no focal arterial abnormality or measurable stenosis.     Cardiac Evaluation:   TTE with bubble 12/21/21  1. The left ventricle (LV) is dilated with severely depressed systolic function & global hypokinesis.  2. LV ejection fraction is 15-20%. Grade III LV diastolic dysfunction.  3. Small to moderate sized (1.6x1.1 cm) layered, protruding apical mass c/w thrombus.  4. The right ventricle (RV) is severely enlarged with normal systolic function.   5. Estimated RVSP is moderately elevated (50-70 mmHg). Pulmonary hypertension is present.  6. Severe atrial enlargement.  7. Moderate eccentric mitral regrugitation.  8. Mild tricuspid regurgitation.     Other:  CTA Chest Non coronary 12/25/21  Pulmonary embolism left apical segmental pulmonary artery.     Moderate to large right and moderate left bilateral pleural effusions     Interstitial opacities throughout the lungs bilaterally with superimposed ill-defined consolidation right upper and to lesser degree left upper lobes which may represent superimposed developing pneumonia.     Scattered interlobular septal thickening throughout the lungs concerning for component of vascular congestion and edema.

## 2022-01-04 NOTE — ASSESSMENT & PLAN NOTE
TTE at outside facility 12/21/21:  EF of 15-20%, small to moderate sized (1.6x1.1 cm) layered, protruding apical mass c/w thrombus, global hypokinesis, severe atrial enlargement.    -on heparin gtt and temporarily held due to hemorrhagic conversion, now restarted  -on Heparin gtt to Coumadin bridge   Coumadin increased to 10mg, goal INR 2-3   -repeat echo in 3 months for resolution of thrombus

## 2022-01-05 LAB
APTT BLDCRRT: 50.1 SEC (ref 21–32)
INR PPP: 1.5 (ref 0.8–1.2)
PROTHROMBIN TIME: 15.8 SEC (ref 9–12.5)

## 2022-01-05 PROCEDURE — 99233 PR SUBSEQUENT HOSPITAL CARE,LEVL III: ICD-10-PCS | Mod: ,,, | Performed by: PSYCHIATRY & NEUROLOGY

## 2022-01-05 PROCEDURE — 97112 NEUROMUSCULAR REEDUCATION: CPT

## 2022-01-05 PROCEDURE — 94761 N-INVAS EAR/PLS OXIMETRY MLT: CPT

## 2022-01-05 PROCEDURE — 25000003 PHARM REV CODE 250

## 2022-01-05 PROCEDURE — 11000001 HC ACUTE MED/SURG PRIVATE ROOM

## 2022-01-05 PROCEDURE — 25000003 PHARM REV CODE 250: Performed by: PHYSICIAN ASSISTANT

## 2022-01-05 PROCEDURE — 99233 SBSQ HOSP IP/OBS HIGH 50: CPT | Mod: ,,, | Performed by: PSYCHIATRY & NEUROLOGY

## 2022-01-05 PROCEDURE — 92507 TX SP LANG VOICE COMM INDIV: CPT

## 2022-01-05 PROCEDURE — 85730 THROMBOPLASTIN TIME PARTIAL: CPT | Performed by: PSYCHIATRY & NEUROLOGY

## 2022-01-05 PROCEDURE — 97116 GAIT TRAINING THERAPY: CPT

## 2022-01-05 PROCEDURE — 25000003 PHARM REV CODE 250: Performed by: STUDENT IN AN ORGANIZED HEALTH CARE EDUCATION/TRAINING PROGRAM

## 2022-01-05 PROCEDURE — 85610 PROTHROMBIN TIME: CPT | Performed by: PSYCHIATRY & NEUROLOGY

## 2022-01-05 PROCEDURE — 97535 SELF CARE MNGMENT TRAINING: CPT

## 2022-01-05 PROCEDURE — 36415 COLL VENOUS BLD VENIPUNCTURE: CPT | Performed by: PSYCHIATRY & NEUROLOGY

## 2022-01-05 PROCEDURE — 63600175 PHARM REV CODE 636 W HCPCS: Performed by: INTERNAL MEDICINE

## 2022-01-05 PROCEDURE — 25000003 PHARM REV CODE 250: Performed by: INTERNAL MEDICINE

## 2022-01-05 PROCEDURE — 25000003 PHARM REV CODE 250: Performed by: PSYCHIATRY & NEUROLOGY

## 2022-01-05 PROCEDURE — 92526 ORAL FUNCTION THERAPY: CPT

## 2022-01-05 RX ADMIN — SENNOSIDES AND DOCUSATE SODIUM 1 TABLET: 50; 8.6 TABLET ORAL at 08:01

## 2022-01-05 RX ADMIN — ATORVASTATIN CALCIUM 40 MG: 20 TABLET, FILM COATED ORAL at 08:01

## 2022-01-05 RX ADMIN — SACUBITRIL AND VALSARTAN 1 TABLET: 24; 26 TABLET, FILM COATED ORAL at 08:01

## 2022-01-05 RX ADMIN — HEPARIN SODIUM 14.6 UNITS/KG/HR: 1000 INJECTION INTRAVENOUS; SUBCUTANEOUS at 01:01

## 2022-01-05 RX ADMIN — POLYETHYLENE GLYCOL 3350 17 G: 17 POWDER, FOR SOLUTION ORAL at 08:01

## 2022-01-05 RX ADMIN — METOPROLOL SUCCINATE 50 MG: 50 TABLET, EXTENDED RELEASE ORAL at 08:01

## 2022-01-05 RX ADMIN — ESCITALOPRAM OXALATE 5 MG: 5 TABLET, FILM COATED ORAL at 08:01

## 2022-01-05 RX ADMIN — SPIRONOLACTONE 12.5 MG: 25 TABLET, FILM COATED ORAL at 08:01

## 2022-01-05 RX ADMIN — SENNOSIDES AND DOCUSATE SODIUM 1 TABLET: 50; 8.6 TABLET ORAL at 09:01

## 2022-01-05 RX ADMIN — ASPIRIN 81 MG: 81 TABLET, COATED ORAL at 08:01

## 2022-01-05 RX ADMIN — WARFARIN SODIUM 10 MG: 5 TABLET ORAL at 04:01

## 2022-01-05 NOTE — PT/OT/SLP PROGRESS
"Physical Therapy Co-Treatment    Patient Name:  Jimmy Leger   MRN:  [id number]    Recommendations:     Discharge Recommendations:  rehabilitation facility   Discharge Equipment Recommendations:  (TBD)   Barriers to discharge: Increased level of assist    Assessment:     Jimmy Leger is a 57 y.o. male admitted with a medical diagnosis of Stroke due to embolism of left middle cerebral artery. Patient demonstrates improved gait quality this visit with maximal assist x2. Verbalizing throughout session, intelligible ~50% of the time.     He presents with the following impairments/functional limitations: weakness,impaired endurance,impaired self care skills,impaired functional mobilty,gait instability,impaired balance,decreased upper extremity function,decreased lower extremity function,decreased safety awareness,impaired fine motor,impaired sensation. These deficits affect their roles and responsibilities in which they were able to complete prior to admit. Jimmy Leger would continue to benefit from acute PT intervention to improve quality of life and focus on recovery of impairments. Once medically stable, recommending pt discharge to Inpatient Rehabilitation Facility.     Rehab Prognosis: Good; patient continues to benefit from acute skilled PT services to address these deficits and reach maximum level of function.  Patient remains most appropriate to discharge to rehabilitation facility.  Recent Surgery: * No surgery found *      Plan:     During this hospitalization, patient to be seen 4 x/week to address the identified rehab impairments via gait training,therapeutic activities,therapeutic exercises,neuromuscular re-education and progress toward the following goals:    · Plan of Care Expires:  01/22/22    Subjective     Chief Complaint: None verbalized   Patient/Family Comments/Goals: "I'm okay"  Pain/Comfort:  · Pain Rating 1: 0/10  · Pain Rating Post-Intervention 1: 0/10    Objective:     Communicated with RN prior " to session. Patient found HOB elevated with bed alarm,telemetry,peripheral IV,Condom Catheter upon PT entry to room.     General Precautions: Standard, aspiration,aphasia,pureed diet,nectar thick   Orthopedic Precautions:N/A   Braces: N/A    Functional Mobility:  · Bed Mobility:     · Scooting: minimum assistance  · Supine to Sit: minimum assistance with bed rail and HOB elevated  · Sit to Supine: minimum assistance with bed rail and HOB elevated  · Transfers:     · Sit to Stand: minimum assistance of 2 persons on 1st trial, moderate assistance on 2nd 2 trials with hand-held assist with cues for hand placement  · Gait: Patient ambulated 4 steps forward/back, 6 steps forward/back with hand-held assist and maximal assistance of 2 persons. Patient demonstrates unsteady gait, decreased step length, narrow base of support, decreased weight shift, decreased foot clearance, decreased adriana, inconsistent right foot placement, decreased right knee stability and hemiparetic gait. Patient able to maintain stance on L with OT assist, PT assisted with RLE facilitation (hamstrings and quadriceps), placement, and sequencing. Improvement noted in R hip flexion on 2nd trial. Requires tactile cues to step with L when ambulating back to bed on 2nd trial. All lines remained intact throughout ambulation trial, gait belt utilized.  · Balance:   · Static Sitting: Good, able to maintain for 3 minute(s) with stand by assistance  · Dynamic Sitting: Fair: Patient accepts minimal challenge, contact guard assistance  · Static Standing: Poor, able to maintain for 2 minute(s), 1 minute with moderate - maximal assistance, R knee blocked with cues for quadriceps activation  · Dynamic Standing: Poor: Patient unable to accept challenge or move without loss of balance, maximal assistance of 2 persons    AM-PAC 6 CLICK MOBILITY  Turning over in bed (including adjusting bedclothes, sheets and blankets)?: 3  Sitting down on and standing up from a chair  with arms (e.g., wheelchair, bedside commode, etc.): 2  Moving from lying on back to sitting on the side of the bed?: 3  Moving to and from a bed to a chair (including a wheelchair)?: 2  Need to walk in hospital room?: 2  Climbing 3-5 steps with a railing?: 1  Basic Mobility Total Score: 13     Therapeutic Activities and Exercises:  Patient educated on role of acute care PT and PT POC  Whiteboard updated   Patient participated in self care activities in standing and seated with OT, PT assisting with balance as needed during. Please see OT note for further details    Patient left HOB elevated with all lines intact, call button in reach and bed alarm on.    GOALS:   Multidisciplinary Problems     Physical Therapy Goals        Problem: Physical Therapy Goal    Goal Priority Disciplines Outcome Goal Variances Interventions   Physical Therapy Goal     PT, PT/OT Ongoing, Progressing     Description: Goals to be met by: 2022     Patient will increase functional independence with mobility by performin. Supine to sit with moderate assistance            -Met 1/3, revised to min(A) - met 2022  Updated: stand by assistance   2. Sit to supine with minimum assistance            -Met 1/3, revised to CGA  3. Rolling to Left with maximal assistance  4. Rolling to Right with minimum assistance  5. Sit to stand transfer with moderate assistance - met 2022  Updated: contact guard assistance   6. Bed to chair transfer with moderate assistance using LRAD as needed  7. Gait  x 10 feet with maximal assistance using LRAD as needed  8. Lower extremity exercise program x10 reps per handout, with independence                    Time Tracking:     PT Received On: 22  PT Start Time: 1046     PT Stop Time: 1110  PT Total Time (min): 24 min     Billable Minutes: Gait Training 24 min    Treatment Type: Treatment  PT/PTA: PT     PTA Visit Number: 0     2022    Co-treatment performed for this visit due to patient need  for two skilled therapists to ensure patient and staff safety and to accommodate for patient activity tolerance/pain management

## 2022-01-05 NOTE — NURSING
Scheduled Lopressor and Valsartan at 2100 held due to soft BP, 114/77. Stroke team notified of pt's BP and MD Oanh said it was okay to hold meds for now due to BP. No further orders received. No acute signs of distress noted at this time.

## 2022-01-05 NOTE — PROGRESS NOTES
[facility] - Neurosurgery ([facility])  Vascular Neurology  Comprehensive Stroke Center  Progress Note    Assessment/Plan:     * Stroke due to embolism of left middle cerebral artery  55 yo male with PMHx of HTN and CAD presents as a transfer from [facility] for large L MCA infarct. CTA with distal L M1 occlusion. No tpa, OOW. No IR, large core infarct. Echo (at OSH) with LV global hypokinesis, EF 15-20%, severe atrial enlargement, and apical thrombus. Repeat CTH with petechial hemorrhage, anticoagulation temporarily held. Repeat CT with stable petechial hemorrhage. During admission obtained CTA chest which showed subsegmental PE. On heparing gtt with Coumadin bridge, coumadin is still not therapeutic but getting closer to goal. Pending IPR placement.       Etiology: likely cardioembolic 2/2 low EF(15-20%) and cardiac thrombus.     Antithrombotics for secondary stroke prevention:   Heparin gtt with Coumadin (5mg) bridge with INR goal 2-3    Statins for secondary stroke prevention and hyperlipidemia, if present: Statins: Atorvastatin- 40 mg daily     Aggressive risk factor modification: HTN, CAD     Rehab efforts: The patient has been evaluated by a stroke team provider and the therapy needs have been fully considered based off the presenting complaints and exam findings. The following therapy evaluations are needed: PT/OT on board, SLP evaluate and treat, PM&R evaluate for appropriate placement. Recommend IPR.    Diagnostics ordered/pending: None     VTE prophylaxis: Mechanical prophylaxis: Place SCDs  None: Reason for No Pharmacological VTE Prophylaxis: Currently on anticoagulation    BP parameters: <140      Right shoulder pain  Patient with right shoulder pain.  XR right shoulder negative for fractures and dislocations    Combined systolic and diastolic congestive heart failure  Newly diagnosed  EF 15-20%    Cardiology consulted, appreciate recs   -Discontinue Lisinopril  -Start Entresto 49/51mg   -Anticoagulation for 3  months with follow up echo   -Follow up with cardiology as outpatient  - At discharge start SGLT2 inhibitor with BMP follow up in 1 week; spironolactone 12.5mg daily when able to tolerate; increase metoprolol 50mg BID with goal of increasing to 200mg total daily when tolerable, no life vest recommended at this time     Currently on: Entresto 24-26 mg BID, metoprolol 50mg BID, spironalactone 12.5      PAD (peripheral artery disease)  -Patient with significant PAD w/ occluded stents x 2 in sup femoral and popliteal artery  -Vascular surgery consulted who recommended ASA when safe from neuro perspective.   - ASA started on 12/31    Aphasia  Therapy to evaluate and treat    Single subsegmental pulmonary embolism without acute cor pulmonale  Visualized on CTA chest 12/25  L apical segmental pulmonary artery embolism  EKG with R axis deviation, RBBB.  US LE negative for DVT  Continue heparin gtt with  Coumadin bridge    Debility  PT/OT to evaluate and treat  Therapy recommending rehab    Cytotoxic cerebral edema  Area of cytotoxic cerebral edema identified when reviewing brain imaging in the territory of the L middle cerebral artery. There is mass effect associated with it. Continue to monitor the patients clinical exam for any worsening of symptoms which may indicate expansion of the stroke or the area of the edema resulting in the clinical change. The pattern is suggestive of embolic etiology.    Clinical exam has remained stable, this suggests that cerebral edema has stabilized. Low probability of clinical deterioration. Neurosurgery signed off, patient no longer on hemicrani watch    Essential hypertension  Stroke RF  SBP goal normotension  On GDMT    Grade III diastolic dysfunction  See HF     Mural thrombus of cardiac apex  TTE at outside facility 12/21/21:  EF of 15-20%, small to moderate sized (1.6x1.1 cm) layered, protruding apical mass c/w thrombus, global hypokinesis, severe atrial enlargement.    -on heparin  gtt and temporarily held due to hemorrhagic conversion, now restarted  -on Heparin gtt to Coumadin bridge   Coumadin increased to 10mg, goal INR 2-3   -repeat echo in 3 months for resolution of thrombus           12/22 patient in ICU for hemicrani watch, NPO per SLP  12/23: Patient remains in [facility]. Min intensity heparin gtt was initiated. Patient aphasic with RS plegia.   12/24-Patient with changes in CT with petechial hemorraghic conversion recommend stopping aspirin and holding heparin. Repeat imaging with worsening exam. On hemicrani watch.   12/27 RLE weakness improved on today's exam, patient with runs of vtach overnight on 12/24-12/25, CTA chest with subsegmental PE, patient restarted on heparin gtt, neurosurgery signed off and patient no longer on hemicrani watch, remains NPO per SLP, therapy recommending inpatient rehab  12/28 Continues on heparin gtt for PE, neuro exam stable with mild improvements, low suspicion for  new or worsening ICH.   12/29-Neuro exam stable. Recs for IPR. Overnight, patient had 11 run of vtach. Mg 2.2. P 2.9, will replete P to 4. On heparin gtt. Can transition to DOAC over the weekend.  Patient pending medicaid.  Cardio had been consulted by [facility] and they recommended full AC for thrombus, repeat TTE in 3 months for resolution of thrombus, and no need for life vest at this time.   12/30-Neuro exam remains stable. Patient with significant right shoulder pain. XR right shoulder ordered. Metoprolol increased to 50 BID and lisinopril switched to entresto 24-26.Pending medicaid.   12/31: Patient now has medicaid. Referrals were submitted. Started transition to Coumadin today for LV thrombus. XR R shoulder negative.   01/01/2022 Patient's feet cold on exam, doppler with normal pulses bilaterally. INR 1.1 today. When therapeutic, will be ready for IPR placement.   1/2-Neuro exam improving. Patient refusing vital signs and nursing care, spoke with patient about why it was necessary. INR 1.1  today, coumadin increased to 7.5mg.   01/03/2022 Patient's INR 1.1 again today. Increased to Coumadin 10mg. Waiting for therapeutic INR and IPR placement.   01/04/2022 No new neuro exam changes. INR today is 1.3. Waiting for therapeutic INR levels then will go to IPR.   01/05/2022 INR 1.5. Waiting for therapeutic INR (2-3) prior to discharge to rehab, likely tomorrow.       STROKE DOCUMENTATION        NIH Scale:  1a. Level of Consciousness: 0-->Alert, keenly responsive  1b. LOC Questions: 2-->Answers neither question correctly  1c. LOC Commands: 1-->Performs one task correctly  2. Best Gaze: 0-->Normal  3. Visual: 0-->No visual loss  4. Facial Palsy: 1-->Minor paralysis (flattened nasolabial fold, asymmetry on smiling)  5a. Motor Arm, Left: 0-->No drift, limb holds 90 (or 45) degrees for full 10 secs  5b. Motor Arm, Right: 4-->No movement  6a. Motor Leg, Left: 0-->No drift, leg holds 30 degree position for full 5 secs  6b. Motor Leg, Right: 2-->Some effort against gravity, leg falls to bed by 5 secs, but has some effort against gravity  7. Limb Ataxia: 0-->Absent  8. Sensory: 1-->Mild-to-moderate sensory loss, patient feels pinprick is less sharp or is dull on the affected side, or there is a loss of superficial pain with pinprick, but patient is aware of being touched  9. Best Language: 2-->Severe aphasia, all communication is through fragmentary expression, great need for inference, questioning, and guessing by the listener. Range of information that can be exchanged is limited, listener carries burden of. . . (see row details)  10. Dysarthria: 2-->Severe dysarthria, patients speech is so slurred as to be unintelligible in the absence of or out of proportion to any dysphasia, or is mute/anarthric  11. Extinction and Inattention (formerly Neglect): 0-->No abnormality  Total (NIH Stroke Scale): 15       Modified [place] Score: 0  [place] Coma Scale:    ABCD2 Score:    [id number] Score:   HAS -BLED Score:   ICH  Score:   Baker & Gillis Classification:      Hemorrhagic change of an Ischemic Stroke: Does this patient have an ischemic stroke with hemorrhagic changes? HI Type 1 (HI-1) = small petechiae along the margins of the infarct. Is this a symptomatic change?  No - Hemorrhage is not clinically significant     Neurologic Chief Complaint: found down, nonverbal, RSW    Subjective:     Interval History: Patient is seen for follow-up neurological assessment and treatment recommendations:  INR 1.5. Waiting for therapeutic INR (2-3) prior to discharge to rehab.     HPI, Past Medical, Family, and Social History remains the same as documented in the initial encounter.     Review of Systems   Unable to perform ROS: Mental status change   Constitutional: Negative for chills and fever.   HENT: Negative for facial swelling and trouble swallowing.    Eyes: Negative for photophobia and visual disturbance.   Respiratory: Negative for cough and shortness of breath.    Cardiovascular: Negative for leg swelling.   Gastrointestinal: Negative for constipation, diarrhea and vomiting.   Genitourinary: Negative for difficulty urinating.   Skin: Negative for pallor and rash.   Neurological: Positive for facial asymmetry, speech difficulty and weakness.   Psychiatric/Behavioral: Negative for agitation.     Scheduled Meds:   aspirin  81 mg Oral Daily    atorvastatin  40 mg Oral Daily    EScitalopram oxalate  5 mg Oral Daily    metoprolol succinate  50 mg Oral BID    polyethylene glycol  17 g Oral Daily    sacubitriL-valsartan  1 tablet Oral BID    senna-docusate 8.6-50 mg  1 tablet Oral BID    spironolactone  12.5 mg Oral Daily    warfarin  10 mg Oral Daily     Continuous Infusions:   heparin (porcine) in D5W 19 Units/kg/hr (01/03/22 1101)     PRN Meds:acetaminophen, hydrALAZINE, labetalol, ondansetron, sodium chloride 0.9%    Objective:     Vital Signs (Most Recent):  Temp: 98.6 °F (37 °C) (01/05/22 0753)  Pulse: 83 (01/05/22 0753)  Resp:  17 (01/05/22 0753)  BP: 131/87 (01/05/22 0830)  SpO2: (!) 94 % (01/05/22 0753)  BP Location: Left arm    Vital Signs Range (Last 24H):  Temp:  [97.7 °F (36.5 °C)-98.6 °F (37 °C)]   Pulse:  [55-83]   Resp:  [16-18]   BP: (104-131)/(64-87)   SpO2:  [93 %-99 %]   BP Location: Left arm    Physical Exam  Vitals and nursing note reviewed.   Constitutional:       General: He is not in acute distress.     Appearance: He is well-developed and normal weight. He is not diaphoretic.   HENT:      Head: Normocephalic and atraumatic.      Right Ear: External ear normal.      Left Ear: External ear normal.      Nose: Nose normal. No rhinorrhea.   Eyes:      General: No scleral icterus.        Right eye: No discharge.         Left eye: No discharge.      Extraocular Movements: Extraocular movements intact.   Cardiovascular:      Rate and Rhythm: Normal rate.   Pulmonary:      Effort: Pulmonary effort is normal. No respiratory distress.   Abdominal:      General: Abdomen is flat. There is no distension.   Musculoskeletal:      Right lower leg: No edema.      Left lower leg: No edema.   Skin:     General: Skin is dry.   Neurological:      Mental Status: He is alert.      Cranial Nerves: Dysarthria present.      Sensory: Sensory deficit present.      Motor: Weakness present.      Comments: Aphasia   Psychiatric:         Behavior: Behavior normal.         Neurological Exam:   LOC: alert  Attention Span: Good   Language: expressive aphasia  Articulation: Dysarthria  Orientation: Untestable due to severe aphasia   Motor: Arm left  Normal 5/5  Leg left  Normal 5/5  Arm right  Plegia 0/5  Leg right Paresis 3/5  Sensation: right hypoesthesia  Tone: Flaccid  RUE     Laboratory:  BMP:   Recent Labs   Lab 01/04/22  0900      K 4.0      CO2 25   BUN 10   CREATININE 0.7   CALCIUM 8.7     CBC:   Recent Labs   Lab 01/04/22  0900   WBC 9.22   RBC 3.91*   HGB 13.2*   HCT 39.1*      *   MCH 33.8*   MCHC 33.8     Lipid Panel:    No results for input(s): CHOL, LDLCALC, HDL, TRIG in the last 168 hours.  Coagulation:   Recent Labs   Lab 01/05/22  0627   INR 1.5*   APTT 50.1*       Diagnostic Results     Brain Imaging   CTH 12/25/21  No significant change from prior.  Evolving large recent left MCA territory infarction with petechial hemorrhagic conversion.  No evidence for significant new hemorrhage or increased mass effect.        CTH 12/24/2021  Evolving large left MCA territory infarction with petechial hemorrhagic conversion.  There is intermediate density components in the left temporal lobe area of infarction suggestive for interval additional petechial hemorrhage.  No evidence for extra-axial hemorrhage or hydrocephalus.     MRI Brain WO. Date: 12/22/21    Large acute  left MCA distribution infarct with modest mass effect. Multiple small remote scattered infarcts elsewhere as above.  Embolic disease to be considered.     CTH WO 12/21/21  Again demonstrated in better delineated as large left MCA distribution infarct with no associated acute hemorrhage and with localized mass effect resulting in effacement of overlying cortical sulci and partial effacement left sylvian fissure.  No associated acute hemorrhage.     Several small old areas of infarction again noted including cerebellum bilaterally, anterior left perisylvian region, lateral left frontal cortex and posterior right parietal cortex.     CTH WO 12/20/21   Large area loss of gray-white differentiation consistent with acute infarct left MCA distribution involving the left temporal lobe, temporal occipital region and portions of the basal ganglia with localized mass effect including effacement of overlying cortical sulci although with no associated acute hemorrhage.     Several small old areas of infarction are evident including anterior left perisylvian and cerebellum bilaterally. Mild underlying atrophy.        Vessel Imaging:  US LE Veins 12/25/21  No evidence of deep venous  thrombosis in either lower extremity.    CTA H/N 12/20/21  Segmental occlusion measuring approximately 10 mm mid and distal M1 segment left MCA with diminished flow distal to the stenosis.     Right cervical carotid circulation--atherosclerotic calcification bifurcation without measurable stenosis.     Left cervical carotid circulation--atherosclerotic calcification distal left common carotid artery and bifurcation with stenosis proximal left ICA measuring 16% utilizing NASCET criteria.  Mild eccentric narrowing mid to distal portion left common carotid artery also noted.     Vertebral arteries--no focal arterial abnormality or measurable stenosis.     Cardiac Evaluation:   TTE with bubble 12/21/21  1. The left ventricle (LV) is dilated with severely depressed systolic function & global hypokinesis.  2. LV ejection fraction is 15-20%. Grade III LV diastolic dysfunction.  3. Small to moderate sized (1.6x1.1 cm) layered, protruding apical mass c/w thrombus.  4. The right ventricle (RV) is severely enlarged with normal systolic function.   5. Estimated RVSP is moderately elevated (50-70 mmHg). Pulmonary hypertension is present.  6. Severe atrial enlargement.  7. Moderate eccentric mitral regrugitation.  8. Mild tricuspid regurgitation.     Other:  CTA Chest Non coronary 12/25/21  Pulmonary embolism left apical segmental pulmonary artery.     Moderate to large right and moderate left bilateral pleural effusions     Interstitial opacities throughout the lungs bilaterally with superimposed ill-defined consolidation right upper and to lesser degree left upper lobes which may represent superimposed developing pneumonia.     Scattered interlobular septal thickening throughout the lungs concerning for component of vascular congestion and edema.        Clovis Kate PA-C  Comprehensive Stroke Center  Department of Vascular Neurology   [facility] Neuro[facility])

## 2022-01-05 NOTE — ASSESSMENT & PLAN NOTE
55 yo male with PMHx of HTN and CAD presents as a transfer from [facility] for large L MCA infarct. CTA with distal L M1 occlusion. No tpa, OOW. No IR, large core infarct. Echo (at OSH) with LV global hypokinesis, EF 15-20%, severe atrial enlargement, and apical thrombus. Repeat CTH with petechial hemorrhage, anticoagulation temporarily held. Repeat CT with stable petechial hemorrhage. During admission obtained CTA chest which showed subsegmental PE. On heparing gtt with Coumadin bridge, coumadin is still not therapeutic but getting closer to goal. Pending IPR placement.       Etiology: likely cardioembolic 2/2 low EF(15-20%) and cardiac thrombus.     Antithrombotics for secondary stroke prevention:   Heparin gtt with Coumadin (5mg) bridge with INR goal 2-3    Statins for secondary stroke prevention and hyperlipidemia, if present: Statins: Atorvastatin- 40 mg daily     Aggressive risk factor modification: HTN, CAD     Rehab efforts: The patient has been evaluated by a stroke team provider and the therapy needs have been fully considered based off the presenting complaints and exam findings. The following therapy evaluations are needed: PT/OT on board, SLP evaluate and treat, PM&R evaluate for appropriate placement. Recommend IPR.    Diagnostics ordered/pending: None     VTE prophylaxis: Mechanical prophylaxis: Place SCDs  None: Reason for No Pharmacological VTE Prophylaxis: Currently on anticoagulation    BP parameters: <140

## 2022-01-05 NOTE — PLAN OF CARE
Plan of care and stroke booklet  reviewed with patient and his sister. The sister verbalized her understanding and the patient attempted to verbalize his.The patient has aphasia so it is hard to understand what he is saying at times.Patient is assisted with being turned and repositioned . Patient is tolerating his diet well and he is able to feed himself  If the meal tray is set up for him. Heparin is currently infusing at 19 units at a rate of 14.6 ml/hr.The  Last PTT was therapeutic and the next PTT lab draw will be in the am. No s/s of an adverse reaction noted to medication therapy..Telemetry monitoring remains in place. The patient has a condom catheter in place that is draining yellow colored urine to the BSDB.Bed alarm remains in place. Will continue to monitor and report any changes.

## 2022-01-05 NOTE — PT/OT/SLP PROGRESS
"Speech Language Pathology Treatment    Patient Name:  Jimmy Leger   MRN:  [id number]  Admitting Diagnosis: Stroke due to embolism of left middle cerebral artery    Recommendations:                 General Recommendations:  Dysphagia therapy and Speech/language therapy  Diet recommendations:  Puree, Liquid Diet Level: Nectar Thick   Aspiration Precautions: 1 bite/sip at a time, Check for pocketing/oral residue, HOB to 90 degrees, Small bites/sips and Standard aspiration precautions   General Precautions: Standard, aspiration,aphasia,pureed diet,nectar thick  Communication strategies:  yes/no questions only, provide increased time to answer and y/n questions not reliable     Subjective     Spoke with RN prior to session. Patient received awake and alert in bed agreeable to PO trials and speech therapy. Patient able to approximate "I am good" when asked how he is doing, though still highly unintelligible.     Pain/Comfort:  · Pain Rating 1: 0/10  · Pain Rating Post-Intervention 1: 0/10    Respiratory Status: Room air    Objective:     Has the patient been evaluated by SLP for swallowing?   Yes  Keep patient NPO? No       Worked on intake of thin liquids and regular solids for possible diet advancement. He had reduced and prolonged mastication of more solid foods and noted pocketing on right side requiring puree wash in order to clear. He had anterior spillage with liquids out the right corner of the mouth. Status post transfer of the bolus, he continues to demonstrate a timely swallow trigger though noted throat clearing on thin liquids and immediate cough response with thin liquids from straw. S/sx of airway compromise eliminated with nectar thick liquids. Recommend he continue his current diet at this time.     Addressed speech and language as well. He was able to answer simple Y/N questions with 60% accuracy saying "yes" to all questions asked. He was unable to intelligible label 5 common object in room. He was " unable to follow any commands today (0%) despite clinician model and prompting. He continues to have right sided facial weakness and was unable to perform OME despite max modeling and cuing. ST to follow up.     Assessment:     Jimmy Leger is a 57 y.o. male with an SLP diagnosis of Aphasia and Dysphagia.    Goals:   Multidisciplinary Problems     SLP Goals        Problem: SLP Goal    Goal Priority Disciplines Outcome   SLP Goal     SLP Ongoing, Progressing   Description: Speech Language Pathology Goals  Goals expected to be met by 1/10:  1. Pt will participate in ongoing swallowing assessment to determine if safe for further diet advancement.  2. Pt will tolerate pureed diet and nectar thick liquids without s/s of aspiration.  3. Pt will respond to simple yes/no q's with 60% accuracy given max cues.   4. Pt will model 1-step commands on 2/5 trials given max cues.   5. Pt will repeat single words with 60% intelligibility given max cues.   6. Pt will participate in ongoing evaluation of speech/language abilities.       Goals expected to be met by 12/30:  1. Pt will participate in ongoing swallowing assessment to determine if safe for PO intake. Goal met  2. Pt will respond to simple yes/no q's with 60% accuracy given max cues. ongoing  3. Pt will model 1-step commands on 1/5 trials given max cues. Goal met  4. Pt will repeat single words with 50% intelligibility given max cues. Goal met for bilabial CV syllable/words  5. Pt will participate in ongoing evaluation of speech/language abilities. ongoing                           Plan:     · Patient to be seen:  4 x/week   · Plan of Care expires:  01/22/22  · Plan of Care reviewed with:  patient   · SLP Follow-Up:  Yes       Discharge recommendations:  rehabilitation facility   Barriers to Discharge:  Level of Skilled Assistance Needed      Time Tracking:     SLP Treatment Date:   01/05/22  Speech Start Time:  0903  Speech Stop Time:  0919     Speech Total Time (min):  16  min    Billable Minutes: Speech Therapy Individual 10 and Treatment Swallowing Dysfunction 6    01/05/2022

## 2022-01-05 NOTE — PLAN OF CARE
Goals reviewed and revised as appropriate. Continue POC.    Problem: Physical Therapy Goal  Goal: Physical Therapy Goal  Description: Goals to be met by: 2022     Patient will increase functional independence with mobility by performin. Supine to sit with moderate assistance            -Met 1/3, revised to min(A) - met 2022  Updated: stand by assistance   2. Sit to supine with minimum assistance            -Met 1/3, revised to CGA  3. Rolling to Left with maximal assistance  4. Rolling to Right with minimum assistance  5. Sit to stand transfer with moderate assistance - met 2022  Updated: contact guard assistance   6. Bed to chair transfer with moderate assistance using LRAD as needed  7. Gait  x 10 feet with maximal assistance using LRAD as needed  8. Lower extremity exercise program x10 reps per handout, with independence   Outcome: Ongoing, Progressing

## 2022-01-05 NOTE — PLAN OF CARE
Recommendations    1. Continue Pureed diet advancing texture per SLP   - Continue cardiac restrictions    2. Rec adding Boost pudding TID   - Once liquids advance to thin, change ONS to Boost Plus TID    3. Monitor and follow-up    Goals: Pt will meet and tolerate >75% EEN/EPN by RD f/u  Nutrition Goal Status: goal met  Communication of RD Recs:  (POC)

## 2022-01-05 NOTE — SUBJECTIVE & OBJECTIVE
Neurologic Chief Complaint: found down, nonverbal, RSW    Subjective:     Interval History: Patient is seen for follow-up neurological assessment and treatment recommendations:  INR 1.5. Waiting for therapeutic INR (2-3) prior to discharge to rehab.     HPI, Past Medical, Family, and Social History remains the same as documented in the initial encounter.     Review of Systems   Unable to perform ROS: Mental status change   Constitutional: Negative for chills and fever.   HENT: Negative for facial swelling and trouble swallowing.    Eyes: Negative for photophobia and visual disturbance.   Respiratory: Negative for cough and shortness of breath.    Cardiovascular: Negative for leg swelling.   Gastrointestinal: Negative for constipation, diarrhea and vomiting.   Genitourinary: Negative for difficulty urinating.   Skin: Negative for pallor and rash.   Neurological: Positive for facial asymmetry, speech difficulty and weakness.   Psychiatric/Behavioral: Negative for agitation.     Scheduled Meds:   aspirin  81 mg Oral Daily    atorvastatin  40 mg Oral Daily    EScitalopram oxalate  5 mg Oral Daily    metoprolol succinate  50 mg Oral BID    polyethylene glycol  17 g Oral Daily    sacubitriL-valsartan  1 tablet Oral BID    senna-docusate 8.6-50 mg  1 tablet Oral BID    spironolactone  12.5 mg Oral Daily    warfarin  10 mg Oral Daily     Continuous Infusions:   heparin (porcine) in D5W 19 Units/kg/hr (01/03/22 1101)     PRN Meds:acetaminophen, hydrALAZINE, labetalol, ondansetron, sodium chloride 0.9%    Objective:     Vital Signs (Most Recent):  Temp: 98.6 °F (37 °C) (01/05/22 0753)  Pulse: 83 (01/05/22 0753)  Resp: 17 (01/05/22 0753)  BP: 131/87 (01/05/22 0830)  SpO2: (!) 94 % (01/05/22 0753)  BP Location: Left arm    Vital Signs Range (Last 24H):  Temp:  [97.7 °F (36.5 °C)-98.6 °F (37 °C)]   Pulse:  [55-83]   Resp:  [16-18]   BP: (104-131)/(64-87)   SpO2:  [93 %-99 %]   BP Location: Left arm    Physical  Exam  Vitals and nursing note reviewed.   Constitutional:       General: He is not in acute distress.     Appearance: He is well-developed and normal weight. He is not diaphoretic.   HENT:      Head: Normocephalic and atraumatic.      Right Ear: External ear normal.      Left Ear: External ear normal.      Nose: Nose normal. No rhinorrhea.   Eyes:      General: No scleral icterus.        Right eye: No discharge.         Left eye: No discharge.      Extraocular Movements: Extraocular movements intact.   Cardiovascular:      Rate and Rhythm: Normal rate.   Pulmonary:      Effort: Pulmonary effort is normal. No respiratory distress.   Abdominal:      General: Abdomen is flat. There is no distension.   Musculoskeletal:      Right lower leg: No edema.      Left lower leg: No edema.   Skin:     General: Skin is dry.   Neurological:      Mental Status: He is alert.      Cranial Nerves: Dysarthria present.      Sensory: Sensory deficit present.      Motor: Weakness present.      Comments: Aphasia   Psychiatric:         Behavior: Behavior normal.         Neurological Exam:   LOC: alert  Attention Span: Good   Language: expressive aphasia  Articulation: Dysarthria  Orientation: Untestable due to severe aphasia   Motor: Arm left  Normal 5/5  Leg left  Normal 5/5  Arm right  Plegia 0/5  Leg right Paresis 3/5  Sensation: right hypoesthesia  Tone: Flaccid  RUE     Laboratory:  BMP:   Recent Labs   Lab 01/04/22  0900      K 4.0      CO2 25   BUN 10   CREATININE 0.7   CALCIUM 8.7     CBC:   Recent Labs   Lab 01/04/22  0900   WBC 9.22   RBC 3.91*   HGB 13.2*   HCT 39.1*      *   MCH 33.8*   MCHC 33.8     Lipid Panel:   No results for input(s): CHOL, LDLCALC, HDL, TRIG in the last 168 hours.  Coagulation:   Recent Labs   Lab 01/05/22  0627   INR 1.5*   APTT 50.1*       Diagnostic Results     Brain Imaging   CTH 12/25/21  No significant change from prior.  Evolving large recent left MCA territory infarction  with petechial hemorrhagic conversion.  No evidence for significant new hemorrhage or increased mass effect.        CTH 12/24/2021  Evolving large left MCA territory infarction with petechial hemorrhagic conversion.  There is intermediate density components in the left temporal lobe area of infarction suggestive for interval additional petechial hemorrhage.  No evidence for extra-axial hemorrhage or hydrocephalus.     MRI Brain WO. Date: 12/22/21    Large acute  left MCA distribution infarct with modest mass effect. Multiple small remote scattered infarcts elsewhere as above.  Embolic disease to be considered.     CTH WO 12/21/21  Again demonstrated in better delineated as large left MCA distribution infarct with no associated acute hemorrhage and with localized mass effect resulting in effacement of overlying cortical sulci and partial effacement left sylvian fissure.  No associated acute hemorrhage.     Several small old areas of infarction again noted including cerebellum bilaterally, anterior left perisylvian region, lateral left frontal cortex and posterior right parietal cortex.     CTH WO 12/20/21   Large area loss of gray-white differentiation consistent with acute infarct left MCA distribution involving the left temporal lobe, temporal occipital region and portions of the basal ganglia with localized mass effect including effacement of overlying cortical sulci although with no associated acute hemorrhage.     Several small old areas of infarction are evident including anterior left perisylvian and cerebellum bilaterally. Mild underlying atrophy.        Vessel Imaging:  US LE Veins 12/25/21  No evidence of deep venous thrombosis in either lower extremity.    CTA H/N 12/20/21  Segmental occlusion measuring approximately 10 mm mid and distal M1 segment left MCA with diminished flow distal to the stenosis.     Right cervical carotid circulation--atherosclerotic calcification bifurcation without measurable  stenosis.     Left cervical carotid circulation--atherosclerotic calcification distal left common carotid artery and bifurcation with stenosis proximal left ICA measuring 16% utilizing NASCET criteria.  Mild eccentric narrowing mid to distal portion left common carotid artery also noted.     Vertebral arteries--no focal arterial abnormality or measurable stenosis.     Cardiac Evaluation:   TTE with bubble 12/21/21  1. The left ventricle (LV) is dilated with severely depressed systolic function & global hypokinesis.  2. LV ejection fraction is 15-20%. Grade III LV diastolic dysfunction.  3. Small to moderate sized (1.6x1.1 cm) layered, protruding apical mass c/w thrombus.  4. The right ventricle (RV) is severely enlarged with normal systolic function.   5. Estimated RVSP is moderately elevated (50-70 mmHg). Pulmonary hypertension is present.  6. Severe atrial enlargement.  7. Moderate eccentric mitral regrugitation.  8. Mild tricuspid regurgitation.     Other:  CTA Chest Non coronary 12/25/21  Pulmonary embolism left apical segmental pulmonary artery.     Moderate to large right and moderate left bilateral pleural effusions     Interstitial opacities throughout the lungs bilaterally with superimposed ill-defined consolidation right upper and to lesser degree left upper lobes which may represent superimposed developing pneumonia.     Scattered interlobular septal thickening throughout the lungs concerning for component of vascular congestion and edema.

## 2022-01-05 NOTE — PLAN OF CARE
Problem: Adult Inpatient Plan of Care  Goal: Plan of Care Review  Outcome: Ongoing, Progressing  Flowsheets (Taken 1/5/2022 0562)  Plan of Care Reviewed With: patient  Goal: Optimal Comfort and Wellbeing  Outcome: Ongoing, Progressing     Problem: Cerebral Tissue Perfusion (Stroke, Ischemic/Transient Ischemic Attack)  Goal: Optimal Cerebral Tissue Perfusion  Outcome: Ongoing, Progressing     Problem: Swallowing Impairment (Stroke, Ischemic/Transient Ischemic Attack)  Goal: Optimal Eating and Swallowing without Aspiration  Outcome: Ongoing, Progressing     Problem: Fall Injury Risk  Goal: Absence of Fall and Fall-Related Injury  Outcome: Ongoing, Progressing       POC reviewed and updated with the patient/caregiver. Questions regarding POC were encouraged and addressed with the patient/caregiver.  VSS, see flow-sheets. Patient is AO X 1 (self) at this time, expressive aphasia noted. Fall/safety precautions maintained, no signs of injury noted during shift. Patient was repositioned for comfort, bed locked in low position with side rails X 3, bed alarm set, with call light within reach. Patient instructed to call staff for mobility, verbalized understanding. Heparin gtt continued. No acute signs of distress noted at this time.

## 2022-01-05 NOTE — PROGRESS NOTES
"[facility] - Neurosurgery ([facility])  Adult Nutrition  Progress Note    SUMMARY       Recommendations    1. Continue Pureed diet advancing texture per SLP   - Continue cardiac restrictions    2. Rec adding Boost pudding TID   - Once liquids advance to thin, change ONS to Boost Plus TID    3. Monitor and follow-up    Goals: Pt will meet and tolerate >75% EEN/EPN by RD f/u  Nutrition Goal Status: goal met  Communication of RD Recs:  (POC)    Assessment and Plan      Nutrition Problem  Inadequate energy intake     Related to (etiology):   Difficulty swallowing     Signs and Symptoms (as evidenced by):   Pureed diet w/ nectar thick liquids per SLP     Interventions (treatment strategy):  Collaboration with other providers  Texture, Fat, Na modified diet  Commercial food     Nutrition Diagnosis Status:   New      Malnutrition Assessment                 Orbital Region (Subcutaneous Fat Loss): well nourished  Upper Arm Region (Subcutaneous Fat Loss): well nourished   [place] Region (Muscle Loss): well nourished                 Reason for Assessment    Reason For Assessment: RD follow-up  Diagnosis:  (Stroke)  Relevant Medical History: .  Interdisciplinary Rounds: did not attend    General Information Comments: Pt continues with 100% PO intake tolerating pureed diet with nectar thick liquids. AOx1 (self) at this time, expressive aphasia noted. UBW ~165# per graph review. 13# wt gain noted x 4 months. Wt loss noted since admit, unsure of accuracy. NFPE to be completed upon f/u.    Nutrition Discharge Planning: Adequate intake    Nutrition Risk Screen    Nutrition Risk Screen: dysphagia or difficulty swallowing    Nutrition/Diet History    Spiritual, Cultural Beliefs, Jain Practices, Values that Affect Care: no  Food Allergies: NKFA  Factors Affecting Nutritional Intake: difficulty/impaired swallowing    Anthropometrics    Temp: 98.6 °F (37 °C)  Height Method: Estimated  Height: 5' 11" (180.3 cm)  Height (inches): 71 " in  Weight Method: Bed Scale  Weight: 73.2 kg (161 lb 6 oz)  Weight (lb): 161.38 lb  Ideal Body Weight (IBW), Male: 172 lb  % Ideal Body Weight, Male (lb): 100.58 %  BMI (Calculated): 22.5  BMI Grade: 18.5-24.9 - normal  Usual Body Weight (UBW), k kg (per chart)  % Usual Body Weight: 104.85  % Weight Change From Usual Weight: 4.63 %       Lab/Procedures/Meds    Pertinent Labs Reviewed: reviewed  Pertinent Labs Comments: labs pending  Pertinent Medications Reviewed: reviewed  Pertinent Medications Comments: statin, metoprolol, senna, spironolactone, warfarin, heparin    Estimated/Assessed Needs    Weight Used For Calorie Calculations: 76.1 kg (167 lb 12.3 oz)  Energy Calorie Requirements (kcal):  kcal  Energy Need Method: [place]-St Jeor (x1.25 AF)  Protein Requirements: 76-91g (1-1.2g/kg)  Weight Used For Protein Calculations: 76.1 kg (167 lb 12.3 oz)  Fluid Requirements (mL): 1ml/kcal or per MD  Estimated Fluid Requirement Method: RDA Method  RDA Method (mL):          Nutrition Prescription Ordered    Current Diet Order: Dysphagia Pureed (L4), Cardiac  Nutrition Order Comments: Nectar thick liquids, 1000 mL FR    Evaluation of Received Nutrient/Fluid Intake    I/O: none noted  Energy Calories Required: meeting needs  Protein Required: meeting needs  Fluid Required: meeting needs  Comments: LBM: 1/4  Tolerance: tolerating  % Intake of Estimated Energy Needs: 50 - 75 %  % Meal Intake: 75 - 100 %    Nutrition Risk    Level of Risk/Frequency of Follow-up: low     Monitor and Evaluation    Food and Nutrient Intake: energy intake,enteral nutrition intake  Food and Nutrient Adminstration: enteral and parenteral nutrition administration  Knowledge/Beliefs/Attitudes: food and nutrition knowledge/skill  Physical Activity and Function: nutrition-related ADLs and IADLs  Anthropometric Measurements: weight,weight change  Biochemical Data, Medical Tests and Procedures: electrolyte and renal panel,gastrointestinal  profile,glucose/endocrine profile,inflammatory profile,lipid profile  Nutrition-Focused Physical Findings: overall appearance     Nutrition Follow-Up    RD Follow-up?: Yes

## 2022-01-05 NOTE — PT/OT/SLP PROGRESS
Occupational Therapy   Co-Treatment w/ PT    Patient Name:  Jimmy Leger   MRN:  [id number]  Admit Date: 12/21/2021  Admitting Diagnosis:  Stroke due to embolism of left middle cerebral artery   Length of Stay: 15 days  Recent Surgery: * No surgery found *           Recommendations:     Discharge Recommendations: rehabilitation facility  Discharge Equipment Recommendations:   (TBD)  Barriers to discharge:   (increased assistance needed)    Plan:     Patient to be seen 5 x/week to address the above listed problems via self-care/home management,therapeutic activities,therapeutic exercises,neuromuscular re-education  · Plan of Care Expires: 01/21/22  · Plan of Care Reviewed with: patient    Assessment:   Jimmy Leger is a 57 y.o. male with a medical diagnosis of Stroke due to embolism of left middle cerebral artery.  He presents with the following performance deficits affecting function: weakness,impaired endurance,impaired self care skills,impaired functional mobilty,gait instability,impaired balance,decreased upper extremity function,decreased lower extremity function,impaired coordination,decreased ROM,impaired fine motor,decreased safety awareness,impaired sensation.  Pt would benefit from skilled OT services in order to maximize independence with ADLs and facilitate safe discharge. Pt's clinical condition meets full inpatient rehab criteria. Inpatient rehab will provide to total interdisciplinary treatment approach needed. Pt is at high risk of unplanned readmission due to fall risk. The lower level of care cannot provide total interdisciplinary approach needed. Because of patient's significant decline from PLOF, patient would benefit from Inpatient Rehab to maximize return to PLOF. Pt is an excellent candidate for inpatient rehabilitation, as he has a qualifying diagnosis, displays good activity tolerance, has experienced decline from PLOF, has good family support, and is motivated to participate in therapy.  "    Time spent performing bed mobility, EOB activity w/ focus on standing balance, dynamic sitting and attention to R side; assisted PT in gait training and performing self-care in standing    Relevant hx and current limitations:   Affected side: right   Speech: expressive aphasia   Neglect/Inattention: right inattention/neglect   EOB sitting: Min A-SBA    Pt continues to require significant assist with functional mobility and safe completion of ADLs requiring Max-Mod A. Pt with noted improvement with ambulation and self-care as compared to last session. Patient is making progress towards goals.    Rehab Prognosis: Good; patient would benefit from acute skilled OT services to address these deficits and reach maximum level of function.        Subjective   Communicated with: RN prior to session.  Patient found HOB elevated with bed alarm,telemetry,peripheral IV,Condom Catheter upon OT entry to room.    Patient: "ok" "yes"     Pain/Comfort:  Pain Rating 1: 0/10    Objective:   Patient found with: bed alarm,telemetry,peripheral IV,Condom Catheter   General Precautions: Standard, aspiration,aphasia,pureed diet,nectar thick   Orthopedic Precautions:N/A   Braces: N/A   Oxygen Device: Room Air  Vitals: /81 (Patient Position: Lying)   Pulse 78   Temp 98.5 °F (36.9 °C) (Axillary)   Resp 17   Ht 5' 11" (1.803 m)   Wt 73.2 kg (161 lb 6 oz)   SpO2 (!) 94%   BMI 22.51 kg/m²     Outcome Measures:  [facility] 6 Click ADL: 14    Occupational Performance:  Bed Mobility:       Rolling Left:  minimum assistance   Scooting: minimum assistance   Supine to Sit: minimum assistance   Sit to Supine: minimum assistance    Functional Mobility/Transfers:     Patient completed Sit <> Stand Transfer with minimum then moderate assistance with  hand-held assist   o Pt performed x4 trials  - Pt req'd increased assistance to remain standing d/t fatigue and R sided lean  · Patient able to ambulate x2 trials: 4 steps then 6 steps " fwd/bwd with Max A x2 persons    Activities of Daily Living:     Feeding:  set-up assistance sitting then standing EOB to drink from styrofoam cup   Grooming: minimum assistance to perform hair hygiene standing EOB; initially pt with incorrect usage of comb despite max VC, however pt able to perform task correctly when it was physically demonstrated   Lower Body Dressing: moderate assistance to don/doff socks w/ HOB elevated; pt able to doff socks w/ minimum assistance and increased time, pt able to pull socks on when writing therapist initiated task and thread socks on toes      AMPAC 6 Click ADL:  AMPA Total Score: 14    Neuromuscular Re-Education:  Pt w/ poor sitting balance sitting EOB that steadily improved from Min A-SBA d/t positioning and engaging pt in core strengthening exercises  Pt participated in exercises that work on dynamic sitting balance: crossing midline and recovering from leaning to far to one side or forward (focus on scanning and tracking to R)  Improved this date, able to correct to self to midline w/ % of the time    Treatment & Education:   Therapist provided facilitation and instruction of proper body mechanics, energy conservation, and fall prevention strategies during tasks listed above.   Pt re-educated on importance of OOB activities with staff member assistance and sitting OOB majority of the day.    Updated communication board with level of assist required (Max A stand pivot) & educated RN/patient that pt is appropriate for transfers and mobility with RN/PCT.     Additional staff present: PT for co-tx due to patient's medical complexities requiring two skilled therapists in order to appropriately assess patient's functional deficits as well as ensure patient safety, accommodate for limited activity tolerance, and provide appropriate, skilled assistance to maximize functional potential during evaluation.    Patient left HOB elevated with all lines intact, call button in  reach, bed alarm on and RN notified    GOALS:   Multidisciplinary Problems     Occupational Therapy Goals        Problem: Occupational Therapy Goal    Goal Priority Disciplines Outcome Interventions   Occupational Therapy Goal     OT, PT/OT Ongoing, Progressing    Description: Goals to be met by: 1/10/22     Patient will increase functional independence with ADLs by performing:      UE Dressing with Minimal Assistance.  LE Dressing with Moderate Assistance.  Grooming while seated with Moderate Assistance.  Toileting from bedside commode with Maximum Assistance for hygiene and clothing management.   Sitting at edge of bed x5 minutes with Minimal Assistance.  Rolling to Bilateral with Minimal Assistance.   Supine to sit with Minimal Assistance.  Toilet transfer to bedside commode with Moderate Assistance.  Upper extremity exercise program AAROM x10 reps, with assistance as needed.                      Time Tracking:     OT Date of Treatment: 01/05/22  OT Start Time: 1046  OT Stop Time: 1110  OT Total Time (min): 24 min    Additional staff present: PT    Billable Minutes:  Self Care/Home Management 12  Neuromuscular Re-education Aleta Dawson (Joycelyn), OTR/L  [phone] (Pager #)  1/5/2022

## 2022-01-06 VITALS
DIASTOLIC BLOOD PRESSURE: 80 MMHG | OXYGEN SATURATION: 96 % | HEART RATE: 77 BPM | BODY MASS INDEX: 22.59 KG/M2 | HEIGHT: 71 IN | TEMPERATURE: 98 F | WEIGHT: 161.38 LBS | SYSTOLIC BLOOD PRESSURE: 119 MMHG | RESPIRATION RATE: 16 BRPM

## 2022-01-06 PROBLEM — M25.511 RIGHT SHOULDER PAIN: Status: RESOLVED | Noted: 2021-12-30 | Resolved: 2022-01-06

## 2022-01-06 LAB
ALBUMIN SERPL BCP-MCNC: 3 G/DL (ref 3.5–5.2)
ALP SERPL-CCNC: 69 U/L (ref 55–135)
ALT SERPL W/O P-5'-P-CCNC: 15 U/L (ref 10–44)
ANION GAP SERPL CALC-SCNC: 10 MMOL/L (ref 8–16)
APTT BLDCRRT: 53.7 SEC (ref 21–32)
AST SERPL-CCNC: 17 U/L (ref 10–40)
BASOPHILS # BLD AUTO: 0.07 K/UL (ref 0–0.2)
BASOPHILS NFR BLD: 0.8 % (ref 0–1.9)
BILIRUB SERPL-MCNC: 0.6 MG/DL (ref 0.1–1)
BUN SERPL-MCNC: 9 MG/DL (ref 6–20)
CALCIUM SERPL-MCNC: 9.1 MG/DL (ref 8.7–10.5)
CHLORIDE SERPL-SCNC: 106 MMOL/L (ref 95–110)
CO2 SERPL-SCNC: 24 MMOL/L (ref 23–29)
CREAT SERPL-MCNC: 0.7 MG/DL (ref 0.5–1.4)
DIFFERENTIAL METHOD: ABNORMAL
EOSINOPHIL # BLD AUTO: 0.4 K/UL (ref 0–0.5)
EOSINOPHIL NFR BLD: 4.2 % (ref 0–8)
ERYTHROCYTE [DISTWIDTH] IN BLOOD BY AUTOMATED COUNT: 12.2 % (ref 11.5–14.5)
EST. GFR  (AFRICAN AMERICAN): >60 ML/MIN/1.73 M^2
EST. GFR  (NON AFRICAN AMERICAN): >60 ML/MIN/1.73 M^2
GLUCOSE SERPL-MCNC: 94 MG/DL (ref 70–110)
HCT VFR BLD AUTO: 39.9 % (ref 40–54)
HGB BLD-MCNC: 13.5 G/DL (ref 14–18)
IMM GRANULOCYTES # BLD AUTO: 0.03 K/UL (ref 0–0.04)
IMM GRANULOCYTES NFR BLD AUTO: 0.3 % (ref 0–0.5)
INR PPP: 2.2 (ref 0.8–1.2)
LYMPHOCYTES # BLD AUTO: 2 K/UL (ref 1–4.8)
LYMPHOCYTES NFR BLD: 22.6 % (ref 18–48)
MAGNESIUM SERPL-MCNC: 2 MG/DL (ref 1.6–2.6)
MCH RBC QN AUTO: 33.6 PG (ref 27–31)
MCHC RBC AUTO-ENTMCNC: 33.8 G/DL (ref 32–36)
MCV RBC AUTO: 99 FL (ref 82–98)
MONOCYTES # BLD AUTO: 0.7 K/UL (ref 0.3–1)
MONOCYTES NFR BLD: 7.6 % (ref 4–15)
NEUTROPHILS # BLD AUTO: 5.8 K/UL (ref 1.8–7.7)
NEUTROPHILS NFR BLD: 64.5 % (ref 38–73)
NRBC BLD-RTO: 0 /100 WBC
PHOSPHATE SERPL-MCNC: 3.2 MG/DL (ref 2.7–4.5)
PLATELET # BLD AUTO: 260 K/UL (ref 150–450)
PMV BLD AUTO: 11.8 FL (ref 9.2–12.9)
POTASSIUM SERPL-SCNC: 3.8 MMOL/L (ref 3.5–5.1)
PROT SERPL-MCNC: 6.9 G/DL (ref 6–8.4)
PROTHROMBIN TIME: 22.7 SEC (ref 9–12.5)
RBC # BLD AUTO: 4.02 M/UL (ref 4.6–6.2)
SODIUM SERPL-SCNC: 140 MMOL/L (ref 136–145)
WBC # BLD AUTO: 8.95 K/UL (ref 3.9–12.7)

## 2022-01-06 PROCEDURE — 25000003 PHARM REV CODE 250

## 2022-01-06 PROCEDURE — 97530 THERAPEUTIC ACTIVITIES: CPT

## 2022-01-06 PROCEDURE — 25000003 PHARM REV CODE 250: Performed by: STUDENT IN AN ORGANIZED HEALTH CARE EDUCATION/TRAINING PROGRAM

## 2022-01-06 PROCEDURE — 99233 SBSQ HOSP IP/OBS HIGH 50: CPT | Mod: ,,, | Performed by: PSYCHIATRY & NEUROLOGY

## 2022-01-06 PROCEDURE — 80053 COMPREHEN METABOLIC PANEL: CPT

## 2022-01-06 PROCEDURE — 83735 ASSAY OF MAGNESIUM: CPT

## 2022-01-06 PROCEDURE — 97110 THERAPEUTIC EXERCISES: CPT

## 2022-01-06 PROCEDURE — 84100 ASSAY OF PHOSPHORUS: CPT

## 2022-01-06 PROCEDURE — 85025 COMPLETE CBC W/AUTO DIFF WBC: CPT

## 2022-01-06 PROCEDURE — 97535 SELF CARE MNGMENT TRAINING: CPT

## 2022-01-06 PROCEDURE — 25000003 PHARM REV CODE 250: Performed by: PHYSICIAN ASSISTANT

## 2022-01-06 PROCEDURE — 99233 PR SUBSEQUENT HOSPITAL CARE,LEVL III: ICD-10-PCS | Mod: ,,, | Performed by: PSYCHIATRY & NEUROLOGY

## 2022-01-06 PROCEDURE — 85730 THROMBOPLASTIN TIME PARTIAL: CPT | Performed by: PSYCHIATRY & NEUROLOGY

## 2022-01-06 PROCEDURE — 85610 PROTHROMBIN TIME: CPT | Performed by: PSYCHIATRY & NEUROLOGY

## 2022-01-06 PROCEDURE — 92507 TX SP LANG VOICE COMM INDIV: CPT

## 2022-01-06 PROCEDURE — 94761 N-INVAS EAR/PLS OXIMETRY MLT: CPT

## 2022-01-06 PROCEDURE — 25000003 PHARM REV CODE 250: Performed by: INTERNAL MEDICINE

## 2022-01-06 PROCEDURE — 92526 ORAL FUNCTION THERAPY: CPT

## 2022-01-06 PROCEDURE — 25000003 PHARM REV CODE 250: Performed by: PSYCHIATRY & NEUROLOGY

## 2022-01-06 PROCEDURE — 63600175 PHARM REV CODE 636 W HCPCS: Performed by: INTERNAL MEDICINE

## 2022-01-06 PROCEDURE — 99223 PR INITIAL HOSPITAL CARE,LEVL III: ICD-10-PCS | Mod: ,,, | Performed by: PHYSICAL MEDICINE & REHABILITATION

## 2022-01-06 PROCEDURE — 99223 1ST HOSP IP/OBS HIGH 75: CPT | Mod: ,,, | Performed by: PHYSICAL MEDICINE & REHABILITATION

## 2022-01-06 RX ORDER — ASPIRIN 81 MG/1
81 TABLET ORAL DAILY
Refills: 0
Start: 2022-01-07 | End: 2023-01-07

## 2022-01-06 RX ORDER — ATORVASTATIN CALCIUM 40 MG/1
40 TABLET, FILM COATED ORAL DAILY
Qty: 90 TABLET | Refills: 3
Start: 2022-01-07 | End: 2023-01-07

## 2022-01-06 RX ORDER — METOPROLOL SUCCINATE 25 MG/1
12.5 TABLET, EXTENDED RELEASE ORAL 2 TIMES DAILY
Qty: 30 TABLET | Refills: 11
Start: 2022-01-06 | End: 2023-01-06

## 2022-01-06 RX ORDER — SPIRONOLACTONE 25 MG/1
12.5 TABLET ORAL DAILY
Qty: 15 TABLET | Refills: 11
Start: 2022-01-08 | End: 2023-01-08

## 2022-01-06 RX ORDER — ESCITALOPRAM OXALATE 5 MG/1
5 TABLET ORAL DAILY
Qty: 30 TABLET | Refills: 11
Start: 2022-01-07 | End: 2023-01-07

## 2022-01-06 RX ORDER — WARFARIN 10 MG/1
10 TABLET ORAL DAILY
Qty: 30 TABLET | Refills: 11
Start: 2022-01-06 | End: 2023-01-06

## 2022-01-06 RX ORDER — DAPAGLIFLOZIN 10 MG/1
10 TABLET, FILM COATED ORAL DAILY
Qty: 30 TABLET | Refills: 2 | Status: SHIPPED | OUTPATIENT
Start: 2022-01-06

## 2022-01-06 RX ADMIN — ASPIRIN 81 MG: 81 TABLET, COATED ORAL at 08:01

## 2022-01-06 RX ADMIN — SACUBITRIL AND VALSARTAN 1 TABLET: 24; 26 TABLET, FILM COATED ORAL at 08:01

## 2022-01-06 RX ADMIN — SENNOSIDES AND DOCUSATE SODIUM 1 TABLET: 50; 8.6 TABLET ORAL at 08:01

## 2022-01-06 RX ADMIN — ATORVASTATIN CALCIUM 40 MG: 20 TABLET, FILM COATED ORAL at 08:01

## 2022-01-06 RX ADMIN — SPIRONOLACTONE 12.5 MG: 25 TABLET, FILM COATED ORAL at 09:01

## 2022-01-06 RX ADMIN — ESCITALOPRAM OXALATE 5 MG: 5 TABLET, FILM COATED ORAL at 08:01

## 2022-01-06 RX ADMIN — SPIRONOLACTONE 12.5 MG: 25 TABLET, FILM COATED ORAL at 08:01

## 2022-01-06 RX ADMIN — POLYETHYLENE GLYCOL 3350 17 G: 17 POWDER, FOR SOLUTION ORAL at 09:01

## 2022-01-06 RX ADMIN — HEPARIN SODIUM 19.06 UNITS/KG/HR: 1000 INJECTION INTRAVENOUS; SUBCUTANEOUS at 05:01

## 2022-01-06 RX ADMIN — METOPROLOL SUCCINATE 50 MG: 50 TABLET, EXTENDED RELEASE ORAL at 08:01

## 2022-01-06 NOTE — PLAN OF CARE
Problem: Adult Inpatient Plan of Care  Goal: Plan of Care Review  Outcome: Ongoing, Progressing  Goal: Patient-Specific Goal (Individualized)  Description: Admit Date: (Not on file)    <principal problem not specified>    Past Medical History:  No date: Coronary artery disease  No date: Hypertension    No past surgical history on file.    Individualization:   1. Pt likes dim lights.    Restraints: na             Outcome: Ongoing, Progressing  Goal: Absence of Hospital-Acquired Illness or Injury  Outcome: Ongoing, Progressing      Stroke booklet at bedside, reviewed with patient and they verbalized understanding. POC reviewed with the patient and they verbalized understanding. All comments and concerns addressed. Bed locked in lowest position with bed alarm set, call light within reach. Safety precautions maintained. VSS, see flowsheets. Will continue to monitor for changes to POC and clinical condition.

## 2022-01-06 NOTE — ASSESSMENT & PLAN NOTE
Patient with right shoulder pain.  XR right shoulder negative for fractures and dislocations  Resolved

## 2022-01-06 NOTE — ASSESSMENT & PLAN NOTE
Stroke RF  SBP goal normotension  On GDMT  Reduced metoprolol dose to 25mg due to hypotensive episodes requiring evening dose of 50mg to be held

## 2022-01-06 NOTE — PROGRESS NOTES
[facility] - Neurosurgery ([facility])  Vascular Neurology  Comprehensive Stroke Center  Progress Note    Assessment/Plan:     * Stroke due to embolism of left middle cerebral artery  55 yo male with PMHx of HTN and CAD presents as a transfer from [facility] for large L MCA infarct. CTA with distal L M1 occlusion. No tpa, OOW. No IR, large core infarct. Echo (at OSH) with LV global hypokinesis, EF 15-20%, severe atrial enlargement, and apical thrombus. Repeat CTH with petechial hemorrhage, anticoagulation temporarily held. Repeat CT with stable petechial hemorrhage. During admission obtained CTA chest which showed subsegmental PE. On heparing gtt with Coumadin bridge, coumadin is therapeutic (2.2). Being discharged to IPR placement.       Etiology: likely cardioembolic 2/2 low EF(15-20%) and cardiac thrombus.     Antithrombotics for secondary stroke prevention:   Heparin gtt with Coumadin (5mg) bridge with INR goal 2-3    Statins for secondary stroke prevention and hyperlipidemia, if present: Statins: Atorvastatin- 40 mg daily     Aggressive risk factor modification: HTN, CAD     Rehab efforts: The patient has been evaluated by a stroke team provider and the therapy needs have been fully considered based off the presenting complaints and exam findings. The following therapy evaluations are needed: PT/OT on board, SLP evaluate and treat, PM&R evaluate for appropriate placement. Recommend IPR.    Diagnostics ordered/pending: None     VTE prophylaxis: Mechanical prophylaxis: Place SCDs  None: Reason for No Pharmacological VTE Prophylaxis: Currently on anticoagulation    BP parameters: <140      Combined systolic and diastolic congestive heart failure  Newly diagnosed  EF 15-20%    Cardiology consulted, appreciate recs   -Discontinue Lisinopril  -Start Entresto 49/51mg   -Anticoagulation for 3 months with follow up echo   -Follow up with cardiology as outpatient  - At discharge start SGLT2 inhibitor with BMP follow up in 1  week; spironolactone 12.5mg daily when able to tolerate; increase metoprolol 50mg BID with goal of increasing to 200mg total daily when tolerable, no life vest recommended at this time     Currently on: Entresto 24-26 mg BID, metoprolol 25mg BID, spironalactone 12.5 (see HTN for recent medication changes)       PAD (peripheral artery disease)  -Patient with significant PAD w/ occluded stents x 2 in sup femoral and popliteal artery  -Vascular surgery consulted who recommended ASA when safe from neuro perspective.   - ASA started on 12/31    Aphasia  Therapy to evaluate and treat    Single subsegmental pulmonary embolism without acute cor pulmonale  Visualized on CTA chest 12/25  L apical segmental pulmonary artery embolism  EKG with R axis deviation, RBBB.  US LE negative for DVT  Coumadin bridge complete, continue coumadin 10mg     Debility  PT/OT to evaluate and treat  Therapy recommending rehab  Being discharged to [facility] today     Cytotoxic cerebral edema  Area of cytotoxic cerebral edema identified when reviewing brain imaging in the territory of the L middle cerebral artery. There is mass effect associated with it. Continue to monitor the patients clinical exam for any worsening of symptoms which may indicate expansion of the stroke or the area of the edema resulting in the clinical change. The pattern is suggestive of embolic etiology.    Clinical exam has remained stable, this suggests that cerebral edema has stabilized. Low probability of clinical deterioration. Neurosurgery signed off, patient no longer on hemicrani watch    Essential hypertension  Stroke RF  SBP goal normotension  On GDMT  Reduced metoprolol dose to 25mg due to hypotensive episodes requiring evening dose of 50mg to be held    Grade III diastolic dysfunction  See HF     Mural thrombus of cardiac apex  TTE at outside facility 12/21/21:  EF of 15-20%, small to moderate sized (1.6x1.1 cm) layered, protruding apical mass c/w thrombus, global  hypokinesis, severe atrial enlargement.    -Was on heparin gtt and temporarily held due to hemorrhagic conversion, now restarted  -Was on Heparin gtt to Coumadin bridge   Coumadin increased to 10mg, goal INR 2-3 ---now therapeutic, heparin d/c  -repeat echo in 3 months for resolution of thrombus---ordered for outpatient setting          Patient was admitted to [facility] for L MCA on som-craniotomy watch. He was started on heparin gtt for cardiac thrombus; however, this was temporarily discontinued due to petechial hemorrhagic conversion. Patient's stay also complicated by runs of V Tach (12/24-12/25) and a CTA finding of subsegmental PE. Heparin drip was restarted, and patient was later stepped down to NPU. Patient also was seen by cardiology as inpatient for HF. Entresto, metoprolol, and spironolactone were initiated, lisinopril d/c. He will follow up with cardiology as an outpatient. Patient was bridged from heparin gtt to coumadin. INR became therapeutic on 1/6/22 (2.2). He will be discharged to [facility] with OP follow up with VN.         STROKE DOCUMENTATION        NIH Scale:  1a. Level of Consciousness: 0-->Alert, keenly responsive  1b. LOC Questions: 2-->Answers neither question correctly  1c. LOC Commands: 1-->Performs one task correctly  2. Best Gaze: 0-->Normal  3. Visual: 0-->No visual loss  4. Facial Palsy: 1-->Minor paralysis (flattened nasolabial fold, asymmetry on smiling)  5a. Motor Arm, Left: 0-->No drift, limb holds 90 (or 45) degrees for full 10 secs  5b. Motor Arm, Right: 4-->No movement  6a. Motor Leg, Left: 0-->No drift, leg holds 30 degree position for full 5 secs  6b. Motor Leg, Right: 2-->Some effort against gravity, leg falls to bed by 5 secs, but has some effort against gravity  7. Limb Ataxia: 0-->Absent  8. Sensory: 1-->Mild-to-moderate sensory loss, patient feels pinprick is less sharp or is dull on the affected side, or there is a loss of superficial pain with pinprick, but patient is aware of  being touched  9. Best Language: 1-->Mild-to-moderate aphasia, some obvious loss of fluency or facility of comprehension, without significant limitation on ideas expressed or form of expression. Reduction of speech and/or comprehension, however, makes conversation. . . (see row details)  10. Dysarthria: 2-->Severe dysarthria, patients speech is so slurred as to be unintelligible in the absence of or out of proportion to any dysphasia, or is mute/anarthric  11. Extinction and Inattention (formerly Neglect): 0-->No abnormality  Total (NIH Stroke Scale): 14       Modified [place] Score: 4  [place] Coma Scale:    ABCD2 Score:    [id number] Score:   HAS -BLED Score:   ICH Score:   Hunt & Gillis Classification:      Hemorrhagic change of an Ischemic Stroke: Does this patient have an ischemic stroke with hemorrhagic changes? Yes, Grading Scale: HI Type 1 (HI-1) = small petechiae along the margins of the infarct. Is this a symptomatic change?  No - Hemorrhage is not clinically significant     Neurologic Chief Complaint: found down, nonverbal, RSW    Subjective:     Interval History: Patient is seen for follow-up neurological assessment and treatment recommendations:   Patient's INR 2.2. Medically ready to be discharged to [facility].     HPI, Past Medical, Family, and Social History remains the same as documented in the initial encounter.     Review of Systems   Unable to perform ROS: Mental status change   Constitutional: Negative for chills and fever.   HENT: Negative for facial swelling and trouble swallowing.    Eyes: Negative for photophobia and visual disturbance.   Respiratory: Negative for cough and shortness of breath.    Cardiovascular: Negative for leg swelling.   Gastrointestinal: Negative for constipation, diarrhea and vomiting.   Genitourinary: Negative for difficulty urinating.   Skin: Negative for pallor and rash.   Neurological: Positive for facial asymmetry, speech difficulty and weakness.    Psychiatric/Behavioral: Negative for agitation.     Scheduled Meds:   aspirin  81 mg Oral Daily    atorvastatin  40 mg Oral Daily    EScitalopram oxalate  5 mg Oral Daily    metoprolol succinate  12.5 mg Oral BID    polyethylene glycol  17 g Oral Daily    sacubitriL-valsartan  1 tablet Oral BID    senna-docusate 8.6-50 mg  1 tablet Oral BID    spironolactone  12.5 mg Oral Daily    warfarin  10 mg Oral Daily     Continuous Infusions:    PRN Meds:acetaminophen, hydrALAZINE, labetalol, ondansetron, sodium chloride 0.9%    Objective:     Vital Signs (Most Recent):  Temp: 98.1 °F (36.7 °C) (01/06/22 1053)  Pulse: 77 (01/06/22 1053)  Resp: 16 (01/06/22 0450)  BP: 119/80 (01/06/22 1053)  SpO2: 95 % (01/06/22 1053)  BP Location: Right arm    Vital Signs Range (Last 24H):  Temp:  [97.8 °F (36.6 °C)-99 °F (37.2 °C)]   Pulse:  [69-85]   Resp:  [16-18]   BP: (108-123)/(70-85)   SpO2:  [94 %-100 %]   BP Location: Right arm    Physical Exam  Vitals and nursing note reviewed.   Constitutional:       General: He is not in acute distress.     Appearance: He is well-developed and normal weight. He is not diaphoretic.   HENT:      Head: Normocephalic and atraumatic.      Right Ear: External ear normal.      Left Ear: External ear normal.      Nose: Nose normal. No rhinorrhea.   Eyes:      General: No scleral icterus.        Right eye: No discharge.         Left eye: No discharge.      Extraocular Movements: Extraocular movements intact.   Cardiovascular:      Rate and Rhythm: Normal rate.   Pulmonary:      Effort: Pulmonary effort is normal. No respiratory distress.   Abdominal:      General: Abdomen is flat. There is no distension.   Musculoskeletal:      Right lower leg: No edema.      Left lower leg: No edema.   Skin:     General: Skin is dry.   Neurological:      Mental Status: He is alert.      Cranial Nerves: Dysarthria present.      Sensory: Sensory deficit present.      Motor: Weakness present.      Comments:  Aphasia   Psychiatric:         Behavior: Behavior normal.         Neurological Exam:   LOC: alert  Attention Span: Good   Language: expressive aphasia  Articulation: Dysarthria  Orientation: Untestable due to severe aphasia   Motor: Arm left  Normal 5/5  Leg left  Normal 5/5  Arm right  Plegia 0/5  Leg right Paresis 3/5  Sensation: right hypoesthesia  Tone: Flaccid  RUE     Laboratory:  BMP:   Recent Labs   Lab 01/06/22  0506      K 3.8      CO2 24   BUN 9   CREATININE 0.7   CALCIUM 9.1     CBC:   Recent Labs   Lab 01/06/22  0506   WBC 8.95   RBC 4.02*   HGB 13.5*   HCT 39.9*      MCV 99*   MCH 33.6*   MCHC 33.8     Lipid Panel:   No results for input(s): CHOL, LDLCALC, HDL, TRIG in the last 168 hours.  Coagulation:   Recent Labs   Lab 01/06/22  0506   INR 2.2*   APTT 53.7*       Diagnostic Results     Brain Imaging   CTH 12/25/21  No significant change from prior.  Evolving large recent left MCA territory infarction with petechial hemorrhagic conversion.  No evidence for significant new hemorrhage or increased mass effect.        CTH 12/24/2021  Evolving large left MCA territory infarction with petechial hemorrhagic conversion.  There is intermediate density components in the left temporal lobe area of infarction suggestive for interval additional petechial hemorrhage.  No evidence for extra-axial hemorrhage or hydrocephalus.     MRI Brain WO. Date: 12/22/21    Large acute  left MCA distribution infarct with modest mass effect. Multiple small remote scattered infarcts elsewhere as above.  Embolic disease to be considered.     CTH WO 12/21/21  Again demonstrated in better delineated as large left MCA distribution infarct with no associated acute hemorrhage and with localized mass effect resulting in effacement of overlying cortical sulci and partial effacement left sylvian fissure.  No associated acute hemorrhage.     Several small old areas of infarction again noted including cerebellum  bilaterally, anterior left perisylvian region, lateral left frontal cortex and posterior right parietal cortex.     CTH WO 12/20/21   Large area loss of gray-white differentiation consistent with acute infarct left MCA distribution involving the left temporal lobe, temporal occipital region and portions of the basal ganglia with localized mass effect including effacement of overlying cortical sulci although with no associated acute hemorrhage.     Several small old areas of infarction are evident including anterior left perisylvian and cerebellum bilaterally. Mild underlying atrophy.        Vessel Imaging:  US LE Veins 12/25/21  No evidence of deep venous thrombosis in either lower extremity.    CTA H/N 12/20/21  Segmental occlusion measuring approximately 10 mm mid and distal M1 segment left MCA with diminished flow distal to the stenosis.     Right cervical carotid circulation--atherosclerotic calcification bifurcation without measurable stenosis.     Left cervical carotid circulation--atherosclerotic calcification distal left common carotid artery and bifurcation with stenosis proximal left ICA measuring 16% utilizing NASCET criteria.  Mild eccentric narrowing mid to distal portion left common carotid artery also noted.     Vertebral arteries--no focal arterial abnormality or measurable stenosis.     Cardiac Evaluation:   TTE with bubble 12/21/21  1. The left ventricle (LV) is dilated with severely depressed systolic function & global hypokinesis.  2. LV ejection fraction is 15-20%. Grade III LV diastolic dysfunction.  3. Small to moderate sized (1.6x1.1 cm) layered, protruding apical mass c/w thrombus.  4. The right ventricle (RV) is severely enlarged with normal systolic function.   5. Estimated RVSP is moderately elevated (50-70 mmHg). Pulmonary hypertension is present.  6. Severe atrial enlargement.  7. Moderate eccentric mitral regrugitation.  8. Mild tricuspid regurgitation.     Other:  CTA Chest Non  coronary 12/25/21  Pulmonary embolism left apical segmental pulmonary artery.     Moderate to large right and moderate left bilateral pleural effusions     Interstitial opacities throughout the lungs bilaterally with superimposed ill-defined consolidation right upper and to lesser degree left upper lobes which may represent superimposed developing pneumonia.     Scattered interlobular septal thickening throughout the lungs concerning for component of vascular congestion and edema.        Clovis Kate PA-C  [facility]  Department of Vascular Neurology   [facility])

## 2022-01-06 NOTE — ASSESSMENT & PLAN NOTE
Newly diagnosed  EF 15-20%    Cardiology consulted, appreciate recs   -Discontinue Lisinopril  -Start Entresto 49/51mg   -Anticoagulation for 3 months with follow up echo   -Follow up with cardiology as outpatient  - At discharge start SGLT2 inhibitor with BMP follow up in 1 week; spironolactone 12.5mg daily when able to tolerate; increase metoprolol 50mg BID with goal of increasing to 200mg total daily when tolerable, no life vest recommended at this time     Currently on: Entresto 24-26 mg BID, metoprolol 25mg BID, spironalactone 12.5 (see HTN for recent medication changes)

## 2022-01-06 NOTE — PT/OT/SLP PROGRESS
Speech Language Pathology Treatment    Patient Name:  Jmimy Leger   MRN:  [id number]  Admitting Diagnosis: Stroke due to embolism of left middle cerebral artery    Recommendations:                 General Recommendations:  Dysphagia therapy and Speech/language therapy  Diet recommendations:  Mechanical soft, Liquid Diet Level: Nectar Thick   Aspiration Precautions: 1 bite/sip at a time, Assistance with meals and Assistance with thickening liquids, Check for pocketing/oral residue R side, HOB to 90 degrees, Meds whole buried in puree, Small bites/sips and Strict aspiration precautions   General Precautions: Standard, aspiration,aphasia,nectar thick  Communication strategies:  yes/no questions only and provide increased time to answer    Subjective     Spoke with RN prior to session who reports patient is taking pills whole in puree without difficulty. Received patient awake, alert sitting upright in bedside chair. He continues to have highly unintelligible speech and difficulty controlling oral motor movements, though was able to answer simple Y/N questions and was agreeable to PO trials.     Pain/Comfort:  Pain Rating 1: 0/10    Respiratory Status: Room air    Objective:     Has the patient been evaluated by SLP for swallowing?   Yes  Keep patient NPO? No   Current Respiratory Status:        Worked on intake of ice chips x2 and thin liquids. He continues to demonstrated adequate acceptance and extraction from spoon and cup rim with improve anterior containment today. NO anterior spillage observed out R side of mouth with feed assist. Status post transfer of the bolus, he continues to have throat clearing with ice chips. He tolerated 2 small tsp sips of thin liquids, but on his 3rd sip he demonstrated prolonged bolus hold and then immediate coughing/choking response. This was eliminated with nectar thick liquids.    With solids foods today he continues to demonstrate prolonged and reduce mastication secondary to  missing teeth and R sided weakness, though improve from previous session. He was able to chew and swallow regular solids today adequately and benefited from puree wash to clear mild residuals. NO pocketing observed in R side today. Would recommend upgrade to mechanical soft solids at this time. RN in agreement.      Worked on automatic speech tasks today with counting and the alphabet. He was able to independently count to 10 after intitial cueing from clinician, though was unable to say the ABCs despite a clinician model for each letter. He was able to answer simple Y/N questions today with 75% accuracy though requires frequent repetitions. He is still unable to follow simple commands today, though was able to open his mouth and protrude tongue slightly upon stimulation. Given a verbal FCx2, he was able to orient to self and year only.  Attempted repetition of sounds and simple CV, VC, CVC words. He was unable to repeat any sounds or words modeled by clinician despite tactile cues.     Educated the patient on role of SLP in his care, ongoing deficits, improvements, diet recommendations, aspiration precautions, safe swallow strategies, clear speech strategies, oral motor exercises, need for ongoing ST services, and POC. He indicated understanding and agreement with head nod.      Assessment:     Jimmy Leger is a 57 y.o. male with an SLP diagnosis of Aphasia, Dysphagia, Apraxia and Cognitive-Linguistic Impairment.  He presents with highly unintelligible speech, difficult following commands, R sided facial weakness, prolonged mastication and overt s/sx of airway compromise with thin liquids.    Goals:   Multidisciplinary Problems     SLP Goals        Problem: SLP Goal    Goal Priority Disciplines Outcome   SLP Goal     SLP Ongoing, Progressing   Description: Speech Language Pathology Goals  Goals expected to be met by 1/10:  1. Pt will participate in ongoing swallowing assessment to determine if safe for further diet  advancement.  2. Pt will tolerate [facility] soft diet and nectar thick liquids without s/s of aspiration.  3. Pt will respond to simple yes/no q's with 80% accuracy given max cues.   4. Pt will model 1-step commands on 2/5 trials given max cues.   5. Pt will repeat single words with 60% intelligibility given max cues.   6. Pt will participate in ongoing evaluation of speech/language abilities.       Goals expected to be met by 12/30:  1. Pt will participate in ongoing swallowing assessment to determine if safe for PO intake. Goal met  2. Pt will respond to simple yes/no q's with 60% accuracy given max cues. ongoing  3. Pt will model 1-step commands on 1/5 trials given max cues. Goal met  4. Pt will repeat single words with 50% intelligibility given max cues. Goal met for bilabial CV syllable/words  5. Pt will participate in ongoing evaluation of speech/language abilities. ongoing                           Plan:     · Patient to be seen:  4 x/week   · Plan of Care expires:  01/22/22  · Plan of Care reviewed with:  patient   · SLP Follow-Up:  Yes       Discharge recommendations:  rehabilitation facility   Barriers to Discharge:  None    Time Tracking:     SLP Treatment Date:   01/06/22  Speech Start Time:  1120  Speech Stop Time:  1142     Speech Total Time (min):  22 min    Billable Minutes: Speech Therapy Individual 6, Treatment Swallowing Dysfunction 8 and Self Care/Home Management Training 8    01/06/2022

## 2022-01-06 NOTE — ASSESSMENT & PLAN NOTE
PT/OT to evaluate and treat  Therapy recommending rehab  Being discharged to [facility] today

## 2022-01-06 NOTE — ASSESSMENT & PLAN NOTE
Visualized on CTA chest 12/25  L apical segmental pulmonary artery embolism  EKG with R axis deviation, RBBB.  US LE negative for DVT  Coumadin bridge complete, continue coumadin 10mg

## 2022-01-06 NOTE — PLAN OF CARE
Problem: Adult Inpatient Plan of Care  Goal: Plan of Care Review  Outcome: Ongoing, Progressing  Flowsheets (Taken 1/6/2022 1254)  Plan of Care Reviewed With: patient  Goal: Patient-Specific Goal (Individualized)  Description: Admit Date: (Not on file)    <principal problem not specified>    Past Medical History:  No date: Coronary artery disease  No date: Hypertension    No past surgical history on file.    Individualization:   1. Pt likes dim lights.    Restraints: na             Outcome: Ongoing, Progressing  Flowsheets (Taken 1/6/2022 1254)  Anxieties, Fears or Concerns: SERA  Goal: Absence of Hospital-Acquired Illness or Injury  Outcome: Ongoing, Progressing    Stroke booklet at bedside, reviewed with patient and they verbalized understanding. POC reviewed with the patient and they verbalized understanding. All comments and concerns addressed. Bed locked in lowest position with bed alarm set, call light within reach. Safety precautions maintained. VSS, see flowsheets. Will continue to monitor for changes to POC and clinical condition.

## 2022-01-06 NOTE — DISCHARGE SUMMARY
[facility] - Neurosurgery ([facility])  Vascular Neurology  Comprehensive Stroke Center  Discharge Summary     Summary:     Admit Date: 12/21/2021  5:16 PM    Discharge Date and Time:  01/06/2022 11:19 AM    Attending Physician: Olaf Kasper MD     Discharge Provider: Clovis Kate PA-C    History of Present Illness: 55 yo male with PMHx of HTN and CAD who presents as a transfer from [facility]. Patient had been admitted to OSH after telestroke was cancelled due to no intervention recommended per telestroke provider. Patient initially brought to OSH via EMS after being found down by coworker yesterday afternoon. Last known normal unclear. Patient did not show up to feed the horses on the morning of 12/19/21 which he usually does. Coworker went to check on him yesterday and found patient down with only a shirt and called EMS. On initial evaluation at OSH ED, patient nonverbal to command with right sided hemiparesis. Stroke code activated. CTH at OSH with large L MCA infarct. CTA with distal L M1 occlusion. He was admitted to hospital medicine at OSH and now transferred to [facility] for higher level of care. Repeat [facility]CTH(12/21) with large L MCA infarct. Echo at OSH with LV global hypokinesis, EF 15-20%, severe atrial enlargement and small to moderate size apical thrombus. He is NPO, received  via suppository pta. Patient to be admitted to [facility] for close monitoring.     *History obtained per chart review as patient is aphasic.       Hospital Course (synopsis of major diagnoses, care, treatment, and services provided during the course of the hospital stay): Patient was admitted to [facility] for L MCA on som-craniotomy watch. He was started on heparin gtt for cardiac thrombus; however, this was temporarily discontinued due to petechial hemorrhagic conversion. Patient's stay also complicated by runs of V Tach (12/24-12/25) and a CTA finding of subsegmental PE. Heparin drip was restarted, and patient was later stepped down to NPU.  Patient also was seen by cardiology as inpatient for HF. Entresto, metoprolol, and spironolactone were initiated, lisinopril d/c. He will follow up with cardiology as an outpatient. Patient was bridged from heparin gtt to coumadin. INR became therapeutic on 1/6/22 (2.2). He will be discharged to [facility] with OP follow up with VN.         Goals of Care Treatment Preferences:  Code Status: Full Code      Stroke Etiology: Ischemic Cardioembolic Ischemic cardiomyopathy with EF < 28%    STROKE DOCUMENTATION         NIH Scale:  1a. Level of Consciousness: 0-->Alert, keenly responsive  1b. LOC Questions: 2-->Answers neither question correctly  1c. LOC Commands: 1-->Performs one task correctly  2. Best Gaze: 0-->Normal  3. Visual: 0-->No visual loss  4. Facial Palsy: 1-->Minor paralysis (flattened nasolabial fold, asymmetry on smiling)  5a. Motor Arm, Left: 0-->No drift, limb holds 90 (or 45) degrees for full 10 secs  5b. Motor Arm, Right: 4-->No movement  6a. Motor Leg, Left: 0-->No drift, leg holds 30 degree position for full 5 secs  6b. Motor Leg, Right: 2-->Some effort against gravity, leg falls to bed by 5 secs, but has some effort against gravity  7. Limb Ataxia: 0-->Absent  8. Sensory: 1-->Mild-to-moderate sensory loss, patient feels pinprick is less sharp or is dull on the affected side, or there is a loss of superficial pain with pinprick, but patient is aware of being touched  9. Best Language: 1-->Mild-to-moderate aphasia, some obvious loss of fluency or facility of comprehension, without significant limitation on ideas expressed or form of expression. Reduction of speech and/or comprehension, however, makes conversation. . . (see row details)  10. Dysarthria: 2-->Severe dysarthria, patients speech is so slurred as to be unintelligible in the absence of or out of proportion to any dysphasia, or is mute/anarthric  11. Extinction and Inattention (formerly Neglect): 0-->No abnormality  Total (NIH Stroke Scale):  14        Modified Francis Score: 4  Brenden Coma Scale:    ABCD2 Score:    [id number] Score:   HAS -BLED Score:   ICH Score:   Hunt & Gillis Classification:       Assessment/Plan:     Diagnostic Results:    Brain Imaging   CTH 12/25/21  No significant change from prior.  Evolving large recent left MCA territory infarction with petechial hemorrhagic conversion.  No evidence for significant new hemorrhage or increased mass effect.        CTH 12/24/2021  Evolving large left MCA territory infarction with petechial hemorrhagic conversion.  There is intermediate density components in the left temporal lobe area of infarction suggestive for interval additional petechial hemorrhage.  No evidence for extra-axial hemorrhage or hydrocephalus.     MRI Brain WO. Date: 12/22/21    Large acute  left MCA distribution infarct with modest mass effect. Multiple small remote scattered infarcts elsewhere as above.  Embolic disease to be considered.     CTH WO 12/21/21  Again demonstrated in better delineated as large left MCA distribution infarct with no associated acute hemorrhage and with localized mass effect resulting in effacement of overlying cortical sulci and partial effacement left sylvian fissure.  No associated acute hemorrhage.     Several small old areas of infarction again noted including cerebellum bilaterally, anterior left perisylvian region, lateral left frontal cortex and posterior right parietal cortex.     CTH WO 12/20/21   Large area loss of gray-white differentiation consistent with acute infarct left MCA distribution involving the left temporal lobe, temporal occipital region and portions of the basal ganglia with localized mass effect including effacement of overlying cortical sulci although with no associated acute hemorrhage.     Several small old areas of infarction are evident including anterior left perisylvian and cerebellum bilaterally. Mild underlying atrophy.        Vessel Imaging:  US LE Veins  12/25/21  No evidence of deep venous thrombosis in either lower extremity.     CTA H/N 12/20/21  Segmental occlusion measuring approximately 10 mm mid and distal M1 segment left MCA with diminished flow distal to the stenosis.     Right cervical carotid circulation--atherosclerotic calcification bifurcation without measurable stenosis.     Left cervical carotid circulation--atherosclerotic calcification distal left common carotid artery and bifurcation with stenosis proximal left ICA measuring 16% utilizing NASCET criteria.  Mild eccentric narrowing mid to distal portion left common carotid artery also noted.     Vertebral arteries--no focal arterial abnormality or measurable stenosis.     Cardiac Evaluation:   TTE with bubble 12/21/21  1. The left ventricle (LV) is dilated with severely depressed systolic function & global hypokinesis.  2. LV ejection fraction is 15-20%. Grade III LV diastolic dysfunction.  3. Small to moderate sized (1.6x1.1 cm) layered, protruding apical mass c/w thrombus.  4. The right ventricle (RV) is severely enlarged with normal systolic function.   5. Estimated RVSP is moderately elevated (50-70 mmHg). Pulmonary hypertension is present.  6. Severe atrial enlargement.  7. Moderate eccentric mitral regrugitation.  8. Mild tricuspid regurgitation.     Other:  CTA Chest Non coronary 12/25/21  Pulmonary embolism left apical segmental pulmonary artery.     Moderate to large right and moderate left bilateral pleural effusions     Interstitial opacities throughout the lungs bilaterally with superimposed ill-defined consolidation right upper and to lesser degree left upper lobes which may represent superimposed developing pneumonia.     Scattered interlobular septal thickening throughout the lungs concerning for component of vascular congestion and edema.    Interventions: None    Complications: Hemorrhage (petechial hemorrhagic transformation, HI-1), not clinically significant    Disposition:      Final Active Diagnoses:    Diagnosis Date Noted POA    PRINCIPAL PROBLEM:  Stroke due to embolism of left middle cerebral artery [I63.412] 12/20/2021 Yes    Combined systolic and diastolic congestive heart failure [I50.40] 12/29/2021 Unknown    Aphasia [R47.01] 12/28/2021 Yes    PAD (peripheral artery disease) [I73.9] 12/28/2021 Yes    Absent pulse in one limb [R09.89] 12/27/2021 Clinically Undetermined    Single subsegmental pulmonary embolism without acute cor pulmonale [I26.93] 12/25/2021 No    Mural thrombus of cardiac apex [I51.3] 12/21/2021 Yes    Grade III diastolic dysfunction [I51.89] 12/21/2021 Yes    Essential hypertension [I10] 12/21/2021 Yes    Cytotoxic cerebral edema [G93.6] 12/21/2021 Yes    Debility [R53.81] 12/21/2021 Yes      Problems Resolved During this Admission:     No new Assessment & Plan notes have been filed under this hospital service since the last note was generated.  Service: Vascular Neurology      Recommendations:     Post-discharge complication risks: None, Falls    Stroke Education given to: patient and family    Follow-up in Stroke Clinic in 4-6 weeks.     Discharge Plan:  Statin: Atorvastatin 40mg  Anticoagulant: Warfarin 10 mg daily   Antiplatelet: ASA 81 (PAD)   Aggresive risk factor modification:  Hypertension  HF  CAD     Follow Up:   Follow-up Information     [facility] VASCULAR NEUROLOGY In 1 month.    Specialty: Vascular Neurology  Why: Please follow up in clinic in 4-6 weeks with vascular neurology. If you do not hear from our office with an appointment in 1 week, please give us a call.   Contact information:  Rahul Gallardo [address]  [facility] [address] [phone]           Primary Doctor No.    Why: Please establish with a PCP for 7-day Hospital follow-up after facility discharge.                       Patient Instructions:      BASIC METABOLIC PANEL   Standing Status: Future Standing Exp. Date: 03/07/23     Ambulatory referral/consult to Vascular  Neurology   Standing Status: Future   Referral Priority: Routine Referral Type: Consultation   Referral Reason: Specialty Services Required   Requested Specialty: Vascular Neurology     Ambulatory referral/consult to Cardiology   Standing Status: Future   Referral Priority: Routine Referral Type: Consultation   Referral Reason: Specialty Services Required   Requested Specialty: Cardiology     Echo   Standing Status: Future Standing Exp. Date: 04/06/22     Order Specific Question Answer Comments   Release to patient Immediate        Medications:  Reconciled Home Medications:      Medication List      START taking these medications    aspirin 81 MG EC tablet  Commonly known as: ECOTRIN  Take 1 tablet (81 mg total) by mouth once daily.  Start taking on: January 7, 2022     atorvastatin 40 MG tablet  Commonly known as: LIPITOR  Take 1 tablet (40 mg total) by mouth once daily.  Start taking on: January 7, 2022     dapagliflozin 10 mg tablet  Commonly known as: FARXIGA  Take 1 tablet (10 mg total) by mouth once daily.     EScitalopram oxalate 5 MG Tab  Commonly known as: LEXAPRO  Take 1 tablet (5 mg total) by mouth once daily.  Start taking on: January 7, 2022     metoprolol succinate 25 MG 24 hr tablet  Commonly known as: TOPROL-XL  Take 0.5 tablets (12.5 mg total) by mouth 2 (two) times daily.     sacubitriL-valsartan 24-26 mg per tablet  Commonly known as: ENTRESTO  Take 1 tablet by mouth 2 (two) times daily.     spironolactone 25 MG tablet  Commonly known as: ALDACTONE  Take 0.5 tablets (12.5 mg total) by mouth once daily.  Start taking on: January 8, 2022     warfarin 10 MG tablet  Commonly known as: COUMADIN  Take 1 tablet (10 mg total) by mouth Daily.            Clovis Kate PA-C  Comprehensive Stroke Center  Department of Vascular Neurology   [facility] [place]

## 2022-01-06 NOTE — PLAN OF CARE
[facility] - Neurosurgery (Hospital)  Discharge Final Note    Primary Care Provider: Primary Doctor No  Expected Discharge Date: 1/6/2022    Patient medically ready for discharge to [facility].  Nurse can call report to [phone].  Transportation arranged per wheelchair van at 1300.   Is family/patient aware of discharge yes - patient and sister.  Hospital follow up scheduled, per rehab.  Vascular Neurology to schedule follow up if necessary.  Final Discharge Note (most recent)     Final Note - 01/06/22 1206        Final Note    Assessment Type Final Discharge Note     Anticipated Discharge Disposition Rehab Facility     Hospital Resources/Appts/Education Provided --   per rehab       Post-Acute Status    Post-Acute Authorization Placement     Post-Acute Placement Status Set-up Complete/Auth obtained     Discharge Delays None known at this time                 Important Message from Medicare         Referral Info (most recent)     Referral Info    No documentation.               Contact Info     [facility]ASCULAR NEUROLOGY   Specialty: Vascular Neurology    [address]  [facility] [address]   Phone: [phone]       Next Steps: Follow up in 1 month(s)    Instructions: Please follow up in clinic in 4-6 weeks with vascular neurology. If you do not hear from our office with an appointment in 1 week, please give us a call.     No, Primary Doctor   Relationship: PCP - General        Next Steps: Follow up    Instructions: Please establish with a PCP for 7-day Hospital follow-up after facility discharge.        No future appointments.  Charmaine Prasad RN  Case Management  Ext: [phone]  01/06/2022

## 2022-01-06 NOTE — SUBJECTIVE & OBJECTIVE
Neurologic Chief Complaint: found down, nonverbal, RSW    Subjective:     Interval History: Patient is seen for follow-up neurological assessment and treatment recommendations:   Patient's INR 2.2. Medically ready to be discharged to [facility].     HPI, Past Medical, Family, and Social History remains the same as documented in the initial encounter.     Review of Systems   Unable to perform ROS: Mental status change   Constitutional: Negative for chills and fever.   HENT: Negative for facial swelling and trouble swallowing.    Eyes: Negative for photophobia and visual disturbance.   Respiratory: Negative for cough and shortness of breath.    Cardiovascular: Negative for leg swelling.   Gastrointestinal: Negative for constipation, diarrhea and vomiting.   Genitourinary: Negative for difficulty urinating.   Skin: Negative for pallor and rash.   Neurological: Positive for facial asymmetry, speech difficulty and weakness.   Psychiatric/Behavioral: Negative for agitation.     Scheduled Meds:   aspirin  81 mg Oral Daily    atorvastatin  40 mg Oral Daily    EScitalopram oxalate  5 mg Oral Daily    metoprolol succinate  12.5 mg Oral BID    polyethylene glycol  17 g Oral Daily    sacubitriL-valsartan  1 tablet Oral BID    senna-docusate 8.6-50 mg  1 tablet Oral BID    spironolactone  12.5 mg Oral Daily    warfarin  10 mg Oral Daily     Continuous Infusions:    PRN Meds:acetaminophen, hydrALAZINE, labetalol, ondansetron, sodium chloride 0.9%    Objective:     Vital Signs (Most Recent):  Temp: 98.1 °F (36.7 °C) (01/06/22 1053)  Pulse: 77 (01/06/22 1053)  Resp: 16 (01/06/22 0450)  BP: 119/80 (01/06/22 1053)  SpO2: 95 % (01/06/22 1053)  BP Location: Right arm    Vital Signs Range (Last 24H):  Temp:  [97.8 °F (36.6 °C)-99 °F (37.2 °C)]   Pulse:  [69-85]   Resp:  [16-18]   BP: (108-123)/(70-85)   SpO2:  [94 %-100 %]   BP Location: Right arm    Physical Exam  Vitals and nursing note reviewed.   Constitutional:       General: He  is not in acute distress.     Appearance: He is well-developed and normal weight. He is not diaphoretic.   HENT:      Head: Normocephalic and atraumatic.      Right Ear: External ear normal.      Left Ear: External ear normal.      Nose: Nose normal. No rhinorrhea.   Eyes:      General: No scleral icterus.        Right eye: No discharge.         Left eye: No discharge.      Extraocular Movements: Extraocular movements intact.   Cardiovascular:      Rate and Rhythm: Normal rate.   Pulmonary:      Effort: Pulmonary effort is normal. No respiratory distress.   Abdominal:      General: Abdomen is flat. There is no distension.   Musculoskeletal:      Right lower leg: No edema.      Left lower leg: No edema.   Skin:     General: Skin is dry.   Neurological:      Mental Status: He is alert.      Cranial Nerves: Dysarthria present.      Sensory: Sensory deficit present.      Motor: Weakness present.      Comments: Aphasia   Psychiatric:         Behavior: Behavior normal.         Neurological Exam:   LOC: alert  Attention Span: Good   Language: expressive aphasia  Articulation: Dysarthria  Orientation: Untestable due to severe aphasia   Motor: Arm left  Normal 5/5  Leg left  Normal 5/5  Arm right  Plegia 0/5  Leg right Paresis 3/5  Sensation: right hypoesthesia  Tone: Flaccid  RUE     Laboratory:  BMP:   Recent Labs   Lab 01/06/22  0506      K 3.8      CO2 24   BUN 9   CREATININE 0.7   CALCIUM 9.1     CBC:   Recent Labs   Lab 01/06/22  0506   WBC 8.95   RBC 4.02*   HGB 13.5*   HCT 39.9*      MCV 99*   MCH 33.6*   MCHC 33.8     Lipid Panel:   No results for input(s): CHOL, LDLCALC, HDL, TRIG in the last 168 hours.  Coagulation:   Recent Labs   Lab 01/06/22  0506   INR 2.2*   APTT 53.7*       Diagnostic Results     Brain Imaging   CT 12/25/21  No significant change from prior.  Evolving large recent left MCA territory infarction with petechial hemorrhagic conversion.  No evidence for significant new  hemorrhage or increased mass effect.        CTH 12/24/2021  Evolving large left MCA territory infarction with petechial hemorrhagic conversion.  There is intermediate density components in the left temporal lobe area of infarction suggestive for interval additional petechial hemorrhage.  No evidence for extra-axial hemorrhage or hydrocephalus.     MRI Brain WO. Date: 12/22/21    Large acute  left MCA distribution infarct with modest mass effect. Multiple small remote scattered infarcts elsewhere as above.  Embolic disease to be considered.     CTH WO 12/21/21  Again demonstrated in better delineated as large left MCA distribution infarct with no associated acute hemorrhage and with localized mass effect resulting in effacement of overlying cortical sulci and partial effacement left sylvian fissure.  No associated acute hemorrhage.     Several small old areas of infarction again noted including cerebellum bilaterally, anterior left perisylvian region, lateral left frontal cortex and posterior right parietal cortex.     CTH WO 12/20/21   Large area loss of gray-white differentiation consistent with acute infarct left MCA distribution involving the left temporal lobe, temporal occipital region and portions of the basal ganglia with localized mass effect including effacement of overlying cortical sulci although with no associated acute hemorrhage.     Several small old areas of infarction are evident including anterior left perisylvian and cerebellum bilaterally. Mild underlying atrophy.        Vessel Imaging:  US LE Veins 12/25/21  No evidence of deep venous thrombosis in either lower extremity.    CTA H/N 12/20/21  Segmental occlusion measuring approximately 10 mm mid and distal M1 segment left MCA with diminished flow distal to the stenosis.     Right cervical carotid circulation--atherosclerotic calcification bifurcation without measurable stenosis.     Left cervical carotid circulation--atherosclerotic  calcification distal left common carotid artery and bifurcation with stenosis proximal left ICA measuring 16% utilizing NASCET criteria.  Mild eccentric narrowing mid to distal portion left common carotid artery also noted.     Vertebral arteries--no focal arterial abnormality or measurable stenosis.     Cardiac Evaluation:   TTE with bubble 12/21/21  1. The left ventricle (LV) is dilated with severely depressed systolic function & global hypokinesis.  2. LV ejection fraction is 15-20%. Grade III LV diastolic dysfunction.  3. Small to moderate sized (1.6x1.1 cm) layered, protruding apical mass c/w thrombus.  4. The right ventricle (RV) is severely enlarged with normal systolic function.   5. Estimated RVSP is moderately elevated (50-70 mmHg). Pulmonary hypertension is present.  6. Severe atrial enlargement.  7. Moderate eccentric mitral regrugitation.  8. Mild tricuspid regurgitation.     Other:  CTA Chest Non coronary 12/25/21  Pulmonary embolism left apical segmental pulmonary artery.     Moderate to large right and moderate left bilateral pleural effusions     Interstitial opacities throughout the lungs bilaterally with superimposed ill-defined consolidation right upper and to lesser degree left upper lobes which may represent superimposed developing pneumonia.     Scattered interlobular septal thickening throughout the lungs concerning for component of vascular congestion and edema.

## 2022-01-06 NOTE — PT/OT/SLP PROGRESS
Occupational Therapy   Treatment    Patient Name:  Jimmy Leger   MRN:  [id number]  Admit Date: 12/21/2021  Admitting Diagnosis:  Stroke due to embolism of left middle cerebral artery   Length of Stay: 16 days  Recent Surgery: * No surgery found *           Recommendations:     Discharge Recommendations: rehabilitation facility  Discharge Equipment Recommendations:   (TBD)  Barriers to discharge:   (increased assistance needed)    Plan:     Patient to be seen 5 x/week to address the above listed problems via self-care/home management,therapeutic activities,therapeutic exercises,neuromuscular re-education  · Plan of Care Expires: 01/21/22  · Plan of Care Reviewed with: patient    Assessment:   Jimmy Leger is a 57 y.o. male with a medical diagnosis of Stroke due to embolism of left middle cerebral artery.  He presents with the following performance deficits affecting function: weakness,impaired endurance,impaired self care skills,impaired functional mobilty,gait instability,impaired balance,decreased upper extremity function,decreased lower extremity function,impaired coordination,decreased ROM,impaired fine motor,decreased safety awareness,impaired sensation.  Pt would benefit from skilled OT services in order to maximize independence with ADLs and facilitate safe discharge. Pt's clinical condition meets full inpatient rehab criteria. Inpatient rehab will provide to total interdisciplinary treatment approach needed. Pt is at high risk of unplanned readmission due to fall risk. The lower level of care cannot provide total interdisciplinary approach needed. Because of patient's significant decline from PLOF, patient would benefit from Inpatient Rehab to maximize return to PLOF. Pt is an excellent candidate for inpatient rehabilitation, as he has a qualifying diagnosis, displays good activity tolerance, has experienced decline from PLOF, has good family support, and is motivated to participate in therapy.     Time spent  "performing bed mobility, EOB activity w/ focus on standing balance, dynamic sitting and attention to R side; assisted PT in gait training and performing self-care in standing    Relevant hx and current limitations:   Affected side: right   Speech: expressive aphasia   Neglect/Inattention: right inattention/neglect   EOB sitting: Min A-SBA    Pt continues to require significant assist with functional mobility and safe completion of ADLs requiring Max-Mod A. Pt with noted improvement with ambulation and self-care as compared to last session. Patient is making progress towards goals.    Rehab Prognosis: Good; patient would benefit from acute skilled OT services to address these deficits and reach maximum level of function.        Subjective   Communicated with: EVENS Potter prior to session.  Patient found HOB elevated with bed alarm,telemetry,peripheral IV,Condom Catheter upon OT entry to room.    Patient: "ok" "yes"     Pain/Comfort:   none stated    Objective:   Patient found with: bed alarm,telemetry,peripheral IV,Condom Catheter   General Precautions: Standard, aspiration,aphasia,pureed diet,nectar thick   Orthopedic Precautions:N/A   Braces:   none  Oxygen Device: Room Air  Vitals: /80 (Patient Position: Sitting)   Pulse 77   Temp 98.1 °F (36.7 °C) (Oral)   Resp 16   Ht 5' 11" (1.803 m)   Wt 73.2 kg (161 lb 6 oz)   SpO2 96%   BMI 22.51 kg/m²     Outcome Measures:  AMPAC 6 Click ADL: 14    Occupational Performance:  Bed Mobility:       Rolling Left:  minimum assistance   Scooting: minimum assistance   Supine to Sit: minimum assistance   Sit to Supine: minimum assistance    Functional Mobility/Transfers: *pt req'd adequate rest break between standing trials     Patient completed Sit <> Stand Transfer with Min A from EOB and then Max A from bedside chair with  hand-held assist   o Pt performed x2 trials EOB and x5 trials from chair  - Pt req'd increased assistance to remain standing d/t fatigue " and R sided lean  · Bed <> chair stand pivot transfers w/ Mod A and HHA    Activities of Daily Living:     Feeding:  set-up assistance sitting then standing EOB to drink from styrofoam cup   Grooming: minimum assistance to perform hair hygiene standing EOB; pt able to correctly use comb this date w/ LUE      AMPAC 6 Click ADL:  AMPA Total Score: 14    Neuromuscular Re-Education:  Pt w/ poor sitting balance sitting EOB that steadily improved from Min A-SBA d/t positioning and engaging pt in core strengthening exercises  Pt participated in exercises that work on dynamic sitting balance: crossing midline and recovering from leaning to far to one side or forward (focus on scanning and tracking to R)  Improved this date, able to correct to self to midline w/ % of the time    Treatment & Education:   Therapist provided facilitation and instruction of proper body mechanics, energy conservation, and fall prevention strategies during tasks listed above.   Pt re-educated on importance of OOB activities with staff member assistance and sitting OOB majority of the day.    Updated communication board with level of assist required (Mod A stand pivot) & educated RN/patient that pt is appropriate for transfers and mobility with RN/PCT.    Patient left up in chair with all lines intact, call button in reach, chair alarm on and RN notified    GOALS:   Multidisciplinary Problems     Occupational Therapy Goals        Problem: Occupational Therapy Goal    Goal Priority Disciplines Outcome Interventions   Occupational Therapy Goal     OT, PT/OT Ongoing, Progressing    Description: Goals to be met by: 1/10/22     Patient will increase functional independence with ADLs by performing:      UE Dressing with Minimal Assistance.  LE Dressing with Moderate Assistance.  Grooming while seated with Moderate Assistance.  Toileting from bedside commode with Maximum Assistance for hygiene and clothing management.   Sitting at edge of bed  x5 minutes with Minimal Assistance.  Rolling to Bilateral with Minimal Assistance.   Supine to sit with Minimal Assistance.  Toilet transfer to bedside commode with Moderate Assistance.  Upper extremity exercise program AAROM x10 reps, with assistance as needed.                      Time Tracking:     OT Date of Treatment: 01/05/22  OT Start Time: 1046  OT Stop Time: 1110  OT Total Time (min): 24 min    Additional staff present: N/A    Billable Minutes:  Therapeutic Activity 12  Therapeutic Exercise 12      Eunice (Joycelyn), OTR/L  [phone] (Pager #)  1/6/2022

## 2022-01-06 NOTE — PLAN OF CARE
[facility]    FACILITY TRANSFER ORDERS      Patient Name: Jimmy Leger  YOB: 1965    PCP: Primary Doctor No   PCP Address: None  PCP Phone Number: None  PCP Fax: None    Encounter Date: 01/06/2022    Admit to: IPR     Vital Signs:  Routine    Diagnoses:   Active Hospital Problems    Diagnosis  POA    *Stroke due to embolism of left middle cerebral artery [I63.412]  Yes    Right shoulder pain [M25.511]  Yes    Combined systolic and diastolic congestive heart failure [I50.40]  Unknown    Aphasia [R47.01]  Yes    PAD (peripheral artery disease) [I73.9]  Yes    Absent pulse in one limb [R09.89]  Clinically Undetermined    Single subsegmental pulmonary embolism without acute cor pulmonale [I26.93]  No    Mural thrombus of cardiac apex [I51.3]  Yes    Grade III diastolic dysfunction [I51.89]  Yes    Essential hypertension [I10]  Yes    Cytotoxic cerebral edema [G93.6]  Yes    Debility [R53.81]  Yes      Resolved Hospital Problems   No resolved problems to display.       Allergies:Review of patient's allergies indicates:  No Known Allergies    Diet: Mechanical soft diet with nectar thick liquids     Activities: Activity as tolerated    Nursing: INR for coumadin goal 2-3 daily checks, will need set up with coumadin clinic    Labs: Daily INR, BMP in 1 week scheduled as well     CONSULTS:    Physical Therapy to evaluate and treat. , Occupational Therapy to evaluate and treat. and Speech Therapy to evaluate and treat for Language, Swallowing and Cognition.    MISCELLANEOUS CARE:  Will need coumadin clinic follow up as OP, goal 2-3 INR     WOUND CARE ORDERS  None    Medications: Review discharge medications with patient and family and provide education.      Current Discharge Medication List      START taking these medications    Details   aspirin (ECOTRIN) 81 MG EC tablet Take 1 tablet (81 mg total) by mouth once daily.  Refills: 0      atorvastatin (LIPITOR) 40 MG tablet Take 1 tablet  (40 mg total) by mouth once daily.  Qty: 90 tablet, Refills: 3      dapagliflozin (FARXIGA) 10 mg tablet Take 1 tablet (10 mg total) by mouth once daily.  Qty: 30 tablet, Refills: 2      EScitalopram oxalate (LEXAPRO) 5 MG Tab Take 1 tablet (5 mg total) by mouth once daily.  Qty: 30 tablet, Refills: 11      metoprolol succinate (TOPROL-XL) 25 MG 24 hr tablet Take 0.5 tablets (12.5 mg total) by mouth 2 (two) times daily.  Qty: 30 tablet, Refills: 11    Comments: .      sacubitriL-valsartan (ENTRESTO) 24-26 mg per tablet Take 1 tablet by mouth 2 (two) times daily.      spironolactone (ALDACTONE) 25 MG tablet Take 0.5 tablets (12.5 mg total) by mouth once daily.  Qty: 15 tablet, Refills: 11    Comments: .      warfarin (COUMADIN) 10 MG tablet Take 1 tablet (10 mg total) by mouth Daily.  Qty: 30 tablet, Refills: 11                Immunizations Administered as of 1/6/2022     No immunizations on file.          Unknown COVID vaccine status    Some patients may experience side effects after vaccination.  These may include fever, headache, muscle or joint aches.  Most symptoms resolve with 24-48 hours and do not require urgent medical evaluation unless they persist for more than 72 hours or symptoms are concerning for an unrelated medical condition.          _________________________________  Clovis Kate PA-C  01/06/2022

## 2022-01-06 NOTE — PLAN OF CARE
Problem: Adult Inpatient Plan of Care  Goal: Plan of Care Review  Outcome: Ongoing, Progressing  Flowsheets (Taken 1/6/2022 5457)  Plan of Care Reviewed With: patient  Goal: Optimal Comfort and Wellbeing  Outcome: Ongoing, Progressing     Problem: Cerebral Tissue Perfusion (Stroke, Ischemic/Transient Ischemic Attack)  Goal: Optimal Cerebral Tissue Perfusion  Outcome: Ongoing, Progressing     Problem: Cognitive Impairment (Stroke, Ischemic/Transient Ischemic Attack)  Goal: Optimal Cognitive Function  Outcome: Ongoing, Progressing     Problem: Communication Impairment (Stroke, Ischemic/Transient Ischemic Attack)  Goal: Improved Communication Skills  Outcome: Ongoing, Progressing       POC reviewed and updated with the patient/caregiver. Questions regarding POC were encouraged and addressed with the patient/caregiver.  VSS, see flow-sheets. Patient is AO X 1 (self) at this time, expressive aphasia noted. Heparin gtt continued, no titration needed overnight. Fall/safety precautions maintained, no signs of injury noted during shift. Patient was repositioned for comfort, bed locked in low position with side rails X 3, bed alarm set, with call light within reach. Patient instructed to call staff for mobility, verbalized understanding. No acute signs of distress noted at this time.

## 2022-01-06 NOTE — ASSESSMENT & PLAN NOTE
TTE at outside facility 12/21/21:  EF of 15-20%, small to moderate sized (1.6x1.1 cm) layered, protruding apical mass c/w thrombus, global hypokinesis, severe atrial enlargement.    -Was on heparin gtt and temporarily held due to hemorrhagic conversion, now restarted  -Was on Heparin gtt to Coumadin bridge   Coumadin increased to 10mg, goal INR 2-3 ---now therapeutic, heparin d/c  -repeat echo in 3 months for resolution of thrombus---ordered for outpatient setting

## 2022-01-06 NOTE — ASSESSMENT & PLAN NOTE
55 yo male with PMHx of HTN and CAD presents as a transfer from [facility] for large L MCA infarct. CTA with distal L M1 occlusion. No tpa, OOW. No IR, large core infarct. Echo (at [facility]) with LV global hypokinesis, EF 15-20%, severe atrial enlargement, and apical thrombus. Repeat CTH with petechial hemorrhage, anticoagulation temporarily held. Repeat CT with stable petechial hemorrhage. During admission obtained CTA chest which showed subsegmental PE. On heparing gtt with Coumadin bridge, coumadin is therapeutic (2.2). Being discharged to IPR placement.       Etiology: likely cardioembolic 2/2 low EF(15-20%) and cardiac thrombus.     Antithrombotics for secondary stroke prevention:   Heparin gtt with Coumadin (5mg) bridge with INR goal 2-3    Statins for secondary stroke prevention and hyperlipidemia, if present: Statins: Atorvastatin- 40 mg daily     Aggressive risk factor modification: HTN, CAD     Rehab efforts: The patient has been evaluated by a stroke team provider and the therapy needs have been fully considered based off the presenting complaints and exam findings. The following therapy evaluations are needed: PT/OT on board, SLP evaluate and treat, PM&R evaluate for appropriate placement. Recommend IPR.    Diagnostics ordered/pending: None     VTE prophylaxis: Mechanical prophylaxis: Place SCDs  None: Reason for No Pharmacological VTE Prophylaxis: Currently on anticoagulation    BP parameters: <140

## 2022-01-14 ENCOUNTER — HOSPITAL ENCOUNTER (OUTPATIENT)
Dept: RADIOLOGY | Facility: HOSPITAL | Age: 57
Discharge: HOME | End: 2022-01-14
Attending: PHYSICAL MEDICINE & REHABILITATION
Payer: MEDICAID

## 2022-01-14 PROCEDURE — 73030 XR SHOULDER COMPLETE 2 OR MORE VIEWS RIGHT: ICD-10-PCS | Mod: 26,RT,, | Performed by: RADIOLOGY

## 2022-01-14 PROCEDURE — 73030 X-RAY EXAM OF SHOULDER: CPT | Mod: 26,RT,, | Performed by: RADIOLOGY

## 2022-01-28 ENCOUNTER — TELEPHONE (OUTPATIENT)
Dept: NEUROLOGY | Facility: CLINIC | Age: 57
End: 2022-01-28
Payer: MEDICAID

## 2022-01-31 ENCOUNTER — TELEPHONE (OUTPATIENT)
Dept: SPEECH THERAPY | Facility: HOSPITAL | Age: 57
End: 2022-01-31
Payer: MEDICAID

## 2022-01-31 DIAGNOSIS — I63.412 CEREBRAL INFARCTION DUE TO EMBOLISM OF LEFT MIDDLE CEREBRAL ARTERY: ICD-10-CM

## 2022-01-31 DIAGNOSIS — R13.12 OROPHARYNGEAL DYSPHAGIA: Primary | ICD-10-CM

## 2022-02-04 ENCOUNTER — CLINICAL SUPPORT (OUTPATIENT)
Dept: SPEECH THERAPY | Facility: HOSPITAL | Age: 57
End: 2022-02-04
Payer: MEDICAID

## 2022-02-04 ENCOUNTER — HOSPITAL ENCOUNTER (OUTPATIENT)
Dept: RADIOLOGY | Facility: HOSPITAL | Age: 57
Discharge: HOME | End: 2022-02-04
Attending: PHYSICAL MEDICINE & REHABILITATION
Payer: MEDICAID

## 2022-02-04 DIAGNOSIS — R13.12 OROPHARYNGEAL DYSPHAGIA: Primary | ICD-10-CM

## 2022-02-04 DIAGNOSIS — I63.412 CEREBRAL INFARCTION DUE TO EMBOLISM OF LEFT MIDDLE CEREBRAL ARTERY: ICD-10-CM

## 2022-02-04 PROCEDURE — 74230 X-RAY XM SWLNG FUNCJ C+: CPT | Mod: 26,,, | Performed by: RADIOLOGY

## 2022-02-04 PROCEDURE — 74230 FL MODIFIED BARIUM SWALLOW SPEECH STUDY: ICD-10-PCS | Mod: 26,,, | Performed by: RADIOLOGY

## 2022-02-04 PROCEDURE — 92611 MOTION FLUOROSCOPY/SWALLOW: CPT | Mod: GN | Performed by: SPEECH-LANGUAGE PATHOLOGIST

## 2022-02-04 PROCEDURE — A9698 NON-RAD CONTRAST MATERIALNOC: HCPCS | Performed by: PHYSICAL MEDICINE & REHABILITATION

## 2022-02-04 PROCEDURE — 25500020 PHARM REV CODE 255: Performed by: PHYSICAL MEDICINE & REHABILITATION

## 2022-02-04 RX ADMIN — BARIUM SULFATE 80 ML: 0.81 POWDER, FOR SUSPENSION ORAL at 08:02

## 2022-02-04 NOTE — PROGRESS NOTES
"MODIFIED BARIUM SWALLOW STUDY    REASON FOR REFERRAL:  Jimmy Leger, age 57, was referred by Dr. Bunny Elizabeth, physiatrist, for a Modified Barium Swallow Study to rule out aspiration, assess his overall swallowing function, and determine safest consistencies for oral intake.  He was accompanied by a PT from [facility].    Per review of the H&P per Dr. Elizabeth, Mr. Leger has a "previous medical history of coronary artery disease, peripheral artery disease, and hypertension who presented to [facility] check this on 12/20 2021 with was found unresponsive by co-worker when she did not show up to work on time. EMS was called and patient was brought to the ED, he was found to be nonverbal to command with right-sided weakness. Stroke protocol was initiated and tele stroke evaluation was done. Workup for stroke was initiated and patient was referred to the main campus in [facility] for left MCA syndrome. He is admitted to neurocritical care for diagnosis of left middle cerebral artery infarct and Demi craniotomy watch. Course was complicated with diagnosis of systolic congestive heart failure with LVEF of 15-20%. Cardiology was consulted and he was started on congestive heart failure pathway. He was found to have nonsustained V-tach on 12/25/2021. He was also diagnosed with cardiac thrombus and was started on heparin drip for treatment. CTA chest completed on 12/25/2021 shows subsegmental pulmonary embolism. He was bridged from heparin drip to Coumadin with a goal INR which became therapeutic on 01/06/2022 with INR at 2.2."    He was transferred to [facility] 1/6/22. His initial diet there was an IDDSI 4 (pureed) diet with IDDSI 2 (mildly thick, nectar-thick) liquids.  Per ST evaluation 1/7/22, he presented with "severe expressive and receptive aphasia with possible cognitive impairments as well."    The most recent ST notes visible in the EMR are from 2/3/22 and show that he " "continued to exhibit receptive-expressive aphasia.  He participated in PO trials "with thin liquid in which no overt s/s of aspiration observed."  Diet is currently IDDSI 5, 2 (Minced & Moist, Nectar Thickened Liquids) per weekly progress note of 2/1/22.    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    SWALLOWING HISTORY:  Diet prior to CVA not unknown.  MBSS was requested while inpatient, but not performed.  By the end of his inpatient course, he was advanced to mechanical soft with nectar-thick liquids.    Transferred to [facility] 1/6/22 and has continued on similar diet.  Participating in dysphagia therapy as part of his ST intervention.    FAMILY HISTORY:  No family history on file.     SOCIAL HISTORY:  Per H&P of 1/6/22, "Prior to hospitalization, Patient lives in [place] and has support from sister."    Tobacco:  "Light tobacco smoker" per EMR.  ETOH:  No currently, per EMR.    BEHAVIOR:  Mr. Leger was a very pleasant man who had normal to slightly flat affect and appropriate social interaction.  He was able to adequately cooperate during the study. When taking cup sips, he appeared to either misunderstand the directive to take "a regular sip" or exhibited impulsivity re: size and/or number of sips.  Results of today's assessment were considered indicative of his current levels of swallowing functioning.      HEARING:  Subjectively, within normal limits.     ORAL PERIPHERAL:   Informal examination of the oral mechanism revealed structures and functioning within functional limits for swallowing and speech purposes.  Irregular dentition with most of mandibular arch absent.  R lip weakness.    Voice quality was within normal limits " with no wet quality before, during, or after the study.      Expressive aphasia evident.  Degree of receptive aphasia and/or impulsivity unclear.  Was able to follow most directives for study, but when provided with open cup, took too-large boluses despite instructions.    TEST FINDINGS:   Mr. Leger was seen in Radiology with the Radiologist for a Modified Barium Swallow Study.  He was seated wheelchair for a left lateral videofluoroscopic view.      Consistencies assessed using radiopaque barium contrast:  *thin (5- and 10-mL boluses via medicine cup and single and continuous swallows via open cup -- self-determined and judged too large by clinician),   *thin puree (1-teaspoon boluses) via spoon,   *thick puree (1-teaspoon boluses) via spoon,   *solid (1/2 cracker boluses) by Mr. Leger's hand, and   *13 mm barium tablets, one with water and one embedded in puree.    Strategies:  Small sips -- reduced risk of aspiration  Embedding tablet in puree -- eliminated coughing/choking as compared to tablet taken with plain water    Phases:  Oral:  Mr. Leger was able to obtain liquid and strip utensils adequately with mild-moderate loss of material from the oral cavity via his R lips, particularly with cup sips.  He moved boluses through the oral cavity with appropriate to mildly slow transit time.  There was increased time for mastication, most likely related to state of dentition. There was no pooling of liquids in the mouth.  Swallow reflex was triggered within normal limits for 5- and 10-mL of thin liquids and for thin and thick pureed foods.  Cup sips of thin liquids (which were considered too large as above) were prematurely spilled to the pyriforms and masticated solids were prematurely spilled to the valleculae.    Pharyngeal:  Boluses moved through the pharyngeal phase with no laryngeal penetration and no aspiration and no nasal regurgitation.   The plain water used with the barium tablet is suspected as having been  penetrated deeply or aspirated per the spontaneous coughing that occurred while the tablet was safely swallowed.     - Timely initiation with some consistencies/bolus sizes; delayed with others (see above)  - Adequate soft palate elevation  - Adequate laryngeal elevation and anterior hyoid excursion  - Adequate tongue base retraction  - Complete epiglottic inversion  - Complete laryngeal vestibular closure  - Adequate pharyngeal stripping wave  - Adequate PE segment opening.  -  No pharyngeal residue.  The barium tablet taken with plain water had brief stasis in the valleculae, but was progressed with a second swallow.    Cervical Esophageal:  Boluses entered the upper esophagus within normal limits.  No obstruction was noted.    Rosenbeck 8-point Penetration-Aspiration Scale:  1 - Material does not enter airway.    NOTE: Suspect deep penetration/aspiration with plain water used with barium tablet based on coughing/choking while tablet was swallowed safely. Possible ratin - Material enters the ariway, passes below the vocal folds, and is ejected into the larynx or out of the airway.      IMPRESSIONS:   This 57 y.o. man appears to present with  1.  Mild oropharyngeal dysphagia characterized by reduced labial strength/occlusion, irregular dentition with most of mandibular arch missing, and premature spillage to large liquid boluses and masticated solids resulting in anterior loss of liquids from R side of mouth with cup sips, increased time of mastication, and increased risk of laryngeal penetration/aspiration with thin liquids in too-large sips and premature spillage to pyriforms.  2.  Receptive-expressive aphasia.  Could not r/o possible impulsivity re: taking too-large boluses with cup sips.  3.  R hemiparesis.  4.  History left MCA syndrome; onset 19.      RECOMMENDATIONS/PLAN OF CARE:   It is felt that Mr. Leger would benefit from  1.  Continuation of his current minced and moist consistency diet (IDDSI  5) primarily due to limitations re: mastication with thin liquids (IDDSI 0) using the following strategies and common aspiration precautions, including, but not limited to   A.  Appropriate upright seating for all eating and drinking.   B.  Small sips (5- to 10-mL); suggest volume-limiting cup if Mr. Leger cannot reliably regulate his sip size.   C.  Small bites (1/2 to 1 teaspoon); may need supervision if self-determined boluses are larger than 1 teaspoon.   D.  Embed pills in puree.   E.  Monitoring for any signs/symptoms of aspiration (such as wet/gurgly voice that does not clear with coughing, inability to make any voice sounds, any persistent coughing with oral intake, otherwise unexplained fever, unexplained increased or new difficulty or discomfort breathing, unexplained increase in sleepiness/lethargy/significant fatigue, unexplained increase or new onset confusion or change in cognitive functioning, or any other unexplained change in health or well-being that could be related to swallowing).  2.  Continued ST services for aphasia and to support independence in use of safe swallowing strategies.  3.  Repeat MBSS as needed, particular with any increased concerns for s/s aspiration.

## 2022-02-04 NOTE — PLAN OF CARE
IMPRESSIONS:   This 57 y.o. man appears to present with  1.  Mild oropharyngeal dysphagia characterized by reduced labial strength/occlusion, irregular dentition with most of mandibular arch missing, and premature spillage to large liquid boluses and masticated solids resulting in anterior loss of liquids from R side of mouth with cup sips, increased time of mastication, and increased risk of laryngeal penetration/aspiration with thin liquids in too-large sips and premature spillage to pyriforms.  2.  Receptive-expressive aphasia.  Could not r/o possible impulsivity re: taking too-large boluses with cup sips.  3.  R hemiparesis.  4.  History left MCA syndrome; onset 12/20/21.      RECOMMENDATIONS/PLAN OF CARE:   It is felt that Mr. Leger would benefit from  1.  Continuation of his current minced and moist consistency diet (IDDSI 5) primarily due to limitations re: mastication with thin liquids (IDDSI 0) using the following strategies and common aspiration precautions, including, but not limited to   A.  Appropriate upright seating for all eating and drinking.   B.  Small sips (5- to 10-mL); suggest volume-limiting cup if Mr. Leger cannot reliably regulate his sip size.   C.  Small bites (1/2 to 1 teaspoon); may need supervision if self-determined boluses are larger than 1 teaspoon.   D.  Embed pills in puree.   E.  Monitoring for any signs/symptoms of aspiration (such as wet/gurgly voice that does not clear with coughing, inability to make any voice sounds, any persistent coughing with oral intake, otherwise unexplained fever, unexplained increased or new difficulty or discomfort breathing, unexplained increase in sleepiness/lethargy/significant fatigue, unexplained increase or new onset confusion or change in cognitive functioning, or any other unexplained change in health or well-being that could be related to swallowing).  2.  Continued ST services for aphasia and to support independence in use of safe swallowing  strategies.  3.  Repeat MBSS as needed, particular with any increased concerns for s/s aspiration.

## 2022-02-23 ENCOUNTER — TELEPHONE (OUTPATIENT)
Dept: NEUROLOGY | Facility: CLINIC | Age: 57
End: 2022-02-23

## 2022-09-21 NOTE — SUBJECTIVE & OBJECTIVE
Neurologic Chief Complaint: found down, nonverbal, RSW    Subjective:     Interval History: Patient is seen for follow-up neurological assessment and treatment recommendations: RLE weakness improved on today's exam, patient with runs of vtach overnight on 12/24-12/25, CTA chest with subsegmental PE, patient restarted on heparin gtt, neurosurgery signed off and patient no longer on hemicrani watch, remains NPO per SLP, therapy recommending inpatient rehab    HPI, Past Medical, Family, and Social History remains the same as documented in the initial encounter.     Review of Systems   Unable to perform ROS: Patient nonverbal   Constitutional: Negative for fever.   HENT: Positive for trouble swallowing.    Respiratory: Negative for cough.    Cardiovascular: Negative for leg swelling.   Gastrointestinal: Negative for vomiting.   Neurological: Positive for facial asymmetry, speech difficulty and weakness. Negative for numbness.     Scheduled Meds:   atorvastatin  40 mg Oral Daily    EScitalopram oxalate  5 mg Per NG tube Daily    lisinopriL  5 mg Per NG tube Daily    metoprolol tartrate  25 mg Per NG tube BID    polyethylene glycol  17 g Per NG tube Daily    senna-docusate 8.6-50 mg  1 tablet Per NG tube BID    sodium chloride  2 g Oral TID     Continuous Infusions:   heparin (porcine) in D5W 20 Units/kg/hr (12/27/21 1005)     PRN Meds:acetaminophen, hydrALAZINE, labetalol, magnesium oxide, magnesium oxide, ondansetron, potassium bicarbonate, potassium bicarbonate, potassium bicarbonate, potassium, sodium phosphates, potassium, sodium phosphates, potassium, sodium phosphates, sodium chloride 0.9%    Objective:     Vital Signs (Most Recent):  Temp: 99.6 °F (37.6 °C) (12/27/21 0705)  Pulse: 80 (12/27/21 1005)  Resp: (!) 25 (12/27/21 1005)  BP: (!) 133/93 (12/27/21 1005)  SpO2: (!) 93 % (12/27/21 1005)  BP Location: Left arm    Vital Signs Range (Last 24H):  Temp:  [97.1 °F (36.2 °C)-99.6 °F (37.6 °C)]   Pulse:   [71-92]   Resp:  [20-31]   BP: (117-133)/(71-93)   SpO2:  [93 %-100 %]   BP Location: Left arm    Physical Exam  Vitals reviewed.   Constitutional:       General: He is not in acute distress.     Appearance: He is well-developed.   HENT:      Head: Normocephalic and atraumatic.   Cardiovascular:      Rate and Rhythm: Normal rate.   Pulmonary:      Effort: Pulmonary effort is normal. No respiratory distress.   Skin:     General: Skin is warm and dry.   Neurological:      Mental Status: He is alert.         Neurological Exam:   LOC: alert  Attention Span: Good   Language: Global aphasia, follows simple midline commands  Articulation: Untestable due to severe aphasia   Orientation: Untestable due to severe aphasia   Facial Movement (CN VII): Lower facial weakness on the Right  Motor: Arm left  Normal 5/5  Leg left  Normal 5/5  Arm right  Plegia 0/5  Leg right Plegia 3/5  Sensation: Intact to light touch, temperature and vibration  Tone: Flaccid  RUE       Laboratory:  CMP:   Recent Labs   Lab 12/27/21  0012   CALCIUM 8.4*   ALBUMIN 2.6*   PROT 5.8*      K 4.0   CO2 21*   *   BUN 20   CREATININE 0.7   ALKPHOS 48*   ALT 22   AST 21   BILITOT 0.8     CBC:   Recent Labs   Lab 12/27/21  0012   WBC 10.10   RBC 3.53*   HGB 12.2*   HCT 36.0*      *   MCH 34.6*   MCHC 33.9     Lipid Panel:   Recent Labs   Lab 12/21/21  1911   CHOL 162   LDLCALC 89.2   HDL 58   TRIG 74     Coagulation:   Recent Labs   Lab 12/23/21  1206 12/23/21  1822 12/27/21  1031   INR 1.0  --   --    APTT 24.9   < > 46.1*    < > = values in this interval not displayed.     Platelet Aggregation Study: No results for input(s): PLTAGG, PLTAGINTERP, PLTAGREGLACO, ADPPLTAGGREG in the last 168 hours.  Hgb A1C:   Recent Labs   Lab 12/21/21  0453   HGBA1C 4.9     TSH:   Recent Labs   Lab 12/21/21  0453   TSH 0.768       Diagnostic Results     Brain imaging:    [facility] 12/25/21  No significant change from prior.  Evolving large recent left MCA  territory infarction with petechial hemorrhagic conversion.  No evidence for significant new hemorrhage or increased mass effect.      [facility] 12/24/2021  Evolving large left MCA territory infarction with petechial hemorrhagic conversion.  There is intermediate density components in the left temporal lobe area of infarction suggestive for interval additional petechial hemorrhage.  No evidence for extra-axial hemorrhage or hydrocephalus.    MRI Brain WO. Date: 12/22/21    Large acute  left MCA distribution infarct with modest mass effect. Multiple small remote scattered infarcts elsewhere as above.  Embolic disease to be considered.     [facility] WO 12/21/21  Again demonstrated in better delineated as large left MCA distribution infarct with no associated acute hemorrhage and with localized mass effect resulting in effacement of overlying cortical sulci and partial effacement left sylvian fissure.  No associated acute hemorrhage.     Several small old areas of infarction again noted including cerebellum bilaterally, anterior left perisylvian region, lateral left frontal cortex and posterior right parietal cortex.     CTH WO 12/20/21   Large area loss of gray-white differentiation consistent with acute infarct left MCA distribution involving the left temporal lobe, temporal occipital region and portions of the basal ganglia with localized mass effect including effacement of overlying cortical sulci although with no associated acute hemorrhage.     Several small old areas of infarction are evident including anterior left perisylvian and cerebellum bilaterally. Mild underlying atrophy.       Vessel Imaging:  CTA H/N 12/20/21  Segmental occlusion measuring approximately 10 mm mid and distal M1 segment left MCA with diminished flow distal to the stenosis.     Right cervical carotid circulation--atherosclerotic calcification bifurcation without measurable stenosis.     Left cervical carotid circulation--atherosclerotic calcification  distal left common carotid artery and bifurcation with stenosis proximal left ICA measuring 16% utilizing NASCET criteria.  Mild eccentric narrowing mid to distal portion left common carotid artery also noted.     Vertebral arteries--no focal arterial abnormality or measurable stenosis.     Cardiac Evaluation:   TTE with bubble 12/21/21  1. The left ventricle (LV) is dilated with severely depressed systolic function & global hypokinesis.  2. LV ejection fraction is 15-20%. Grade III LV diastolic dysfunction.  3. Small to moderate sized (1.6x1.1 cm) layered, protruding apical mass c/w thrombus.  4. The right ventricle (RV) is severely enlarged with normal systolic function.   5. Estimated RVSP is moderately elevated (50-70 mmHg). Pulmonary hypertension is present.  6. Severe atrial enlargement.  7. Moderate eccentric mitral regrugitation.  8. Mild tricuspid regurgitation.     Cantharidin Pregnancy And Lactation Text: The use of this medication during pregnancy or lactation is not recommended as there is insufficient data.

## 2022-10-28 NOTE — ASSESSMENT & PLAN NOTE
- Related to prolonged/acute hospital course.     Recommendations  -  Encourage mobility, OOB in chair at least 3 hours per day, and early ambulation as appropriate  -  PT/OT evaluate and treat  -  Pain management  -  Monitor for and prevent skin breakdown and pressure ulcers  · Early mobility, repositioning/weight shifting every 20-30 minutes when sitting, turn patient every 2 hours, proper mattress/overlay and chair cushioning, pressure relief/heel protector boots  -  DVT prophylaxis    -  Reviewed discharge options (IP rehab, SNF, HH therapy, and OP therapy)   Previously Declined (within the last year)

## 2024-05-29 NOTE — ASSESSMENT & PLAN NOTE
-Left MCA stroke without intervention  -Admit to NCC, VN following  -NSGY on board for hemicrani watch  -q1h neuro checks, vital checks  -EKG, ECHO, CXR  -A1c, TSH, lipid panel, coag panel  -Daily CBC, CMP, mag, phos  -SBP < 180, prn labetalol and hydralazine  -Daily Statin, ASA  -SCDs, hold AC until after MRI to determine stroke burden  -CT large left MCA distribution infarct with no associated acute hemorrhage  -CTA segmental occlusion measuring approximately 10 mm mid and distal M1 segment left MCA with diminished flow distal to the stenosis.  -PT/OT/SLP  -MRI pending     Pt attempted to provide a urine sample but was unable.